# Patient Record
Sex: FEMALE | Race: WHITE | NOT HISPANIC OR LATINO | Employment: PART TIME | URBAN - METROPOLITAN AREA
[De-identification: names, ages, dates, MRNs, and addresses within clinical notes are randomized per-mention and may not be internally consistent; named-entity substitution may affect disease eponyms.]

---

## 2019-04-09 ENCOUNTER — DOCUMENTATION (OUTPATIENT)
Dept: FAMILY MEDICINE CLINIC | Facility: CLINIC | Age: 54
End: 2019-04-09

## 2019-04-09 ENCOUNTER — TELEPHONE (OUTPATIENT)
Dept: FAMILY MEDICINE CLINIC | Facility: CLINIC | Age: 54
End: 2019-04-09

## 2019-04-09 DIAGNOSIS — E78.00 HYPERCHOLESTEREMIA: Primary | ICD-10-CM

## 2019-04-09 DIAGNOSIS — E55.9 VITAMIN D DEFICIENCY: ICD-10-CM

## 2019-04-10 PROBLEM — R03.0 ELEVATED BLOOD PRESSURE READING: Status: ACTIVE | Noted: 2017-11-23

## 2019-04-10 PROBLEM — G43.009 MIGRAINE WITHOUT AURA AND WITHOUT STATUS MIGRAINOSUS, NOT INTRACTABLE: Status: ACTIVE | Noted: 2017-11-23

## 2019-04-10 PROBLEM — E55.9 VITAMIN D DEFICIENCY: Status: ACTIVE | Noted: 2017-11-23

## 2019-04-10 PROBLEM — M79.7 FIBROMYALGIA: Status: ACTIVE | Noted: 2017-11-23

## 2019-04-10 PROBLEM — E78.00 HYPERCHOLESTEREMIA: Status: ACTIVE | Noted: 2017-11-23

## 2019-04-10 PROBLEM — G47.00 PERSISTENT INSOMNIA: Status: ACTIVE | Noted: 2018-02-23

## 2019-04-17 RX ORDER — NAPROXEN 500 MG/1
500 TABLET ORAL 2 TIMES DAILY PRN
COMMUNITY
Start: 2019-01-17 | End: 2019-06-21 | Stop reason: ALTCHOICE

## 2019-04-17 RX ORDER — BIOTIN 1 MG
1000 TABLET ORAL DAILY
COMMUNITY
Start: 2018-05-25 | End: 2019-12-13 | Stop reason: ALTCHOICE

## 2019-04-17 RX ORDER — IRON POLYSACCHARIDE COMPLEX 150 MG
150 CAPSULE ORAL DAILY
COMMUNITY
Start: 2018-05-25 | End: 2019-07-11

## 2019-04-17 RX ORDER — SUMATRIPTAN 100 MG/1
100 TABLET, FILM COATED ORAL AS NEEDED
COMMUNITY
Start: 2019-01-17 | End: 2019-07-11 | Stop reason: SDUPTHER

## 2019-04-24 ENCOUNTER — TELEPHONE (OUTPATIENT)
Dept: FAMILY MEDICINE CLINIC | Facility: CLINIC | Age: 54
End: 2019-04-24

## 2019-04-24 DIAGNOSIS — E61.1 LOW IRON: ICD-10-CM

## 2019-04-24 DIAGNOSIS — N92.6 ABNORMAL MENSTRUAL PERIODS: Primary | ICD-10-CM

## 2019-04-26 ENCOUNTER — TELEPHONE (OUTPATIENT)
Dept: FAMILY MEDICINE CLINIC | Facility: CLINIC | Age: 54
End: 2019-04-26

## 2019-05-15 ENCOUNTER — LAB (OUTPATIENT)
Dept: LAB | Facility: CLINIC | Age: 54
End: 2019-05-15
Payer: COMMERCIAL

## 2019-05-15 DIAGNOSIS — E78.00 HYPERCHOLESTEREMIA: ICD-10-CM

## 2019-05-15 DIAGNOSIS — E61.1 LOW IRON: ICD-10-CM

## 2019-05-15 DIAGNOSIS — N92.6 ABNORMAL MENSTRUAL PERIODS: ICD-10-CM

## 2019-05-15 DIAGNOSIS — E55.9 VITAMIN D DEFICIENCY: ICD-10-CM

## 2019-05-15 LAB
25(OH)D3 SERPL-MCNC: 22.4 NG/ML (ref 30–100)
ALBUMIN SERPL BCP-MCNC: 3.7 G/DL (ref 3.5–5)
ALP SERPL-CCNC: 81 U/L (ref 46–116)
ALT SERPL W P-5'-P-CCNC: 24 U/L (ref 12–78)
ANION GAP SERPL CALCULATED.3IONS-SCNC: 3 MMOL/L (ref 4–13)
AST SERPL W P-5'-P-CCNC: 14 U/L (ref 5–45)
BASOPHILS # BLD AUTO: 0.03 THOUSANDS/ΜL (ref 0–0.1)
BASOPHILS NFR BLD AUTO: 0 % (ref 0–1)
BILIRUB SERPL-MCNC: 0.56 MG/DL (ref 0.2–1)
BUN SERPL-MCNC: 8 MG/DL (ref 5–25)
CALCIUM SERPL-MCNC: 8.4 MG/DL (ref 8.3–10.1)
CHLORIDE SERPL-SCNC: 105 MMOL/L (ref 100–108)
CHOLEST SERPL-MCNC: 221 MG/DL (ref 50–200)
CO2 SERPL-SCNC: 30 MMOL/L (ref 21–32)
CREAT SERPL-MCNC: 0.7 MG/DL (ref 0.6–1.3)
EOSINOPHIL # BLD AUTO: 0.15 THOUSAND/ΜL (ref 0–0.61)
EOSINOPHIL NFR BLD AUTO: 2 % (ref 0–6)
ERYTHROCYTE [DISTWIDTH] IN BLOOD BY AUTOMATED COUNT: 13 % (ref 11.6–15.1)
FERRITIN SERPL-MCNC: 14 NG/ML (ref 8–388)
FSH SERPL-ACNC: 26.4 MIU/ML
GFR SERPL CREATININE-BSD FRML MDRD: 101 ML/MIN/1.73SQ M
GLUCOSE P FAST SERPL-MCNC: 77 MG/DL (ref 65–99)
HCT VFR BLD AUTO: 46.9 % (ref 34.8–46.1)
HDLC SERPL-MCNC: 54 MG/DL (ref 40–60)
HGB BLD-MCNC: 14.9 G/DL (ref 11.5–15.4)
IMM GRANULOCYTES # BLD AUTO: 0.06 THOUSAND/UL (ref 0–0.2)
IMM GRANULOCYTES NFR BLD AUTO: 1 % (ref 0–2)
LDLC SERPL CALC-MCNC: 145 MG/DL (ref 0–100)
LH SERPL-ACNC: 20.3 MIU/ML
LYMPHOCYTES # BLD AUTO: 1.91 THOUSANDS/ΜL (ref 0.6–4.47)
LYMPHOCYTES NFR BLD AUTO: 26 % (ref 14–44)
MCH RBC QN AUTO: 28 PG (ref 26.8–34.3)
MCHC RBC AUTO-ENTMCNC: 31.8 G/DL (ref 31.4–37.4)
MCV RBC AUTO: 88 FL (ref 82–98)
MONOCYTES # BLD AUTO: 0.57 THOUSAND/ΜL (ref 0.17–1.22)
MONOCYTES NFR BLD AUTO: 8 % (ref 4–12)
NEUTROPHILS # BLD AUTO: 4.62 THOUSANDS/ΜL (ref 1.85–7.62)
NEUTS SEG NFR BLD AUTO: 63 % (ref 43–75)
NONHDLC SERPL-MCNC: 167 MG/DL
NRBC BLD AUTO-RTO: 0 /100 WBCS
PLATELET # BLD AUTO: 231 THOUSANDS/UL (ref 149–390)
PMV BLD AUTO: 10.9 FL (ref 8.9–12.7)
POTASSIUM SERPL-SCNC: 3.9 MMOL/L (ref 3.5–5.3)
PROT SERPL-MCNC: 7.1 G/DL (ref 6.4–8.2)
RBC # BLD AUTO: 5.33 MILLION/UL (ref 3.81–5.12)
SODIUM SERPL-SCNC: 138 MMOL/L (ref 136–145)
TRIGL SERPL-MCNC: 111 MG/DL
WBC # BLD AUTO: 7.34 THOUSAND/UL (ref 4.31–10.16)

## 2019-05-15 PROCEDURE — 83002 ASSAY OF GONADOTROPIN (LH): CPT

## 2019-05-15 PROCEDURE — 36415 COLL VENOUS BLD VENIPUNCTURE: CPT

## 2019-05-15 PROCEDURE — 82306 VITAMIN D 25 HYDROXY: CPT

## 2019-05-15 PROCEDURE — 83001 ASSAY OF GONADOTROPIN (FSH): CPT

## 2019-05-15 PROCEDURE — 80061 LIPID PANEL: CPT

## 2019-05-15 PROCEDURE — 85025 COMPLETE CBC W/AUTO DIFF WBC: CPT

## 2019-05-15 PROCEDURE — 82728 ASSAY OF FERRITIN: CPT

## 2019-05-15 PROCEDURE — 80053 COMPREHEN METABOLIC PANEL: CPT

## 2019-05-16 ENCOUNTER — TELEPHONE (OUTPATIENT)
Dept: NEUROLOGY | Facility: CLINIC | Age: 54
End: 2019-05-16

## 2019-05-17 ENCOUNTER — OFFICE VISIT (OUTPATIENT)
Dept: FAMILY MEDICINE CLINIC | Facility: CLINIC | Age: 54
End: 2019-05-17
Payer: COMMERCIAL

## 2019-05-17 VITALS
WEIGHT: 156.8 LBS | HEIGHT: 62 IN | OXYGEN SATURATION: 99 % | RESPIRATION RATE: 16 BRPM | SYSTOLIC BLOOD PRESSURE: 136 MMHG | BODY MASS INDEX: 28.85 KG/M2 | HEART RATE: 76 BPM | TEMPERATURE: 97.6 F | DIASTOLIC BLOOD PRESSURE: 90 MMHG

## 2019-05-17 DIAGNOSIS — N95.1 PERIMENOPAUSE: ICD-10-CM

## 2019-05-17 DIAGNOSIS — Z00.00 WELL ADULT EXAM: ICD-10-CM

## 2019-05-17 DIAGNOSIS — Z12.31 SCREENING MAMMOGRAM, ENCOUNTER FOR: ICD-10-CM

## 2019-05-17 DIAGNOSIS — J30.2 SEASONAL ALLERGIES: ICD-10-CM

## 2019-05-17 DIAGNOSIS — G43.009 MIGRAINE WITHOUT AURA AND WITHOUT STATUS MIGRAINOSUS, NOT INTRACTABLE: Primary | ICD-10-CM

## 2019-05-17 DIAGNOSIS — G47.00 PERSISTENT INSOMNIA: ICD-10-CM

## 2019-05-17 DIAGNOSIS — Z12.11 SCREENING FOR COLON CANCER: ICD-10-CM

## 2019-05-17 DIAGNOSIS — R79.0 LOW FERRITIN: ICD-10-CM

## 2019-05-17 DIAGNOSIS — M79.7 FIBROMYALGIA: ICD-10-CM

## 2019-05-17 DIAGNOSIS — E55.9 VITAMIN D DEFICIENCY: ICD-10-CM

## 2019-05-17 DIAGNOSIS — R03.0 ELEVATED BLOOD PRESSURE READING: ICD-10-CM

## 2019-05-17 DIAGNOSIS — E78.00 HYPERCHOLESTEREMIA: ICD-10-CM

## 2019-05-17 PROCEDURE — 99214 OFFICE O/P EST MOD 30 MIN: CPT | Performed by: FAMILY MEDICINE

## 2019-05-17 PROCEDURE — 99396 PREV VISIT EST AGE 40-64: CPT | Performed by: FAMILY MEDICINE

## 2019-05-17 RX ORDER — ONDANSETRON HYDROCHLORIDE 8 MG/1
8 TABLET, FILM COATED ORAL EVERY 8 HOURS PRN
Qty: 20 TABLET | Refills: 0 | Status: SHIPPED | OUTPATIENT
Start: 2019-05-17 | End: 2019-11-22 | Stop reason: ALTCHOICE

## 2019-05-17 RX ORDER — AMITRIPTYLINE HYDROCHLORIDE 25 MG/1
25 TABLET, FILM COATED ORAL
Qty: 30 TABLET | Refills: 1 | Status: SHIPPED | OUTPATIENT
Start: 2019-05-17 | End: 2019-07-26 | Stop reason: SDUPTHER

## 2019-05-17 RX ORDER — RIZATRIPTAN BENZOATE 10 MG/1
10 TABLET ORAL ONCE AS NEEDED
Qty: 9 TABLET | Refills: 0 | Status: SHIPPED | OUTPATIENT
Start: 2019-05-17 | End: 2019-06-21

## 2019-05-20 ENCOUNTER — TELEPHONE (OUTPATIENT)
Dept: FAMILY MEDICINE CLINIC | Facility: CLINIC | Age: 54
End: 2019-05-20

## 2019-05-20 DIAGNOSIS — H66.001 NON-RECURRENT ACUTE SUPPURATIVE OTITIS MEDIA OF RIGHT EAR WITHOUT SPONTANEOUS RUPTURE OF TYMPANIC MEMBRANE: Primary | ICD-10-CM

## 2019-05-20 RX ORDER — AMOXICILLIN 500 MG/1
500 CAPSULE ORAL 2 TIMES DAILY
Qty: 14 CAPSULE | Refills: 0 | Status: SHIPPED | OUTPATIENT
Start: 2019-05-20 | End: 2019-05-27

## 2019-05-29 ENCOUNTER — TELEPHONE (OUTPATIENT)
Dept: FAMILY MEDICINE CLINIC | Facility: CLINIC | Age: 54
End: 2019-05-29

## 2019-05-29 DIAGNOSIS — T36.95XA ANTIBIOTIC-INDUCED YEAST INFECTION: Primary | ICD-10-CM

## 2019-05-29 DIAGNOSIS — B37.9 ANTIBIOTIC-INDUCED YEAST INFECTION: Primary | ICD-10-CM

## 2019-05-30 RX ORDER — FLUCONAZOLE 150 MG/1
150 TABLET ORAL ONCE
Qty: 2 TABLET | Refills: 0 | Status: SHIPPED | OUTPATIENT
Start: 2019-05-30 | End: 2019-05-30

## 2019-06-21 ENCOUNTER — OFFICE VISIT (OUTPATIENT)
Dept: FAMILY MEDICINE CLINIC | Facility: CLINIC | Age: 54
End: 2019-06-21
Payer: COMMERCIAL

## 2019-06-21 VITALS
DIASTOLIC BLOOD PRESSURE: 90 MMHG | SYSTOLIC BLOOD PRESSURE: 140 MMHG | HEART RATE: 90 BPM | OXYGEN SATURATION: 96 % | TEMPERATURE: 98.1 F | WEIGHT: 158 LBS | HEIGHT: 62 IN | BODY MASS INDEX: 29.08 KG/M2

## 2019-06-21 DIAGNOSIS — M79.7 FIBROMYALGIA: ICD-10-CM

## 2019-06-21 DIAGNOSIS — G43.009 MIGRAINE WITHOUT AURA AND WITHOUT STATUS MIGRAINOSUS, NOT INTRACTABLE: Primary | ICD-10-CM

## 2019-06-21 DIAGNOSIS — R03.0 ELEVATED BLOOD PRESSURE READING: ICD-10-CM

## 2019-06-21 PROCEDURE — 3008F BODY MASS INDEX DOCD: CPT | Performed by: FAMILY MEDICINE

## 2019-06-21 PROCEDURE — 1036F TOBACCO NON-USER: CPT | Performed by: FAMILY MEDICINE

## 2019-06-21 PROCEDURE — 99214 OFFICE O/P EST MOD 30 MIN: CPT | Performed by: FAMILY MEDICINE

## 2019-06-21 RX ORDER — PROPRANOLOL HCL 60 MG
60 CAPSULE, EXTENDED RELEASE 24HR ORAL DAILY
Qty: 30 CAPSULE | Refills: 1 | Status: SHIPPED | OUTPATIENT
Start: 2019-06-21 | End: 2019-09-05 | Stop reason: ALTCHOICE

## 2019-07-11 ENCOUNTER — LAB (OUTPATIENT)
Dept: LAB | Facility: CLINIC | Age: 54
End: 2019-07-11
Payer: COMMERCIAL

## 2019-07-11 ENCOUNTER — OFFICE VISIT (OUTPATIENT)
Dept: FAMILY MEDICINE CLINIC | Facility: CLINIC | Age: 54
End: 2019-07-11
Payer: COMMERCIAL

## 2019-07-11 ENCOUNTER — TELEPHONE (OUTPATIENT)
Dept: FAMILY MEDICINE CLINIC | Facility: CLINIC | Age: 54
End: 2019-07-11

## 2019-07-11 VITALS
RESPIRATION RATE: 18 BRPM | TEMPERATURE: 97.7 F | WEIGHT: 158.2 LBS | HEIGHT: 62 IN | BODY MASS INDEX: 29.11 KG/M2 | SYSTOLIC BLOOD PRESSURE: 128 MMHG | HEART RATE: 90 BPM | DIASTOLIC BLOOD PRESSURE: 78 MMHG | OXYGEN SATURATION: 99 %

## 2019-07-11 DIAGNOSIS — R21 RASH: ICD-10-CM
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
## 2019-07-11 DIAGNOSIS — G43.009 MIGRAINE WITHOUT AURA AND WITHOUT STATUS MIGRAINOSUS, NOT INTRACTABLE: ICD-10-CM
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
## 2019-07-11 PROCEDURE — 1036F TOBACCO NON-USER: CPT | Performed by: FAMILY MEDICINE

## 2019-07-11 PROCEDURE — 3008F BODY MASS INDEX DOCD: CPT | Performed by: FAMILY MEDICINE

## 2019-07-11 PROCEDURE — 99214 OFFICE O/P EST MOD 30 MIN: CPT | Performed by: FAMILY MEDICINE

## 2019-07-11 PROCEDURE — 36415 COLL VENOUS BLD VENIPUNCTURE: CPT

## 2019-07-11 PROCEDURE — 86618 LYME DISEASE ANTIBODY: CPT

## 2019-07-11 RX ORDER — RIZATRIPTAN BENZOATE 10 MG/1
10 TABLET ORAL
COMMUNITY
End: 2019-07-11

## 2019-07-11 RX ORDER — METHYLPREDNISOLONE 4 MG/1
TABLET ORAL
Refills: 0 | COMMUNITY
Start: 2019-06-27 | End: 2019-07-11

## 2019-07-11 RX ORDER — DOXYCYCLINE HYCLATE 100 MG/1
200 CAPSULE ORAL EVERY 12 HOURS SCHEDULED
Qty: 2 CAPSULE | Refills: 0 | Status: SHIPPED | OUTPATIENT
Start: 2019-07-11 | End: 2019-07-12

## 2019-07-11 RX ORDER — FERROUS SULFATE 325(65) MG
325 TABLET ORAL
COMMUNITY

## 2019-07-11 RX ORDER — BETAMETHASONE DIPROPIONATE 0.5 MG/G
CREAM TOPICAL 2 TIMES DAILY
Qty: 15 G | Refills: 0 | Status: SHIPPED | OUTPATIENT
Start: 2019-07-11 | End: 2019-11-22 | Stop reason: ALTCHOICE

## 2019-07-11 RX ORDER — SUMATRIPTAN 100 MG/1
100 TABLET, FILM COATED ORAL AS NEEDED
Qty: 30 TABLET | Refills: 2 | Status: SHIPPED | OUTPATIENT
Start: 2019-07-11 | End: 2020-01-17 | Stop reason: SDUPTHER

## 2019-07-11 NOTE — PROGRESS NOTES
Assessment/Plan:   Willy Chawla was seen today for tick removal     Diagnoses and all orders for this visit:    Tick bite, initial encounter  See below  -     doxycycline hyclate (VIBRAMYCIN) 100 mg capsule; Take 2 capsules (200 mg total) by mouth every 12 (twelve) hours for 1 dose  -     betamethasone dipropionate (DIPROSONE) 0 05 % cream; Apply topically 2 (two) times a day 2 weeks maximum  Do not use on face or groin  -     Lyme Antibody Profile with reflex to WB; Future    Rash  Pt concerned this is Lyme disease  Rash more consistent with an allergic reaction  With high lyme prevelent area will treat with one time dose of Doxy 200 mg, prescription betamethasone to itchy area  Difficult to assess symptoms of lyme as these are her baseline symptoms  Recommend wait a few weeks to check Lyme titer unless her baseline symptoms increase    -     doxycycline hyclate (VIBRAMYCIN) 100 mg capsule; Take 2 capsules (200 mg total) by mouth every 12 (twelve) hours for 1 dose  -     betamethasone dipropionate (DIPROSONE) 0 05 % cream; Apply topically 2 (two) times a day 2 weeks maximum  Do not use on face or groin  -     Lyme Antibody Profile with reflex to WB; Future    Migraine without aura and without status migrainosus, not intractable  -     SUMAtriptan (IMITREX) 100 mg tablet; Take 1 tablet (100 mg total) by mouth as needed for migraine      There are no Patient Instructions on file for this visit  Return for Keep scheduled appointment  Subjective:    HPI  Willy Chawla is a 48 y o  female who presents with:  Chief Complaint     Tick Removal        HPI     Tick Removal      Additional comments: tick bite on right shoulder blade  bright red spot  was itching before, not currently  noticed it was itching on tuesday          Last edited by Barney Varner MA on 7/11/2019  1:12 PM  (History)        ---Above per clinical staff & reviewed  ---        2 days ago noted something on back  Itchy   Not painful   No fevers or chills   Normal joint aches and pains  Fatigue but feels it may be medicine  She did not see any tick or insect -   Just sees ticks in her area so assumed it was a tick   Area very itchy     Headaches are definitely better  Using imitrex less often  Got her period again  Headaches not as severe  Blood pressure much better on Inderal  Will cancel nurse visit for blood pressure check  No side effects of inderal      The following portions of the patient's history were reviewed and updated as appropriate: allergies, current medications, past family history, past medical history, past social history, past surgical history and problem list   Review of Systems  ROS:  all others negative - no chest pain, SOB, normal urine and bowels  no GERD  sleeping well  mood good     Objective:    /78   Pulse 90   Temp 97 7 °F (36 5 °C) (Tympanic)   Resp 18   Ht 5' 1 81" (1 57 m)   Wt 71 8 kg (158 lb 3 2 oz)   SpO2 99%   BMI 29 11 kg/m²   Wt Readings from Last 3 Encounters:   07/11/19 71 8 kg (158 lb 3 2 oz)   06/21/19 71 7 kg (158 lb)   05/17/19 71 1 kg (156 lb 12 8 oz)     BP Readings from Last 3 Encounters:   07/11/19 128/78   06/21/19 140/90   05/17/19 136/90     Current Outpatient Medications   Medication Sig Dispense Refill    amitriptyline (ELAVIL) 25 mg tablet Take 1 tablet (25 mg total) by mouth daily at bedtime 30 tablet 1    Cholecalciferol (VITAMIN D3) 1000 units CAPS Take 1,000 Units by mouth daily      ferrous sulfate 325 (65 Fe) mg tablet Take 325 mg by mouth      ondansetron (ZOFRAN) 8 mg tablet Take 1 tablet (8 mg total) by mouth every 8 (eight) hours as needed for nausea or vomiting (with migraine) 20 tablet 0    propranolol (INDERAL LA) 60 mg 24 hr capsule Take 1 capsule (60 mg total) by mouth daily 30 capsule 1    SUMAtriptan (IMITREX) 100 mg tablet Take 1 tablet (100 mg total) by mouth as needed for migraine 30 tablet 2    betamethasone dipropionate (DIPROSONE) 0 05 % cream Apply topically 2 (two) times a day 2 weeks maximum  Do not use on face or groin  15 g 0     No current facility-administered medications for this visit  Physical Exam   Skin:           Constitutional: she appears well-developed and well-nourished  HENT: Head: Normocephalic  Right Ear: External ear normal  Tympanic membrane normal    Left Ear: External ear normal  Tympanic membrane normal    Nose: Nose normal  No mucosal edema, No rhinorrhea  Right sinus exhibits no maxillary sinus tenderness  Left sinus exhibits no maxillary sinus tenderness  Mouth/Throat: Oropharynx is clear and moist    Eyes: Normal conjunctiva  No erythema  No discharge  Neck: No pain on exam  Neck supple  Cardiovascular: Normal rate, regular rhythm and normal heart sounds  Pulmonary/Chest: Effort normal and breath sounds normal    Abdominal: Soft  Bowel sounds are normal  There is no tenderness  Lymphadenopathy: she has no cervical adenopathy  Neurological: she is alert and oriented to person, place, and time  Skin: Skin is warm and dry  Psychiatric: she has a normal mood and affect   her behavior is normal

## 2019-07-12 NOTE — TELEPHONE ENCOUNTER
I assume this is in regards to the doxycycline - 2 tablets one time for a total of 200 mg once  Please call that in

## 2019-07-13 LAB
B BURGDOR IGG SER IA-ACNC: 0.06
B BURGDOR IGM SER IA-ACNC: 0.66

## 2019-07-14 NOTE — RESULT ENCOUNTER NOTE
Call patient with lab results - Lyme is negative  Please order another test to be done in 4 weeks to ensure it remains negative  (use dx tick bite and rash)

## 2019-07-14 NOTE — ASSESSMENT & PLAN NOTE
Failed Maxalt  Using Imitrex less since starting elavil, Inderal along with ferrous sulfate and vitamin D  Will refill Imitrex today  Did get bad migraine with her menses, her first one in 9 months

## 2019-07-15 ENCOUNTER — TELEPHONE (OUTPATIENT)
Dept: FAMILY MEDICINE CLINIC | Facility: CLINIC | Age: 54
End: 2019-07-15
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
## 2019-07-15 DIAGNOSIS — R21 RASH: ICD-10-CM

## 2019-07-26 DIAGNOSIS — G43.009 MIGRAINE WITHOUT AURA AND WITHOUT STATUS MIGRAINOSUS, NOT INTRACTABLE: ICD-10-CM

## 2019-07-29 NOTE — TELEPHONE ENCOUNTER
Medication: Amitriptyline 25mg  Last office visit: 7/11/19  Next office visit: 9/27/19  Labs: 5/15/19  Last refilled: 5/17/19  Pharmacy: on file

## 2019-07-30 RX ORDER — AMITRIPTYLINE HYDROCHLORIDE 25 MG/1
TABLET, FILM COATED ORAL
Qty: 30 TABLET | Refills: 2 | Status: SHIPPED | OUTPATIENT
Start: 2019-07-30 | End: 2019-11-03 | Stop reason: SDUPTHER

## 2019-08-21 ENCOUNTER — TELEPHONE (OUTPATIENT)
Dept: FAMILY MEDICINE CLINIC | Facility: CLINIC | Age: 54
End: 2019-08-21

## 2019-08-21 NOTE — TELEPHONE ENCOUNTER
Patient called, was seen on 7/11/19 for possible lymes r/t tick bite  Per patient, rash present and has grown 4 times in the size since her visit with us  Tender to the touch, hard, warm to the touch  Was prescribed doxy and completed the course  Patient is out of state in [facility]  Will be home Sunday  Spoke to Dr Jose Eduardo Rosales - patient should seek treatment at an Urgent Care  Advised patient and she agreed  Will call our office if a follow up is necessary for when she returns home  Will have Urgent Care send us results and OV note

## 2019-09-03 ENCOUNTER — TELEPHONE (OUTPATIENT)
Dept: FAMILY MEDICINE CLINIC | Facility: CLINIC | Age: 54
End: 2019-09-03

## 2019-09-03 NOTE — TELEPHONE ENCOUNTER
Please schedule a visit here first and we can refer her if needed  (there is a 4 month wait to get into derm)

## 2019-09-03 NOTE — TELEPHONE ENCOUNTER
Hi Dr Woody Marlow  Placed call to patient  Patient informed visit Urgent Care was told possible cellulitis  Patient is requesting referral to dermatology  Site remains red and irritated  Thank you

## 2019-09-03 NOTE — TELEPHONE ENCOUNTER
Concern: tick bite  Symptoms: not better, red and enlarged  Duration: since 7/11/19  Remedies tried at home for relief:  Dr Jacinto Smith gave her a one time antibiotics, was seen at urgent care 2 weeks ago because site was red, warm and itchy  Finished antibiotics from urgent care  She wondering if she should be seen by Derm?

## 2019-09-05 ENCOUNTER — LAB (OUTPATIENT)
Dept: LAB | Facility: CLINIC | Age: 54
End: 2019-09-05
Payer: COMMERCIAL

## 2019-09-05 ENCOUNTER — OFFICE VISIT (OUTPATIENT)
Dept: FAMILY MEDICINE CLINIC | Facility: CLINIC | Age: 54
End: 2019-09-05
Payer: COMMERCIAL

## 2019-09-05 VITALS
WEIGHT: 159 LBS | HEIGHT: 62 IN | RESPIRATION RATE: 16 BRPM | TEMPERATURE: 98.6 F | BODY MASS INDEX: 29.26 KG/M2 | OXYGEN SATURATION: 90 % | HEART RATE: 61 BPM | DIASTOLIC BLOOD PRESSURE: 86 MMHG | SYSTOLIC BLOOD PRESSURE: 120 MMHG

## 2019-09-05 DIAGNOSIS — R21 RASH: Primary | ICD-10-CM

## 2019-09-05 DIAGNOSIS — A69.20 ERYTHEMA MIGRANS (LYME DISEASE): ICD-10-CM

## 2019-09-05 PROCEDURE — 36415 COLL VENOUS BLD VENIPUNCTURE: CPT

## 2019-09-05 PROCEDURE — 99214 OFFICE O/P EST MOD 30 MIN: CPT | Performed by: FAMILY MEDICINE

## 2019-09-05 PROCEDURE — 86617 LYME DISEASE ANTIBODY: CPT

## 2019-09-05 RX ORDER — FLUCONAZOLE 150 MG/1
TABLET ORAL
Refills: 0 | COMMUNITY
Start: 2019-08-22 | End: 2019-11-22 | Stop reason: ALTCHOICE

## 2019-09-05 RX ORDER — DOXYCYCLINE 100 MG/1
100 TABLET ORAL 2 TIMES DAILY
Qty: 42 TABLET | Refills: 0 | Status: SHIPPED | OUTPATIENT
Start: 2019-09-05 | End: 2019-09-26

## 2019-09-05 NOTE — PROGRESS NOTES
Assessment/Plan:   Meri Murphy was seen today for follow-up  Diagnoses and all orders for this visit:    Rash    Erythema migrans (Lyme disease)  Rash first seen 7/10 as small area of erythema with itch and warmth after presumed bite  At that time treated with one dose of doxy in case of lyme and steroid cream given  Lyme test ordered but pt had blood work that day instead of waiting  Apparently it never got better and pt seen in [facility] 8/22/19 and given bactrim x 10 days  for presumed cellulitis  Area of erythema has actually worsened since this time  Area of initial erythema is still indurated and warm  Today diff dx reviewed with pt - again presumed to be Lyme rash with bulls eye appearance  Will start 21 day course of Doxy  Will check Lyme Western Blot  If Lyme test negative or rash not responding to antibiotics will refer to derm  -     doxycycline (ADOXA) 100 MG tablet; Take 1 tablet (100 mg total) by mouth 2 (two) times a day for 21 days  -     Lyme Antibody Profile with reflex to WB; Future        There are no Patient Instructions on file for this visit  No follow-ups on file  Subjective:    HPI  Meri Murphy is a 48 y o  female who presents with:  Chief Complaint     Follow-up        HPI     Follow-up      Additional comments: Derm referral           Last edited by Iris Kelly MA on 9/5/2019  2:52 PM  (History)        ---Above per clinical staff & reviewed  ---      Pt here because rash she came in for in July got much worse  Was seen at [facility] 8/22/19 and given bactrim x 10 days for cellulitis but this did not help   Area no longer itchy  Little sore at swollen area of bra  No new lyme symptoms - she has her usual aches, fatigue from fibro  Does not feel any new symptoms  Denies new headache or numbness         The following portions of the patient's history were reviewed and updated as appropriate: allergies, current medications, past family history, past medical history, past social history, past surgical history and problem list   Review of Systems  ROS:  all others negative - no chest pain, SOB, normal urine and bowels  no GERD  sleeping well  mood good  Objective:    /86 (BP Location: Left arm)   Pulse 61   Temp 98 6 °F (37 °C)   Resp 16   Ht 5' 1 81" (1 57 m)   Wt 72 1 kg (159 lb)   SpO2 90%   BMI 29 26 kg/m²   Wt Readings from Last 3 Encounters:   09/05/19 72 1 kg (159 lb)   07/11/19 71 8 kg (158 lb 3 2 oz)   06/21/19 71 7 kg (158 lb)     BP Readings from Last 3 Encounters:   09/05/19 120/86   07/11/19 128/78   06/21/19 140/90     Current Outpatient Medications   Medication Sig Dispense Refill    amitriptyline (ELAVIL) 25 mg tablet TAKE 1 TABLET(25 MG) BY MOUTH DAILY AT BEDTIME 30 tablet 2    betamethasone dipropionate (DIPROSONE) 0 05 % cream Apply topically 2 (two) times a day 2 weeks maximum  Do not use on face or groin  15 g 0    cholecalciferol (VITAMIN D3) 1,000 units tablet Take 1,000 Units by mouth      ferrous sulfate 325 (65 Fe) mg tablet Take 325 mg by mouth      fluconazole (DIFLUCAN) 150 mg tablet   0    ondansetron (ZOFRAN) 8 mg tablet Take 1 tablet (8 mg total) by mouth every 8 (eight) hours as needed for nausea or vomiting (with migraine) 20 tablet 0    SUMAtriptan (IMITREX) 100 mg tablet Take 1 tablet (100 mg total) by mouth as needed for migraine 30 tablet 2    Cholecalciferol (VITAMIN D3) 1000 units CAPS Take 1,000 Units by mouth daily      doxycycline (ADOXA) 100 MG tablet Take 1 tablet (100 mg total) by mouth 2 (two) times a day for 21 days 42 tablet 0     No current facility-administered medications for this visit  Physical Exam   Constitutional: she appears well-developed and well-nourished  HENT: Head: Normocephalic  Right Ear: External ear normal  Tympanic membrane normal    Left Ear: External ear normal  Tympanic membrane normal    Nose: Nose normal  No mucosal edema, No rhinorrhea  Right sinus exhibits no maxillary sinus tenderness     Left sinus exhibits no maxillary sinus tenderness  Mouth/Throat: Oropharynx is clear and moist    Eyes: Normal conjunctiva  No erythema  No discharge  Neck: No pain on exam  Neck supple  Cardiovascular: Normal rate, regular rhythm and normal heart sounds  Pulmonary/Chest: Effort normal and breath sounds normal    Abdominal: Soft  Bowel sounds are normal  There is no tenderness  Lymphadenopathy: she has no cervical pr axillary adenopathy  Neurological: she is alert and oriented to person, place, and time  Skin: large rash, entire back  See photos in Media and below   On right side, at bra line large area of warmth and induration  No areas of open skin  No oozing or purulence  No papules or pustules  Outer border irregular and erythematous, then clearing , then red again  Psychiatric: she has a normal mood and affect   her behavior is normal

## 2019-09-09 DIAGNOSIS — R21 RASH: Primary | ICD-10-CM

## 2019-09-09 LAB — MISCELLANEOUS LAB TEST RESULT: NORMAL

## 2019-09-09 NOTE — RESULT ENCOUNTER NOTE
Call patient with lab results - her lyme test is negative for Lyme disease  Ask if her rash is any better   If it is not better I would like her to stay on antibiotics and please call derm to try to get her in for an appointment  (Kisha [phone])

## 2019-09-13 ENCOUNTER — HOSPITAL ENCOUNTER (OUTPATIENT)
Dept: MAMMOGRAPHY | Facility: CLINIC | Age: 54
Discharge: HOME | End: 2019-09-13
Payer: COMMERCIAL

## 2019-09-13 VITALS — BODY MASS INDEX: 30.02 KG/M2 | HEIGHT: 61 IN | WEIGHT: 159 LBS

## 2019-09-13 DIAGNOSIS — Z12.31 SCREENING MAMMOGRAM, ENCOUNTER FOR: ICD-10-CM

## 2019-09-13 PROCEDURE — 77067 SCR MAMMO BI INCL CAD: CPT

## 2019-09-13 PROCEDURE — 77063 BREAST TOMOSYNTHESIS BI: CPT

## 2019-11-01 ENCOUNTER — OFFICE VISIT (OUTPATIENT)
Dept: FAMILY MEDICINE CLINIC | Facility: CLINIC | Age: 54
End: 2019-11-01
Payer: COMMERCIAL

## 2019-11-01 VITALS
DIASTOLIC BLOOD PRESSURE: 112 MMHG | HEART RATE: 85 BPM | WEIGHT: 160.8 LBS | OXYGEN SATURATION: 97 % | BODY MASS INDEX: 30.36 KG/M2 | SYSTOLIC BLOOD PRESSURE: 162 MMHG | TEMPERATURE: 99.1 F | HEIGHT: 61 IN

## 2019-11-01 DIAGNOSIS — H10.022 PINK EYE, LEFT: ICD-10-CM

## 2019-11-01 DIAGNOSIS — G43.009 MIGRAINE WITHOUT AURA AND WITHOUT STATUS MIGRAINOSUS, NOT INTRACTABLE: Primary | ICD-10-CM

## 2019-11-01 DIAGNOSIS — M79.7 FIBROMYALGIA: ICD-10-CM

## 2019-11-01 DIAGNOSIS — R03.0 ELEVATED BLOOD PRESSURE READING: ICD-10-CM

## 2019-11-01 DIAGNOSIS — M54.2 ONGOING CERVICAL PAIN: ICD-10-CM

## 2019-11-01 DIAGNOSIS — G89.29 ONGOING CERVICAL PAIN: ICD-10-CM

## 2019-11-01 PROCEDURE — 99214 OFFICE O/P EST MOD 30 MIN: CPT | Performed by: FAMILY MEDICINE

## 2019-11-01 PROCEDURE — 3008F BODY MASS INDEX DOCD: CPT | Performed by: FAMILY MEDICINE

## 2019-11-01 RX ORDER — POLYMYXIN B SULFATE AND TRIMETHOPRIM 1; 10000 MG/ML; [USP'U]/ML
1 SOLUTION OPHTHALMIC EVERY 4 HOURS
Qty: 10 ML | Refills: 0 | Status: SHIPPED | OUTPATIENT
Start: 2019-11-01 | End: 2019-11-22 | Stop reason: ALTCHOICE

## 2019-11-01 RX ORDER — CYCLOBENZAPRINE HCL 10 MG
10 TABLET ORAL
Qty: 30 TABLET | Refills: 1 | Status: SHIPPED | OUTPATIENT
Start: 2019-11-01 | End: 2020-04-03 | Stop reason: ALTCHOICE

## 2019-11-01 NOTE — PROGRESS NOTES
Assessment/Plan:  1  Migraine without aura and without status migrainosus, not intractable  Headaches not well controlled  These are clearly not all migraines, and pt understands that she is overusing Imitrex and having rebound headaches too  Her neck pain is also contributing to the headaches  She has failed multiple preventive medications  She will need to have these headaches managed by neuro  - Ambulatory referral to Neurology; Future    2  Elevated blood pressure reading  Blood pressure high today, she was not feeling good and in pain  Last visit she was off of inderal and blood pressure was perfect  Recommend we recheck when she is feeling better and if remains high she agrees to try a different blood pressure medication  3  Fibromyalgia  Admits to aches and pains, suspect this is contributing to her neck pain and headaches  She has massage appointment scheduled  4  Ongoing cervical pain  She is not open to going back to PT at this time, did not find that to be helpful  She has a massage scheduled  Offered appointment with ortho and she did not feel this would be helpful either  She did agree to see pain mgmt as her severe neck pain has caused her ongoing headaches  Will prescribe Flexeril to be used at bedtime as needed only  Will order x-rays to be done prior to pain mgmt appointment  - Ambulatory referral to Pain Management; Future  - XR spine cervical complete 4 or 5 vw non injury; Future  - cyclobenzaprine (FLEXERIL) 10 mg tablet; Take 1 tablet (10 mg total) by mouth daily at bedtime as needed for muscle spasms  Dispense: 30 tablet; Refill: 1    5  Pink eye, left  Warm compressed three times a day and prescription:  - polymyxin b-trimethoprim (POLYTRIM) ophthalmic solution; Administer 1 drop into the left eye every 4 (four) hours  Dispense: 10 mL; Refill: 0    Return for nurse visit week for BP check        Subjective:   Pool Stevens is a 48 y o  female here today for a follow-up on her current medical conditions:  Patient Active Problem List   Diagnosis    Elevated blood pressure reading    Fibromyalgia    Hypercholesteremia    Migraine without aura and without status migrainosus, not intractable    Persistent insomnia    Vitamin D deficiency    Low ferritin        Current Outpatient Medications   Medication Sig Dispense Refill    amitriptyline (ELAVIL) 25 mg tablet TAKE 1 TABLET(25 MG) BY MOUTH DAILY AT BEDTIME 30 tablet 2    betamethasone dipropionate (DIPROSONE) 0 05 % cream Apply topically 2 (two) times a day 2 weeks maximum  Do not use on face or groin  15 g 0    cholecalciferol (VITAMIN D3) 1,000 units tablet Take 1,000 Units by mouth      ferrous sulfate 325 (65 Fe) mg tablet Take 325 mg by mouth      fluconazole (DIFLUCAN) 150 mg tablet   0    ondansetron (ZOFRAN) 8 mg tablet Take 1 tablet (8 mg total) by mouth every 8 (eight) hours as needed for nausea or vomiting (with migraine) 20 tablet 0    SUMAtriptan (IMITREX) 100 mg tablet Take 1 tablet (100 mg total) by mouth as needed for migraine 30 tablet 2    Cholecalciferol (VITAMIN D3) 1000 units CAPS Take 1,000 Units by mouth daily      cyclobenzaprine (FLEXERIL) 10 mg tablet Take 1 tablet (10 mg total) by mouth daily at bedtime as needed for muscle spasms 30 tablet 1    polymyxin b-trimethoprim (POLYTRIM) ophthalmic solution Administer 1 drop into the left eye every 4 (four) hours 10 mL 0     No current facility-administered medications for this visit  HPI:  Chief Complaint   Patient presents with    Follow-up     Migraine, B/P     Conjunctivitis     Left eye x 1 day     Immunizations     Patient declines flu vaccine this season      -- Above per clinical staff and reviewed  --    PHQ-9 Depression Screening    PHQ-9:    Frequency of the following problems over the past two weeks:              f/u headaches  started inderal for prevention  tolerated well, helped bp too   did not find benefit with elavil  maxalt did not help  9/9 worsening rash referred to derm  LYme 3 positive IgG, 1 positive IgM  Today:  Feels like having same migraine for 5 weeks   Not finding releief on sumatriptan   Right hand and arm going numb coming from neck pain   Massage therapy appointment   Not steady   Stopped inderal after a month - feels it was hallucinations from this medication   Has not gotten period - headache started before her cycle  Taking imitrex often so tried to not take it but did not do well  Taking it almost daily  Across face, temples and into back of head   Not affected by light, little by noise, smells bother her  No nausea  Last imaging of brain 12 or 16 years ago  At that time had bad sinus infection  Red eye started yesterday  Discharge  Pain/irritation  Threw out her contacts  Some congestion few days  "slight fever" here 99 1  Never needed to see derm as rash got better  2 aleve and imitrex today for migraine  Rates pain 6-7/10  The following portions of the patient's history were reviewed and updated as appropriate: allergies, current medications, past family history, past medical history, past social history, past surgical history and problem list     Objective:  Vitals:  BP (!) 162/112   Pulse 85   Temp 99 1 °F (37 3 °C)   Ht 5' 1" (1 549 m)   Wt 72 9 kg (160 lb 12 8 oz)   SpO2 97%   BMI 30 38 kg/m²    Wt Readings from Last 3 Encounters:   11/01/19 72 9 kg (160 lb 12 8 oz)   09/13/19 72 1 kg (159 lb)   09/05/19 72 1 kg (159 lb)      BP Readings from Last 3 Encounters:   11/01/19 (!) 162/112   09/05/19 120/86   07/11/19 128/78        Review of Systems   She has no other concerns  No unexpected weight changes  No chest pain, SOB, or palpitations  No GERD  No changes in bowels or bladder  Sleeping well  No mood changes  Physical Exam   Constitutional:  she appears well-developed and well-nourished but in pain with headache  HENT: Head: Normocephalic  Left eye conjunctiva erythema, injected     Neck: Neck supple  Cardiovascular: Normal rate, regular rhythm and normal heart sounds  Pulmonary/Chest: Effort normal and breath sounds normal  No wheezes, rales, or rhonchi  Abdominal: Soft  Bowel sounds are normal  There is no tenderness  No hepatosplenomegaly  Musculoskeletal: she exhibits no edema  Lymphadenopathy: she has no cervical adenopathy  Neurological: she is alert and oriented to person, place, and time  The patient is alert and oriented to person, place, and time  CN II - XII intact  Muscle strength full and equal  Patellar reflexes normal  Negative romberg  Skin: Skin is warm and dry  Psychiatric: she has a normal mood and affect   her behavior is normal  Thought content normal

## 2019-11-03 DIAGNOSIS — G43.009 MIGRAINE WITHOUT AURA AND WITHOUT STATUS MIGRAINOSUS, NOT INTRACTABLE: ICD-10-CM

## 2019-11-04 RX ORDER — AMITRIPTYLINE HYDROCHLORIDE 25 MG/1
TABLET, FILM COATED ORAL
Qty: 30 TABLET | Refills: 5 | Status: SHIPPED | OUTPATIENT
Start: 2019-11-04 | End: 2020-04-03 | Stop reason: ALTCHOICE

## 2019-11-08 ENCOUNTER — CLINICAL SUPPORT (OUTPATIENT)
Dept: FAMILY MEDICINE CLINIC | Facility: CLINIC | Age: 54
End: 2019-11-08

## 2019-11-08 VITALS — DIASTOLIC BLOOD PRESSURE: 82 MMHG | HEART RATE: 72 BPM | SYSTOLIC BLOOD PRESSURE: 132 MMHG

## 2019-11-08 DIAGNOSIS — G43.009 MIGRAINE WITHOUT AURA AND WITHOUT STATUS MIGRAINOSUS, NOT INTRACTABLE: ICD-10-CM

## 2019-11-08 DIAGNOSIS — I10 BENIGN ESSENTIAL HYPERTENSION: Primary | ICD-10-CM

## 2019-11-08 NOTE — PROGRESS NOTES
Patient came in for a BP nurse visit  Patient is taking Elavil 25 mg and Flexeril 10 mg  Per patient she was on Inderal previously but caused hallucinations  Patient stated Dr Paras Luciano was going to prescribe additional medications based off of BP visit and when she sees neurologist   Patients neurologist appointment is in March 2020  No refills needed at this time

## 2019-11-11 RX ORDER — TIMOLOL MALEATE 10 MG/1
10 TABLET ORAL 2 TIMES DAILY WITH MEALS
Qty: 60 TABLET | Refills: 1 | Status: SHIPPED | OUTPATIENT
Start: 2019-11-11 | End: 2020-01-17 | Stop reason: SINTOL

## 2019-11-11 NOTE — PROGRESS NOTES
Placed call to patient, reviewed medication instructions  Should patient continue to take Elavil 25 mg and Flexeril 10 mg in additional to the Timolol? Patient inquired about neck XRAY - xray on file and will mail to patient

## 2019-11-11 NOTE — PROGRESS NOTES
Please let pt know I want her to try a different blood pressure med that is also good for migraines  We will start with a low dose - 10 mg twice a day  Please have her schedule a nurse visit in 4 weeks for nurse visit for blood pressure check

## 2019-11-11 NOTE — PROGRESS NOTES
Spoke to Dr Paras Luciano, the Timolol should be taken in addition to the Elavil and Flexeril  Placed call to patient, advised to take Timolol in addition to the current medications  Patient understood

## 2019-11-22 ENCOUNTER — APPOINTMENT (OUTPATIENT)
Dept: RADIOLOGY | Facility: MEDICAL CENTER | Age: 54
End: 2019-11-22
Payer: COMMERCIAL

## 2019-11-22 ENCOUNTER — CONSULT (OUTPATIENT)
Dept: PAIN MEDICINE | Facility: MEDICAL CENTER | Age: 54
End: 2019-11-22
Payer: COMMERCIAL

## 2019-11-22 VITALS
HEIGHT: 62 IN | SYSTOLIC BLOOD PRESSURE: 148 MMHG | HEART RATE: 74 BPM | BODY MASS INDEX: 29.63 KG/M2 | DIASTOLIC BLOOD PRESSURE: 91 MMHG | WEIGHT: 161 LBS

## 2019-11-22 DIAGNOSIS — M54.2 ONGOING CERVICAL PAIN: ICD-10-CM

## 2019-11-22 DIAGNOSIS — M47.812 CERVICAL SPONDYLOSIS: Primary | ICD-10-CM

## 2019-11-22 DIAGNOSIS — G89.29 ONGOING CERVICAL PAIN: ICD-10-CM

## 2019-11-22 DIAGNOSIS — M54.12 CERVICAL RADICULITIS: ICD-10-CM

## 2019-11-22 DIAGNOSIS — G44.86 CERVICOGENIC HEADACHE: ICD-10-CM

## 2019-11-22 PROCEDURE — 72050 X-RAY EXAM NECK SPINE 4/5VWS: CPT

## 2019-11-22 PROCEDURE — 99244 OFF/OP CNSLTJ NEW/EST MOD 40: CPT | Performed by: PHYSICAL MEDICINE & REHABILITATION

## 2019-11-22 NOTE — PROGRESS NOTES
Assessment  1  Cervical spondylosis    2  Ongoing cervical pain    3  Cervicogenic headache    4  Cervical radiculitis        Plan  1  Patient will obtain her cervical spine x-ray hopefully today and try to have this copy to me  2  Patient will obtain right upper extremity EMG  3  Consider cervical medial branch nerve blocks for neck and headache pain  4  May consider cervical spine MRI depending on the results of the EMG, if she does have evidence of cervical radiculopathy will pursue cervical epidural steroid injection   5  Recommend neurology consultation regarding chronic migraine    My impressions and treatment recommendations were discussed in detail with the patient who verbalized understanding and had no further questions  Discharge instructions were provided  I personally saw and examined the patient and I agree with the above discussed plan of care  Orders Placed This Encounter   Procedures    EMG 1 Limb     Standing Status:   Future     Standing Expiration Date:   11/22/2020     Order Specific Question:   Location:     Answer:   Right upper     Order Specific Question:   Reason for Exam:     Answer:   right arm pain and paresthesias     Order Specific Question:   Possible Diagnosis: Answer:   median neuropathy (carpal tunnel syndrome)     Order Specific Question:   Possible Diagnosis: Answer:   cervical neuropathy     New Medications Ordered This Visit   Medications    magnesium gluconate (MAGONATE) 500 mg tablet     Sig: Take 800 mg by mouth daily at bedtime       History of Present Illness    Amelia Timmons is a 48 y o  female seen in consultation at the request of Dr Kobe Malave regarding chronic neck pain and presumed cervicogenic headache  The patient has been experiencing symptoms for a number of years related to her neck pain but more recently has been developing paresthesias and numbness in the right upper extremity which is concerning to her      She is unable to determine any inciting event or trauma  She describes moderate to severe intensity symptoms rated as a 5 to 8/10 which are nearly constant without any typical pattern  She experiences numbness and pins and needle sensation in the arm and has sharp pain, pressure-like pain, and throbbing in her neck  Aggravating factors are unknown  She is unable to determine any significant alleviating factors  Past medical history is significant for fibromyalgia  She has tried physical therapy in the past without relief, moderate relief of local heat or ice application, acupuncture, and chiropractic manipulation  I have personally reviewed and/or updated the patient's past medical history, past surgical history, family history, social history, current medications, allergies, and vital signs today  Review of Systems   Constitutional: Negative for fever and unexpected weight change  HENT: Negative for trouble swallowing  Eyes: Positive for visual disturbance  Respiratory: Negative for shortness of breath and wheezing  Cardiovascular: Negative for chest pain and palpitations  Gastrointestinal: Negative for constipation, diarrhea, nausea and vomiting  Endocrine: Negative for cold intolerance, heat intolerance and polydipsia  Genitourinary: Negative for difficulty urinating and frequency  Musculoskeletal: Positive for neck pain (posterior)  Negative for arthralgias, gait problem, joint swelling and myalgias  Skin: Negative for rash  Neurological: Positive for numbness (right arm ) and headaches  Negative for dizziness, seizures, syncope and weakness  Hematological: Does not bruise/bleed easily  Psychiatric/Behavioral: Negative for dysphoric mood  All other systems reviewed and are negative        Patient Active Problem List   Diagnosis    Elevated blood pressure reading    Fibromyalgia    Hypercholesteremia    Migraine without aura and without status migrainosus, not intractable    Persistent insomnia    Vitamin D deficiency    Low ferritin       Past Medical History:   Diagnosis Date    Fibromyalgia     Migraine     Neck pain     Osteoarthritis     lumbar spine       Past Surgical History:   Procedure Laterality Date    CHOLECYSTECTOMY      DILATION AND CURETTAGE OF UTERUS      TUBAL LIGATION         Family History   Problem Relation Age of Onset    MORGAN disease Mother     Heart attack Father     Diabetes Father     Prostate cancer Father 79   [facility] Lup[facility] Sister     Raynaud syndrome Sister     Sjogren's syndrome Sister     Breast cancer Maternal Grandmother 79    No Known Problems Paternal Grandfather     No Known Problems Daughter     Prostate cancer Maternal Grandfather 79    Colon cancer Paternal Grandmother [de-identified]    No Known Problems Sister     No Known Problems Brother     No Known Problems Son     No Known Problems Son     No Known Problems Son     No Known Problems Son     No Known Problems Son     No Known Problems Maternal Uncle     No Known Problems Paternal Aunt     No Known Problems Paternal Uncle        Social History     Occupational History    Occupation: Administration   Tobacco Use    Smoking status: Never Smoker    Smokeless tobacco: Never Used   Substance and Sexual Activity    Alcohol use: Yes     Frequency: 2-4 times a month     Drinks per session: 1 or 2     Binge frequency: Never    Drug use: Never    Sexual activity: Yes     Partners: Male     Birth control/protection: Female Sterilization       Current Outpatient Medications on File Prior to Visit   Medication Sig    amitriptyline (ELAVIL) 25 mg tablet TAKE 1 TABLET(25 MG) BY MOUTH DAILY AT BEDTIME    cholecalciferol (VITAMIN D3) 1,000 units tablet Take 1,000 Units by mouth    cyclobenzaprine (FLEXERIL) 10 mg tablet Take 1 tablet (10 mg total) by mouth daily at bedtime as needed for muscle spasms    ferrous sulfate 325 (65 Fe) mg tablet Take 325 mg by mouth    magnesium gluconate (MAGONATE) 500 mg tablet Take 800 mg by mouth daily at bedtime    SUMAtriptan (IMITREX) 100 mg tablet Take 1 tablet (100 mg total) by mouth as needed for migraine    timolol (BLOCADREN) 10 mg tablet Take 1 tablet (10 mg total) by mouth 2 (two) times a day with meals    Cholecalciferol (VITAMIN D3) 1000 units CAPS Take 1,000 Units by mouth daily    [DISCONTINUED] betamethasone dipropionate (DIPROSONE) 0 05 % cream Apply topically 2 (two) times a day 2 weeks maximum  Do not use on face or groin   [DISCONTINUED] fluconazole (DIFLUCAN) 150 mg tablet     [DISCONTINUED] ondansetron (ZOFRAN) 8 mg tablet Take 1 tablet (8 mg total) by mouth every 8 (eight) hours as needed for nausea or vomiting (with migraine)    [DISCONTINUED] polymyxin b-trimethoprim (POLYTRIM) ophthalmic solution Administer 1 drop into the left eye every 4 (four) hours     No current facility-administered medications on file prior to visit  Allergies   Allergen Reactions    Propranolol Other (See Comments)     Halluncinations    Strawberry Extract      Other reaction(s): Other (See Comments)  Congestion  Physical Exam    /91   Pulse 74   Ht 5' 2" (1 575 m)   Wt 73 kg (161 lb)   BMI 29 45 kg/m²     CERVICAL  General: Well-developed, well-nourished individual in no acute distress  Mental: Appropriate mood and affect  Grossly oriented with coherent speech and thought processing   Neuro:  Cranial nerves: Cranial nerve function is grossly intact bilaterally   Strength: Bilateral upper extremity strength is normal and symmetric   No atrophy or tone abnormalities noted   Reflexes: Bilateral upper extremity muscle stretch reflexes are physiologic and symmetric   No Hoover sign   Sensation: No loss of sensation is noted   Foraminal Compression Maneuvers:  Spurling sign is equivocal on the right        Gait:  Gait/gross motor: Gait is normal  Station is normal      Musculoskeletal:  Palpation: Inspection and palpation of the spine and extremities are unremarkable except for significant tenderness to palpation along the cervical facet joints reproducing some of her pain complaint  Spine:  Significant limitation in range of motion of cervical extension and rotation to either direction which reproduces pain    No gross axial skeletal deformities   Skin: Skin inspection grossly negative for erythema, breakdown, or concerning lesions in affected area   Lymph: No lymphadenopathy is appreciated in the involved extremity   Vessels: No lower extremity edema   Lungs: Breathing is comfortable and regular  No dyspnea noted during examination   Eyes: Visual field grossly intact to confrontation  No redness appreciated  ENT: No craniofacial deformities or asymmetry  No neck masses appreciated            Imaging

## 2019-11-29 NOTE — RESULT ENCOUNTER NOTE
[facility] - Please let Virgen Hernandezjustyn know that the x-ray shows mild arthritis in her neck     DR Wesly Bauer -I will forward the reading of the x-ray to Dr Jf Page per his request --

## 2019-12-03 ENCOUNTER — TELEPHONE (OUTPATIENT)
Dept: PAIN MEDICINE | Facility: MEDICAL CENTER | Age: 54
End: 2019-12-03

## 2019-12-03 NOTE — TELEPHONE ENCOUNTER
----- Message from Luda Andres DO sent at 12/2/2019  3:48 PM EST -----  Minimal C5-6 degenerative change    Could offer C4-6 MBB if interested, whichever side she prefers to start with

## 2019-12-03 NOTE — TELEPHONE ENCOUNTER
--pt to C/B w/ her decision--    S/W pt  Advised pt of the same  Explained MBB and RFA procedures to pt  Pt stated she wants to review the pamphlets that her got at her appt and research it and think about what she wants to do  Pt stated she has a busy week this week and she will C/B on Monday next week with her decision  Gave [facility] direct line for her to C/B

## 2019-12-13 ENCOUNTER — OFFICE VISIT (OUTPATIENT)
Dept: FAMILY MEDICINE CLINIC | Facility: CLINIC | Age: 54
End: 2019-12-13
Payer: COMMERCIAL

## 2019-12-13 ENCOUNTER — TELEPHONE (OUTPATIENT)
Dept: NON INVASIVE DIAGNOSTICS | Facility: HOSPITAL | Age: 54
End: 2019-12-13

## 2019-12-13 VITALS
WEIGHT: 161.8 LBS | SYSTOLIC BLOOD PRESSURE: 114 MMHG | HEART RATE: 63 BPM | RESPIRATION RATE: 12 BRPM | HEIGHT: 62 IN | TEMPERATURE: 98.7 F | BODY MASS INDEX: 29.77 KG/M2 | OXYGEN SATURATION: 95 % | DIASTOLIC BLOOD PRESSURE: 92 MMHG

## 2019-12-13 DIAGNOSIS — R94.31 ABNORMAL EKG: ICD-10-CM

## 2019-12-13 DIAGNOSIS — G43.009 MIGRAINE WITHOUT AURA AND WITHOUT STATUS MIGRAINOSUS, NOT INTRACTABLE: ICD-10-CM

## 2019-12-13 DIAGNOSIS — I44.7 LBBB (LEFT BUNDLE BRANCH BLOCK): ICD-10-CM

## 2019-12-13 DIAGNOSIS — I10 BENIGN ESSENTIAL HYPERTENSION: Primary | ICD-10-CM

## 2019-12-13 DIAGNOSIS — Z11.4 SCREENING FOR HIV (HUMAN IMMUNODEFICIENCY VIRUS): ICD-10-CM

## 2019-12-13 DIAGNOSIS — R00.2 PALPITATIONS: ICD-10-CM

## 2019-12-13 DIAGNOSIS — R79.0 LOW FERRITIN: ICD-10-CM

## 2019-12-13 DIAGNOSIS — E55.9 VITAMIN D DEFICIENCY: ICD-10-CM

## 2019-12-13 DIAGNOSIS — R20.2 NUMBNESS AND TINGLING IN RIGHT HAND: ICD-10-CM

## 2019-12-13 DIAGNOSIS — R20.0 NUMBNESS AND TINGLING IN RIGHT HAND: ICD-10-CM

## 2019-12-13 DIAGNOSIS — R07.9 CHEST PAIN, UNSPECIFIED TYPE: ICD-10-CM

## 2019-12-13 DIAGNOSIS — M79.7 FIBROMYALGIA: ICD-10-CM

## 2019-12-13 PROBLEM — R03.0 ELEVATED BLOOD PRESSURE READING: Status: RESOLVED | Noted: 2017-11-23 | Resolved: 2019-12-13

## 2019-12-13 PROCEDURE — 3008F BODY MASS INDEX DOCD: CPT | Performed by: FAMILY MEDICINE

## 2019-12-13 PROCEDURE — 93000 ELECTROCARDIOGRAM COMPLETE: CPT | Performed by: FAMILY MEDICINE

## 2019-12-13 PROCEDURE — 99215 OFFICE O/P EST HI 40 MIN: CPT | Performed by: FAMILY MEDICINE

## 2019-12-13 RX ORDER — DULOXETIN HYDROCHLORIDE 30 MG/1
30 CAPSULE, DELAYED RELEASE ORAL DAILY
Qty: 30 CAPSULE | Refills: 0 | Status: SHIPPED | OUTPATIENT
Start: 2019-12-13 | End: 2020-01-17

## 2019-12-13 NOTE — PROGRESS NOTES
Assessment/Plan:  1  Benign essential hypertension  Not at goal   Timolol was added for blood pressure and hoping it would benefit migraines also  It has not helped migraines  dBP not at goal    Pt asymptomatic at this time but with abnormal EKG today will send for stress test  Will change meds depending on outcome of this  Close follow up needed  - Ferritin; Future  - Vitamin D 25 hydroxy; Future  - Comprehensive metabolic panel; Future  - HIV 1/2 AG-AB combo; Future    2  Chest pain, unspecified type  See above  Chest pain musculoskeletal in nature at this time from FM, but EKG changes are concerning and need workup  If any new symptoms over weekend to ER   - POCT ECG    3  Palpitations  See above  - POCT ECG    4  Abnormal EKG  See above  - Echo stress test w contrast if indicated; Future    5  LBBB (left bundle branch block)  See above   - Echo stress test w contrast if indicated; Future    6  Low ferritin  Update labs   - Ferritin; Future  - Vitamin D 25 hydroxy; Future  - Comprehensive metabolic panel; Future  - HIV 1/2 AG-AB combo; Future    7  Vitamin D deficiency  Update labs   - Ferritin; Future  - Vitamin D 25 hydroxy; Future  - Comprehensive metabolic panel; Future  - HIV 1/2 AG-AB combo; Future    8  Screening for HIV (human immunodeficiency virus)  Update labs   - Ferritin; Future  - Vitamin D 25 hydroxy; Future  - Comprehensive metabolic panel; Future  - HIV 1/2 AG-AB combo; Future    9  Numbness and tingling in right hand  Advised her that whole hand numbness will require an EMG and then further workup either with neuro (scheduled)  or MRI cervical spine depending on outcome  (she is now following with pain mgmt too)   - EMG 2 Limb Upper Extremity; Future    10  Fibromyalgia  Not well controlled  She has never taken a medication dedicated to this  After reviewed risks and benefits and side effects she agrees to try Cymbalta 30 mg daily  Follow up 4 weeks     - DULoxetine (CYMBALTA) 30 mg delayed release capsule; Take 1 capsule (30 mg total) by mouth daily  Dispense: 30 capsule; Refill: 0    11  Migraine without aura and without status migrainosus, not intractable  Not well controlled  Still awaiting neuro appointment  Total visit time 45 minutes and greater than 50% spent counseling/coordinating care of the patient regarding above  Reviewed diagnosis as above, treatment options including differential dx for abnormal EKG, workup options, meds, risks and benefits and side effects of these treatment options  Return in about 1 month (around 1/13/2020) for pain check FM   Subjective:   Susan Liu is a 48 y o  female here today for a follow-up on her current medical conditions:  Patient Active Problem List   Diagnosis    Fibromyalgia    Hypercholesteremia    Migraine without aura and without status migrainosus, not intractable    Persistent insomnia    Vitamin D deficiency    Low ferritin    Benign essential hypertension        Current Outpatient Medications   Medication Sig Dispense Refill    amitriptyline (ELAVIL) 25 mg tablet TAKE 1 TABLET(25 MG) BY MOUTH DAILY AT BEDTIME 30 tablet 5    cholecalciferol (VITAMIN D3) 1,000 units tablet Take 1,000 Units by mouth      cyclobenzaprine (FLEXERIL) 10 mg tablet Take 1 tablet (10 mg total) by mouth daily at bedtime as needed for muscle spasms 30 tablet 1    ferrous sulfate 325 (65 Fe) mg tablet Take 325 mg by mouth      magnesium gluconate (MAGONATE) 500 mg tablet Take 800 mg by mouth daily at bedtime      SUMAtriptan (IMITREX) 100 mg tablet Take 1 tablet (100 mg total) by mouth as needed for migraine 30 tablet 2    timolol (BLOCADREN) 10 mg tablet Take 1 tablet (10 mg total) by mouth 2 (two) times a day with meals 60 tablet 1    DULoxetine (CYMBALTA) 30 mg delayed release capsule Take 1 capsule (30 mg total) by mouth daily 30 capsule 0     No current facility-administered medications for this visit          HPI:  Chief Complaint   Patient presents with    Follow-up     B/P     -- Above per clinical staff and reviewed  --    PHQ-9 Depression Screening    PHQ-9:    Frequency of the following problems over the past two weeks:              f/u BP  headaches  started inderal for prevention  tolerated well, helped bp too  did not find benefit with elavil  maxalt did not help  9/9 worsening rash referred to derm  LYme 3 positive IgG, 1 positive IgM  saw pain mgmt for cervical pain   Timolol started for migraines and BP  (Failed Inderal)  referred to neuro   due for HIV   ferritin vit D lipids CMP   Today:  Last few nights heart racing when getting into bed heart fast   Not skipping beats, not punding - just fast and only when she lays down to go to bed  Chest hurts but she thinks that is her muscles - fibro she thinks  Along tender point in her chest    [employer] 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 and she attributes the pain to this   Denies chest pressure with activities  Denies nausea or diaphoresis    March appointment with neuro   Not getting black out headaches   Using medicine often (Imitrex) - almost everyday   Wakes up with migraines   To start injections in her neck   Hand going numb from wrist down on right side     Not always sleeping deeply but does sleep       The following portions of the patient's history were reviewed and updated as appropriate: allergies, current medications, past family history, past medical history, past social history, past surgical history and problem list     Objective:  Vitals:  /92   Pulse 63   Temp 98 7 °F (37 1 °C)   Resp 12   Ht 5' 2" (1 575 m)   Wt 73 4 kg (161 lb 12 8 oz)   SpO2 95%   BMI 29 59 kg/m²    Wt Readings from Last 3 Encounters:   12/13/19 73 4 kg (161 lb 12 8 oz)   11/22/19 73 kg (161 lb)   11/01/19 72 9 kg (160 lb 12 8 oz)      BP Readings from Last 3 Encounters:   12/13/19 114/92   11/22/19 148/91   11/08/19 132/82        Review of Systems   She has no other concerns  No unexpected weight changes  + chest pain, shortness of breath with more strenuous activities , +  Palpitations in bed only  No GERD  No changes in bowels or bladder  Not sleeping well  No mood changes  Admits to more stressors  Physical Exam   Pulmonary/Chest:          Constitutional:  she appears well-developed and well-nourished  HENT: Head: Normocephalic  Neck: Neck supple  Cardiovascular: Normal rate, regular rhythm and normal heart sounds  + pain upon palpatation of anterior chest wall along FM trigger points  Pulmonary/Chest: Effort normal and breath sounds normal  No wheezes, rales, or rhonchi  Abdominal: Soft  Bowel sounds are normal  There is no tenderness  No hepatosplenomegaly  Musculoskeletal: she exhibits no edema  Lymphadenopathy: she has no cervical adenopathy  Neurological: she is alert and oriented to person, place, and time  Skin: Skin is warm and dry  Psychiatric: she has a normal mood and affect   her behavior is normal  Thought content normal

## 2019-12-13 NOTE — PATIENT INSTRUCTIONS
[employer] - Available in [employer] store and [employer] for free, has in-carroll purchases  Teaches about the chronic pain cycle and how to reverse it, while retraining your brain and nervous system for long-term results  Smart  [employer] guides user through updates in pain science to identify, target, eliminate the factors that are keeping user "stuck" in the pain cycle

## 2019-12-16 ENCOUNTER — TELEPHONE (OUTPATIENT)
Dept: FAMILY MEDICINE CLINIC | Facility: CLINIC | Age: 54
End: 2019-12-16

## 2019-12-16 ENCOUNTER — VBI (OUTPATIENT)
Dept: FAMILY MEDICINE CLINIC | Facility: CLINIC | Age: 54
End: 2019-12-16

## 2019-12-16 DIAGNOSIS — R94.31 ABNORMAL EKG: ICD-10-CM

## 2019-12-16 DIAGNOSIS — I44.7 LBBB (LEFT BUNDLE BRANCH BLOCK): Primary | ICD-10-CM

## 2019-12-16 NOTE — TELEPHONE ENCOUNTER
Patient is scheduled for her NM stress echo tomorrow and has not taken her medication, she has a headache and would like to know if there is anything that she can take for this until she is able to take her medication again?   Please let her know, her test is scheduled for tomorrow morning at 7:30am

## 2019-12-17 ENCOUNTER — HOSPITAL ENCOUNTER (OUTPATIENT)
Dept: NUCLEAR MEDICINE | Facility: HOSPITAL | Age: 54
Discharge: HOME | End: 2019-12-17
Attending: FAMILY MEDICINE
Payer: COMMERCIAL

## 2019-12-17 ENCOUNTER — HOSPITAL ENCOUNTER (OUTPATIENT)
Dept: NON INVASIVE DIAGNOSTICS | Facility: HOSPITAL | Age: 54
Discharge: HOME | End: 2019-12-17
Attending: FAMILY MEDICINE
Payer: COMMERCIAL

## 2019-12-17 DIAGNOSIS — I44.7 LBBB (LEFT BUNDLE BRANCH BLOCK): ICD-10-CM

## 2019-12-17 DIAGNOSIS — R94.31 ABNORMAL EKG: ICD-10-CM

## 2019-12-17 LAB
CHEST PAIN STATEMENT: NORMAL
MAX DIASTOLIC BP: 116 MMHG
MAX HEART RATE: 144 BPM
MAX PREDICTED HEART RATE: 168 BPM
MAX. SYSTOLIC BP: 183 MMHG
PROTOCOL NAME: NORMAL
REASON FOR TERMINATION: NORMAL
TARGET HR FORMULA: NORMAL
TEST INDICATION: NORMAL
TIME IN EXERCISE PHASE: NORMAL

## 2019-12-17 PROCEDURE — 93306 TTE W/DOPPLER COMPLETE: CPT | Performed by: INTERNAL MEDICINE

## 2019-12-17 PROCEDURE — 78452 HT MUSCLE IMAGE SPECT MULT: CPT

## 2019-12-17 PROCEDURE — 93017 CV STRESS TEST TRACING ONLY: CPT

## 2019-12-17 PROCEDURE — A9502 TC99M TETROFOSMIN: HCPCS

## 2019-12-17 RX ADMIN — REGADENOSON 0.4 MG: 0.08 INJECTION, SOLUTION INTRAVENOUS at 09:17

## 2019-12-19 DIAGNOSIS — I10 BENIGN ESSENTIAL HYPERTENSION: Primary | ICD-10-CM

## 2019-12-19 DIAGNOSIS — I10 BENIGN ESSENTIAL HYPERTENSION: ICD-10-CM

## 2019-12-19 RX ORDER — LISINOPRIL 5 MG/1
TABLET ORAL
Qty: 90 TABLET | Refills: 0 | Status: SHIPPED | OUTPATIENT
Start: 2019-12-19 | End: 2020-01-17 | Stop reason: SINTOL

## 2019-12-19 RX ORDER — LISINOPRIL 5 MG/1
5 TABLET ORAL DAILY
Qty: 30 TABLET | Refills: 1 | Status: SHIPPED | OUTPATIENT
Start: 2019-12-19 | End: 2019-12-19 | Stop reason: SDUPTHER

## 2019-12-30 ENCOUNTER — TELEPHONE (OUTPATIENT)
Dept: FAMILY MEDICINE CLINIC | Facility: CLINIC | Age: 54
End: 2019-12-30

## 2020-01-10 DIAGNOSIS — M79.7 FIBROMYALGIA: ICD-10-CM

## 2020-01-10 RX ORDER — DULOXETIN HYDROCHLORIDE 30 MG/1
CAPSULE, DELAYED RELEASE ORAL
Qty: 30 CAPSULE | Refills: 0 | OUTPATIENT
Start: 2020-01-10

## 2020-01-17 ENCOUNTER — OFFICE VISIT (OUTPATIENT)
Dept: FAMILY MEDICINE CLINIC | Facility: CLINIC | Age: 55
End: 2020-01-17
Payer: COMMERCIAL

## 2020-01-17 VITALS
OXYGEN SATURATION: 93 % | WEIGHT: 160 LBS | DIASTOLIC BLOOD PRESSURE: 100 MMHG | RESPIRATION RATE: 12 BRPM | HEIGHT: 62 IN | BODY MASS INDEX: 29.44 KG/M2 | HEART RATE: 86 BPM | TEMPERATURE: 98.5 F | SYSTOLIC BLOOD PRESSURE: 142 MMHG

## 2020-01-17 DIAGNOSIS — M79.7 FIBROMYALGIA: ICD-10-CM

## 2020-01-17 DIAGNOSIS — I44.7 LBBB (LEFT BUNDLE BRANCH BLOCK): ICD-10-CM

## 2020-01-17 DIAGNOSIS — G43.009 MIGRAINE WITHOUT AURA AND WITHOUT STATUS MIGRAINOSUS, NOT INTRACTABLE: Primary | ICD-10-CM

## 2020-01-17 DIAGNOSIS — I10 BENIGN ESSENTIAL HYPERTENSION: ICD-10-CM

## 2020-01-17 PROCEDURE — 3008F BODY MASS INDEX DOCD: CPT | Performed by: FAMILY MEDICINE

## 2020-01-17 PROCEDURE — 99214 OFFICE O/P EST MOD 30 MIN: CPT | Performed by: FAMILY MEDICINE

## 2020-01-17 RX ORDER — SUMATRIPTAN 100 MG/1
100 TABLET, FILM COATED ORAL AS NEEDED
Qty: 10 TABLET | Refills: 2 | Status: SHIPPED | OUTPATIENT
Start: 2020-01-17 | End: 2020-04-27 | Stop reason: SDUPTHER

## 2020-01-17 RX ORDER — DULOXETIN HYDROCHLORIDE 30 MG/1
CAPSULE, DELAYED RELEASE ORAL
Qty: 90 CAPSULE | Refills: 1 | Status: SHIPPED | OUTPATIENT
Start: 2020-01-17 | End: 2020-04-03 | Stop reason: ALTCHOICE

## 2020-01-17 RX ORDER — LISINOPRIL 5 MG/1
5 TABLET ORAL DAILY
Qty: 90 TABLET | Refills: 0
Start: 2020-01-17 | End: 2020-03-18 | Stop reason: SDUPTHER

## 2020-01-17 NOTE — PROGRESS NOTES
Assessment/Plan:  1  Migraine without aura and without status migrainosus, not intractable  Pt feels migraines may have been related to meds and stopped them all  Reviewed this is not likely due to pattern and history  Rebound headaches are likely and tension headache   Refilled imitrex today   To see neuro in March due to failing multiple prevention medications    - SUMAtriptan (IMITREX) 100 mg tablet; Take 1 tablet (100 mg total) by mouth as needed for migraine  Dispense: 10 tablet; Refill: 2    2  Benign essential hypertension  Not well controlled  She has not tolerated inderal or timolol (both started for migraine prevention and blood pressure control)  Then switched to lisinopril for htn but she felt this may have caused her migraines  Encouraged her to give lisinopril a try again  Call if headache return  To see cardio soon  - lisinopril (ZESTRIL) 5 mg tablet; Take 1 tablet (5 mg total) by mouth daily  Dispense: 90 tablet; Refill: 0    3  LBBB (left bundle branch block)  Echo scheduled  Cardio appointment scheduled  4  Fibromyalgia  Not well controlled  She had stopped amitriptyline not realizing this was for migraines, sleep and FM  for now will keep her off of this but may restart  Reviewed complex cycle of poor sleep, FM flare, headaches  Recommend we increase cymbalta since she had no side effects to this and try to get ahead of FM pain which may help sleep and headaches  Increase cymbalta from 30 mg to 60 mg X 2 weeks then 90 mg daily   - DULoxetine (CYMBALTA) 30 mg delayed release capsule; Two per day for 2 weeks then 3 per day for total of 90 mg daily  Dispense: 90 capsule; Refill: 1    Return in about 2 months (around 3/17/2020) for fibromyalgia, HA       Subjective:   Chelsy Mistyr is a 48 y o  female here today for a follow-up on her current medical conditions:  Patient Active Problem List   Diagnosis    Fibromyalgia    Hypercholesteremia    Migraine without aura and without status migrainosus, not intractable    Persistent insomnia    Vitamin D deficiency    Low ferritin    Benign essential hypertension        Current Outpatient Medications   Medication Sig Dispense Refill    amitriptyline (ELAVIL) 25 mg tablet TAKE 1 TABLET(25 MG) BY MOUTH DAILY AT BEDTIME (Patient not taking: Reported on 1/17/2020) 30 tablet 5    cholecalciferol (VITAMIN D3) 1,000 units tablet Take 1,000 Units by mouth      cyclobenzaprine (FLEXERIL) 10 mg tablet Take 1 tablet (10 mg total) by mouth daily at bedtime as needed for muscle spasms (Patient not taking: Reported on 1/17/2020) 30 tablet 1    DULoxetine (CYMBALTA) 30 mg delayed release capsule Two per day for 2 weeks then 3 per day for total of 90 mg daily 90 capsule 1    ferrous sulfate 325 (65 Fe) mg tablet Take 325 mg by mouth      lisinopril (ZESTRIL) 5 mg tablet Take 1 tablet (5 mg total) by mouth daily 90 tablet 0    magnesium gluconate (MAGONATE) 500 mg tablet Take 800 mg by mouth daily at bedtime      SUMAtriptan (IMITREX) 100 mg tablet Take 1 tablet (100 mg total) by mouth as needed for migraine 10 tablet 2     No current facility-administered medications for this visit  HPI:  Chief Complaint   Patient presents with    Follow-up     medication, pain      -- Above per clinical staff and reviewed  --      f/u BP  headaches  started inderal for prevention  tolerated well, helped bp too  did not find benefit with elavil  maxalt did not help  9/9 worsening rash referred to derm  LYme 3 positive IgG, 1 positive IgM  saw pain mgmt for cervical pain   Timolol started for migraines and BP  (Failed Inderal)  To see cardio Dec 2019 - echo ordered   referred to neuro   due for HIV   ferritin vit D lipids CMP     referred for EMG then neuro or MRI for f/u   last visit started Cymbalta for FM   She has appointment with neuro 3 5 2020 & 3 27 2020     12/17/19 NM stress test IMPRESSIONS: Normal study after pharmacologic vasodilation   Myocardial perfusion imaging was normal at rest and with stress  Labs were ordered but not done   Today:  Since last visit she self stopped all of her medications -   Noticed when she held them for stress test she did not have a headache   When she stopped timolol she did not have headache   switched to lisinopril but was still having headache   Then skipped meds one day by accident and did not have a headache   Then when she took the lisinopril she had a headache   Fibro horrible for a few weeks and migraine  Only got 6 Imitrex so she was panicking a little  To go to [facility] and thinks swimming will be good for her   [facility]   Only migraine this week was when she took the lisinopril     Blood pressure at home running 138-156/81-86  Echo scheduled next week   Dr Yfn Yoder [facility] appointment next week     Eating more fruit   Will get colonoscopy this year     The following portions of the patient's history were reviewed and updated as appropriate: allergies, current medications, past family history, past medical history, past social history, past surgical history and problem list   2  Objective:  Vitals:  /100   Pulse 86   Temp 98 5 °F (36 9 °C)   Resp 12   Ht 5' 2" (1 575 m)   Wt 72 6 kg (160 lb)   SpO2 93%   BMI 29 26 kg/m²    Wt Readings from Last 3 Encounters:   01/17/20 72 6 kg (160 lb)   12/13/19 73 4 kg (161 lb 12 8 oz)   11/22/19 73 kg (161 lb)      BP Readings from Last 3 Encounters:   01/17/20 142/100   12/13/19 114/92   11/22/19 148/91        Review of Systems   She has no other concerns  No unexpected weight changes  No chest pain, SOB, or palpitations  No GERD  No changes in bowels or bladder  Sleeping well  No mood changes  + myalgias kylah neck and upper back  Physical Exam   Constitutional:  she appears well-developed and well-nourished  HENT: Head: Normocephalic  Neck: Neck supple  Cardiovascular: Normal rate, regular rhythm and normal heart sounds     Pulmonary/Chest: Effort normal and breath sounds normal  No wheezes, rales, or rhonchi  Abdominal: Soft  Bowel sounds are normal  There is no tenderness  No hepatosplenomegaly  Musculoskeletal: she exhibits no edema  + pain upper back and neck  Lymphadenopathy: she has no cervical adenopathy  Neurological: she is alert and oriented to person, place, and time  Skin: Skin is warm and dry  Psychiatric: she has a normal mood and affect  her behavior is normal  Thought content normal      BMI Counseling: There is no height or weight on file to calculate BMI  The BMI is above normal  Nutrition recommendations include decreasing portion sizes  Exercise recommendations include exercising 3-5 times per week

## 2020-01-17 NOTE — PATIENT INSTRUCTIONS
Starting today Cymbalta 30 mg take two capsules daily for 2 weeks, then start 3 capsules daily for a total of 90 mg (for fibromylagia)   Next week restart Lisinopril 5 mg daily   Black Cohash herbal supplement

## 2020-03-05 ENCOUNTER — HOSPITAL ENCOUNTER (OUTPATIENT)
Dept: NEUROLOGY | Facility: CLINIC | Age: 55
Discharge: HOME | End: 2020-03-05
Payer: COMMERCIAL

## 2020-03-05 DIAGNOSIS — R20.0 NUMBNESS AND TINGLING IN RIGHT HAND: ICD-10-CM

## 2020-03-05 DIAGNOSIS — R20.2 NUMBNESS AND TINGLING IN RIGHT HAND: ICD-10-CM

## 2020-03-05 PROCEDURE — 95912 NRV CNDJ TEST 11-12 STUDIES: CPT | Performed by: PHYSICAL MEDICINE & REHABILITATION

## 2020-03-05 PROCEDURE — 95886 MUSC TEST DONE W/N TEST COMP: CPT | Performed by: PHYSICAL MEDICINE & REHABILITATION

## 2020-03-18 DIAGNOSIS — I10 BENIGN ESSENTIAL HYPERTENSION: ICD-10-CM

## 2020-03-18 RX ORDER — LISINOPRIL 5 MG/1
TABLET ORAL
Qty: 90 TABLET | Refills: 0 | OUTPATIENT
Start: 2020-03-18

## 2020-03-18 NOTE — TELEPHONE ENCOUNTER
Left detailed message (Amos Sexton per communication form in chart ) informing patient  and to call the office

## 2020-03-18 NOTE — TELEPHONE ENCOUNTER
Medication refill received from [facility]  Please let the patient know that we do not accept refills directly from the pharmacy

## 2020-03-19 DIAGNOSIS — I10 BENIGN ESSENTIAL HYPERTENSION: ICD-10-CM

## 2020-03-19 RX ORDER — LISINOPRIL 5 MG/1
TABLET ORAL
Qty: 90 TABLET | OUTPATIENT
Start: 2020-03-19

## 2020-03-19 RX ORDER — LISINOPRIL 5 MG/1
5 TABLET ORAL DAILY
Qty: 30 TABLET | Refills: 0 | Status: SHIPPED | OUTPATIENT
Start: 2020-03-19 | End: 2020-04-03

## 2020-03-27 ENCOUNTER — CONSULT (OUTPATIENT)
Dept: NEUROLOGY | Facility: CLINIC | Age: 55
End: 2020-03-27
Payer: COMMERCIAL

## 2020-03-27 VITALS
RESPIRATION RATE: 18 BRPM | WEIGHT: 164.2 LBS | HEART RATE: 80 BPM | HEIGHT: 62 IN | DIASTOLIC BLOOD PRESSURE: 66 MMHG | SYSTOLIC BLOOD PRESSURE: 143 MMHG | BODY MASS INDEX: 30.22 KG/M2

## 2020-03-27 DIAGNOSIS — M79.7 FIBROMYALGIA: ICD-10-CM

## 2020-03-27 DIAGNOSIS — G47.9 SLEEP DISTURBANCE: ICD-10-CM

## 2020-03-27 DIAGNOSIS — G43.009 MIGRAINE WITHOUT AURA AND WITHOUT STATUS MIGRAINOSUS, NOT INTRACTABLE: ICD-10-CM

## 2020-03-27 DIAGNOSIS — R55 SYNCOPE, UNSPECIFIED SYNCOPE TYPE: Primary | ICD-10-CM

## 2020-03-27 PROCEDURE — 99244 OFF/OP CNSLTJ NEW/EST MOD 40: CPT | Performed by: PSYCHIATRY & NEUROLOGY

## 2020-03-27 RX ORDER — GABAPENTIN 100 MG/1
CAPSULE ORAL
Qty: 180 CAPSULE | Refills: 2 | Status: SHIPPED | OUTPATIENT
Start: 2020-03-27 | End: 2020-05-15

## 2020-03-27 NOTE — PATIENT INSTRUCTIONS
Begin gabapentin 100 mg capsules taking 1 capsule at bedtime daily for 3 days then 2 capsules at bedtime daily for 3 days then 1 capsule in the morning and 2 capsules at bedtime daily  Please keep a headache diary  Reinstitute your oral magnesium daily supplement  Speak with your PCP regarding a possible referral to Rheumatology  Call in 2 weeks with a status update and to discuss possible further change in medication  Call before then should she have any questions or concerns    Please review your handouts which detail migraine, dietary non dietary triggers, over-the-counter agents helpful, medication overuse headache, etc

## 2020-03-27 NOTE — ASSESSMENT & PLAN NOTE
Patient's history quite consistent with a diagnosis of migraine without aura dating back to her mid teen years  Unfortunately, she has had increasing frequency and intensity over the past several years and although slightly improved as of late, still experiencing 1 to 2 significant episodes on a weekly basis  Interestingly, she also reports several brief episodes of loss of consciousness in association with her very intense migraines, the last occurring some 6-7 months ago  No history of any past documented seizure issues  Likely vasovagal origin here  She does have total body pain and 12+ areas of distinct trigger point tenderness, supporting the diagnosis of fibromyalgia  Certainly, this untreated could well be aggravating her headache circumstance  In addition, she has an attendant sleep disturbance with early night insomnia and multiple awakenings, supporting inadequate restorative sleep, a definite contributed to her headache situation  --the frequency of her migraine would suggest the appropriateness of beginning a daily preventative measure  In review she was tried in the past unsuccessfully on amitriptyline, Flexeril and duloxetine and was intolerant of propranolol  As an alternative, especially given her issues with fibromyalgia, trial on gabapentin  She will begin slowly with 100 mg 1 at bedtime nightly for 3 days, then 2 at bedtime daily for 3 days followed by 1 in the morning and 2 at bedtime daily  --to reinstitute her oral magnesium supplement (has not been using as of late)  --to maintain a headache diary  --to call in 2 weeks with a status report and to discuss possible further advancement in her gabapentin schedule  --baseline blood work to include CBC, CMP along with TSH, sed rate and B12 level    --given the change in character and intensity of her headaches and her reported syncopal episodes, MRI brain along with sleep-deprived EEG   --with her significant sleep issues, ambulatory referral to sleep medicine  --I have also suggested that she discuss with her PCP referral to Rheumatology given her diagnosis of fibromyalgia

## 2020-03-27 NOTE — PROGRESS NOTES
Patient ID: Ahmet Garcia is a 48 y o  female  Assessment/Plan:    Migraine without aura and without status migrainosus, not intractable       Syncopal episodes       Fibromyalgia       Sleep disturbance      Patient's history quite consistent with a diagnosis of migraine without aura dating back to her mid teen years  Unfortunately, she has had increasing frequency and intensity over the past several years and although slightly improved as of late, still experiencing 1 to 2 significant episodes on a weekly basis  Interestingly, she also reports several brief episodes of loss of consciousness in association with her very intense migraines, the last occurring some 6-7 months ago  No history of any past documented seizure issues  Likely vasovagal origin here  She does have total body pain and 12+ areas of distinct trigger point tenderness, supporting the diagnosis of fibromyalgia  Certainly, this untreated could well be aggravating her headache circumstance  In addition, she has an attendant sleep disturbance with early night insomnia and multiple awakenings, supporting inadequate restorative sleep, a definite contributed to her headache situation  --the frequency of her migraine would suggest the appropriateness of beginning a daily preventative measure  In review she was tried in the past unsuccessfully on amitriptyline, Flexeril and duloxetine and was intolerant of propranolol  As an alternative, especially given her issues with fibromyalgia, trial on gabapentin  She will begin slowly with 100 mg 1 at bedtime nightly for 3 days, then 2 at bedtime daily for 3 days followed by 1 in the morning and 2 at bedtime daily  --to reinstitute her oral magnesium supplement (has not been using as of late)  --to maintain a headache diary  --to call in 2 weeks with a status report and to discuss possible further advancement in her gabapentin schedule    --baseline blood work to include CBC, CMP along with TSH, sed rate and B12 level  --given the change in character and intensity of her headaches and her reported syncopal episodes, MRI brain along with sleep-deprived EEG   --with her significant sleep issues, ambulatory referral to sleep medicine  --I have also suggested that she discuss with her PCP referral to Rheumatology given her diagnosis of fibromyalgia  She will follow up in 10-12 weeks  Subjective:    HPI  Patient, 48years of age and right-handed, presents for further evaluation regarding ongoing headache issues  She reports her headaches for started when she was in her mid teens  Her headaches can either be holocranial or hemicranial, left much more so than right  She describes the quality of her discomfort as an intense pressure along with pulsation  Headaches vary from moderately severe to at times severe  She is aware of no specific aura or though does report some nonspecific prodrome  With her headaches she is is smell and light sensitive  She has occasional nausea and vomiting during her intense headaches  Over the past 2-3 years her headaches have been slowly increasing in frequency and intensity  At 1 point she was experiencing 3-4 episodes on a weekly basis  More recently, her frequency has been reduced to 1-2 on a weekly basis  She describes several episodes of brief loss of consciousness that occurred in conjunction with her most severe migraine episodes, the last occurring some 6-7 months ago  There is no past history of seizure  She does apparently have a history of fibromyalgia with an accompanying issue with early night insomnia and difficulties in staying awake, resulting in poor sleep  X-ray of cervical spine results reviewed  Minimal degenerative changes noted at the C5-6 level  She also has had a recent EMG study done which suggest the presence of a right carpal tunnel syndrome  Has not had recent blood work performed      Past Medical History:   Diagnosis Date    Fibromyalgia     Migraine     Neck pain     Osteoarthritis     lumbar spine     Past Surgical History:   Procedure Laterality Date    CHOLECYSTECTOMY      DILATION AND CURETTAGE OF UTERUS      TUBAL LIGATION       Social History     Socioeconomic History    Marital status: /Civil Union     Spouse name: None    Number of children: None    Years of education: None    Highest education level: None   Occupational History    Occupation: Administration   Social Needs    Financial resource strain: None    Food insecurity:     Worry: None     Inability: None    Transportation needs:     Medical: None     Non-medical: None   Tobacco Use    Smoking status: Never Smoker    Smokeless tobacco: Never Used   Substance and Sexual Activity    Alcohol use: Yes     Frequency: 2-4 times a month     Drinks per session: 1 or 2     Binge frequency: Never    Drug use: Never    Sexual activity: Yes     Partners: Male     Birth control/protection: Female Sterilization   Lifestyle    Physical activity:     Days per week: None     Minutes per session: None    Stress: None   Relationships    Social connections:     Talks on phone: None     Gets together: None     Attends Mu-ism service: None     Active member of club or organization: None     Attends meetings of clubs or organizations: None     Relationship status: None    Intimate partner violence:     Fear of current or ex partner: None     Emotionally abused: None     Physically abused: None     Forced sexual activity: None   Other Topics Concern    None   Social History Narrative    6 kids 25, 25, 23, 16, 15, 8     Moved from [place] for husbands job - [employer] for  at [employer] with spouse     Family History   Problem Relation Age of Onset    MORGAN disease Mother     Heart attack Father 79    Diabetes Father     Prostate cancer Father 79    Lupus Sister     Raynaud syndrome Sister     Sjogren's syndrome Sister     Breast cancer Maternal Grandmother 79    No Known Problems Paternal Grandfather     No Known Problems Daughter     Prostate cancer Maternal Grandfather 79    Colon cancer Paternal Grandmother [de-identified]    No Known Problems Sister     No Known Problems Brother     No Known Problems Son     No Known Problems Son     No Known Problems Son     No Known Problems Son     No Known Problems Son     No Known Problems Maternal Uncle     No Known Problems Paternal Aunt     No Known Problems Paternal Uncle      Allergies   Allergen Reactions    Propranolol Other (See Comments)     Halluncinations    Strawberry Extract      Other reaction(s): Other (See Comments)  Congestion         Current Outpatient Medications:     Aspirin-Acetaminophen-Caffeine (EXCEDRIN MIGRAINE PO), Take by mouth as needed, Disp: , Rfl:     cholecalciferol (VITAMIN D3) 1,000 units tablet, Take 1,000 Units by mouth, Disp: , Rfl:     ferrous sulfate 325 (65 Fe) mg tablet, Take 325 mg by mouth, Disp: , Rfl:     lisinopril (ZESTRIL) 5 mg tablet, Take 1 tablet (5 mg total) by mouth daily, Disp: 30 tablet, Rfl: 0    Naproxen Sodium (ALEVE PO), Take 600 mg by mouth as needed, Disp: , Rfl:     SUMAtriptan (IMITREX) 100 mg tablet, Take 1 tablet (100 mg total) by mouth as needed for migraine, Disp: 10 tablet, Rfl: 2    amitriptyline (ELAVIL) 25 mg tablet, TAKE 1 TABLET(25 MG) BY MOUTH DAILY AT BEDTIME (Patient not taking: Reported on 1/17/2020), Disp: 30 tablet, Rfl: 5    cyclobenzaprine (FLEXERIL) 10 mg tablet, Take 1 tablet (10 mg total) by mouth daily at bedtime as needed for muscle spasms (Patient not taking: Reported on 1/17/2020), Disp: 30 tablet, Rfl: 1    DULoxetine (CYMBALTA) 30 mg delayed release capsule, Two per day for 2 weeks then 3 per day for total of 90 mg daily (Patient not taking: Reported on 3/27/2020), Disp: 90 capsule, Rfl: 1    gabapentin (NEURONTIN) 100 mg capsule, By mouth take 2 capsules 3 times daily or as directed, Disp: 180 capsule, Rfl: 2    magnesium gluconate (MAGONATE) 500 mg tablet, Take 800 mg by mouth daily at bedtime, Disp: , Rfl:     Objective:    Blood pressure 143/66, pulse 80, resp  rate 18, height 5' 2" (1 575 m), weight 74 5 kg (164 lb 3 2 oz)  Physical Exam  Head normocephalic  Eyes nonicteric  No audible head, ocular or anterior neck bruits  Lungs clear to auscultation  Rhythm regular  GI (abdomen) soft nontender  Bowel sounds present  No significant lower extremity edema  12+ areas of distinct trigger point tenderness noted  Neurological Exam  Alert  Pleasantly appropriately conversational   Fully oriented  No dysarthria  Unremarkable spontaneous gait  Able to heel and toe walk bilaterally  Able to tandem walk  Romberg maneuver performed unremarkably  Cranial Nerves:   I: Olfactory sense intact bilaterally  II: Visual fields full to confrontation  Pupils equal, round, reactive to light with normal accomodation  Fundus: with bilaterally marginated discs  III,IV,VI: Extraocular muscles EOMI, no nystagmus  V: Masseter and pterygoid strength  Sensation in the V1 through V3 distributions intact to pinprick and light touch bilaterally  VII: Face is symmetric with no weakness noted  VIII: Audition intact to finger rub bilaterally  IX/X: Uvula midline  Soft palate elevation symmetric  XI: Trapezius and SCM strength 5/5 bilaterally  XII: Tongue midline with no atrophy or fasciculations with appropriate movement  Accurate with finger-to-nose and heel-to-shin maneuvers bilaterally  Full symmetrical strength throughout the 4 extremities with no upper extremity drift  Sensory testing grossly intact to pin, position and vibration throughout  No extinction with double simultaneous stimulation  Muscle stretch reflexes bilaterally 1+ throughout the upper extremities and at the knees and bilaterally 2 at the ankles  Toe response downgoing bilaterally      ROS:    Review of Systems Constitutional: Positive for fatigue  Negative for appetite change and fever  HENT: Negative  Negative for hearing loss, tinnitus, trouble swallowing and voice change  Eyes: Negative  Negative for photophobia and pain  Respiratory: Negative  Negative for shortness of breath  Cardiovascular: Negative  Negative for palpitations  Gastrointestinal: Negative  Negative for nausea and vomiting  Endocrine: Negative  Negative for cold intolerance  Genitourinary: Negative  Negative for dysuria, frequency and urgency  Musculoskeletal: Negative  Negative for myalgias and neck pain  Skin: Negative  Negative for rash  Allergic/Immunologic: Negative  Neurological: Positive for numbness (bilateral hands) and headaches  Negative for dizziness, tremors, seizures, syncope, facial asymmetry, speech difficulty, weakness and light-headedness  Hematological: Bruises/bleeds easily  Psychiatric/Behavioral: Positive for sleep disturbance  Negative for confusion and hallucinations  The patient is nervous/anxious  I personally reviewed the ROS as entered by the medical assistant  *Please note this document was created using voice recognition software and may contain sound-alike word errors  *

## 2020-03-30 ENCOUNTER — TELEPHONE (OUTPATIENT)
Dept: NEUROLOGY | Facility: CLINIC | Age: 55
End: 2020-03-30

## 2020-04-03 ENCOUNTER — TELEMEDICINE (OUTPATIENT)
Dept: FAMILY MEDICINE CLINIC | Facility: CLINIC | Age: 55
End: 2020-04-03
Payer: COMMERCIAL

## 2020-04-03 VITALS
DIASTOLIC BLOOD PRESSURE: 96 MMHG | SYSTOLIC BLOOD PRESSURE: 158 MMHG | BODY MASS INDEX: 29.63 KG/M2 | HEIGHT: 62 IN | WEIGHT: 161 LBS | HEART RATE: 68 BPM

## 2020-04-03 DIAGNOSIS — E66.3 OVERWEIGHT: ICD-10-CM

## 2020-04-03 DIAGNOSIS — M79.7 FIBROMYALGIA: ICD-10-CM

## 2020-04-03 DIAGNOSIS — G47.00 PERSISTENT INSOMNIA: ICD-10-CM

## 2020-04-03 DIAGNOSIS — G43.009 MIGRAINE WITHOUT AURA AND WITHOUT STATUS MIGRAINOSUS, NOT INTRACTABLE: Primary | ICD-10-CM

## 2020-04-03 DIAGNOSIS — I10 BENIGN ESSENTIAL HYPERTENSION: ICD-10-CM

## 2020-04-03 PROCEDURE — 99214 OFFICE O/P EST MOD 30 MIN: CPT | Performed by: FAMILY MEDICINE

## 2020-04-03 RX ORDER — LISINOPRIL 10 MG/1
10 TABLET ORAL DAILY
Qty: 30 TABLET | Refills: 2 | Status: SHIPPED | OUTPATIENT
Start: 2020-04-03 | End: 2020-04-03

## 2020-04-03 RX ORDER — LISINOPRIL 10 MG/1
TABLET ORAL
Qty: 90 TABLET | Refills: 2 | Status: SHIPPED | OUTPATIENT
Start: 2020-04-03 | End: 2020-06-19 | Stop reason: SDUPTHER

## 2020-04-08 ENCOUNTER — APPOINTMENT (OUTPATIENT)
Dept: LAB | Facility: MEDICAL CENTER | Age: 55
End: 2020-04-08
Payer: COMMERCIAL

## 2020-04-08 DIAGNOSIS — G43.009 MIGRAINE WITHOUT AURA AND WITHOUT STATUS MIGRAINOSUS, NOT INTRACTABLE: ICD-10-CM

## 2020-04-08 DIAGNOSIS — M79.7 FIBROMYALGIA: ICD-10-CM

## 2020-04-08 LAB
ALBUMIN SERPL BCP-MCNC: 3.7 G/DL (ref 3.5–5)
ALP SERPL-CCNC: 81 U/L (ref 46–116)
ALT SERPL W P-5'-P-CCNC: 36 U/L (ref 12–78)
ANION GAP SERPL CALCULATED.3IONS-SCNC: 4 MMOL/L (ref 4–13)
AST SERPL W P-5'-P-CCNC: 20 U/L (ref 5–45)
BASOPHILS # BLD AUTO: 0.04 THOUSANDS/ΜL (ref 0–0.1)
BASOPHILS NFR BLD AUTO: 1 % (ref 0–1)
BILIRUB SERPL-MCNC: 0.32 MG/DL (ref 0.2–1)
BUN SERPL-MCNC: 15 MG/DL (ref 5–25)
CALCIUM SERPL-MCNC: 9.2 MG/DL (ref 8.3–10.1)
CHLORIDE SERPL-SCNC: 107 MMOL/L (ref 100–108)
CO2 SERPL-SCNC: 29 MMOL/L (ref 21–32)
CREAT SERPL-MCNC: 0.67 MG/DL (ref 0.6–1.3)
EOSINOPHIL # BLD AUTO: 0.25 THOUSAND/ΜL (ref 0–0.61)
EOSINOPHIL NFR BLD AUTO: 3 % (ref 0–6)
ERYTHROCYTE [DISTWIDTH] IN BLOOD BY AUTOMATED COUNT: 12.8 % (ref 11.6–15.1)
ERYTHROCYTE [SEDIMENTATION RATE] IN BLOOD: 7 MM/HOUR (ref 0–20)
GFR SERPL CREATININE-BSD FRML MDRD: 101 ML/MIN/1.73SQ M
GLUCOSE P FAST SERPL-MCNC: 90 MG/DL (ref 65–99)
HCT VFR BLD AUTO: 45.4 % (ref 34.8–46.1)
HGB BLD-MCNC: 14.4 G/DL (ref 11.5–15.4)
IMM GRANULOCYTES # BLD AUTO: 0.06 THOUSAND/UL (ref 0–0.2)
IMM GRANULOCYTES NFR BLD AUTO: 1 % (ref 0–2)
LYMPHOCYTES # BLD AUTO: 2.07 THOUSANDS/ΜL (ref 0.6–4.47)
LYMPHOCYTES NFR BLD AUTO: 28 % (ref 14–44)
MCH RBC QN AUTO: 28.4 PG (ref 26.8–34.3)
MCHC RBC AUTO-ENTMCNC: 31.7 G/DL (ref 31.4–37.4)
MCV RBC AUTO: 90 FL (ref 82–98)
MONOCYTES # BLD AUTO: 0.65 THOUSAND/ΜL (ref 0.17–1.22)
MONOCYTES NFR BLD AUTO: 9 % (ref 4–12)
NEUTROPHILS # BLD AUTO: 4.45 THOUSANDS/ΜL (ref 1.85–7.62)
NEUTS SEG NFR BLD AUTO: 58 % (ref 43–75)
NRBC BLD AUTO-RTO: 0 /100 WBCS
PLATELET # BLD AUTO: 241 THOUSANDS/UL (ref 149–390)
PMV BLD AUTO: 10.5 FL (ref 8.9–12.7)
POTASSIUM SERPL-SCNC: 4.2 MMOL/L (ref 3.5–5.3)
PROT SERPL-MCNC: 7 G/DL (ref 6.4–8.2)
RBC # BLD AUTO: 5.07 MILLION/UL (ref 3.81–5.12)
SODIUM SERPL-SCNC: 140 MMOL/L (ref 136–145)
TSH SERPL DL<=0.05 MIU/L-ACNC: 2.96 UIU/ML (ref 0.36–3.74)
VIT B12 SERPL-MCNC: 836 PG/ML (ref 100–900)
WBC # BLD AUTO: 7.52 THOUSAND/UL (ref 4.31–10.16)

## 2020-04-08 PROCEDURE — 85652 RBC SED RATE AUTOMATED: CPT

## 2020-04-08 PROCEDURE — 82607 VITAMIN B-12: CPT

## 2020-04-08 PROCEDURE — 36415 COLL VENOUS BLD VENIPUNCTURE: CPT

## 2020-04-08 PROCEDURE — 80053 COMPREHEN METABOLIC PANEL: CPT

## 2020-04-08 PROCEDURE — 84443 ASSAY THYROID STIM HORMONE: CPT

## 2020-04-08 PROCEDURE — 85025 COMPLETE CBC W/AUTO DIFF WBC: CPT

## 2020-04-20 ENCOUNTER — TELEPHONE (OUTPATIENT)
Dept: MRI IMAGING | Facility: HOSPITAL | Age: 55
End: 2020-04-20

## 2020-04-24 DIAGNOSIS — G43.009 MIGRAINE WITHOUT AURA AND WITHOUT STATUS MIGRAINOSUS, NOT INTRACTABLE: ICD-10-CM

## 2020-04-24 RX ORDER — SUMATRIPTAN 100 MG/1
100 TABLET, FILM COATED ORAL AS NEEDED
Qty: 10 TABLET | Refills: 0 | Status: CANCELLED | OUTPATIENT
Start: 2020-04-24 | End: 2021-04-24

## 2020-04-25 DIAGNOSIS — G43.009 MIGRAINE WITHOUT AURA AND WITHOUT STATUS MIGRAINOSUS, NOT INTRACTABLE: ICD-10-CM

## 2020-04-27 ENCOUNTER — PATIENT MESSAGE (OUTPATIENT)
Dept: FAMILY MEDICINE CLINIC | Facility: CLINIC | Age: 55
End: 2020-04-27

## 2020-04-27 DIAGNOSIS — G43.009 MIGRAINE WITHOUT AURA AND WITHOUT STATUS MIGRAINOSUS, NOT INTRACTABLE: ICD-10-CM

## 2020-04-27 RX ORDER — SUMATRIPTAN 100 MG/1
100 TABLET, FILM COATED ORAL AS NEEDED
Qty: 10 TABLET | Refills: 2 | Status: SHIPPED | OUTPATIENT
Start: 2020-04-27 | End: 2020-04-27 | Stop reason: SDUPTHER

## 2020-04-27 RX ORDER — SUMATRIPTAN 100 MG/1
TABLET, FILM COATED ORAL
Qty: 10 TABLET | Refills: 2 | Status: SHIPPED | OUTPATIENT
Start: 2020-04-27 | End: 2020-07-17 | Stop reason: SDUPTHER

## 2020-04-27 RX ORDER — SUMATRIPTAN 100 MG/1
TABLET, FILM COATED ORAL
Qty: 10 TABLET | Refills: 2 | OUTPATIENT
Start: 2020-04-27

## 2020-04-30 ENCOUNTER — TELEPHONE (OUTPATIENT)
Dept: FAMILY MEDICINE CLINIC | Facility: CLINIC | Age: 55
End: 2020-04-30

## 2020-05-01 ENCOUNTER — HOSPITAL ENCOUNTER (OUTPATIENT)
Dept: NEUROLOGY | Facility: CLINIC | Age: 55
Discharge: HOME | End: 2020-05-01
Payer: COMMERCIAL

## 2020-05-01 DIAGNOSIS — G43.009 MIGRAINE WITHOUT AURA AND WITHOUT STATUS MIGRAINOSUS, NOT INTRACTABLE: ICD-10-CM

## 2020-05-01 DIAGNOSIS — R55 SYNCOPE, UNSPECIFIED SYNCOPE TYPE: ICD-10-CM

## 2020-05-01 PROCEDURE — 95819 EEG AWAKE AND ASLEEP: CPT | Performed by: PSYCHIATRY & NEUROLOGY

## 2020-05-01 PROCEDURE — 95819 EEG AWAKE AND ASLEEP: CPT

## 2020-05-15 ENCOUNTER — OFFICE VISIT (OUTPATIENT)
Dept: FAMILY MEDICINE CLINIC | Facility: CLINIC | Age: 55
End: 2020-05-15
Payer: COMMERCIAL

## 2020-05-15 VITALS
DIASTOLIC BLOOD PRESSURE: 78 MMHG | OXYGEN SATURATION: 99 % | HEIGHT: 62 IN | BODY MASS INDEX: 30 KG/M2 | HEART RATE: 70 BPM | WEIGHT: 163 LBS | RESPIRATION RATE: 12 BRPM | TEMPERATURE: 98.5 F | SYSTOLIC BLOOD PRESSURE: 112 MMHG

## 2020-05-15 DIAGNOSIS — M79.7 FIBROMYALGIA: Primary | ICD-10-CM

## 2020-05-15 DIAGNOSIS — L23.7 POISON IVY: ICD-10-CM

## 2020-05-15 DIAGNOSIS — I10 BENIGN ESSENTIAL HYPERTENSION: ICD-10-CM

## 2020-05-15 DIAGNOSIS — G43.009 MIGRAINE WITHOUT AURA AND WITHOUT STATUS MIGRAINOSUS, NOT INTRACTABLE: ICD-10-CM

## 2020-05-15 PROCEDURE — 3074F SYST BP LT 130 MM HG: CPT | Performed by: FAMILY MEDICINE

## 2020-05-15 PROCEDURE — 99214 OFFICE O/P EST MOD 30 MIN: CPT | Performed by: FAMILY MEDICINE

## 2020-05-15 PROCEDURE — 3078F DIAST BP <80 MM HG: CPT | Performed by: FAMILY MEDICINE

## 2020-05-15 PROCEDURE — 1036F TOBACCO NON-USER: CPT | Performed by: FAMILY MEDICINE

## 2020-05-15 RX ORDER — GABAPENTIN 300 MG/1
300 CAPSULE ORAL 3 TIMES DAILY
Qty: 90 CAPSULE | Refills: 1 | Status: SHIPPED | OUTPATIENT
Start: 2020-05-15 | End: 2020-10-23 | Stop reason: ALTCHOICE

## 2020-05-15 RX ORDER — PROCHLORPERAZINE MALEATE 10 MG
10 TABLET ORAL EVERY 6 HOURS PRN
Qty: 30 TABLET | Refills: 0
Start: 2020-05-15 | End: 2020-10-23 | Stop reason: ALTCHOICE

## 2020-05-21 ENCOUNTER — PATIENT MESSAGE (OUTPATIENT)
Dept: FAMILY MEDICINE CLINIC | Facility: CLINIC | Age: 55
End: 2020-05-21

## 2020-05-21 ENCOUNTER — TELEPHONE (OUTPATIENT)
Dept: OBGYN CLINIC | Facility: CLINIC | Age: 55
End: 2020-05-21

## 2020-06-12 ENCOUNTER — TELEPHONE (OUTPATIENT)
Dept: FAMILY MEDICINE CLINIC | Facility: CLINIC | Age: 55
End: 2020-06-12

## 2020-06-12 ENCOUNTER — PATIENT MESSAGE (OUTPATIENT)
Dept: FAMILY MEDICINE CLINIC | Facility: CLINIC | Age: 55
End: 2020-06-12

## 2020-06-17 ENCOUNTER — LAB (OUTPATIENT)
Dept: LAB | Facility: MEDICAL CENTER | Age: 55
End: 2020-06-17
Payer: COMMERCIAL

## 2020-06-17 DIAGNOSIS — I10 BENIGN ESSENTIAL HYPERTENSION: ICD-10-CM

## 2020-06-17 DIAGNOSIS — Z11.4 SCREENING FOR HIV (HUMAN IMMUNODEFICIENCY VIRUS): ICD-10-CM

## 2020-06-17 DIAGNOSIS — E55.9 VITAMIN D DEFICIENCY: ICD-10-CM

## 2020-06-17 DIAGNOSIS — R79.0 LOW FERRITIN: ICD-10-CM

## 2020-06-17 LAB
25(OH)D3 SERPL-MCNC: 41.1 NG/ML (ref 30–100)
ALBUMIN SERPL BCP-MCNC: 3.8 G/DL (ref 3.5–5)
ALP SERPL-CCNC: 79 U/L (ref 46–116)
ALT SERPL W P-5'-P-CCNC: 27 U/L (ref 12–78)
ANION GAP SERPL CALCULATED.3IONS-SCNC: 5 MMOL/L (ref 4–13)
AST SERPL W P-5'-P-CCNC: 17 U/L (ref 5–45)
BILIRUB SERPL-MCNC: 0.4 MG/DL (ref 0.2–1)
BUN SERPL-MCNC: 12 MG/DL (ref 5–25)
CALCIUM SERPL-MCNC: 9.3 MG/DL (ref 8.3–10.1)
CHLORIDE SERPL-SCNC: 110 MMOL/L (ref 100–108)
CO2 SERPL-SCNC: 26 MMOL/L (ref 21–32)
CREAT SERPL-MCNC: 0.68 MG/DL (ref 0.6–1.3)
FERRITIN SERPL-MCNC: 24 NG/ML (ref 8–388)
GFR SERPL CREATININE-BSD FRML MDRD: 101 ML/MIN/1.73SQ M
GLUCOSE P FAST SERPL-MCNC: 86 MG/DL (ref 65–99)
POTASSIUM SERPL-SCNC: 3.7 MMOL/L (ref 3.5–5.3)
PROT SERPL-MCNC: 7.3 G/DL (ref 6.4–8.2)
SODIUM SERPL-SCNC: 141 MMOL/L (ref 136–145)

## 2020-06-17 PROCEDURE — 80053 COMPREHEN METABOLIC PANEL: CPT

## 2020-06-17 PROCEDURE — 87389 HIV-1 AG W/HIV-1&-2 AB AG IA: CPT

## 2020-06-17 PROCEDURE — 82728 ASSAY OF FERRITIN: CPT

## 2020-06-17 PROCEDURE — 36415 COLL VENOUS BLD VENIPUNCTURE: CPT

## 2020-06-17 PROCEDURE — 82306 VITAMIN D 25 HYDROXY: CPT

## 2020-06-19 ENCOUNTER — OFFICE VISIT (OUTPATIENT)
Dept: FAMILY MEDICINE CLINIC | Facility: CLINIC | Age: 55
End: 2020-06-19
Payer: COMMERCIAL

## 2020-06-19 VITALS
SYSTOLIC BLOOD PRESSURE: 144 MMHG | DIASTOLIC BLOOD PRESSURE: 96 MMHG | BODY MASS INDEX: 29.63 KG/M2 | HEART RATE: 67 BPM | TEMPERATURE: 98.5 F | WEIGHT: 161 LBS | OXYGEN SATURATION: 99 % | HEIGHT: 62 IN | RESPIRATION RATE: 12 BRPM

## 2020-06-19 DIAGNOSIS — Z00.00 WELL ADULT EXAM: Primary | ICD-10-CM

## 2020-06-19 DIAGNOSIS — G47.00 PERSISTENT INSOMNIA: ICD-10-CM

## 2020-06-19 DIAGNOSIS — Z80.0 FAMILY HISTORY OF COLON CANCER REQUIRING SCREENING COLONOSCOPY: ICD-10-CM

## 2020-06-19 DIAGNOSIS — Z23 NEED FOR VACCINATION: ICD-10-CM

## 2020-06-19 DIAGNOSIS — E61.1 IRON DEFICIENCY: ICD-10-CM

## 2020-06-19 DIAGNOSIS — M79.7 FIBROMYALGIA: ICD-10-CM

## 2020-06-19 DIAGNOSIS — I10 BENIGN ESSENTIAL HYPERTENSION: ICD-10-CM

## 2020-06-19 DIAGNOSIS — G43.009 MIGRAINE WITHOUT AURA AND WITHOUT STATUS MIGRAINOSUS, NOT INTRACTABLE: ICD-10-CM

## 2020-06-19 LAB — HIV 1+2 AB+HIV1 P24 AG SERPL QL IA: NORMAL

## 2020-06-19 PROCEDURE — 3008F BODY MASS INDEX DOCD: CPT | Performed by: FAMILY MEDICINE

## 2020-06-19 PROCEDURE — 3077F SYST BP >= 140 MM HG: CPT | Performed by: FAMILY MEDICINE

## 2020-06-19 PROCEDURE — 3080F DIAST BP >= 90 MM HG: CPT | Performed by: FAMILY MEDICINE

## 2020-06-19 PROCEDURE — 90471 IMMUNIZATION ADMIN: CPT | Performed by: FAMILY MEDICINE

## 2020-06-19 PROCEDURE — 90750 HZV VACC RECOMBINANT IM: CPT | Performed by: FAMILY MEDICINE

## 2020-06-19 PROCEDURE — 99396 PREV VISIT EST AGE 40-64: CPT | Performed by: FAMILY MEDICINE

## 2020-06-19 PROCEDURE — 99214 OFFICE O/P EST MOD 30 MIN: CPT | Performed by: FAMILY MEDICINE

## 2020-06-19 PROCEDURE — 1036F TOBACCO NON-USER: CPT | Performed by: FAMILY MEDICINE

## 2020-06-19 RX ORDER — LISINOPRIL 10 MG/1
20 TABLET ORAL DAILY
Qty: 90 TABLET | Refills: 2
Start: 2020-06-19 | End: 2020-07-07 | Stop reason: SDUPTHER

## 2020-07-07 DIAGNOSIS — I10 BENIGN ESSENTIAL HYPERTENSION: ICD-10-CM

## 2020-07-07 RX ORDER — LISINOPRIL 10 MG/1
20 TABLET ORAL DAILY
Qty: 180 TABLET | Refills: 1 | Status: SHIPPED | OUTPATIENT
Start: 2020-07-07 | End: 2020-12-21 | Stop reason: DRUGHIGH

## 2020-07-07 NOTE — TELEPHONE ENCOUNTER
Medication: Lisinopril 10 mg   Last refilled: 6/19/20  Last Office Visit: 6/19/20  Next Office Visit: 10/23/20  Pharmacy:

## 2020-07-16 ENCOUNTER — TELEPHONE (OUTPATIENT)
Dept: FAMILY MEDICINE CLINIC | Facility: CLINIC | Age: 55
End: 2020-07-16

## 2020-07-16 NOTE — TELEPHONE ENCOUNTER
Patient called, is scheduled for a 3 week BP f/u nurse visit on 7/17/20  Patient is requesting to cancel that appointment as she has a cardiologist appointment next week  [place] that patient can cancel the appointment and we will take the BP and pulse from cardiology's OV  Reminder sent to myself to pull BP and pulse from OV next week  FYI to provider

## 2020-07-17 DIAGNOSIS — G43.009 MIGRAINE WITHOUT AURA AND WITHOUT STATUS MIGRAINOSUS, NOT INTRACTABLE: ICD-10-CM

## 2020-07-17 RX ORDER — SUMATRIPTAN 100 MG/1
TABLET, FILM COATED ORAL
Qty: 10 TABLET | Refills: 0 | OUTPATIENT
Start: 2020-07-17

## 2020-07-17 NOTE — TELEPHONE ENCOUNTER
Medication refill received from [facility]  Please let the patient know that we do not accept refills directly from the pharmacy

## 2020-07-17 NOTE — TELEPHONE ENCOUNTER
Medication: Imitrex  Last office visit:6/19/20  Next office visit:10/23/20  Labs: 6/17/20  Last refilled:4/27/20#10x2  Pharmacy: on file

## 2020-07-18 RX ORDER — SUMATRIPTAN 100 MG/1
TABLET, FILM COATED ORAL
Qty: 10 TABLET | Refills: 2 | Status: SHIPPED | OUTPATIENT
Start: 2020-07-18 | End: 2020-08-18 | Stop reason: SDUPTHER

## 2020-08-11 ENCOUNTER — TELEPHONE (OUTPATIENT)
Dept: FAMILY MEDICINE CLINIC | Facility: CLINIC | Age: 55
End: 2020-08-11

## 2020-08-11 NOTE — TELEPHONE ENCOUNTER
Dr Rajan Mahan  Please advise  Per 6/19/20 office visit        2  Migraine without aura and without status migrainosus, not intractable   not well controlled  Patient does not like how she feels on gabapentin  She will wean herself off of this  She does not wish to try any prevention medicines at this time  She will continue with Imitrex as needed and she will start CBD oil

## 2020-08-11 NOTE — TELEPHONE ENCOUNTER
Radha Dallas is calling because she is taking Sumatriptan and Aleve at the same time and getting some relief  She is leaving for vacation on Monday and is out of medication ( went through them in 3 weeks)  She does not know what she should take?

## 2020-08-12 NOTE — TELEPHONE ENCOUNTER
Left detailed message (Joshua Oscar per communication form in chart ) informing patient  and to call the office with any further questions or concerns

## 2020-08-12 NOTE — TELEPHONE ENCOUNTER
So she should not be going through the imitrex this fast  She should again consider a prevention medication  I again encourage her to see a headache specialist  If she wishes to see a different neurologist in the same practice that may be an option  I feel that she needs a neurologist to help manage her headaches

## 2020-08-18 DIAGNOSIS — G43.009 MIGRAINE WITHOUT AURA AND WITHOUT STATUS MIGRAINOSUS, NOT INTRACTABLE: ICD-10-CM

## 2020-08-18 RX ORDER — SUMATRIPTAN 100 MG/1
TABLET, FILM COATED ORAL
Qty: 10 TABLET | Refills: 2 | Status: SHIPPED | OUTPATIENT
Start: 2020-08-18 | End: 2020-10-26 | Stop reason: SDUPTHER

## 2020-08-18 NOTE — TELEPHONE ENCOUNTER
[place] and is out of her medication and needs a refill sent over today, please call if there is a problem

## 2020-08-18 NOTE — TELEPHONE ENCOUNTER
Medication: Imitrex 100 mg   Last refilled: 7/18/20  Last Office Visit: 6/19/20  Next Office Visit: 10/23/20  Pharmacy:

## 2020-10-23 ENCOUNTER — OFFICE VISIT (OUTPATIENT)
Dept: FAMILY MEDICINE CLINIC | Facility: CLINIC | Age: 55
End: 2020-10-23
Payer: COMMERCIAL

## 2020-10-23 VITALS
DIASTOLIC BLOOD PRESSURE: 92 MMHG | RESPIRATION RATE: 16 BRPM | OXYGEN SATURATION: 98 % | HEIGHT: 62 IN | BODY MASS INDEX: 29.88 KG/M2 | HEART RATE: 71 BPM | SYSTOLIC BLOOD PRESSURE: 120 MMHG | TEMPERATURE: 98.8 F | WEIGHT: 162.4 LBS

## 2020-10-23 DIAGNOSIS — I10 BENIGN ESSENTIAL HYPERTENSION: Primary | ICD-10-CM

## 2020-10-23 DIAGNOSIS — R10.13 EPIGASTRIC PAIN: ICD-10-CM

## 2020-10-23 DIAGNOSIS — Z23 NEED FOR VACCINATION: ICD-10-CM

## 2020-10-23 DIAGNOSIS — G43.009 MIGRAINE WITHOUT AURA AND WITHOUT STATUS MIGRAINOSUS, NOT INTRACTABLE: ICD-10-CM

## 2020-10-23 DIAGNOSIS — R79.0 LOW FERRITIN: ICD-10-CM

## 2020-10-23 DIAGNOSIS — G47.00 PERSISTENT INSOMNIA: ICD-10-CM

## 2020-10-23 DIAGNOSIS — Z12.31 SCREENING MAMMOGRAM, ENCOUNTER FOR: ICD-10-CM

## 2020-10-23 DIAGNOSIS — E78.00 HYPERCHOLESTEREMIA: ICD-10-CM

## 2020-10-23 DIAGNOSIS — M79.7 FIBROMYALGIA: ICD-10-CM

## 2020-10-23 DIAGNOSIS — I10 BENIGN ESSENTIAL HYPERTENSION: ICD-10-CM

## 2020-10-23 PROCEDURE — 90471 IMMUNIZATION ADMIN: CPT | Performed by: FAMILY MEDICINE

## 2020-10-23 PROCEDURE — 99214 OFFICE O/P EST MOD 30 MIN: CPT | Performed by: FAMILY MEDICINE

## 2020-10-23 PROCEDURE — 90750 HZV VACC RECOMBINANT IM: CPT | Performed by: FAMILY MEDICINE

## 2020-10-23 RX ORDER — HYDROCHLOROTHIAZIDE 25 MG/1
TABLET ORAL
Qty: 90 TABLET | Refills: 1 | Status: SHIPPED | OUTPATIENT
Start: 2020-10-23 | End: 2020-11-20

## 2020-10-23 RX ORDER — HYDROCHLOROTHIAZIDE 25 MG/1
25 TABLET ORAL
Qty: 30 TABLET | Refills: 5 | Status: SHIPPED | OUTPATIENT
Start: 2020-10-23 | End: 2020-10-23

## 2020-10-23 RX ORDER — OMEPRAZOLE 20 MG/1
CAPSULE, DELAYED RELEASE ORAL
Qty: 90 CAPSULE | Refills: 1 | Status: SHIPPED | OUTPATIENT
Start: 2020-10-23 | End: 2021-05-15

## 2020-10-23 RX ORDER — OMEPRAZOLE 20 MG/1
20 CAPSULE, DELAYED RELEASE ORAL
Qty: 30 CAPSULE | Refills: 5 | Status: SHIPPED | OUTPATIENT
Start: 2020-10-23 | End: 2020-10-23

## 2020-10-29 ENCOUNTER — TELEMEDICINE (OUTPATIENT)
Dept: FAMILY MEDICINE CLINIC | Facility: CLINIC | Age: 55
End: 2020-10-29
Payer: COMMERCIAL

## 2020-10-29 VITALS
HEART RATE: 78 BPM | BODY MASS INDEX: 29.81 KG/M2 | DIASTOLIC BLOOD PRESSURE: 81 MMHG | HEIGHT: 62 IN | WEIGHT: 162 LBS | SYSTOLIC BLOOD PRESSURE: 117 MMHG

## 2020-10-29 DIAGNOSIS — J01.00 ACUTE NON-RECURRENT MAXILLARY SINUSITIS: Primary | ICD-10-CM

## 2020-10-29 DIAGNOSIS — B37.9 ANTIBIOTIC-INDUCED YEAST INFECTION: ICD-10-CM

## 2020-10-29 DIAGNOSIS — T36.95XA ANTIBIOTIC-INDUCED YEAST INFECTION: ICD-10-CM

## 2020-10-29 PROCEDURE — 99213 OFFICE O/P EST LOW 20 MIN: CPT | Performed by: FAMILY MEDICINE

## 2020-10-29 RX ORDER — AMOXICILLIN AND CLAVULANATE POTASSIUM 875; 125 MG/1; MG/1
1 TABLET, FILM COATED ORAL EVERY 12 HOURS SCHEDULED
Qty: 20 TABLET | Refills: 0 | Status: SHIPPED | OUTPATIENT
Start: 2020-10-29 | End: 2020-11-08

## 2020-10-29 RX ORDER — FLUCONAZOLE 150 MG/1
150 TABLET ORAL ONCE
Qty: 1 TABLET | Refills: 0 | Status: SHIPPED | OUTPATIENT
Start: 2020-10-29 | End: 2020-10-29

## 2020-11-04 ENCOUNTER — PATIENT MESSAGE (OUTPATIENT)
Dept: FAMILY MEDICINE CLINIC | Facility: CLINIC | Age: 55
End: 2020-11-04

## 2020-11-05 ENCOUNTER — TELEMEDICINE (OUTPATIENT)
Dept: FAMILY MEDICINE CLINIC | Facility: CLINIC | Age: 55
End: 2020-11-05
Payer: COMMERCIAL

## 2020-11-05 VITALS
HEIGHT: 62 IN | SYSTOLIC BLOOD PRESSURE: 129 MMHG | DIASTOLIC BLOOD PRESSURE: 80 MMHG | HEART RATE: 68 BPM | WEIGHT: 162 LBS | BODY MASS INDEX: 29.81 KG/M2

## 2020-11-05 DIAGNOSIS — J01.00 ACUTE NON-RECURRENT MAXILLARY SINUSITIS: Primary | ICD-10-CM

## 2020-11-05 PROCEDURE — 3079F DIAST BP 80-89 MM HG: CPT | Performed by: FAMILY MEDICINE

## 2020-11-05 PROCEDURE — 3074F SYST BP LT 130 MM HG: CPT | Performed by: FAMILY MEDICINE

## 2020-11-05 PROCEDURE — 99213 OFFICE O/P EST LOW 20 MIN: CPT | Performed by: FAMILY MEDICINE

## 2020-11-05 RX ORDER — FLUCONAZOLE 150 MG/1
150 TABLET ORAL ONCE
COMMUNITY
Start: 2020-10-29 | End: 2021-07-27

## 2020-11-05 RX ORDER — PREDNISONE 10 MG/1
TABLET ORAL
Qty: 30 TABLET | Refills: 0 | Status: SHIPPED | OUTPATIENT
Start: 2020-11-05 | End: 2020-11-18 | Stop reason: ALTCHOICE

## 2020-11-12 ENCOUNTER — TELEPHONE (OUTPATIENT)
Dept: FAMILY MEDICINE CLINIC | Facility: CLINIC | Age: 55
End: 2020-11-12

## 2020-11-12 DIAGNOSIS — J01.00 ACUTE NON-RECURRENT MAXILLARY SINUSITIS: Primary | ICD-10-CM

## 2020-11-12 RX ORDER — CEFDINIR 300 MG/1
300 CAPSULE ORAL 2 TIMES DAILY
Qty: 20 CAPSULE | Refills: 0 | Status: SHIPPED | OUTPATIENT
Start: 2020-11-12 | End: 2020-11-22

## 2020-11-13 ENCOUNTER — TELEPHONE (OUTPATIENT)
Dept: FAMILY MEDICINE CLINIC | Facility: CLINIC | Age: 55
End: 2020-11-13

## 2020-11-16 ENCOUNTER — TELEMEDICINE (OUTPATIENT)
Dept: FAMILY MEDICINE CLINIC | Facility: CLINIC | Age: 55
End: 2020-11-16
Payer: COMMERCIAL

## 2020-11-16 VITALS — WEIGHT: 162 LBS | BODY MASS INDEX: 29.81 KG/M2 | HEIGHT: 62 IN

## 2020-11-16 DIAGNOSIS — J01.00 ACUTE NON-RECURRENT MAXILLARY SINUSITIS: ICD-10-CM

## 2020-11-16 DIAGNOSIS — J34.89 SINUS PAIN: ICD-10-CM

## 2020-11-16 DIAGNOSIS — I10 BENIGN ESSENTIAL HYPERTENSION: ICD-10-CM

## 2020-11-16 DIAGNOSIS — R30.0 DYSURIA: ICD-10-CM

## 2020-11-16 DIAGNOSIS — R35.0 URINARY FREQUENCY: Primary | ICD-10-CM

## 2020-11-16 PROCEDURE — 1036F TOBACCO NON-USER: CPT | Performed by: FAMILY MEDICINE

## 2020-11-16 PROCEDURE — 3008F BODY MASS INDEX DOCD: CPT | Performed by: FAMILY MEDICINE

## 2020-11-16 PROCEDURE — 99214 OFFICE O/P EST MOD 30 MIN: CPT | Performed by: FAMILY MEDICINE

## 2020-11-17 ENCOUNTER — LAB (OUTPATIENT)
Dept: LAB | Facility: CLINIC | Age: 55
End: 2020-11-17
Payer: COMMERCIAL

## 2020-11-17 DIAGNOSIS — R30.0 DYSURIA: ICD-10-CM

## 2020-11-17 DIAGNOSIS — R35.0 URINARY FREQUENCY: ICD-10-CM

## 2020-11-17 PROCEDURE — 87086 URINE CULTURE/COLONY COUNT: CPT

## 2020-11-18 LAB — BACTERIA UR CULT: NORMAL

## 2020-11-19 ENCOUNTER — TELEPHONE (OUTPATIENT)
Dept: FAMILY MEDICINE CLINIC | Facility: CLINIC | Age: 55
End: 2020-11-19

## 2020-11-19 DIAGNOSIS — I10 BENIGN ESSENTIAL HYPERTENSION: ICD-10-CM

## 2020-11-20 RX ORDER — HYDROCHLOROTHIAZIDE 25 MG/1
12.5 TABLET ORAL DAILY
Qty: 90 TABLET | Refills: 1
Start: 2020-11-20 | End: 2020-12-21 | Stop reason: DRUGHIGH

## 2020-12-14 ENCOUNTER — HOSPITAL ENCOUNTER (OUTPATIENT)
Dept: CT IMAGING | Facility: HOSPITAL | Age: 55
Discharge: HOME | End: 2020-12-14
Payer: COMMERCIAL

## 2020-12-14 DIAGNOSIS — J32.9 CHRONIC SINUSITIS, UNSPECIFIED LOCATION: ICD-10-CM

## 2020-12-14 PROCEDURE — G1004 CDSM NDSC: HCPCS

## 2020-12-14 PROCEDURE — 70486 CT MAXILLOFACIAL W/O DYE: CPT

## 2020-12-21 ENCOUNTER — TELEMEDICINE (OUTPATIENT)
Dept: FAMILY MEDICINE CLINIC | Facility: CLINIC | Age: 55
End: 2020-12-21
Payer: COMMERCIAL

## 2020-12-21 VITALS
HEIGHT: 62 IN | BODY MASS INDEX: 29.81 KG/M2 | SYSTOLIC BLOOD PRESSURE: 112 MMHG | DIASTOLIC BLOOD PRESSURE: 77 MMHG | WEIGHT: 162 LBS

## 2020-12-21 DIAGNOSIS — G89.29 ONGOING CERVICAL PAIN: ICD-10-CM

## 2020-12-21 DIAGNOSIS — I10 BENIGN ESSENTIAL HYPERTENSION: ICD-10-CM

## 2020-12-21 DIAGNOSIS — M79.7 FIBROMYALGIA: ICD-10-CM

## 2020-12-21 DIAGNOSIS — M54.2 ONGOING CERVICAL PAIN: ICD-10-CM

## 2020-12-21 DIAGNOSIS — G43.009 MIGRAINE WITHOUT AURA AND WITHOUT STATUS MIGRAINOSUS, NOT INTRACTABLE: Primary | ICD-10-CM

## 2020-12-21 PROCEDURE — 3078F DIAST BP <80 MM HG: CPT | Performed by: FAMILY MEDICINE

## 2020-12-21 PROCEDURE — 3008F BODY MASS INDEX DOCD: CPT | Performed by: FAMILY MEDICINE

## 2020-12-21 PROCEDURE — 99214 OFFICE O/P EST MOD 30 MIN: CPT | Performed by: FAMILY MEDICINE

## 2020-12-21 PROCEDURE — 1036F TOBACCO NON-USER: CPT | Performed by: FAMILY MEDICINE

## 2020-12-21 PROCEDURE — 3074F SYST BP LT 130 MM HG: CPT | Performed by: FAMILY MEDICINE

## 2020-12-21 RX ORDER — LISINOPRIL 20 MG/1
20 TABLET ORAL DAILY
Start: 2020-12-21 | End: 2021-01-15 | Stop reason: SDUPTHER

## 2020-12-21 RX ORDER — SUMATRIPTAN 100 MG/1
TABLET, FILM COATED ORAL
Qty: 10 TABLET | Refills: 0 | Status: SHIPPED | OUTPATIENT
Start: 2020-12-21 | End: 2021-02-08 | Stop reason: SDUPTHER

## 2020-12-21 RX ORDER — HYDROCHLOROTHIAZIDE 12.5 MG/1
12.5 CAPSULE, GELATIN COATED ORAL DAILY
Start: 2020-12-21 | End: 2021-03-26 | Stop reason: SDUPTHER

## 2020-12-28 ENCOUNTER — TELEPHONE (OUTPATIENT)
Dept: FAMILY MEDICINE CLINIC | Facility: CLINIC | Age: 55
End: 2020-12-28

## 2021-01-15 DIAGNOSIS — I10 BENIGN ESSENTIAL HYPERTENSION: ICD-10-CM

## 2021-01-15 RX ORDER — LISINOPRIL 20 MG/1
20 TABLET ORAL DAILY
Qty: 90 TABLET | Refills: 1 | Status: SHIPPED | OUTPATIENT
Start: 2021-01-15 | End: 2021-04-08 | Stop reason: SDUPTHER

## 2021-01-15 NOTE — TELEPHONE ENCOUNTER
Medication refill requested: Lisinopril   Last office visit: 12/21/20  Next office visit: None  Last refilled: 12/21/20, Historical   Ph[facility]acy :   [facility] [address] [address]  Phone: [phone] Fax: [phone]         Pended: 90 x 1

## 2021-02-08 ENCOUNTER — TELEPHONE (OUTPATIENT)
Dept: FAMILY MEDICINE CLINIC | Facility: CLINIC | Age: 56
End: 2021-02-08

## 2021-02-08 DIAGNOSIS — G43.009 MIGRAINE WITHOUT AURA AND WITHOUT STATUS MIGRAINOSUS, NOT INTRACTABLE: ICD-10-CM

## 2021-02-08 NOTE — TELEPHONE ENCOUNTER
Medication: Imitrex 100 mg   Last refilled: 12/21/20  Last Office Visit: 12/21/20  Next Office Visit: No follow-up at this time    Pharmacy: [facility]

## 2021-02-08 NOTE — TELEPHONE ENCOUNTER
Patient called, left a message stating she is having knee pain and is unsure if she needs a referral to ortho or an appointment with our office  Patient will need to be triaged

## 2021-02-08 NOTE — TELEPHONE ENCOUNTER
Has she tried [place] and [place]? Has she scheduled with neuro? She needs to see neuro for her migraines

## 2021-02-09 ENCOUNTER — TELEPHONE (OUTPATIENT)
Dept: NEUROLOGY | Facility: CLINIC | Age: 56
End: 2021-02-09

## 2021-02-09 RX ORDER — SUMATRIPTAN 100 MG/1
TABLET, FILM COATED ORAL
Qty: 10 TABLET | Refills: 0 | Status: SHIPPED | OUTPATIENT
Start: 2021-02-09 | End: 2021-03-26 | Stop reason: SDUPTHER

## 2021-02-09 NOTE — TELEPHONE ENCOUNTER
Best contact number for [id number]:[phone]    Emergency Contact name and number:    Referring provider and telephone number:[facility] Provider Name and if affiliated with [facility]     Reason for Appointment/Dx:Migraine without aura and without status migrainosus    Neurology Location patient would like to be seen:    Order received? Yes                                                 Records Received? No    Have you ever seen another Neurologist?       No    Insurance Information    Insurance Name:[employer]    ID/Policy #:    Secondary Insurance:    ID/Policy#: Workman's Comp/ Accident/ School  Information      Workman's Comp/Accident/School related?        No    If yes name of Insurance company:    Date of Injury:    Type of Injury:    [address] Name and Telephone Number:    Notes:Galen Shea pt pack sent                   Appointment date: 04/07/21 12:00pm

## 2021-02-09 NOTE — TELEPHONE ENCOUNTER
Patient returned our call  The [facility] did not do anything for the patient, didn't even take the edge off  Tried this a few times  Patient reached out to Neurology, Dr Esdras Flores office to schedule  Sumatriptan and Aleve is working the best for patient for the most relief

## 2021-03-04 ENCOUNTER — APPOINTMENT (OUTPATIENT)
Dept: RADIOLOGY | Facility: MEDICAL CENTER | Age: 56
End: 2021-03-04
Payer: COMMERCIAL

## 2021-03-04 ENCOUNTER — OFFICE VISIT (OUTPATIENT)
Dept: OBGYN CLINIC | Facility: MEDICAL CENTER | Age: 56
End: 2021-03-04
Payer: COMMERCIAL

## 2021-03-04 VITALS
SYSTOLIC BLOOD PRESSURE: 128 MMHG | DIASTOLIC BLOOD PRESSURE: 82 MMHG | BODY MASS INDEX: 29.95 KG/M2 | TEMPERATURE: 98.3 F | HEIGHT: 63 IN | WEIGHT: 169 LBS | HEART RATE: 78 BPM

## 2021-03-04 DIAGNOSIS — M25.562 LEFT KNEE PAIN, UNSPECIFIED CHRONICITY: ICD-10-CM

## 2021-03-04 DIAGNOSIS — Z01.89 ENCOUNTER FOR LOWER EXTREMITY COMPARISON IMAGING STUDY: ICD-10-CM

## 2021-03-04 DIAGNOSIS — M25.562 LEFT KNEE PAIN, UNSPECIFIED CHRONICITY: Primary | ICD-10-CM

## 2021-03-04 DIAGNOSIS — M17.12 PRIMARY OSTEOARTHRITIS OF LEFT KNEE: ICD-10-CM

## 2021-03-04 PROCEDURE — 73564 X-RAY EXAM KNEE 4 OR MORE: CPT

## 2021-03-04 PROCEDURE — 3079F DIAST BP 80-89 MM HG: CPT | Performed by: SPECIALIST/TECHNOLOGIST

## 2021-03-04 PROCEDURE — 99204 OFFICE O/P NEW MOD 45 MIN: CPT | Performed by: SPECIALIST/TECHNOLOGIST

## 2021-03-04 PROCEDURE — 3008F BODY MASS INDEX DOCD: CPT | Performed by: SPECIALIST/TECHNOLOGIST

## 2021-03-04 PROCEDURE — 1036F TOBACCO NON-USER: CPT | Performed by: SPECIALIST/TECHNOLOGIST

## 2021-03-04 PROCEDURE — 20610 DRAIN/INJ JOINT/BURSA W/O US: CPT | Performed by: SPECIALIST/TECHNOLOGIST

## 2021-03-04 PROCEDURE — 73560 X-RAY EXAM OF KNEE 1 OR 2: CPT

## 2021-03-04 PROCEDURE — 3074F SYST BP LT 130 MM HG: CPT | Performed by: SPECIALIST/TECHNOLOGIST

## 2021-03-04 RX ORDER — METHYLPREDNISOLONE ACETATE 40 MG/ML
2 INJECTION, SUSPENSION INTRA-ARTICULAR; INTRALESIONAL; INTRAMUSCULAR; SOFT TISSUE
Status: COMPLETED | OUTPATIENT
Start: 2021-03-04 | End: 2021-03-04

## 2021-03-04 RX ORDER — LIDOCAINE HYDROCHLORIDE 10 MG/ML
3 INJECTION, SOLUTION INFILTRATION; PERINEURAL
Status: COMPLETED | OUTPATIENT
Start: 2021-03-04 | End: 2021-03-04

## 2021-03-04 RX ADMIN — LIDOCAINE HYDROCHLORIDE 3 ML: 10 INJECTION, SOLUTION INFILTRATION; PERINEURAL at 12:46

## 2021-03-04 RX ADMIN — METHYLPREDNISOLONE ACETATE 2 ML: 40 INJECTION, SUSPENSION INTRA-ARTICULAR; INTRALESIONAL; INTRAMUSCULAR; SOFT TISSUE at 12:46

## 2021-03-04 NOTE — PROGRESS NOTES
Assessment/Plan     1  Left knee pain, unspecified chronicity    2  Encounter for lower extremity comparison imaging study    3  Primary osteoarthritis of left knee      Orders Placed This Encounter   Procedures    Large joint arthrocentesis    XR knee 4+ vw left injury    XR knee 1 or 2 vw right    Ambulatory referral to Physical Therapy     -CSI was provided to the patient today  - an order for physical therapy was provided to the patient, she was instructed to attend at least one session In transition to a daily home exercise program  - we did discuss viscosupplementation may be an option for her further in the future  - she should continue to use ice and elevation for pain relief   -weight management was discussed at today's appointment   -  She may also use Tylenol and NSAIDs for swelling and pain control      Return in about 6 weeks (around 4/15/2021)  I answered all of the patient's questions during the visit and provided education of the patient's condition during the visit  The patient verbalized understanding of the information given and agrees with the plan  This note was dictated using [employer] software  It may contain errors including improperly dictated words  Please contact physician directly for any questions  History of Present Illness   Chief complaint:   Chief Complaint   Patient presents with    Left Knee - Pain       HPI: Gail Pena is a 50 y o  female that c/o left knee pain  She presents to the office today with her   She has been experiencing left knee pain for approximately 2 months with insidious onset  She denies any previous injuries to her left knee  She states she experiences a constant moderate pain  On her anterior medial left knee  She states her pain is waking her up at night  She does use ice to help relieve her pain which does help  She tried compression in the past however it increased her pain    She does not use any Tylenol or ibuprofen to help relieve her pain  She denies any clicking, catching or locking  She does experience increased pain when she pivots  She states her pain does not keep her from participating in activities however he does have to push through the pain  She denies any further injury or trauma to her left knee  She denies any distal paresthesias  ROS:    See HPI for musculoskeletal review     All other systems reviewed are negative     Historical Information   Past Medical History:   Diagnosis Date    Allergic 02/01/2020    Fibromyalgia     Hypertension     Migraine     Neck pain     Osteoarthritis     lumbar spine     Past Surgical History:   Procedure Laterality Date    CHOLECYSTECTOMY      DILATION AND CURETTAGE OF UTERUS      TUBAL LIGATION       Social History   Social History     Substance and Sexual Activity   Alcohol Use Not Currently    Frequency: Monthly or less    Drinks per session: 1 or 2    Binge frequency: Never     Social History     Substance and Sexual Activity   Drug Use Never     Social History     Tobacco Use   Smoking Status Never Smoker   Smokeless Tobacco Never Used     Family History:   Family History   Problem Relation Age of Onset    MORGAN disease Mother     Arthritis Mother     Depression Mother     Hypertension Mother     Heart attack Father 79    Diabetes Father     Prostate cancer Father 79    Hypertension Father     Arthritis Father     Lupus Sister     Raynaud syndrome Sister     Sjogren's syndrome Sister     Breast cancer Maternal Grandmother 79    Arthritis Maternal Grandmother     Cancer Maternal Grandmother     Alcohol abuse Paternal Grandfather     No Known Problems Daughter     Prostate cancer Maternal Grandfather 79    Arthritis Maternal Grandfather     Cancer Maternal Grandfather     Colon cancer Paternal Grandmother [de-identified]    Depression Sister     No Known Problems Brother     No Known Problems Son     No Known Problems Son     No Known Problems Son    Alonso Rios No Known Problems Son     No Known Problems Son     No Known Problems Maternal Uncle     No Known Problems Paternal Aunt     No Known Problems Paternal Uncle        Current Outpatient Medications on File Prior to Visit   Medication Sig Dispense Refill    Aspirin-Acetaminophen-Caffeine (EXCEDRIN MIGRAINE PO) Take 1 tablet by mouth as needed (migraines)       Black Cohosh (REMIFEMIN PO) Take by mouth      cholecalciferol (VITAMIN D3) 1,000 units tablet Take 2,000 Units by mouth daily       ferrous sulfate 325 (65 Fe) mg tablet Take 325 mg by mouth daily with breakfast       fluconazole (DIFLUCAN) 150 mg tablet Take 150 mg by mouth once       hydrochlorothiazide (MICROZIDE) 12 5 mg capsule Take 1 capsule (12 5 mg total) by mouth daily      lisinopril (ZESTRIL) 20 mg tablet Take 1 tablet (20 mg total) by mouth daily 90 tablet 1    magnesium gluconate (MAGONATE) 500 mg tablet Take 200 mg by mouth 2 (two) times a day       Multiple Vitamins-Minerals (MULTIVITAMIN ADULT PO) Take 1 tablet by mouth daily      omeprazole (PriLOSEC) 20 mg delayed release capsule TAKE 1 CAPSULE(20 MG) BY MOUTH DAILY BEFORE BREAKFAST (Patient taking differently: Take 20 mg by mouth daily ) 90 capsule 1    predniSONE 10 mg tablet Take 40 mg daily for 3 days  Then take 30 mg daily for 3 days  Then take 20 mg daily for 3 days  Then take 10 mg daily for 3 days  30 tablet 0    Riboflavin (Vitamin B-2) 100 MG TABS Take 400 mg by mouth daily      SUMAtriptan (IMITREX) 100 mg tablet One tab daily prn migraine, may repeat in 2 hours if needed  Max 2 tabs per 24 hr 10 tablet 0     No current facility-administered medications on file prior to visit        Allergies   Allergen Reactions    Propranolol Other (See Comments)     Halluncinations    Raspberry (Food Allergy) Allergic Rhinitis, Itching and Nasal Congestion       Current Outpatient Medications on File Prior to Visit   Medication Sig Dispense Refill    Aspirin-Acetaminophen-Caffeine (EXCEDRIN MIGRAINE PO) Take 1 tablet by mouth as needed (migraines)       Black Cohosh (REMIFEMIN PO) Take by mouth      cholecalciferol (VITAMIN D3) 1,000 units tablet Take 2,000 Units by mouth daily       ferrous sulfate 325 (65 Fe) mg tablet Take 325 mg by mouth daily with breakfast       fluconazole (DIFLUCAN) 150 mg tablet Take 150 mg by mouth once       hydrochlorothiazide (MICROZIDE) 12 5 mg capsule Take 1 capsule (12 5 mg total) by mouth daily      lisinopril (ZESTRIL) 20 mg tablet Take 1 tablet (20 mg total) by mouth daily 90 tablet 1    magnesium gluconate (MAGONATE) 500 mg tablet Take 200 mg by mouth 2 (two) times a day       Multiple Vitamins-Minerals (MULTIVITAMIN ADULT PO) Take 1 tablet by mouth daily      omeprazole (PriLOSEC) 20 mg delayed release capsule TAKE 1 CAPSULE(20 MG) BY MOUTH DAILY BEFORE BREAKFAST (Patient taking differently: Take 20 mg by mouth daily ) 90 capsule 1    predniSONE 10 mg tablet Take 40 mg daily for 3 days  Then take 30 mg daily for 3 days  Then take 20 mg daily for 3 days  Then take 10 mg daily for 3 days  30 tablet 0    Riboflavin (Vitamin B-2) 100 MG TABS Take 400 mg by mouth daily      SUMAtriptan (IMITREX) 100 mg tablet One tab daily prn migraine, may repeat in 2 hours if needed  Max 2 tabs per 24 hr 10 tablet 0     No current facility-administered medications on file prior to visit  Objective   Vitals: Blood pressure 128/82, pulse 78, temperature 98 3 °F (36 8 °C), height 5' 2 5" (1 588 m), weight 76 7 kg (169 lb)  ,Body mass index is 30 42 kg/m²      PE:  AAOx 3  WDWN  Hearing intact, no drainage from eyes  Regular rate  no audible wheezing  no abdominal distension  LE compartments soft, skin intact      leftknee:    Appearance:  mild swelling   No ecchymosis  no obvious joint deformity   small effusion  Palpation/Tenderness:  +TTP over medial joint line  No TTP over lateral joint line   No TTP over patella  No TTP over patellar tendon  No TTP over pes anserine bursa  Active Range of Motion:  AROM: 0-130 pain at extreme knee flexion  Special Tests:  Medial Ly's Test:  Positive  Lateral Ly's Test:    Positive  Apley's compression test:   positive  Lachman's Test:  negative  Anterior Drawer Test:  Negative  Patellar grind:  Negative  Valgus Stress Test:  Negative, with pain  Varus Stress Test:  negative     No ipsilateral hip pain with ROM    leftLE:    LLE:  EHL/AT/GS/quads/hamstrings/iliopsoas 5/5, sensation grossly intact L4, L5, S1, palpable pedal pulse      Imaging Studies: I have personally reviewed pertinent reports  leftknee:   X-ray of left knee obtained on March 4, 2021 which demonstrates mild left knee osteoarthritis  With medial  Joint line narrowing    Large joint arthrocentesis: L knee  Universal Protocol:  Consent: Verbal consent obtained  Consent given by: patient  Time out: Immediately prior to procedure a "time out" was called to verify the correct patient, procedure, equipment, support staff and site/side marked as required    Timeout called at: 3/4/2021 12:44 PM   Site marked: the operative site was marked  Supporting Documentation  Indications: pain and diagnostic evaluation   Procedure Details  Location: knee - L knee  Preparation: Patient was prepped and draped in the usual sterile fashion  Needle size: 22 G  Ultrasound guidance: no  Approach: lateral  Medications administered: 3 mL lidocaine 1 %; 2 mL methylPREDNISolone acetate 40 mg/mL    Patient tolerance: patient tolerated the procedure well with no immediate complications  Dressing:  Sterile dressing applied

## 2021-03-10 ENCOUNTER — TELEPHONE (OUTPATIENT)
Dept: PALLIATIVE MEDICINE | Facility: CLINIC | Age: 56
End: 2021-03-10

## 2021-03-10 NOTE — TELEPHONE ENCOUNTER
Patient had called the [place] office asking to speak with [facility] but I advised her that he is only here in [place] on tuesdays  I did tell the patient that she can call the [place] office to see if he is down there but not sure where he is at with the other locations? Patient has questions regarding on where her tumor is located? She is having shooting pain on left side between ear and jaw line  Patient is asking that you give her a call regarding her questions  She doesn't always get the greatest reception with her cell phone at her job  Her cell number is:  [phone]    Her work number is:   [phone] ext [phone]--her hours are Mon-Thursday 9-3ish

## 2021-03-10 NOTE — TELEPHONE ENCOUNTER
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

## 2021-03-19 ENCOUNTER — TELEPHONE (OUTPATIENT)
Dept: NEUROLOGY | Facility: CLINIC | Age: 56
End: 2021-03-19

## 2021-03-19 NOTE — TELEPHONE ENCOUNTER
Called and left a vm for pt that Dr Maggie Adhikari will not be in the office on 4/721 and we need to reschedule the apt  Please call me back to reschedule  Also informed pt that Dr Tamar Han has opening on 4/20 and 4/23

## 2021-03-22 NOTE — TELEPHONE ENCOUNTER
Called and left a 2nd vm for pt that Dr Curtis Eugene will not be in the office on 4/721 and we need to reschedule the apt  Please call me back to reschedule  Also informed pt that Dr Ryley Peña has opening on 4/20 and 4/23

## 2021-03-26 DIAGNOSIS — I10 BENIGN ESSENTIAL HYPERTENSION: ICD-10-CM

## 2021-03-26 DIAGNOSIS — G43.009 MIGRAINE WITHOUT AURA AND WITHOUT STATUS MIGRAINOSUS, NOT INTRACTABLE: ICD-10-CM

## 2021-03-26 NOTE — TELEPHONE ENCOUNTER
The neurology appointment was cancelled by their office and rescheduled for June, so she will need to have this refilled until she gets in for that appointment

## 2021-03-29 DIAGNOSIS — I10 BENIGN ESSENTIAL HYPERTENSION: ICD-10-CM

## 2021-03-29 RX ORDER — HYDROCHLOROTHIAZIDE 12.5 MG/1
CAPSULE, GELATIN COATED ORAL
Qty: 90 CAPSULE | Refills: 1 | Status: SHIPPED | OUTPATIENT
Start: 2021-03-29 | End: 2021-09-28 | Stop reason: SDUPTHER

## 2021-03-29 RX ORDER — HYDROCHLOROTHIAZIDE 12.5 MG/1
12.5 CAPSULE, GELATIN COATED ORAL DAILY
Qty: 30 CAPSULE | Refills: 5 | Status: SHIPPED | OUTPATIENT
Start: 2021-03-29 | End: 2021-03-29

## 2021-03-29 RX ORDER — SUMATRIPTAN 100 MG/1
TABLET, FILM COATED ORAL
Qty: 10 TABLET | Refills: 2 | Status: SHIPPED | OUTPATIENT
Start: 2021-03-29 | End: 2021-05-04 | Stop reason: SDUPTHER

## 2021-03-29 NOTE — TELEPHONE ENCOUNTER
Medication refill requested: Sumatriptan, Microzide  Last office visit: 12/21/20  Next office visit: None  Last refilled:   Sumatriptan 02/09/21, 10 x 0  Microzide 12/21/20  Pharmacy :   [facility] [address] [place] [id number]  Phone: [phone] Fax: [phone]    Pended: See pended

## 2021-04-05 ENCOUNTER — HOSPITAL ENCOUNTER (OUTPATIENT)
Dept: MRI IMAGING | Facility: HOSPITAL | Age: 56
Discharge: HOME | End: 2021-04-05
Payer: COMMERCIAL

## 2021-04-05 DIAGNOSIS — R22.1 PARAPHARYNGEAL SPACE MASS: ICD-10-CM

## 2021-04-05 PROCEDURE — A9585 GADOBUTROL INJECTION: HCPCS | Performed by: RADIOLOGY

## 2021-04-05 PROCEDURE — 70543 MRI ORBT/FAC/NCK W/O &W/DYE: CPT

## 2021-04-05 PROCEDURE — G1004 CDSM NDSC: HCPCS

## 2021-04-05 RX ADMIN — GADOBUTROL 7 ML: 604.72 INJECTION INTRAVENOUS at 08:36

## 2021-04-08 ENCOUNTER — PATIENT MESSAGE (OUTPATIENT)
Dept: FAMILY MEDICINE CLINIC | Facility: CLINIC | Age: 56
End: 2021-04-08

## 2021-04-08 DIAGNOSIS — I10 BENIGN ESSENTIAL HYPERTENSION: ICD-10-CM

## 2021-04-08 RX ORDER — LISINOPRIL 20 MG/1
20 TABLET ORAL DAILY
Qty: 90 TABLET | Refills: 1 | Status: SHIPPED | OUTPATIENT
Start: 2021-04-08 | End: 2021-10-04 | Stop reason: SDUPTHER

## 2021-04-16 ENCOUNTER — OFFICE VISIT (OUTPATIENT)
Dept: OBGYN CLINIC | Facility: MEDICAL CENTER | Age: 56
End: 2021-04-16
Payer: COMMERCIAL

## 2021-04-16 VITALS
TEMPERATURE: 97.8 F | WEIGHT: 168 LBS | DIASTOLIC BLOOD PRESSURE: 73 MMHG | SYSTOLIC BLOOD PRESSURE: 106 MMHG | BODY MASS INDEX: 29.77 KG/M2 | HEIGHT: 63 IN | HEART RATE: 71 BPM

## 2021-04-16 DIAGNOSIS — M17.12 PRIMARY OSTEOARTHRITIS OF LEFT KNEE: Primary | ICD-10-CM

## 2021-04-16 PROCEDURE — 99213 OFFICE O/P EST LOW 20 MIN: CPT | Performed by: ORTHOPAEDIC SURGERY

## 2021-04-16 PROCEDURE — 3078F DIAST BP <80 MM HG: CPT | Performed by: ORTHOPAEDIC SURGERY

## 2021-04-16 PROCEDURE — 3074F SYST BP LT 130 MM HG: CPT | Performed by: ORTHOPAEDIC SURGERY

## 2021-04-16 PROCEDURE — 1036F TOBACCO NON-USER: CPT | Performed by: ORTHOPAEDIC SURGERY

## 2021-04-16 PROCEDURE — 3008F BODY MASS INDEX DOCD: CPT | Performed by: ORTHOPAEDIC SURGERY

## 2021-04-16 NOTE — PROGRESS NOTES
Assessment/Plan:  No diagnosis found  No orders of the defined types were placed in this encounter  ·     No follow-ups on file  I answered all of the patient's questions during the visit and provided education of the patient's condition during the visit  The patient verbalized understanding of the information given and agrees with the plan  This note was dictated using [employer] software  It may contain errors including improperly dictated words  Please contact physician directly for any questions  Subjective   Chief Complaint: No chief complaint on file  HPI  Leigha Zamora is a 50 y o  female who presents for follow up for left knee pain and OA  Patient received left knee steroid injection on 3/4/2021 and reports     Review of Systems  ROS:    See HPI for musculoskeletal review     All other systems reviewed are negative     History:  Past Medical History:   Diagnosis Date    Allergic 02/01/2020    Fibromyalgia     Hypertension     Migraine     Neck pain     Osteoarthritis     lumbar spine     Past Surgical History:   Procedure Laterality Date    CHOLECYSTECTOMY      DILATION AND CURETTAGE OF UTERUS      TUBAL LIGATION       Social History   Social History     Substance and Sexual Activity   Alcohol Use Not Currently    Frequency: Monthly or less    Drinks per session: 1 or 2    Binge frequency: Never     Social History     Substance and Sexual Activity   Drug Use Never     Social History     Tobacco Use   Smoking Status Never Smoker   Smokeless Tobacco Never Used     Family History:   Family History   Problem Relation Age of Onset    MORGAN disease Mother     Arthritis Mother     Depression Mother     Hypertension Mother     Heart attack Father 79    Diabetes Father     Prostate cancer Father 79    Hypertension Father     Arthritis Father     Lupus Sister     Raynaud syndrome Sister     Sjogren's syndrome Sister     Breast cancer Maternal Grandmother 79    Arthritis Maternal Grandmother     Cancer Maternal Grandmother     Alcohol abuse Paternal Grandfather     No Known Problems Daughter     Prostate cancer Maternal Grandfather 79    Arthritis Maternal Grandfather     Cancer Maternal Grandfather     Colon cancer Paternal Grandmother [de-identified]    Depression Sister     No Known Problems Brother     No Known Problems Son     No Known Problems Son     No Known Problems Son     No Known Problems Son     No Known Problems Son     No Known Problems Maternal Uncle     No Known Problems Paternal Aunt     No Known Problems Paternal Uncle        Current Outpatient Medications on File Prior to Visit   Medication Sig Dispense Refill    Aspirin-Acetaminophen-Caffeine (EXCEDRIN MIGRAINE PO) Take 1 tablet by mouth as needed (migraines)       Black Cohosh (REMIFEMIN PO) Take by mouth      cholecalciferol (VITAMIN D3) 1,000 units tablet Take 2,000 Units by mouth daily       ferrous sulfate 325 (65 Fe) mg tablet Take 325 mg by mouth daily with breakfast       fluconazole (DIFLUCAN) 150 mg tablet Take 150 mg by mouth once       hydrochlorothiazide (MICROZIDE) 12 5 mg capsule TAKE 1 CAPSULE(12 5 MG) BY MOUTH DAILY 90 capsule 1    lisinopril (ZESTRIL) 20 mg tablet Take 1 tablet (20 mg total) by mouth daily 90 tablet 1    magnesium gluconate (MAGONATE) 500 mg tablet Take 200 mg by mouth 2 (two) times a day       Multiple Vitamins-Minerals (MULTIVITAMIN ADULT PO) Take 1 tablet by mouth daily      omeprazole (PriLOSEC) 20 mg delayed release capsule TAKE 1 CAPSULE(20 MG) BY MOUTH DAILY BEFORE BREAKFAST (Patient taking differently: Take 20 mg by mouth daily ) 90 capsule 1    predniSONE 10 mg tablet Take 40 mg daily for 3 days  Then take 30 mg daily for 3 days  Then take 20 mg daily for 3 days  Then take 10 mg daily for 3 days   30 tablet 0    Riboflavin (Vitamin B-2) 100 MG TABS Take 400 mg by mouth daily      SUMAtriptan (IMITREX) 100 mg tablet One tab daily prn migraine, may repeat in 2 hours if needed  Max 2 tabs per 24 hr 10 tablet 2     No current facility-administered medications on file prior to visit  Allergies   Allergen Reactions    Propranolol Other (See Comments)     Halluncinations    Raspberry - Food Allergy Allergic Rhinitis, Itching and Nasal Congestion        Objective     There were no vitals taken for this visit       PE:  AAOx 3  WDWN  Hearing intact, no drainage from eyes  no audible wheezing  no abdominal distension  LE compartments soft, skin intact    Ortho Exam:  left Knee:   No erythema  no swelling  no effusion  no warmth  TTP  AROM:   Stable to varus/valgus stress

## 2021-04-16 NOTE — PROGRESS NOTES
Assessment/Plan:  1  Primary osteoarthritis of left knee      No orders of the defined types were placed in this encounter  · Patient has moderate left knee osteoarthritis   · Advised patient to go to physical therapy for a couple of visits to learn the exercises and continue with them at home   · May repeat steroid injections every 3 months as needed   · Ice and elevated as needed for pain   · Declined prescription medications: May take OTC IBU/ Tylenol as needed for pain   Return if symptoms worsen or fail to improve  I answered all of the patient's questions during the visit and provided education of the patient's condition during the visit  The patient verbalized understanding of the information given and agrees with the plan  This note was dictated using [employer] software  It may contain errors including improperly dictated words  Please contact physician directly for any questions  Subjective   Chief Complaint: No chief complaint on file  HPI  Corina Irving is a 50 y o  female who presents for follow up for left knee pain due to moderate  osteoarthritis  Patient had a left knee steroid injection on 03/04/2021 and she states she is feeling significant improvement from the injection  Overall she feels 95% better  She has not started physical therapy yet  She states she is having tenderness over the anterior medial aspect of the left knee  She states mild buckling when walking  She has been elevating and occasional icing as needed for relief  She is currently not taking pain medications  Patient denies any numbness or tingling  Review of Systems  ROS:    See HPI for musculoskeletal review     All other systems reviewed are negative     History:  Past Medical History:   Diagnosis Date    Allergic 02/01/2020    Fibromyalgia     Hypertension     Migraine     Neck pain     Osteoarthritis     lumbar spine     Past Surgical History:   Procedure Laterality Date    CHOLECYSTECTOMY  DILATION AND CURETTAGE OF UTERUS      TUBAL LIGATION       Social History   Social History     Substance and Sexual Activity   Alcohol Use Not Currently    Frequency: Monthly or less    Drinks per session: 1 or 2    Binge frequency: Never     Social History     Substance and Sexual Activity   Drug Use Never     Social History     Tobacco Use   Smoking Status Never Smoker   Smokeless Tobacco Never Used     Family History:   Family History   Problem Relation Age of Onset    MORGAN disease Mother     Arthritis Mother     Depression Mother     Hypertension Mother     Heart attack Father 79    Diabetes Father     Prostate cancer Father 79    Hypertension Father     Arthritis Father     Lupus Sister     Raynaud syndrome Sister     Sjogren's syndrome Sister     Breast cancer Maternal Grandmother 79    Arthritis Maternal Grandmother     Cancer Maternal Grandmother     Alcohol abuse Paternal Grandfather     No Known Problems Daughter     Prostate cancer Maternal Grandfather 79    Arthritis Maternal Grandfather     Cancer Maternal Grandfather     Colon cancer Paternal Grandmother [de-identified]    Depression Sister     No Known Problems Brother     No Known Problems Son     No Known Problems Son     No Known Problems Son     No Known Problems Son     No Known Problems Son     No Known Problems Maternal Uncle     No Known Problems Paternal Aunt     No Known Problems Paternal Uncle        Current Outpatient Medications on File Prior to Visit   Medication Sig Dispense Refill    Aspirin-Acetaminophen-Caffeine (EXCEDRIN MIGRAINE PO) Take 1 tablet by mouth as needed (migraines)       Black Cohosh (REMIFEMIN PO) Take by mouth      cholecalciferol (VITAMIN D3) 1,000 units tablet Take 2,000 Units by mouth daily       ferrous sulfate 325 (65 Fe) mg tablet Take 325 mg by mouth daily with breakfast       fluconazole (DIFLUCAN) 150 mg tablet Take 150 mg by mouth once       hydrochlorothiazide (MICROZIDE) 12 5 mg capsule TAKE 1 CAPSULE(12 5 MG) BY MOUTH DAILY 90 capsule 1    lisinopril (ZESTRIL) 20 mg tablet Take 1 tablet (20 mg total) by mouth daily 90 tablet 1    magnesium gluconate (MAGONATE) 500 mg tablet Take 200 mg by mouth 2 (two) times a day       Multiple Vitamins-Minerals (MULTIVITAMIN ADULT PO) Take 1 tablet by mouth daily      omeprazole (PriLOSEC) 20 mg delayed release capsule TAKE 1 CAPSULE(20 MG) BY MOUTH DAILY BEFORE BREAKFAST (Patient taking differently: Take 20 mg by mouth daily ) 90 capsule 1    predniSONE 10 mg tablet Take 40 mg daily for 3 days  Then take 30 mg daily for 3 days  Then take 20 mg daily for 3 days  Then take 10 mg daily for 3 days  30 tablet 0    Riboflavin (Vitamin B-2) 100 MG TABS Take 400 mg by mouth daily      SUMAtriptan (IMITREX) 100 mg tablet One tab daily prn migraine, may repeat in 2 hours if needed  Max 2 tabs per 24 hr 10 tablet 2     No current facility-administered medications on file prior to visit        Allergies   Allergen Reactions    Propranolol Other (See Comments)     Halluncinations    Raspberry - Food Allergy Allergic Rhinitis, Itching and Nasal Congestion        Objective     /73   Pulse 71   Temp 97 8 °F (36 6 °C)   Ht 5' 2 5" (1 588 m)   Wt 76 2 kg (168 lb)   BMI 30 24 kg/m²      PE:  AAOx 3  WDWN  Hearing intact, no drainage from eyes  no audible wheezing  no abdominal distension  LE compartments soft, skin intact    Ortho Exam:  left Knee:   No erythema  no swelling  no effusion  no warmth  AROM: 0-120         Scribe Attestation    I,:  Gunjan Baker am acting as a scribe while in the presence of the attending physician :       I,:  Mari Has, DO personally performed the services described in this documentation    as scribed in my presence :

## 2021-04-19 NOTE — TELEPHONE ENCOUNTER
Called and left a vm for pt informing her that Dr Francine Poole has apts available the week of 5/17/21  If interested, please call back to schedule sooner apt

## 2021-05-04 DIAGNOSIS — G43.009 MIGRAINE WITHOUT AURA AND WITHOUT STATUS MIGRAINOSUS, NOT INTRACTABLE: ICD-10-CM

## 2021-05-04 NOTE — TELEPHONE ENCOUNTER
Called and left a vm for pt in regards to cancellation at 11am today, ok to do virtual if cannot make it into office

## 2021-05-04 NOTE — TELEPHONE ENCOUNTER
Medication refill requested: SUMATRIPTAN  Last office visit: 12/21/20  Next office visit: None  Last refilled: 03/29/21, 10 X 2  Pharmacy :   [facility] [address] [place] [id number]  Phone: [phone] Fax: [phone]         Pended: 10 X 2

## 2021-05-05 RX ORDER — SUMATRIPTAN 100 MG/1
TABLET, FILM COATED ORAL
Qty: 10 TABLET | Refills: 2 | Status: SHIPPED | OUTPATIENT
Start: 2021-05-05 | End: 2021-10-15 | Stop reason: SDUPTHER

## 2021-05-07 ENCOUNTER — TELEPHONE (OUTPATIENT)
Dept: NEUROLOGY | Facility: CLINIC | Age: 56
End: 2021-05-07

## 2021-05-15 DIAGNOSIS — R10.13 EPIGASTRIC PAIN: ICD-10-CM

## 2021-05-15 RX ORDER — OMEPRAZOLE 20 MG/1
20 CAPSULE, DELAYED RELEASE ORAL DAILY
Qty: 90 CAPSULE | Refills: 1 | Status: SHIPPED | OUTPATIENT
Start: 2021-05-15 | End: 2021-11-26

## 2021-05-17 ENCOUNTER — OFFICE VISIT (OUTPATIENT)
Dept: NEUROLOGY | Facility: CLINIC | Age: 56
End: 2021-05-17
Payer: COMMERCIAL

## 2021-05-17 VITALS
DIASTOLIC BLOOD PRESSURE: 73 MMHG | HEART RATE: 71 BPM | BODY MASS INDEX: 29.34 KG/M2 | TEMPERATURE: 97.9 F | WEIGHT: 165.6 LBS | SYSTOLIC BLOOD PRESSURE: 117 MMHG | HEIGHT: 63 IN

## 2021-05-17 DIAGNOSIS — G47.00 INSOMNIA, UNSPECIFIED TYPE: ICD-10-CM

## 2021-05-17 DIAGNOSIS — G43.709 CHRONIC MIGRAINE WITHOUT AURA WITHOUT STATUS MIGRAINOSUS, NOT INTRACTABLE: Primary | ICD-10-CM

## 2021-05-17 DIAGNOSIS — R51.9 CHRONIC DAILY HEADACHE: ICD-10-CM

## 2021-05-17 DIAGNOSIS — G43.109 MIGRAINE WITH AURA AND WITHOUT STATUS MIGRAINOSUS, NOT INTRACTABLE: ICD-10-CM

## 2021-05-17 PROCEDURE — 3008F BODY MASS INDEX DOCD: CPT | Performed by: PSYCHIATRY & NEUROLOGY

## 2021-05-17 PROCEDURE — 1036F TOBACCO NON-USER: CPT | Performed by: PSYCHIATRY & NEUROLOGY

## 2021-05-17 PROCEDURE — 99215 OFFICE O/P EST HI 40 MIN: CPT | Performed by: PSYCHIATRY & NEUROLOGY

## 2021-05-17 PROCEDURE — 99417 PROLNG OP E/M EACH 15 MIN: CPT | Performed by: PSYCHIATRY & NEUROLOGY

## 2021-05-17 PROCEDURE — 3074F SYST BP LT 130 MM HG: CPT | Performed by: PSYCHIATRY & NEUROLOGY

## 2021-05-17 PROCEDURE — 3078F DIAST BP <80 MM HG: CPT | Performed by: PSYCHIATRY & NEUROLOGY

## 2021-05-17 RX ORDER — COVID-19 ANTIGEN TEST
2 KIT MISCELLANEOUS AS NEEDED
COMMUNITY
End: 2022-06-14 | Stop reason: ALTCHOICE

## 2021-05-17 NOTE — PROGRESS NOTES
Review of Systems   Constitutional: Negative  HENT: Negative  Eyes: Negative  Respiratory: Negative  Cardiovascular: Negative  Gastrointestinal: Negative  Endocrine: Negative  Genitourinary: Negative  Musculoskeletal: Positive for back pain, myalgias and neck pain  Skin: Negative  Allergic/Immunologic: Negative  Neurological: Positive for numbness (Right Arm) and headaches  Tingling   Balance Problems    Hematological: Negative  Psychiatric/Behavioral: Negative

## 2021-05-17 NOTE — PROGRESS NOTES
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) followed in ENT  referred here for evaluation of headache  My initial evaluation 2021    Chronic migraine with and without aura without status migrainosus, not intractable  Chronic daily headache  She reports a long history of headaches and migraines dating back to age 15  She reports the pain is typically bilateral and various locations as discussed in HPI  She reports sometimes she has a possible aura and otherwise has typical associated migrainous features without significant autonomic features  - as of 2021: chronic daily headaches for years, severe migraines 1-2 times a month that don't even respond to sumatriptan, migraines 10 days a month  Trial of emgality for prevention  Continue sumatriptan for abortive  Workup:  - due to increased frequency and severity of headaches and migraines I recommend further evaluation with MRI brain with without contrast to rule out structural or treatable causes of symptoms     Preventative:  - we discussed headache hygiene and lifestyle factors that may improve headaches  - trial of emgality  Discussed proper use, possible side effects and risks    - Past/ failed/contraindicated:   Gabapentin, amitriptyline, duloxetine side effects therefore venlafaxine would be contraindicated due to previous side effects from duloxetine, propranolol  - future options: alternative CGRP, botox     Abortive:  - discussed not taking over-the-counter or prescription pain medications more than 3 days per week to prevent medication overuse/rebound headache  - continue sumatriptan 100 mg as needed  - Past/ failed/contraindicated: fioricet, Fiorinal, Meryle Ligas  - future options: Alternative Triptan,  prochlorperazine, Toradol IM or p o , could consider trial for 5 days of Depakote or dexamethasone 4 prolonged migraine,  reyvow, nurtec    Insomnia, unspecified type  -     Ambulatory referral to Sleep Medicine; Future      Patient instructions      Continue to follow with your primary care provider and other providers/pain medicine, rheumatology regarding chronic neck and back pain and leg pain     Sleep medicine referral     Additional Testing:   Neurodiagnostic workup: MRI Brain ordered    Headache/migraine treatment:   Abortive medications (for immediate treatment of a headache): It is ok to take ibuprofen, acetaminophen or naproxen (Advil, Tylenol,  Aleve, Excedrin) if they help your headaches you should limit these to No more than 3 times a week to avoid medication overuse/rebound headaches  For your more moderate to severe migraines take this medication early   Sumatriptan 100mg tabs - take one at the onset of headache  May repeat one time after 1-2 hours if pain has not resolved  (Max 2 a day and 9 a month)       Over the counter preventive supplements for headaches/migraines (if you try, try for 3 months straight)  Continue if you want or not   (to take every day to help prevent headaches - not to take at the time of headache):  [] Magnesium 400mg daily (If any diarrhea or upset stomach, decrease dose  as tolerated)  [] Riboflavin (Vitamin B2) 200 mg (kids) to 400mg daily (adults) - try online   (FYI B2 may make your urine bright/neon yellow)    Sleep and headache prevention:   [x] Melatonin - you may take 1-3 mg nightly for sleep   You should take this 1 hour prior to bedtime consistently every night for it to work  It works by gradually helping to adjust your sleep time over days to weeks, rather than immediately making you feel sleepy  Prescription preventive medications for headaches/migraines   (to take every day to help prevent headaches - not to take at the time of headache):  [x] Emgality/Galcanezumab - the 1st dose is 240 mg loading dose of 2 consecutive 120 mg injections  Thereafter, 120 mg injections every 30 days     Keep out of direct sunlight  Prior to administration, allow to come to room temperature for 30 minutes  Do not warm using a heat source (eg, microwave or hot water)  Do not shake  Administer in abdomen (avoiding 2 inches around the navel), thigh, upper arm, or buttocks avoiding areas of skin that are tender, bruised, red or hard  Deliver entire contents of single-use prefilled pen or syringe  *Typically these types of medications take time untill you see the benefit, although some may see improvement in days, often it may take weeks, especially if the medication is being titrated up to a beneficial level  Please contact us if there are any concerns or questions regarding the medication  Lifestyle Recommendations:  [x] SLEEP - Maintain a regular sleep schedule: Adults need at least 7-8 hours of uninterrupted a night  Maintain good sleep hygiene:  Going to bed and waking up at consistent times, avoiding excessive daytime naps, avoiding caffeinated beverages in the evening, avoid excessive stimulation in the evening and generally using bed primarily for sleeping  One hour before bedtime would recommend turning lights down lower, decreasing your activity (may read quietly, listen to music at a low volume)  When you get into bed, should eliminate all technology (no texting, emailing, playing with your phone, iPad or tablet in bed)  [x] HYDRATION - Maintain good hydration    Drink  2L of fluid a day (4 typical small water bottles)  [x] DIET - Maintain good nutrition  In particular don't skip meals and try and eat healthy balanced meals regularly  [x] TRIGGERS - Look for other triggers and avoid them: Limit caffeine to 1-2 cups a day or less  Avoid dietary triggers that you have noticed bring on your headaches (this could include aged cheese, peanuts, MSG, aspartame and nitrates)  [x] EXERCISE - physical exercise as we all know is good for you in many ways, and not only is good for your heart, but also is beneficial for your mental health, cognitive health and  chronic pain/headaches  I would encourage at the least 5 days of physical exercise weekly for at least 30 minutes  Education and Follow-up  [x] Please call with any questions or concerns  Of course if any new concerning symptoms go to the emergency department  [x] Follow up 3 months     - As we discussed if there are no abnormalities on the brain MRI that need urgent intervention (very often there are incidental findings that may not mean anything significant and are better discussed in person), you will not necessarily receive a call from us, but feel free to call in to check on the status of the report (since not knowing can be anxiety provoking) and the nurses will let you know the result or make sure I have nothing to pass on regarding the results first   Ideally, all of this will be discussed in detail in the follow-up visit  CC:   We had the pleasure of evaluating Georgie Douglas in neurological consultation today  Georgie Douglas is a   right handed female who presents today for evaluation of headaches  History obtained from patient as well as available medical record review    History of Present Illness:   Current medical illnesses  or past medical history include Migraine without aura, HTN, fibromyalgia, hypercholesteremia, insomnia, Vit D deficiency, carpal tunnel syndrome,  Chronic neck pain, chronic back pain (saw pain management), low ferritin,  mass (exophytic parotid mass, 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 16 yo   How often do the headaches occur?   - waxing and waned during life, better during pregnancy  -  As of 03/27/2020:  1-2 significant episodes weekly  - as of 5/17/2021: chronic daily headaches for years, severe migraines 1-2 times a month that dont respond to sumatriptan, migraines 10 days a month   What time of the day do the headaches start? Not there when she wakes up usually (they are there 10/30 morning) Start in am and worse by mid afternoon   How long do the headaches last? All day   Are you ever headache free? Yes    Aura? Sometimes with   - handwriting less than an hour prior        Last eye exam: around 5/2020 - nothing going on except for some macular degeneration     Where is your headache located and pain quality?  - bitemporal   - under sinus  - in front of ears  - bioccipital  - rarely apex  - usually bilateral  - rarely unilateral   -throbbing, tight band, pressure, achy, shooting, dull, stabbing   What is the intensity of pain? Average: 7-8/10, worst 10/10  Associated symptoms:   [x] Nausea       [x] Vomiting        [] Diarrhea  [x] Stiff or sore neck   [x] Problems with concentration  [x] Photophobia - not too much     [x]Phonophobia  - not too much     [x] Osmophobia  [x] Light-headed or dizzy       [] Ptosis      [] Facial droop  [] Lacrimation  [x] Nasal congestion    Number of days missed per month because of headaches:  Social or Family activities: 2-3     Things that make the headache worse?  No specific movements, any movement     Headache triggers:   weather changes, alcohol, stress, missing meals, fatigue    Have you seen someone else for headaches or pain? Yes, ENT, neurology Dr Renetta Salinas, pain management  Have you had trigger point injection performed and how often? No  Have you had Botox injection performed and how often? No   Have you had epidural injections or transforaminal injections performed? No  Are you current pregnant or planning on getting pregnant? Tubes tied, irregular   Have you ever had any Brain imaging? Unknown    What medications do you take or have you taken for your headaches? ABORTIVE:    OTC medications have been ineffective     exedrin migraine   sumatriptan 100 mg- helps usually, rarely     Past:  fioricet and fiorinal   ED - migraine cocktails - sometimes dont work   ubrelvy    PREVENTIVE:      riboflavin 400 mg, magnesium 400 mg   for BP:  Lisinopril, hydrochlorothiazide    Past/ failed/contraindicated:  Gabapentin up to twice a day about 3 months - can not remember how high, didn't work   Amitriptyline -   Duloxetine - side effects  Propranolol - side effects hallucinating       Alternative therapies used in the past for headaches? Massage     LIFESTYLE  Sleep   - averages: 4-6 hours   Problems falling asleep?:   Yes  Problems staying asleep?:  Yes, 2-3, body hurts   - never had a sleep study  - snores     Water: trying to drink 1-2 bottles, forgets   Caffeine: 12- 24 oz per day - not daily     Mood:   Denies history of anxiety or depression or other diagnosed mood disorder      Right arm>Left arm paresthesias for years - entire hand   - EMG 12/13/2019  Ordered for 6 months of bilateral  Hand numbness, right greater than left   demonstrated mild carpal tunnel syndrome on the left  - plans to see rheumatology to discuss other etiologies or fibro  - plans to follow up with pain medicine     The following portions of the patient's history were reviewed and updated as appropriate: allergies, current medications, past family history, past medical history, past social history, past surgical history and problem list     Pertinent family history:  Family history of headaches:  migraine headaches in mother, grandmother and probably sons  Any family history of aneurysms - No    Pertinent social history:  Work: admin - [employer]   Education: associates degree   Lives with 2-3 children   Total 5 boys, 1 girl  ( lives in [place] with some of the kids)    Illicit Drugs: denies  Alcohol/tobacco: Denies tobacco use, alcohol intake: social drinker    Past Medical History:     Past Medical History:   Diagnosis Date    Allergic 02/01/2020    Fibromyalgia     Hypertension     Migraine     Neck pain     Osteoarthritis     lumbar spine       Patient Active Problem List   Diagnosis    Fibromyalgia    Hypercholesteremia    Migraine without aura and without status migrainosus, not intractable    Persistent insomnia    Vitamin D deficiency    Low ferritin    Benign essential hypertension    Carpal tunnel syndrome, bilateral    Syncopal episodes    Sleep disturbance       Medications:      Current Outpatient Medications   Medication Sig Dispense Refill    Aspirin-Acetaminophen-Caffeine (EXCEDRIN MIGRAINE PO) Take 1 tablet by mouth as needed (migraines)       cholecalciferol (VITAMIN D3) 1,000 units tablet Take 2,000 Units by mouth daily       ferrous sulfate 325 (65 Fe) mg tablet Take 325 mg by mouth daily with breakfast       hydrochlorothiazide (MICROZIDE) 12 5 mg capsule TAKE 1 CAPSULE(12 5 MG) BY MOUTH DAILY (Patient taking differently: Take 12 5 mg by mouth daily ) 90 capsule 1    lisinopril (ZESTRIL) 20 mg tablet Take 1 tablet (20 mg total) by mouth daily 90 tablet 1    MAGNESIUM GLUCONATE PO Take 3 tablets by mouth daily       Multiple Vitamins-Minerals (MULTIVITAMIN ADULT PO) Take 1 tablet by mouth daily      Naproxen Sodium (Aleve) 220 MG CAPS Take 2 capsules by mouth as needed      omeprazole (PriLOSEC) 20 mg delayed release capsule Take 1 capsule (20 mg total) by mouth daily 90 capsule 1    Riboflavin (Vitamin B-2) 100 MG TABS Take 400 mg by mouth daily      SUMAtriptan (IMITREX) 100 mg tablet One tab daily prn migraine, may repeat in 2 hours if needed  Max 2 tabs per 24 hr 10 tablet 2    Black Cohosh (REMIFEMIN PO) Take by mouth      fluconazole (DIFLUCAN) 150 mg tablet Take 150 mg by mouth once       Galcanezumab-gnlm 120 MG/ML SOAJ Inject 240 mg under the skin once for 1 dose 2 mL 0    [START ON 6/17/2021] Galcanezumab-gnlm 120 MG/ML SOAJ Inject 120 mg under the skin every 30 (thirty) days 1 mL 11    predniSONE 10 mg tablet Take 40 mg daily for 3 days  Then take 30 mg daily for 3 days  Then take 20 mg daily for 3 days  Then take 10 mg daily for 3 days  (Patient not taking: Reported on 5/17/2021) 30 tablet 0     No current facility-administered medications for this visit  Allergies:       Allergies   Allergen Reactions    Propranolol Other (See Comments)     Halluncinations    Raspberry - Food Allergy Allergic Rhinitis, Itching and Nasal Congestion       Family History:     Family History   Problem Relation Age of Onset    MORGAN disease Mother     Arthritis Mother     Depression Mother     Hypertension Mother     Heart attack Father 79    Diabetes Father     Prostate cancer Father 79    Hypertension Father     Arthritis Father     Lupus Sister     Raynaud syndrome Sister     Sjogren's syndrome Sister     Breast cancer Maternal Grandmother 79    Arthritis Maternal Grandmother     Cancer Maternal Grandmother     Alcohol abuse Paternal Grandfather     No Known Problems Daughter     Prostate cancer Maternal Grandfather 79    Arthritis Maternal Grandfather     Cancer Maternal Grandfather     Colon cancer Paternal Grandmother [de-identified]    Depression Sister     No Known Problems Brother     No Known Problems Son     No Known Problems Son     No Known Problems Son     No Known Problems Son     No Known Problems Son     No Known Problems Maternal Uncle     No Known Problems Paternal Aunt     No Known Problems Paternal Uncle        Social History:       Social History     Socioeconomic History    Marital status: /Civil Union     Spouse name: Not on file    Number of children: 10    Years of education: Not on file    Highest education level: Not on file   Occupational History    Occupation: Administration   Social Needs    Financial resource strain: Not on file    Food insecurity     Worry: Not on file     Inability: Not on file   [employer] needs     Medical: Not on file     Non-medical: Not on file   Tobacco Use    Smoking status: Never Smoker    Smokeless tobacco: Never Used   Substance and Sexual Activity    Alcohol use: Not Currently     Frequency: Monthly or less     Drinks per session: 1 or 2     Binge frequency: Never    Drug use: Never    Sexual activity: Yes     Partners: Male     Birth control/protection: Female Sterilization   Lifestyle    Physical activity     Days per week: Not on file     Minutes per session: Not on file    Stress: Not on file   Relationships    Social connections     Talks on phone: Not on file     Gets together: Not on file     Attends Sikh service: Not on file     Active member of club or organization: Not on file     Attends meetings of clubs or organizations: Not on file     Relationship status: Not on file    Intimate partner violence     Fear of current or ex partner: Not on file     Emotionally abused: Not on file     Physically abused: Not on file     Forced sexual activity: Not on file   Other Topics Concern    Not on file   Social History Narrative    6 kids 24, 25, 23, 16, 15, 8     Moved from [place] for husbands job - [employer] [facility] for  at [employer] with spouse         Objective:       Physical Exam:                                                                 Vitals:            Constitutional:    /73 (BP Location: Left arm, Patient Position: Sitting, Cuff Size: Standard)   Pulse 71   Temp 97 9 °F (36 6 °C) (Temporal)   Ht 5' 2 5" (1 588 m)   Wt 75 1 kg (165 lb 9 6 oz)   BMI 29 81 kg/m²   BP Readings from Last 3 Encounters:   05/17/21 117/73   04/16/21 106/73   03/04/21 128/82     Pulse Readings from Last 3 Encounters:   05/17/21 71   04/16/21 71   03/04/21 78         Well developed, well nourished, well groomed  No dysmorphic features  Psychiatric:  Normal behavior and appropriate affect        Neurological Examination:     Mental status/cognitive function:   Recent and remote memory intact  Attention span and concentration as well as fund of knowledge are appropriate for age  Normal language and spontaneous speech  Cranial Nerves:  II-visual fields full  Fundi poorly visualized due to pupillary constriction  III, IV, VI-Pupils were equal, round, and reactive to light and accomodation  Extraocular movements were full and conjugate without nystagmus  V-facial sensation symmetric  VII-facial expression symmetric, intact forehead wrinkle, strong eye closure, symmetric smile    VIII-hearing grossly intact bilaterally   IX, X-palate elevation symmetric, no dysarthria  XI-shoulder shrug strength intact    XII-tongue protrusion midline  Motor Exam: symmetric bulk and tone throughout, no pronator drift  Power/strength 5/5 bilateral upper and lower extremities, no atrophy, fasciculations or abnormal movements noted  Sensory: grossly intact light touch in all extremities  Reflexes: brachioradialis 2+, biceps 2+, knee 2+, ankle 2+ bilaterally  No ankle clonus  Coordination: Finger nose finger intact bilaterally, no apparent dysmetria, ataxia or tremor noted  Gait: steady casual and tandem gait       Pertinent lab results:   6/17/20: CMP unremarkable  Vit D 41 1  4/8/20: CBC unremarkable, B12 836  TSH normal      Normal stress test - 12/17/19     Imaging:   None in our system   Sleep-deprived EEG 05/01/2020 normal     Review of Systems:   ROS obtained by medical assistant Personally reviewed and updated if indicated  Review of Systems   Constitutional: Negative  HENT: Negative  Eyes: Negative  Respiratory: Negative  Cardiovascular: Negative  Gastrointestinal: Negative  Endocrine: Negative  Genitourinary: Negative  Musculoskeletal: Positive for back pain, myalgias and neck pain  Skin: Negative  Allergic/Immunologic: Negative  Neurological: Positive for headaches  Hematological: Negative  Psychiatric/Behavioral: Negative  I have spent 50 minutes with Patient today in which greater than 50% of this time was spent in counseling/coordination of care regarding Prognosis, Risks and benefits of tx options, Intructions for management, Patient education, Importance of tx compliance, Risk factor reductions and Impressions  I also spent 20 minutes non face to face for this patient the same day           Author:  Purnima Martinez MD 5/17/2021 5:37 PM

## 2021-05-17 NOTE — PATIENT INSTRUCTIONS
Continue to follow with your primary care provider and other providers regarding chronic neck and back pain and leg pain     Sleep medicine referral     Additional Testing:   Neurodiagnostic workup: MRI Brain ordered    Headache/migraine treatment:   Abortive medications (for immediate treatment of a headache): It is ok to take ibuprofen, acetaminophen or naproxen (Advil, Tylenol,  Aleve, Excedrin) if they help your headaches you should limit these to No more than 3 times a week to avoid medication overuse/rebound headaches  For your more moderate to severe migraines take this medication early   Sumatriptan 100mg tabs - take one at the onset of headache  May repeat one time after 1-2 hours if pain has not resolved  (Max 2 a day and 9 a month)       Over the counter preventive supplements for headaches/migraines (if you try, try for 3 months straight)  Continue if you want or not   (to take every day to help prevent headaches - not to take at the time of headache):  [] Magnesium 400mg daily (If any diarrhea or upset stomach, decrease dose  as tolerated)  [] Riboflavin (Vitamin B2) 200 mg (kids) to 400mg daily (adults) - try online   (FYI B2 may make your urine bright/neon yellow)    Sleep and headache prevention:   [x] Melatonin - you may take 1-3 mg nightly for sleep  You should take this 1 hour prior to bedtime consistently every night for it to work  It works by gradually helping to adjust your sleep time over days to weeks, rather than immediately making you feel sleepy  Prescription preventive medications for headaches/migraines   (to take every day to help prevent headaches - not to take at the time of headache):  [x] Emgality/Galcanezumab - the 1st dose is 240 mg loading dose of 2 consecutive 120 mg injections  Thereafter, 120 mg injections every 30 days     Keep out of direct sunlight  Prior to administration, allow to come to room temperature for 30 minutes   Do not warm using a heat source (eg, microwave or hot water)  Do not shake  Administer in abdomen (avoiding 2 inches around the navel), thigh, upper arm, or buttocks avoiding areas of skin that are tender, bruised, red or hard  Deliver entire contents of single-use prefilled pen or syringe  *Typically these types of medications take time untill you see the benefit, although some may see improvement in days, often it may take weeks, especially if the medication is being titrated up to a beneficial level  Please contact us if there are any concerns or questions regarding the medication  Lifestyle Recommendations:  [x] SLEEP - Maintain a regular sleep schedule: Adults need at least 7-8 hours of uninterrupted a night  Maintain good sleep hygiene:  Going to bed and waking up at consistent times, avoiding excessive daytime naps, avoiding caffeinated beverages in the evening, avoid excessive stimulation in the evening and generally using bed primarily for sleeping  One hour before bedtime would recommend turning lights down lower, decreasing your activity (may read quietly, listen to music at a low volume)  When you get into bed, should eliminate all technology (no texting, emailing, playing with your phone, iPad or tablet in bed)  [x] HYDRATION - Maintain good hydration  Drink  2L of fluid a day (4 typical small water bottles)  [x] DIET - Maintain good nutrition  In particular don't skip meals and try and eat healthy balanced meals regularly  [x] TRIGGERS - Look for other triggers and avoid them: Limit caffeine to 1-2 cups a day or less  Avoid dietary triggers that you have noticed bring on your headaches (this could include aged cheese, peanuts, MSG, aspartame and nitrates)  [x] EXERCISE - physical exercise as we all know is good for you in many ways, and not only is good for your heart, but also is beneficial for your mental health, cognitive health and  chronic pain/headaches   I would encourage at the least 5 days of physical exercise weekly for at least 30 minutes  Education and Follow-up  [x] Please call with any questions or concerns  Of course if any new concerning symptoms go to the emergency department  [x] Follow up 3 months     - As we discussed if there are no abnormalities on the brain MRI that need urgent intervention (very often there are incidental findings that may not mean anything significant and are better discussed in person), you will not necessarily receive a call from us, but feel free to call in to check on the status of the report (since not knowing can be anxiety provoking) and the nurses will let you know the result or make sure I have nothing to pass on regarding the results first   Ideally, all of this will be discussed in detail in the follow-up visit

## 2021-05-20 ENCOUNTER — PATIENT MESSAGE (OUTPATIENT)
Dept: FAMILY MEDICINE CLINIC | Facility: CLINIC | Age: 56
End: 2021-05-20

## 2021-05-20 NOTE — TELEPHONE ENCOUNTER
Please call the ENT office and ask to speak to a nurse and ask someone to get back to her right away

## 2021-05-20 NOTE — TELEPHONE ENCOUNTER
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  Thank you

## 2021-05-20 NOTE — TELEPHONE ENCOUNTER
Left non detailed message for pt to call office     I SEE 1ST MRI BUT ANOTHER SCHEDULED   CE HAS NOTHING  WHERE WAS THIS COMPLETED AND WHEN?

## 2021-05-20 NOTE — TELEPHONE ENCOUNTER
Placed call to ENT - they will get in contact with patient  Informed patient to be aware of their phone call

## 2021-07-01 ENCOUNTER — HOSPITAL ENCOUNTER (OUTPATIENT)
Dept: MRI IMAGING | Facility: HOSPITAL | Age: 56
Discharge: HOME | End: 2021-07-01
Attending: PSYCHIATRY & NEUROLOGY

## 2021-07-01 DIAGNOSIS — G43.109 MIGRAINE WITH AURA AND WITHOUT STATUS MIGRAINOSUS, NOT INTRACTABLE: ICD-10-CM

## 2021-07-01 DIAGNOSIS — G43.709 CHRONIC MIGRAINE WITHOUT AURA WITHOUT STATUS MIGRAINOSUS, NOT INTRACTABLE: ICD-10-CM

## 2021-07-06 ENCOUNTER — TELEPHONE (OUTPATIENT)
Dept: NEUROLOGY | Facility: CLINIC | Age: 56
End: 2021-07-06

## 2021-07-06 DIAGNOSIS — F40.240 CLAUSTROPHOBIA: Primary | ICD-10-CM

## 2021-07-06 NOTE — TELEPHONE ENCOUNTER
Patient of Dr Raj Ray    scheduled for MRI Brain, and had panic attack and was unable to complete  She said she was unable to tolerate in the past but was unable to this time  Dr Joss Valadez:  Please provide recommendation, she will have transportation if necessary  PA Team:  Please resubmit to extend auth as      Clinical Team:  Emgality needs PA        MRI brain ordered , needs medication in order to tolerate due to claustrophobia (was able to tolerate in the past)  will arrange transportation  pharmacy [facility] [phone], okay to leave detailed message

## 2021-07-07 NOTE — TELEPHONE ENCOUNTER
Emgality PA completed on [address]  Key: [id number]  Sent to [employer]-to check [phone]

## 2021-07-07 NOTE — TELEPHONE ENCOUNTER
I can prescribe ativan to take prior to MRI, someone would have to drive her, let me know if she would like   Glad emgality was approved

## 2021-07-08 RX ORDER — LORAZEPAM 1 MG/1
TABLET ORAL
Qty: 2 TABLET | Refills: 0 | Status: SHIPPED | OUTPATIENT
Start: 2021-07-08 | End: 2021-11-26

## 2021-07-08 NOTE — TELEPHONE ENCOUNTER
I reviewed results of pulmonary function test with patient today  Though he is still consider to have moderate obstruction has had a significant decrease in his FEV1 from 73% predicted to 55% predicted  He is still clinical stage IIB and reports relatively good symptom control on   Tudorza and Breo  Will continue  Tudorza 400 mcg 1 puff twice daily and Breo 200/ 25 mcg 1 puff daily  He was reminded to rinse mouth out after each use of Breo  Recommend continued use of DuoNeb q 6 hours p r n  and Atrovent HFA q 6 hours p r n  Recommended he use Flonase 1 spray each nostril before bed to get the most benefit from this medication  I also offered to prescribe Singulair 10 mg p o  daily at bedtime but he refused stating that he wanted to try Flonase at night 1st  He has completed 5 of the 12 weeks of pulmonary rehab and will continue this  Patient is receptive to ativan prior to MRI  Please send script to pharmacy, thank you

## 2021-07-08 NOTE — TELEPHONE ENCOUNTER
Reached vm, left detailed message regarding ativan, please call back if receptive and Dr Ladonna Hope will prescribe

## 2021-07-09 NOTE — TELEPHONE ENCOUNTER
emgality approved  7/8/2021 - 1/8/2022    Patient aware, reviewed loading dose is 2 pens and 1 pen monthly thereafter  She will call her pharmacy to advise  She also said she picked up ativan for pre MRI and will call to schedule MRI

## 2021-07-13 ENCOUNTER — HOSPITAL ENCOUNTER (EMERGENCY)
Facility: HOSPITAL | Age: 56
Discharge: HOME | End: 2021-07-13
Attending: EMERGENCY MEDICINE | Admitting: EMERGENCY MEDICINE
Payer: COMMERCIAL

## 2021-07-13 ENCOUNTER — APPOINTMENT (EMERGENCY)
Dept: RADIOLOGY | Facility: HOSPITAL | Age: 56
End: 2021-07-13
Payer: COMMERCIAL

## 2021-07-13 VITALS
BODY MASS INDEX: 30.24 KG/M2 | TEMPERATURE: 98.7 F | HEART RATE: 67 BPM | WEIGHT: 165.34 LBS | OXYGEN SATURATION: 95 % | SYSTOLIC BLOOD PRESSURE: 153 MMHG | DIASTOLIC BLOOD PRESSURE: 82 MMHG | RESPIRATION RATE: 18 BRPM

## 2021-07-13 DIAGNOSIS — R07.89 ATYPICAL CHEST PAIN: Primary | ICD-10-CM

## 2021-07-13 LAB
ANION GAP SERPL CALCULATED.3IONS-SCNC: 3 MMOL/L (ref 4–13)
ATRIAL RATE: 0 BPM
ATRIAL RATE: 70 BPM
ATRIAL RATE: 82 BPM
BASOPHILS # BLD AUTO: 0.04 THOUSANDS/ΜL (ref 0–0.1)
BASOPHILS NFR BLD AUTO: 1 % (ref 0–1)
BUN SERPL-MCNC: 16 MG/DL (ref 5–25)
CALCIUM SERPL-MCNC: 9.1 MG/DL (ref 8.3–10.1)
CHLORIDE SERPL-SCNC: 105 MMOL/L (ref 100–108)
CO2 SERPL-SCNC: 34 MMOL/L (ref 21–32)
CREAT SERPL-MCNC: 0.75 MG/DL (ref 0.6–1.3)
EOSINOPHIL # BLD AUTO: 0.22 THOUSAND/ΜL (ref 0–0.61)
EOSINOPHIL NFR BLD AUTO: 3 % (ref 0–6)
ERYTHROCYTE [DISTWIDTH] IN BLOOD BY AUTOMATED COUNT: 12.5 % (ref 11.6–15.1)
GFR SERPL CREATININE-BSD FRML MDRD: 91 ML/MIN/1.73SQ M
GLUCOSE SERPL-MCNC: 104 MG/DL (ref 65–140)
HCT VFR BLD AUTO: 45 % (ref 34.8–46.1)
HGB BLD-MCNC: 14.6 G/DL (ref 11.5–15.4)
IMM GRANULOCYTES # BLD AUTO: 0.03 THOUSAND/UL (ref 0–0.2)
IMM GRANULOCYTES NFR BLD AUTO: 0 % (ref 0–2)
LYMPHOCYTES # BLD AUTO: 1.68 THOUSANDS/ΜL (ref 0.6–4.47)
LYMPHOCYTES NFR BLD AUTO: 20 % (ref 14–44)
MCH RBC QN AUTO: 28.3 PG (ref 26.8–34.3)
MCHC RBC AUTO-ENTMCNC: 32.4 G/DL (ref 31.4–37.4)
MCV RBC AUTO: 87 FL (ref 82–98)
MONOCYTES # BLD AUTO: 0.6 THOUSAND/ΜL (ref 0.17–1.22)
MONOCYTES NFR BLD AUTO: 7 % (ref 4–12)
NEUTROPHILS # BLD AUTO: 5.69 THOUSANDS/ΜL (ref 1.85–7.62)
NEUTS SEG NFR BLD AUTO: 69 % (ref 43–75)
NRBC BLD AUTO-RTO: 0 /100 WBCS
P AXIS: 52 DEGREES
P AXIS: 72 DEGREES
PLATELET # BLD AUTO: 236 THOUSANDS/UL (ref 149–390)
PMV BLD AUTO: 10.3 FL (ref 8.9–12.7)
POTASSIUM SERPL-SCNC: 3.6 MMOL/L (ref 3.5–5.3)
PR INTERVAL: 136 MS
PR INTERVAL: 140 MS
QRS AXIS: 0 DEGREES
QRS AXIS: 54 DEGREES
QRS AXIS: 87 DEGREES
QRSD INTERVAL: 0 MS
QRSD INTERVAL: 76 MS
QRSD INTERVAL: 78 MS
QT INTERVAL: 0 MS
QT INTERVAL: 390 MS
QT INTERVAL: 394 MS
QTC INTERVAL: 0 MS
QTC INTERVAL: 425 MS
QTC INTERVAL: 455 MS
RBC # BLD AUTO: 5.15 MILLION/UL (ref 3.81–5.12)
SODIUM SERPL-SCNC: 142 MMOL/L (ref 136–145)
T WAVE AXIS: 0 DEGREES
T WAVE AXIS: 55 DEGREES
T WAVE AXIS: 69 DEGREES
TROPONIN I SERPL-MCNC: <0.02 NG/ML
TROPONIN I SERPL-MCNC: <0.02 NG/ML
VENTRICULAR RATE: 0 BPM
VENTRICULAR RATE: 70 BPM
VENTRICULAR RATE: 82 BPM
WBC # BLD AUTO: 8.26 THOUSAND/UL (ref 4.31–10.16)

## 2021-07-13 PROCEDURE — 80048 BASIC METABOLIC PNL TOTAL CA: CPT | Performed by: EMERGENCY MEDICINE

## 2021-07-13 PROCEDURE — 96361 HYDRATE IV INFUSION ADD-ON: CPT

## 2021-07-13 PROCEDURE — 99285 EMERGENCY DEPT VISIT HI MDM: CPT

## 2021-07-13 PROCEDURE — 96374 THER/PROPH/DIAG INJ IV PUSH: CPT

## 2021-07-13 PROCEDURE — 93005 ELECTROCARDIOGRAM TRACING: CPT

## 2021-07-13 PROCEDURE — 84484 ASSAY OF TROPONIN QUANT: CPT | Performed by: EMERGENCY MEDICINE

## 2021-07-13 PROCEDURE — 99284 EMERGENCY DEPT VISIT MOD MDM: CPT | Performed by: EMERGENCY MEDICINE

## 2021-07-13 PROCEDURE — 71046 X-RAY EXAM CHEST 2 VIEWS: CPT

## 2021-07-13 PROCEDURE — 36415 COLL VENOUS BLD VENIPUNCTURE: CPT | Performed by: EMERGENCY MEDICINE

## 2021-07-13 PROCEDURE — 93010 ELECTROCARDIOGRAM REPORT: CPT | Performed by: INTERNAL MEDICINE

## 2021-07-13 PROCEDURE — 85025 COMPLETE CBC W/AUTO DIFF WBC: CPT | Performed by: EMERGENCY MEDICINE

## 2021-07-13 RX ORDER — KETOROLAC TROMETHAMINE 30 MG/ML
15 INJECTION, SOLUTION INTRAMUSCULAR; INTRAVENOUS ONCE
Status: COMPLETED | OUTPATIENT
Start: 2021-07-13 | End: 2021-07-13

## 2021-07-13 RX ADMIN — KETOROLAC TROMETHAMINE 15 MG: 30 INJECTION, SOLUTION INTRAMUSCULAR; INTRAVENOUS at 16:29

## 2021-07-13 RX ADMIN — SODIUM CHLORIDE 1000 ML: 0.9 INJECTION, SOLUTION INTRAVENOUS at 16:28

## 2021-07-13 NOTE — ED PROVIDER NOTES
History  Chief Complaint   Patient presents with    Chest Pain     Pt c/o midsternal chest pain that radiates down left arm ongoing for 30 minutes PTA     A 53-year-old female with past medical history of fibromyalgia, hypertension and migraines; presents with left-sided chest pain that radiates toward the left upper extremity and left neck  Pain began approximately 30 minutes prior to arrival   Patient states she was seated at her desk at work, when she developed lightheadedness followed by the onset of chest pain  Patient states the lightheadedness has since improved however the chest pain persists  Patient otherwise denies fever, chills, cough, shortness of breath, abdominal pain, nausea, vomiting, diarrhea, peripheral edema and rashes  A/P:  Chest pain, radiating toward the left upper extremity  Patient otherwise resting comfortably  EKG reviewed and within normal limits  Symptoms are atypical for ACS however patient does have risk factors, will check lab work for cardiac etiology, renal impairment, electrolyte abnormality and anemia  Will obtain chest x-ray to evaluate for structural abnormality  Will give Toradol for pain control  History provided by:  Patient and medical records  Chest Pain      Prior to Admission Medications   Prescriptions Last Dose Informant Patient Reported? Taking? Aspirin-Acetaminophen-Caffeine (EXCEDRIN MIGRAINE PO) Past Week at Unknown time  Yes Yes   Sig: Take 1 tablet by mouth as needed (migraines)    Black Cohosh (REMIFEMIN PO)   Yes No   Sig: Take by mouth   Galcanezumab-gnlm 120 MG/ML SOAJ   No No   Sig: Inject 120 mg under the skin every 30 (thirty) days   LORazepam (ATIVAN) 1 mg tablet Not Taking at Unknown time  No No   Sig: Take 1 mg 1 hour prior to MRI, may repeat x1 in 40 minutes p r n  Claustrophobia/anxiety  No driving     Patient not taking: Reported on 7/13/2021   MAGNESIUM GLUCONATE PO 7/12/2021 at Unknown time Self Yes Yes   Sig: Take 3 tablets by mouth daily    Multiple Vitamins-Minerals (MULTIVITAMIN ADULT PO)   Yes No   Sig: Take 1 tablet by mouth daily   Naproxen Sodium (Aleve) 220 MG CAPS Past Month at Unknown time  Yes Yes   Sig: Take 2 capsules by mouth as needed   Riboflavin (Vitamin B-2) 100 MG TABS 7/13/2021 at Unknown time  Yes Yes   Sig: Take 400 mg by mouth daily   SUMAtriptan (IMITREX) 100 mg tablet Past Month at Unknown time  No Yes   Sig: One tab daily prn migraine, may repeat in 2 hours if needed  Max 2 tabs per 24 hr   cholecalciferol (VITAMIN D3) 1,000 units tablet 7/13/2021 at Unknown time  Yes Yes   Sig: Take 2,000 Units by mouth daily    ferrous sulfate 325 (65 Fe) mg tablet 7/13/2021 at Unknown time  Yes Yes   Sig: Take 325 mg by mouth daily with breakfast    fluconazole (DIFLUCAN) 150 mg tablet   Yes No   Sig: Take 150 mg by mouth once    hydrochlorothiazide (MICROZIDE) 12 5 mg capsule 7/13/2021 at Unknown time  No Yes   Sig: TAKE 1 CAPSULE(12 5 MG) BY MOUTH DAILY   Patient taking differently: Take 12 5 mg by mouth daily    lisinopril (ZESTRIL) 20 mg tablet 7/12/2021 at Unknown time  No Yes   Sig: Take 1 tablet (20 mg total) by mouth daily   omeprazole (PriLOSEC) 20 mg delayed release capsule 7/13/2021 at Unknown time  No Yes   Sig: Take 1 capsule (20 mg total) by mouth daily   predniSONE 10 mg tablet   No No   Sig: Take 40 mg daily for 3 days  Then take 30 mg daily for 3 days  Then take 20 mg daily for 3 days  Then take 10 mg daily for 3 days     Patient not taking: Reported on 5/17/2021      Facility-Administered Medications: None       Past Medical History:   Diagnosis Date    Allergic 02/01/2020    Fibromyalgia     Hypertension     Migraine     Neck pain     Osteoarthritis     lumbar spine       Past Surgical History:   Procedure Laterality Date    CHOLECYSTECTOMY      DILATION AND CURETTAGE OF UTERUS      TUBAL LIGATION         Family History   Problem Relation Age of Onset    MORGAN disease Mother     Arthritis Mother  Depression Mother     Hypertension Mother     Heart attack Father 79    Diabetes Father     Prostate cancer Father 79    Hypertension Father     Arthritis Father     Lupus Sister     Raynaud syndrome Sister     Sjogren's syndrome Sister     Breast cancer Maternal Grandmother 79    Arthritis Maternal Grandmother     Cancer Maternal Grandmother     Alcohol abuse Paternal Grandfather     No Known Problems Daughter     Prostate cancer Maternal Grandfather 79    Arthritis Maternal Grandfather     Cancer Maternal Grandfather     Colon cancer Paternal Grandmother [de-identified]    Depression Sister     No Known Problems Brother     No Known Problems Son     No Known Problems Son     No Known Problems Son     No Known Problems Son     No Known Problems Son     No Known Problems Maternal Uncle     No Known Problems Paternal Aunt     No Known Problems Paternal Uncle      I have reviewed and agree with the history as documented  E-Cigarette/Vaping    E-Cigarette Use Never User      E-Cigarette/Vaping Substances    Nicotine No     THC No     CBD No     Flavoring No     Other No     Unknown No      Social History     Tobacco Use    Smoking status: Never Smoker    Smokeless tobacco: Never Used   Vaping Use    Vaping Use: Never used   Substance Use Topics    Alcohol use: Not Currently    Drug use: Never       Review of Systems   Cardiovascular: Positive for chest pain  Neurological: Positive for light-headedness  All other systems reviewed and are negative  Physical Exam  Physical Exam  General Appearance: alert and oriented, nad, non toxic appearing  Skin:  Warm, dry, intact  HEENT: atraumatic, normocephalic  Neck: Supple, trachea midline  Cardiac: RRR; no murmurs, rub, gallops  Pulmonary: lungs CTAB; no wheezes, rales, rhonchi    Chest wall nontender  Gastrointestinal: abdomen soft, nontender, nondistended; no guarding or rebound tenderness; good bowel sounds, no mass or bruits  Extremities:  no pedal edema, 2+ pulses; no calf tenderness, no clubbing, no cyanosis  Neuro:  no focal motor or sensory deficits, CN 2-12 grossly intact  Psych:  Normal mood and affect, normal judgement and insight      Vital Signs  ED Triage Vitals   Temperature Pulse Respirations Blood Pressure SpO2   07/13/21 1521 07/13/21 1521 07/13/21 1521 07/13/21 1521 07/13/21 1521   98 7 °F (37 1 °C) 78 16 154/95 100 %      Temp Source Heart Rate Source Patient Position - Orthostatic VS BP Location FiO2 (%)   07/13/21 1521 07/13/21 1521 07/13/21 1736 07/13/21 1736 --   Oral Monitor Lying Left arm       Pain Score       07/13/21 1521       5           Vitals:    07/13/21 1521 07/13/21 1736 07/13/21 1930 07/13/21 2021   BP: 154/95 132/74 155/84 153/82   Pulse: 78 59 68 67   Patient Position - Orthostatic VS:  Lying Sitting Lying         Visual Acuity      ED Medications  Medications   sodium chloride 0 9 % bolus 1,000 mL (0 mL Intravenous Stopped 7/13/21 1843)   ketorolac (TORADOL) injection 15 mg (15 mg Intravenous Given 7/13/21 1629)       Diagnostic Studies  Results Reviewed     Procedure Component Value Units Date/Time    Troponin I [[id number]]  (Normal) Collected: 07/13/21 1937    Lab Status: Final result Specimen: Blood from Arm, Right Updated: 07/13/21 2010     Troponin I <0 02 ng/mL     Troponin I [[id number]]  (Normal) Collected: 07/13/21 1628    Lab Status: Final result Specimen: Blood from Arm, Right Updated: 07/13/21 1705     Troponin I <0 02 ng/mL     Basic metabolic panel [[id number]]  (Abnormal) Collected: 07/13/21 1628    Lab Status: Final result Specimen: Blood from Arm, Right Updated: 07/13/21 1653     Sodium 142 mmol/L      Potassium 3 6 mmol/L      Chloride 105 mmol/L      CO2 34 mmol/L      ANION GAP 3 mmol/L      BUN 16 mg/dL      Creatinine 0 75 mg/dL      Glucose 104 mg/dL      Calcium 9 1 mg/dL      eGFR 91 ml/min/1 73sq m     Narrative:      [place] guidelines for Chronic Kidney Disease (CKD):     Stage 1 with normal or high GFR (GFR > 90 mL/min/1 73 square meters)    Stage 2 Mild CKD (GFR = 60-89 mL/min/1 73 square meters)    Stage 3A Moderate CKD (GFR = 45-59 mL/min/1 73 square meters)    Stage 3B Moderate CKD (GFR = 30-44 mL/min/1 73 square meters)    Stage 4 Severe CKD (GFR = 15-29 mL/min/1 73 square meters)    Stage 5 End Stage CKD (GFR <15 mL/min/1 73 square meters)  Note: GFR calculation is accurate only with a steady state creatinine    CBC and differential [[id number]]  (Abnormal) Collected: 07/13/21 1628    Lab Status: Final result Specimen: Blood from Arm, Right Updated: 07/13/21 1644     WBC 8 26 Thousand/uL      RBC 5 15 Million/uL      Hemoglobin 14 6 g/dL      Hematocrit 45 0 %      MCV 87 fL      MCH 28 3 pg      MCHC 32 4 g/dL      RDW 12 5 %      MPV 10 3 fL      Platelets 139 Thousands/uL      nRBC 0 /100 WBCs      Neutrophils Relative 69 %      Immat GRANS % 0 %      Lymphocytes Relative 20 %      Monocytes Relative 7 %      Eosinophils Relative 3 %      Basophils Relative 1 %      Neutrophils Absolute 5 69 Thousands/µL      Immature Grans Absolute 0 03 Thousand/uL      Lymphocytes Absolute 1 68 Thousands/µL      Monocytes Absolute 0 60 Thousand/µL      Eosinophils Absolute 0 22 Thousand/µL      Basophils Absolute 0 04 Thousands/µL                  XR chest 2 views   ED Interpretation by Desean Perdomo DO (07/13 1656)   No acute disease      Final Result by Vidal Cespedes DO (07/14 8383)      No acute cardiopulmonary disease                    Workstation performed: [id number]                    Procedures  Procedures   ECG 12 Lead Documentation  Date/Time: today/date: 7/15/2021  Performed by: Carmelina Gonzalez    ECG reviewed by me, the ED Provider: yes    Patient location:  ED   Previous ECG:  Compared to current, no change   Rate:  82  ECG rate assessment: normal    Rhythm: sinus rhythm    Ectopy:  none    QRS axis:  Normal  Intervals: normal   Q waves: None   ST segments:  Normal  T waves: normal      Impression: Normal EKG        ECG 12 Lead Documentation  Date/Time: today/date: 7/15/2021, obtained at 16:18  Performed by: Nikolai Lara    ECG reviewed by me, the ED Provider: yes    Patient location:  ED   Previous ECG:  Compared to current, no change   Rate:  70  ECG rate assessment: normal    Rhythm: sinus rhythm    Ectopy:  none    QRS axis:  Normal  Intervals: normal   Q waves: None   ST segments:  Normal  T waves: normal      Impression: Normal EKG      ECG 12 Lead Documentation  Date/Time: today/date: 7/15/2021, delta obtained at 19:36  Performed by: Nikolai Lara    ECG reviewed by me, the ED Provider: yes    Patient location:  ED   Previous ECG:  Compared to current, no change   Rate:  80  ECG rate assessment: normal    Rhythm: sinus rhythm    Ectopy:  none    QRS axis:  Normal  Intervals: normal   Q waves: None   ST segments:  Normal  T waves: normal      Impression: Normal EKG        ED Course  ED Course as of Jul 15 2339   Tue Jul 13, 2021   1707 Symptoms atypical for ACS, however given onset just PTA will proceed with 3 hour delta trop and EKG  Troponin I: <0 02   1718 Pt updated on results  Reports pain has started to improve  Agreeable to delta troponin and EKG  2010 Delta troponin negative, EKG unchanged from prior  Chest pain unlikely to be cardiac in nature, will proceed with discharge home  Recommend PCP follow up and strict return precautions     Troponin I: <0 02             HEART Risk Score      Most Recent Value   Heart Score Risk Calculator   History  0 Filed at: 07/13/2021 1707   ECG  0 Filed at: 07/13/2021 1707   Age  1 Filed at: 07/13/2021 1707   Risk Factors  1 Filed at: 07/13/2021 1707   Troponin  0 Filed at: 07/13/2021 1707   HEART Score  2 Filed at: 07/13/2021 1707                                    MDM    Disposition  Final diagnoses:   Atypical chest pain     Time reflects when diagnosis was documented in both MDM as applicable and the Disposition within this note     Time User Action Codes Description Comment    7/13/2021  8:11 PM Fionarobinson Vazquezas Add [R07 89] Atypical chest pain       ED Disposition     ED Disposition Condition Date/Time Comment    Discharge Stable Tue Jul 13, 2021  8:11 PM Alix Guallpa discharge to home/self care  Follow-up Information     Follow up With Specialties Details Why Contact Info Additional Information    Maira Mullins,  Family Medicine Schedule an appointment as soon as possible for a visit in 2 days For re-evaluation [address]    [address]  [facility] Emergency Department Emergency Medicine Go to  If symptoms worsen [facility] [id number]  [address] [facility], [address] [facility] [address] [facility], [id number]          Discharge Medication List as of 7/13/2021  8:11 PM      CONTINUE these medications which have NOT CHANGED    Details   Aspirin-Acetaminophen-Caffeine (EXCEDRIN MIGRAINE PO) Take 1 tablet by mouth as needed (migraines) , Historical Med      cholecalciferol (VITAMIN D3) 1,000 units tablet Take 2,000 Units by mouth daily , Starting Fri 5/25/2018, Historical Med      ferrous sulfate 325 (65 Fe) mg tablet Take 325 mg by mouth daily with breakfast , Historical Med      hydrochlorothiazide (MICROZIDE) 12 5 mg capsule TAKE 1 CAPSULE(12 5 MG) BY MOUTH DAILY, Normal      lisinopril (ZESTRIL) 20 mg tablet Take 1 tablet (20 mg total) by mouth daily, Starting Thu 4/8/2021, Normal      MAGNESIUM GLUCONATE PO Take 3 tablets by mouth daily , Historical Med      Naproxen Sodium (Aleve) 220 MG CAPS Take 2 capsules by mouth as needed, Historical Med      omeprazole (PriLOSEC) 20 mg delayed release capsule Take 1 capsule (20 mg total) by mouth daily, Starting Sat 5/15/2021, Normal      Riboflavin (Vitamin B-2) 100 MG TABS Take 400 mg by mouth daily, Historical Med      SUMAtriptan (IMITREX) 100 mg tablet One tab daily prn migraine, may repeat in 2 hours if needed  Max 2 tabs per 24 hr, Normal      Black Cohosh (REMIFEMIN PO) Take by mouth, Historical Med      fluconazole (DIFLUCAN) 150 mg tablet Take 150 mg by mouth once , Starting Thu 10/29/2020, Historical Med      Galcanezumab-gnlm 120 MG/ML SOAJ Inject 120 mg under the skin every 30 (thirty) days, Starting Thu 6/17/2021, Normal      LORazepam (ATIVAN) 1 mg tablet Take 1 mg 1 hour prior to MRI, may repeat x1 in 40 minutes p r n  Claustrophobia/anxiety  No driving , Normal      Multiple Vitamins-Minerals (MULTIVITAMIN ADULT PO) Take 1 tablet by mouth daily, Historical Med      predniSONE 10 mg tablet Take 40 mg daily for 3 days  Then take 30 mg daily for 3 days  Then take 20 mg daily for 3 days  Then take 10 mg daily for 3 days  , Normal           No discharge procedures on file      PDMP Review       Value Time User    PDMP Reviewed  Yes 7/8/2021  1:38 PM Ana Miller MD          ED Provider  Electronically Signed by           Radha Nguyen DO  07/15/21 2682

## 2021-07-14 LAB
ATRIAL RATE: 70 BPM
P AXIS: 66 DEGREES
PR INTERVAL: 144 MS
QRS AXIS: 90 DEGREES
QRSD INTERVAL: 76 MS
QT INTERVAL: 404 MS
QTC INTERVAL: 436 MS
T WAVE AXIS: 64 DEGREES
VENTRICULAR RATE: 70 BPM

## 2021-07-14 PROCEDURE — 93010 ELECTROCARDIOGRAM REPORT: CPT | Performed by: INTERNAL MEDICINE

## 2021-07-20 ENCOUNTER — TELEPHONE (OUTPATIENT)
Dept: FAMILY MEDICINE CLINIC | Facility: CLINIC | Age: 56
End: 2021-07-20

## 2021-07-20 DIAGNOSIS — M25.50 MULTIPLE JOINT PAIN: ICD-10-CM

## 2021-07-20 DIAGNOSIS — M79.7 FIBROMYALGIA: Primary | ICD-10-CM

## 2021-07-20 NOTE — TELEPHONE ENCOUNTER
[facility] is requesting a doctor to doctor referral for patient to see Dr Mare Hobson on 7/27/21

## 2021-07-27 ENCOUNTER — CONSULT (OUTPATIENT)
Dept: MULTI SPECIALTY CLINIC | Facility: CLINIC | Age: 56
End: 2021-07-27

## 2021-07-27 VITALS
BODY MASS INDEX: 30 KG/M2 | WEIGHT: 163 LBS | HEART RATE: 76 BPM | SYSTOLIC BLOOD PRESSURE: 105 MMHG | TEMPERATURE: 97.4 F | DIASTOLIC BLOOD PRESSURE: 72 MMHG | HEIGHT: 62 IN

## 2021-07-27 DIAGNOSIS — M79.7 FIBROMYALGIA: Primary | ICD-10-CM

## 2021-07-27 DIAGNOSIS — Z82.69 FAMILY HISTORY OF SYSTEMIC LUPUS ERYTHEMATOSUS: ICD-10-CM

## 2021-07-27 PROCEDURE — 99243 OFF/OP CNSLTJ NEW/EST LOW 30: CPT | Performed by: INTERNAL MEDICINE

## 2021-07-27 PROCEDURE — 3074F SYST BP LT 130 MM HG: CPT | Performed by: INTERNAL MEDICINE

## 2021-07-27 PROCEDURE — 1036F TOBACCO NON-USER: CPT | Performed by: INTERNAL MEDICINE

## 2021-07-27 PROCEDURE — 3008F BODY MASS INDEX DOCD: CPT | Performed by: INTERNAL MEDICINE

## 2021-07-27 PROCEDURE — 3078F DIAST BP <80 MM HG: CPT | Performed by: INTERNAL MEDICINE

## 2021-07-27 RX ORDER — DULOXETIN HYDROCHLORIDE 30 MG/1
30 CAPSULE, DELAYED RELEASE ORAL DAILY
Qty: 30 CAPSULE | Refills: 1 | Status: SHIPPED | OUTPATIENT
Start: 2021-07-27 | End: 2021-07-27

## 2021-07-27 RX ORDER — DULOXETIN HYDROCHLORIDE 30 MG/1
CAPSULE, DELAYED RELEASE ORAL
Qty: 30 CAPSULE | Refills: 1 | Status: SHIPPED | OUTPATIENT
Start: 2021-07-27 | End: 2021-09-28

## 2021-07-27 RX ORDER — DULOXETIN HYDROCHLORIDE 20 MG/1
20 CAPSULE, DELAYED RELEASE ORAL DAILY
Qty: 30 CAPSULE | Refills: 1 | Status: CANCELLED | OUTPATIENT
Start: 2021-07-27

## 2021-07-27 NOTE — PATIENT INSTRUCTIONS
Obtain lab work, start Cymbalta  Follow-up in 3 months  Antiphospholipid syndrom could be in your family  Fibromyalgia   WHAT YOU NEED TO KNOW:   Fibromyalgia is a long-term condition that causes pain and tender points throughout your body  Fibromyalgia can start at any age  DISCHARGE INSTRUCTIONS:   Call your doctor or pain specialist if:   · You are depressed and feel you cannot cope with your condition  · Your pain increases, even after you take your pain medicine  · You have difficulty sleeping  · You have questions or concerns about your condition or care  Medicines: You may need any of the following:  · Antidepressants  help decrease depression, pain, and fatigue  · Antiseizure medicine  is used to reduce fibromyalgia pain  · Muscle relaxers  help decrease pain and muscle spasms  · Acetaminophen  decreases pain and fever  It is available without a doctor's order  Ask how much to take and how often to take it  Follow directions  Read the labels of all other medicines you are using to see if they also contain acetaminophen, or ask your doctor or pharmacist  Acetaminophen can cause liver damage if not taken correctly  Do not use more than 4 grams (4,000 milligrams) total of acetaminophen in one day  · NSAIDs , such as ibuprofen, help decrease swelling, pain, and fever  This medicine is available with or without a doctor's order  NSAIDs can cause stomach bleeding or kidney problems in certain people  If you take blood thinner medicine, always ask if NSAIDs are safe for you  Always read the medicine label and follow directions  Do not give these medicines to children under 10months of age without direction from your child's healthcare provider  · Prescription pain medicine  may be given  Ask your healthcare provider how to take this medicine safely  Some prescription pain medicines contain acetaminophen   Do not take other medicines that contain acetaminophen without talking to your healthcare provider  Too much acetaminophen may cause liver damage  Prescription pain medicine may cause constipation  Ask your healthcare provider how to prevent or treat constipation  · Take your medicine as directed  Contact your healthcare provider if you think your medicine is not helping or if you have side effects  Tell him or her if you are allergic to any medicine  Keep a list of the medicines, vitamins, and herbs you take  Include the amounts, and when and why you take them  Bring the list or the pill bottles to follow-up visits  Carry your medicine list with you in case of an emergency  Manage your symptoms:  Fibromyalgia can be managed but not cured  The following can help you manage your symptoms:  · Keep a pain diary  Include your symptoms and what activity caused them  This may also help you track pain cycles and show a pattern to your symptoms  · Exercise as directed  Ask your healthcare provider about the best exercise plan for you  Aerobic exercise, such as walking, and strength-training activities may decrease pain and sleep problems  Exercise such as yoga or sukumar chi can also help with sleep problems  · Set a sleep schedule  Do not nap during the day  Go to bed at the same time each night  Make sure your bedroom is dark, quiet, and comfortable  Do not drink caffeine or alcohol right before you go to bed  These can make it difficult for you to sleep  Limit other liquids to help decrease your need to urinate in the night  · Reach or maintain a healthy weight  Obesity can make fibromyalgia symptoms worse  Your healthcare provider can help you create a weight loss plan if you are overweight  · Ask about massage or acupuncture  Myofascial release massage may help relax and stretch tight muscles, and improve blood flow  Acupuncture may also help relieve pain      Follow up with your doctor or pain specialist as directed:  Write down your questions so you remember to ask them during your visits  For support and more information:   · National Chronic Fatigue Syndrome and Fibromyalgia Association  [address] [facility] , [address] [place] [address] Street  Phone: [phone]  Web Address: [employer] [address]  2021 Information is for End User's use only and may not be sold, redistributed or otherwise used for commercial purposes  All illustrations and images included in CareNotes® are the copyrighted property of A D A M , Inc  or [address] [place], [place] [address]vibha Baez   The above information is an  only  It is not intended as medical advice for individual conditions or treatments  Talk to your doctor, nurse or pharmacist before following any medical regimen to see if it is safe and effective for you

## 2021-07-27 NOTE — PROGRESS NOTES
Outpatient Consult Service: Rheumatology      PATIENT INFORMATION      Amber Lewis 50 y o  female MRN: [id number]  Encounter: [id number]  PCP: Geremias Mc DO  Date of Consultation: 07/27/21  Requesting Physician: No att  providers found       ASSESSMENTS & PLAN         1  Fibromyalgia   suspected based on history of generalized body pain, headaches, poor sleep cycle  Will need to rule out other causes however prior to diagnosis  · Will order MARY, CRP, ESR, rheumatoid factor, anti double-stranded DNA, anti CCP, CK, aldolase, lupus anticoagulant, beta 2 glycoprotein  · Can consider starting Cymbalta to 20 mg daily and assessing response  Can increase dose depending on response  · Physical therapy  · Follow-up in 3 months to assess response and go over lab work  Please await attending attestation for further recommendations  HISTORY OF PRESENT ILLNESS       81-year-old female with past medical history of osteoarthritis, carpal tunnel syndrome, hypertension, migraines who is presenting to office for evaluation for suspected fibromyalgia  She is referred to us by her PCP  She endorses multiple year (19 y) history of generalized body pain which is constant  She feels like she is "hit by truck"  Pain is generalized in her arms and legs as well as her torso and back  She endorses tender points in her elbows and knees  She also endorse history of migraines and headaches for which she takes NSAIDs  And sumatriptan to relief  No changes in bowel movements  Has difficulty sleeping at night, gets up to 4 hours per night  Works in [employer] office and has to be on her feet all day which debilitated her  No recent trauma or accidents  She denies any chest pain, shortness of breath, rashes, fever, chills, nausea, vomiting  Family history of lupus in her sister  She states she was tested in the past and was negative   Has personal history of miscarries, one in the second trimester  REVIEW OF SYSTEMS     A thorough 12-point review of systems has been conducted  Pertinent positives and negatives are mentioned in the history of present illness  PAST MEDICAL & SURGICAL HISTORY      Past Medical History:   Diagnosis Date    Allergic 02/01/2020    Fibromyalgia     Hypertension     Migraine     Neck pain     Osteoarthritis     lumbar spine       Past Surgical History:   Procedure Laterality Date    CHOLECYSTECTOMY      DILATION AND CURETTAGE OF UTERUS      TUBAL LIGATION           MEDICATIONS & ALLERGIES       Medications:   Prior to Admission medications    Medication Sig Start Date End Date Taking?  Authorizing Provider   Aspirin-Acetaminophen-Caffeine (EXCEDRIN MIGRAINE PO) Take 1 tablet by mouth as needed (migraines)    Yes Historical Provider, MD   cholecalciferol (VITAMIN D3) 1,000 units tablet Take 2,000 Units by mouth daily  5/25/18  Yes Historical Provider, MD   ferrous sulfate 325 (65 Fe) mg tablet Take 325 mg by mouth daily with breakfast    Yes Historical Provider, MD   hydrochlorothiazide (MICROZIDE) 12 5 mg capsule TAKE 1 CAPSULE(12 5 MG) BY MOUTH DAILY  Patient taking differently: Take 12 5 mg by mouth daily  3/29/21  Yes Tono Huynh DO   lisinopril (ZESTRIL) 20 mg tablet Take 1 tablet (20 mg total) by mouth daily 4/8/21  Yes Tono Huynh DO   MAGNESIUM GLUCONATE PO Take 3 tablets by mouth daily    Yes Historical Provider, MD   Multiple Vitamins-Minerals (MULTIVITAMIN ADULT PO) Take 1 tablet by mouth daily   Yes Historical Provider, MD   Naproxen Sodium (Aleve) 220 MG CAPS Take 2 capsules by mouth as needed   Yes Historical Provider, MD   omeprazole (PriLOSEC) 20 mg delayed release capsule Take 1 capsule (20 mg total) by mouth daily 5/15/21  Yes Tono Huynh DO   Riboflavin (Vitamin B-2) 100 MG TABS Take 400 mg by mouth daily   Yes Historical Provider, MD   SUMAtriptan (IMITREX) 100 mg tablet One tab daily prn migraine, may repeat in 2 hours if needed  Max 2 tabs per 24 hr 5/5/21  Yes Maira Mullins,    Galcanezumab-gnlm 120 MG/ML SOAJ Inject 120 mg under the skin every 30 (thirty) days 6/17/21   Crow Zarate MD   LORazepam (ATIVAN) 1 mg tablet Take 1 mg 1 hour prior to MRI, may repeat x1 in 40 minutes p r n  Claustrophobia/anxiety  No driving  Patient not taking: Reported on 7/13/2021 7/8/21   Crow Zarate MD   Black Cohosh (REMIFEMIN PO) Take by mouth  7/27/21  Historical Provider, MD   fluconazole (DIFLUCAN) 150 mg tablet Take 150 mg by mouth once  10/29/20 7/27/21  Historical Provider, MD   predniSONE 10 mg tablet Take 40 mg daily for 3 days  Then take 30 mg daily for 3 days  Then take 20 mg daily for 3 days  Then take 10 mg daily for 3 days  Patient not taking: Reported on 5/17/2021 11/18/20 7/27/21  Jaskaran Sandoval PA-C       Allergies:    Allergies   Allergen Reactions    Propranolol Other (See Comments)     Halluncinations    Raspberry - Food Allergy Allergic Rhinitis, Itching and Nasal Congestion         SOCIAL HISTORY      Marital Status: [unfilled]    Substance Use History:   Social History     Substance and Sexual Activity   Alcohol Use Yes    Comment: rarely     Social History     Tobacco Use   Smoking Status Never Smoker   Smokeless Tobacco Never Used     Social History     Substance and Sexual Activity   Drug Use Never         FAMILY HISTORY      Family History   Problem Relation Age of Onset    MORGAN disease Mother     Arthritis Mother     Depression Mother     Hypertension Mother     Heart attack Father 79    Diabetes Father     Prostate cancer Father 79    Hypertension Father     Arthritis Father     Lupus Sister     Raynaud syndrome Sister     Sjogren's syndrome Sister     Breast cancer Maternal Grandmother 79    Arthritis Maternal Grandmother     Cancer Maternal Grandmother     Alcohol abuse Paternal Grandfather     No Known Problems Daughter     Prostate cancer Maternal Grandfather 79    Arthritis Maternal Grandfather     Cancer Maternal Grandfather     Colon cancer Paternal Grandmother [de-identified]    Depression Sister     No Known Problems Brother     No Known Problems Son     No Known Problems Son     No Known Problems Son     No Known Problems Son     No Known Problems Son     No Known Problems Maternal Uncle     No Known Problems Paternal Aunt     No Known Problems Paternal Uncle           PHYSICAL EXAM     Vitals:   Blood Pressure: 105/72 (07/27/21 1255)  Pulse: 76 (07/27/21 1255)  Temperature: (!) 97 4 °F (36 3 °C) (07/27/21 1255)  Temp Source: Temporal (07/27/21 1255)  Height: 5' 2" (157 5 cm) (07/27/21 1255)  Weight - Scale: 73 9 kg (163 lb) (07/27/21 1255)        Physical Exam:   GENERAL: NAD  HEENT:  NC/AT, PERRL, EOMI, MMM, no scleral icterus  CARDIAC:  RRR, +S1/S2, no S3/S4 heard, no m/g/r  PULMONARY:  CTA B/L, no wheezing/rales/rhonci, non-labored breathing  ABDOMEN:  Soft, NT/ND, +BS, no rebound/guarding/rigidity  Extremities:   Some tender points noted around the elbow and he area  No significant swelling noted on any joint  2+ Pulses in DP/PT  No edema, cyanosis, or clubbing  NEUROLOGIC:  Alert/oriented x3  No motor or sensory deficits  SKIN:  No rashes or erythema          ADDITIONAL DATA     Lab Results:               Invalid input(s): LABALBU        Imaging:    XR chest 2 views    Result Date: 7/14/2021  Narrative: CHEST INDICATION:   chest pain  COMPARISON:  None EXAM PERFORMED/VIEWS:  XR CHEST PA & LATERAL Images: 2 FINDINGS: Cardiomediastinal silhouette appears unremarkable  The lungs are clear  No pneumothorax or pleural effusion  Osseous structures appear within normal limits for patient age  Impression: No acute cardiopulmonary disease  Workstation performed: [id number]       EKG, Pathology, and Other Studies Reviewed on Admission:   · EKG: Reviewed      Counseling / Coordination of Care Time: 30 total mins spent n consult   Greater than 50% of total time spent on patient counseling and coordination of care  [address] [facility]  Internal Medicine PGY-3  7/27/2021 1:15 PM         ** Please Note: This note is constructed using a voice recognition dictation system   **

## 2021-07-30 ENCOUNTER — APPOINTMENT (OUTPATIENT)
Dept: LAB | Facility: MEDICAL CENTER | Age: 56
End: 2021-07-30
Payer: COMMERCIAL

## 2021-07-30 DIAGNOSIS — M79.7 FIBROMYALGIA: ICD-10-CM

## 2021-07-30 DIAGNOSIS — R22.1 PARAPHARYNGEAL SPACE MASS: ICD-10-CM

## 2021-07-30 LAB
BUN SERPL-MCNC: 15 MG/DL (ref 5–25)
CK SERPL-CCNC: 67 U/L (ref 26–192)
CREAT SERPL-MCNC: 0.61 MG/DL (ref 0.6–1.3)
CRP SERPL QL: 3.3 MG/L
ERYTHROCYTE [SEDIMENTATION RATE] IN BLOOD: 15 MM/HOUR (ref 0–29)
GFR SERPL CREATININE-BSD FRML MDRD: 104 ML/MIN/1.73SQ M

## 2021-07-30 PROCEDURE — 82565 ASSAY OF CREATININE: CPT

## 2021-07-30 PROCEDURE — 86225 DNA ANTIBODY NATIVE: CPT

## 2021-07-30 PROCEDURE — 85652 RBC SED RATE AUTOMATED: CPT

## 2021-07-30 PROCEDURE — 86430 RHEUMATOID FACTOR TEST QUAL: CPT

## 2021-07-30 PROCEDURE — 84165 PROTEIN E-PHORESIS SERUM: CPT

## 2021-07-30 PROCEDURE — 86147 CARDIOLIPIN ANTIBODY EA IG: CPT

## 2021-07-30 PROCEDURE — 86038 ANTINUCLEAR ANTIBODIES: CPT

## 2021-07-30 PROCEDURE — 82085 ASSAY OF ALDOLASE: CPT

## 2021-07-30 PROCEDURE — 84165 PROTEIN E-PHORESIS SERUM: CPT | Performed by: PATHOLOGY

## 2021-07-30 PROCEDURE — 86140 C-REACTIVE PROTEIN: CPT

## 2021-07-30 PROCEDURE — 86039 ANTINUCLEAR ANTIBODIES (ANA): CPT

## 2021-07-30 PROCEDURE — 84520 ASSAY OF UREA NITROGEN: CPT

## 2021-07-30 PROCEDURE — 86146 BETA-2 GLYCOPROTEIN ANTIBODY: CPT

## 2021-07-30 PROCEDURE — 86200 CCP ANTIBODY: CPT

## 2021-07-30 PROCEDURE — 36415 COLL VENOUS BLD VENIPUNCTURE: CPT

## 2021-07-30 PROCEDURE — 82550 ASSAY OF CK (CPK): CPT

## 2021-07-31 LAB — DSDNA AB SER-ACNC: <1 IU/ML (ref 0–9)

## 2021-08-02 LAB
ALBUMIN SERPL ELPH-MCNC: 4.52 G/DL (ref 3.5–5)
ALBUMIN SERPL ELPH-MCNC: 63.6 % (ref 52–65)
ALDOLASE SERPL-CCNC: 5.2 U/L (ref 3.3–10.3)
ALPHA1 GLOB SERPL ELPH-MCNC: 0.27 G/DL (ref 0.1–0.4)
ALPHA1 GLOB SERPL ELPH-MCNC: 3.8 % (ref 2.5–5)
ALPHA2 GLOB SERPL ELPH-MCNC: 0.62 G/DL (ref 0.4–1.2)
ALPHA2 GLOB SERPL ELPH-MCNC: 8.8 % (ref 7–13)
ANA HOMOGEN SER QL IF: NORMAL
ANA HOMOGEN TITR SER: NORMAL {TITER}
B2 GLYCOPROT1 IGA SERPL IA-ACNC: 1.1
B2 GLYCOPROT1 IGG SERPL IA-ACNC: 0.9
B2 GLYCOPROT1 IGM SERPL IA-ACNC: <2.9
BETA GLOB ABNORMAL SERPL ELPH-MCNC: 0.39 G/DL (ref 0.4–0.8)
BETA1 GLOB SERPL ELPH-MCNC: 5.5 % (ref 5–13)
BETA2 GLOB SERPL ELPH-MCNC: 5.1 % (ref 2–8)
BETA2+GAMMA GLOB SERPL ELPH-MCNC: 0.36 G/DL (ref 0.2–0.5)
CARDIOLIPIN IGA SER IA-ACNC: 1
CARDIOLIPIN IGG SER IA-ACNC: 1.9
CARDIOLIPIN IGM SER IA-ACNC: <0.8
CCP AB SER IA-ACNC: 0.8
GAMMA GLOB ABNORMAL SERPL ELPH-MCNC: 0.94 G/DL (ref 0.5–1.6)
GAMMA GLOB SERPL ELPH-MCNC: 13.2 % (ref 12–22)
IGG/ALB SER: 1.75 {RATIO} (ref 1.1–1.8)
PROT PATTERN SERPL ELPH-IMP: ABNORMAL
PROT SERPL-MCNC: 7.1 G/DL (ref 6.4–8.2)
RHEUMATOID FACT SER QL LA: NEGATIVE
RYE IGE QN: POSITIVE

## 2021-08-24 ENCOUNTER — TELEPHONE (OUTPATIENT)
Dept: NEUROLOGY | Facility: CLINIC | Age: 56
End: 2021-08-24

## 2021-08-24 NOTE — TELEPHONE ENCOUNTER
Spoke with patient , she expressed that she would like to reschedule appointment due to not having MRI and just starting emgality  recently, I was explaining she will probably not have any openings til January 2022, as I explained we got disconnected  I gave her a call back and go  and provided our phone number to reschedule this appointment if she still needs to cancel

## 2021-09-01 NOTE — TELEPHONE ENCOUNTER
Called and left a voicemail for patient - Please call back to confirm upcoming appointment with Doris Lowe  Provided patient with apt date, time and location  Informed patient that check in is at least 15 minutes prior to apt time  This was the second attempt to reach the patient , also advised of MRI pending

## 2021-09-03 ENCOUNTER — TELEPHONE (OUTPATIENT)
Dept: OTHER | Facility: OTHER | Age: 56
End: 2021-09-03

## 2021-09-03 NOTE — TELEPHONE ENCOUNTER
Patient canceled her appointment on 9/7/21  Patient states that she received her medications late and needs to be on them for 3 months before her next appointment  Patient will call back during office hours to reschedule

## 2021-09-08 ENCOUNTER — TELEPHONE (OUTPATIENT)
Dept: INTERNAL MEDICINE CLINIC | Facility: CLINIC | Age: 56
End: 2021-09-08

## 2021-09-08 NOTE — TELEPHONE ENCOUNTER
Patient would like a call with her lab results ordered by Dr Zeinab Restrepo  She says she saw on my chart she tested positive for lupus  She will be following up in October at Dr Bere Gilliam private office  But she is asking that someone contact her before then  Is there any way we can reach out to him at his office to review labs? She is aware he won't be here in the office until 9/28/21

## 2021-09-10 NOTE — TELEPHONE ENCOUNTER
Called Dr Char Ellison office and spoke to Galo Saini  Faxed over labs for him to review  Will inform me on what to tell patient

## 2021-09-15 NOTE — TELEPHONE ENCOUNTER
Received email back from [facility]  office, per Dr Yanci Montelongo I was suppose to advise patient her MARY test is a very low positive which may indicate some sort of autoimmune disorder or connective tissue disease  Lupus markers are negative at this time  Patient has appointment in his private office on 10/29/21 and they will discuss ordering more autoimmune tests at that time  Called patient and advised her as per note, patient understood and had no further questions at this time

## 2021-09-23 ENCOUNTER — TELEPHONE (OUTPATIENT)
Dept: FAMILY MEDICINE CLINIC | Facility: CLINIC | Age: 56
End: 2021-09-23

## 2021-09-23 ENCOUNTER — HOSPITAL ENCOUNTER (OUTPATIENT)
Dept: MAMMOGRAPHY | Facility: MEDICAL CENTER | Age: 56
Discharge: HOME | End: 2021-09-23
Payer: COMMERCIAL

## 2021-09-23 VITALS — BODY MASS INDEX: 30 KG/M2 | WEIGHT: 163 LBS | HEIGHT: 62 IN

## 2021-09-23 DIAGNOSIS — M79.7 FIBROMYALGIA: Primary | ICD-10-CM

## 2021-09-23 DIAGNOSIS — Z12.31 SCREENING MAMMOGRAM, ENCOUNTER FOR: ICD-10-CM

## 2021-09-23 PROCEDURE — 77067 SCR MAMMO BI INCL CAD: CPT

## 2021-09-23 PROCEDURE — 77063 BREAST TOMOSYNTHESIS BI: CPT

## 2021-09-24 NOTE — RESULT ENCOUNTER NOTE
Shana Serum,  Your mammogram is normal  We recommend you schedule your next mammogram in one year     Have a good day,   Dr Aleshia Cuevas

## 2021-09-28 ENCOUNTER — PATIENT MESSAGE (OUTPATIENT)
Dept: FAMILY MEDICINE CLINIC | Facility: CLINIC | Age: 56
End: 2021-09-28

## 2021-09-28 DIAGNOSIS — M79.7 FIBROMYALGIA: ICD-10-CM

## 2021-09-28 DIAGNOSIS — I10 BENIGN ESSENTIAL HYPERTENSION: ICD-10-CM

## 2021-09-28 RX ORDER — DULOXETIN HYDROCHLORIDE 30 MG/1
CAPSULE, DELAYED RELEASE ORAL
Qty: 30 CAPSULE | Refills: 1 | Status: SHIPPED | OUTPATIENT
Start: 2021-09-28 | End: 2021-09-29 | Stop reason: SDUPTHER

## 2021-09-28 RX ORDER — HYDROCHLOROTHIAZIDE 12.5 MG/1
12.5 CAPSULE, GELATIN COATED ORAL DAILY
Qty: 90 CAPSULE | Refills: 1 | Status: SHIPPED | OUTPATIENT
Start: 2021-09-28 | End: 2021-09-29 | Stop reason: SDUPTHER

## 2021-09-29 DIAGNOSIS — M79.7 FIBROMYALGIA: ICD-10-CM

## 2021-09-29 DIAGNOSIS — I10 BENIGN ESSENTIAL HYPERTENSION: ICD-10-CM

## 2021-09-29 RX ORDER — DULOXETIN HYDROCHLORIDE 30 MG/1
30 CAPSULE, DELAYED RELEASE ORAL DAILY
Qty: 90 CAPSULE | Refills: 1 | Status: SHIPPED | OUTPATIENT
Start: 2021-09-29 | End: 2021-11-01 | Stop reason: ALTCHOICE

## 2021-09-29 RX ORDER — HYDROCHLOROTHIAZIDE 12.5 MG/1
12.5 CAPSULE, GELATIN COATED ORAL DAILY
Qty: 90 CAPSULE | Refills: 1 | Status: SHIPPED | OUTPATIENT
Start: 2021-09-29 | End: 2022-02-24

## 2021-10-04 ENCOUNTER — PATIENT MESSAGE (OUTPATIENT)
Dept: FAMILY MEDICINE CLINIC | Facility: CLINIC | Age: 56
End: 2021-10-04

## 2021-10-04 DIAGNOSIS — I10 BENIGN ESSENTIAL HYPERTENSION: ICD-10-CM

## 2021-10-04 RX ORDER — LISINOPRIL 20 MG/1
20 TABLET ORAL DAILY
Qty: 90 TABLET | Refills: 1 | Status: SHIPPED | OUTPATIENT
Start: 2021-10-04 | End: 2022-04-12 | Stop reason: SDUPTHER

## 2021-10-07 ENCOUNTER — HOSPITAL ENCOUNTER (OUTPATIENT)
Dept: MRI IMAGING | Facility: HOSPITAL | Age: 56
Discharge: HOME | End: 2021-10-07
Attending: PSYCHIATRY & NEUROLOGY
Payer: COMMERCIAL

## 2021-10-07 DIAGNOSIS — G43.709 CHRONIC MIGRAINE WITHOUT AURA WITHOUT STATUS MIGRAINOSUS, NOT INTRACTABLE: ICD-10-CM

## 2021-10-07 DIAGNOSIS — G43.109 MIGRAINE WITH AURA AND WITHOUT STATUS MIGRAINOSUS, NOT INTRACTABLE: ICD-10-CM

## 2021-10-07 DIAGNOSIS — R22.1 PARAPHARYNGEAL SPACE MASS: ICD-10-CM

## 2021-10-07 PROCEDURE — A9585 GADOBUTROL INJECTION: HCPCS | Performed by: OTOLARYNGOLOGY

## 2021-10-07 PROCEDURE — 70543 MRI ORBT/FAC/NCK W/O &W/DYE: CPT

## 2021-10-07 PROCEDURE — G1004 CDSM NDSC: HCPCS

## 2021-10-07 PROCEDURE — 70553 MRI BRAIN STEM W/O & W/DYE: CPT

## 2021-10-07 RX ADMIN — GADOBUTROL 7 ML: 604.72 INJECTION INTRAVENOUS at 12:59

## 2021-10-15 ENCOUNTER — PATIENT MESSAGE (OUTPATIENT)
Dept: FAMILY MEDICINE CLINIC | Facility: CLINIC | Age: 56
End: 2021-10-15

## 2021-10-15 DIAGNOSIS — G43.009 MIGRAINE WITHOUT AURA AND WITHOUT STATUS MIGRAINOSUS, NOT INTRACTABLE: ICD-10-CM

## 2021-10-15 RX ORDER — SUMATRIPTAN 100 MG/1
TABLET, FILM COATED ORAL
Qty: 10 TABLET | Refills: 2 | Status: SHIPPED | OUTPATIENT
Start: 2021-10-15 | End: 2021-12-29 | Stop reason: SDUPTHER

## 2021-10-22 ENCOUNTER — CLINICAL SUPPORT (OUTPATIENT)
Dept: NUTRITION | Facility: HOSPITAL | Age: 56
End: 2021-10-22
Payer: COMMERCIAL

## 2021-10-22 VITALS — BODY MASS INDEX: 30 KG/M2 | WEIGHT: 164 LBS

## 2021-10-22 DIAGNOSIS — M79.7 SCAPULOHUMERAL FIBROSITIS: Primary | ICD-10-CM

## 2021-10-22 PROCEDURE — 97802 MEDICAL NUTRITION INDIV IN: CPT

## 2021-11-01 ENCOUNTER — OFFICE VISIT (OUTPATIENT)
Dept: NEUROLOGY | Facility: CLINIC | Age: 56
End: 2021-11-01
Payer: COMMERCIAL

## 2021-11-01 VITALS
BODY MASS INDEX: 29.63 KG/M2 | DIASTOLIC BLOOD PRESSURE: 56 MMHG | SYSTOLIC BLOOD PRESSURE: 116 MMHG | HEART RATE: 78 BPM | WEIGHT: 162 LBS

## 2021-11-01 DIAGNOSIS — G43.709 CHRONIC MIGRAINE WITHOUT AURA WITHOUT STATUS MIGRAINOSUS, NOT INTRACTABLE: Primary | ICD-10-CM

## 2021-11-01 PROCEDURE — 99214 OFFICE O/P EST MOD 30 MIN: CPT | Performed by: PSYCHIATRY & NEUROLOGY

## 2021-11-01 RX ORDER — DULOXETIN HYDROCHLORIDE 60 MG/1
60 CAPSULE, DELAYED RELEASE ORAL EVERY MORNING
COMMUNITY
Start: 2021-10-29 | End: 2022-02-24 | Stop reason: ALTCHOICE

## 2021-11-01 RX ORDER — TIZANIDINE 2 MG/1
2 TABLET ORAL
COMMUNITY
Start: 2021-10-29 | End: 2022-05-22

## 2021-11-01 RX ORDER — RIMEGEPANT SULFATE 75 MG/75MG
TABLET, ORALLY DISINTEGRATING ORAL
Qty: 10 TABLET | Refills: 3 | Status: SHIPPED | OUTPATIENT
Start: 2021-11-01 | End: 2021-11-26

## 2021-11-03 ENCOUNTER — TELEPHONE (OUTPATIENT)
Dept: NEUROLOGY | Facility: CLINIC | Age: 56
End: 2021-11-03

## 2021-11-05 ENCOUNTER — APPOINTMENT (OUTPATIENT)
Dept: LAB | Facility: MEDICAL CENTER | Age: 56
End: 2021-11-05
Payer: COMMERCIAL

## 2021-11-05 DIAGNOSIS — M79.7 FIBROMYALGIA MUSCLE PAIN: ICD-10-CM

## 2021-11-05 LAB
BASOPHILS # BLD AUTO: 0.05 THOUSANDS/ΜL (ref 0–0.1)
BASOPHILS NFR BLD AUTO: 1 % (ref 0–1)
C3 SERPL-MCNC: 131 MG/DL (ref 90–180)
C4 SERPL-MCNC: 32 MG/DL (ref 10–40)
CREAT SERPL-MCNC: 0.81 MG/DL (ref 0.6–1.3)
EOSINOPHIL # BLD AUTO: 0.22 THOUSAND/ΜL (ref 0–0.61)
EOSINOPHIL NFR BLD AUTO: 3 % (ref 0–6)
ERYTHROCYTE [DISTWIDTH] IN BLOOD BY AUTOMATED COUNT: 12.4 % (ref 11.6–15.1)
ERYTHROCYTE [SEDIMENTATION RATE] IN BLOOD: 8 MM/HOUR (ref 0–29)
GFR SERPL CREATININE-BSD FRML MDRD: 83 ML/MIN/1.73SQ M
HCT VFR BLD AUTO: 45.6 % (ref 34.8–46.1)
HGB BLD-MCNC: 14.6 G/DL (ref 11.5–15.4)
IGA SERPL-MCNC: 120 MG/DL (ref 70–400)
IGG SERPL-MCNC: 945 MG/DL (ref 700–1600)
IGM SERPL-MCNC: 83 MG/DL (ref 40–230)
IMM GRANULOCYTES # BLD AUTO: 0.02 THOUSAND/UL (ref 0–0.2)
IMM GRANULOCYTES NFR BLD AUTO: 0 % (ref 0–2)
LYMPHOCYTES # BLD AUTO: 2.33 THOUSANDS/ΜL (ref 0.6–4.47)
LYMPHOCYTES NFR BLD AUTO: 31 % (ref 14–44)
MCH RBC QN AUTO: 28.1 PG (ref 26.8–34.3)
MCHC RBC AUTO-ENTMCNC: 32 G/DL (ref 31.4–37.4)
MCV RBC AUTO: 88 FL (ref 82–98)
MONOCYTES # BLD AUTO: 0.69 THOUSAND/ΜL (ref 0.17–1.22)
MONOCYTES NFR BLD AUTO: 9 % (ref 4–12)
NEUTROPHILS # BLD AUTO: 4.33 THOUSANDS/ΜL (ref 1.85–7.62)
NEUTS SEG NFR BLD AUTO: 56 % (ref 43–75)
NRBC BLD AUTO-RTO: 0 /100 WBCS
PLATELET # BLD AUTO: 275 THOUSANDS/UL (ref 149–390)
PMV BLD AUTO: 10.5 FL (ref 8.9–12.7)
RBC # BLD AUTO: 5.2 MILLION/UL (ref 3.81–5.12)
WBC # BLD AUTO: 7.64 THOUSAND/UL (ref 4.31–10.16)

## 2021-11-05 PROCEDURE — 81374 HLA I TYPING 1 ANTIGEN LR: CPT

## 2021-11-05 PROCEDURE — 86160 COMPLEMENT ANTIGEN: CPT

## 2021-11-05 PROCEDURE — 84166 PROTEIN E-PHORESIS/URINE/CSF: CPT | Performed by: PATHOLOGY

## 2021-11-05 PROCEDURE — 82784 ASSAY IGA/IGD/IGG/IGM EACH: CPT

## 2021-11-05 PROCEDURE — 84166 PROTEIN E-PHORESIS/URINE/CSF: CPT | Performed by: INTERNAL MEDICINE

## 2021-11-05 PROCEDURE — 84165 PROTEIN E-PHORESIS SERUM: CPT

## 2021-11-05 PROCEDURE — 84165 PROTEIN E-PHORESIS SERUM: CPT | Performed by: PATHOLOGY

## 2021-11-05 PROCEDURE — 85652 RBC SED RATE AUTOMATED: CPT

## 2021-11-05 PROCEDURE — 85025 COMPLETE CBC W/AUTO DIFF WBC: CPT

## 2021-11-05 PROCEDURE — 82565 ASSAY OF CREATININE: CPT

## 2021-11-05 PROCEDURE — 86235 NUCLEAR ANTIGEN ANTIBODY: CPT

## 2021-11-05 PROCEDURE — 36415 COLL VENOUS BLD VENIPUNCTURE: CPT

## 2021-11-06 LAB — CENTROMERE B AB SER-ACNC: <0.2 AI (ref 0–0.9)

## 2021-11-08 LAB
ENA JO1 AB SER-ACNC: <0.2 AI (ref 0–0.9)
ENA RNP AB SER-ACNC: 0.3 AI (ref 0–0.9)
ENA SCL70 AB SER-ACNC: <0.2 AI (ref 0–0.9)
ENA SM AB SER-ACNC: <0.2 AI (ref 0–0.9)
ENA SS-A AB SER-ACNC: <0.2 AI (ref 0–0.9)
ENA SS-B AB SER-ACNC: <0.2 AI (ref 0–0.9)

## 2021-11-09 LAB
ALBUMIN SERPL ELPH-MCNC: 4.6 G/DL (ref 3.5–5)
ALBUMIN SERPL ELPH-MCNC: 64.8 % (ref 52–65)
ALBUMIN UR ELPH-MCNC: 100 %
ALPHA1 GLOB MFR UR ELPH: 0 %
ALPHA1 GLOB SERPL ELPH-MCNC: 0.28 G/DL (ref 0.1–0.4)
ALPHA1 GLOB SERPL ELPH-MCNC: 4 % (ref 2.5–5)
ALPHA2 GLOB MFR UR ELPH: 0 %
ALPHA2 GLOB SERPL ELPH-MCNC: 0.61 G/DL (ref 0.4–1.2)
ALPHA2 GLOB SERPL ELPH-MCNC: 8.6 % (ref 7–13)
B-GLOBULIN MFR UR ELPH: 0 %
BETA GLOB ABNORMAL SERPL ELPH-MCNC: 0.36 G/DL (ref 0.4–0.8)
BETA1 GLOB SERPL ELPH-MCNC: 5.1 % (ref 5–13)
BETA2 GLOB SERPL ELPH-MCNC: 4.8 % (ref 2–8)
BETA2+GAMMA GLOB SERPL ELPH-MCNC: 0.34 G/DL (ref 0.2–0.5)
GAMMA GLOB ABNORMAL SERPL ELPH-MCNC: 0.9 G/DL (ref 0.5–1.6)
GAMMA GLOB MFR UR ELPH: 0 %
GAMMA GLOB SERPL ELPH-MCNC: 12.7 % (ref 12–22)
IGG/ALB SER: 1.84 {RATIO} (ref 1.1–1.8)
PROT PATTERN SERPL ELPH-IMP: ABNORMAL
PROT PATTERN UR ELPH-IMP: NORMAL
PROT SERPL-MCNC: 7.1 G/DL (ref 6.4–8.2)
PROT UR-MCNC: 6 MG/DL

## 2021-11-14 LAB — HLA-B27 QL NAA+PROBE: NEGATIVE

## 2021-11-16 ENCOUNTER — CLINICAL SUPPORT (OUTPATIENT)
Dept: NUTRITION | Facility: HOSPITAL | Age: 56
End: 2021-11-16
Payer: COMMERCIAL

## 2021-11-16 VITALS — BODY MASS INDEX: 29.37 KG/M2 | WEIGHT: 160.6 LBS

## 2021-11-16 DIAGNOSIS — M79.7 FIBROMYALGIA: Primary | ICD-10-CM

## 2021-11-16 PROCEDURE — 97803 MED NUTRITION INDIV SUBSEQ: CPT

## 2021-11-23 ENCOUNTER — PATIENT MESSAGE (OUTPATIENT)
Dept: FAMILY MEDICINE CLINIC | Facility: CLINIC | Age: 56
End: 2021-11-23

## 2021-11-23 DIAGNOSIS — G43.009 MIGRAINE WITHOUT AURA AND WITHOUT STATUS MIGRAINOSUS, NOT INTRACTABLE: ICD-10-CM

## 2021-11-23 RX ORDER — SUMATRIPTAN 100 MG/1
TABLET, FILM COATED ORAL
Qty: 10 TABLET | Refills: 2 | Status: CANCELLED | OUTPATIENT
Start: 2021-11-23

## 2021-11-26 ENCOUNTER — OFFICE VISIT (OUTPATIENT)
Dept: FAMILY MEDICINE CLINIC | Facility: CLINIC | Age: 56
End: 2021-11-26
Payer: COMMERCIAL

## 2021-11-26 VITALS
DIASTOLIC BLOOD PRESSURE: 74 MMHG | HEART RATE: 74 BPM | SYSTOLIC BLOOD PRESSURE: 120 MMHG | RESPIRATION RATE: 12 BRPM | OXYGEN SATURATION: 98 % | TEMPERATURE: 98.8 F | WEIGHT: 161.4 LBS | HEIGHT: 61 IN | BODY MASS INDEX: 30.47 KG/M2

## 2021-11-26 DIAGNOSIS — I12.0 HYPERTENSIVE CHRONIC KIDNEY DISEASE WITH STAGE 5 CHRONIC KIDNEY DISEASE OR END STAGE RENAL DISEASE (HCC): ICD-10-CM

## 2021-11-26 DIAGNOSIS — E78.00 HYPERCHOLESTEREMIA: ICD-10-CM

## 2021-11-26 DIAGNOSIS — R10.13 EPIGASTRIC PAIN: ICD-10-CM

## 2021-11-26 DIAGNOSIS — Z80.0 FAMILY HISTORY OF COLON CANCER REQUIRING SCREENING COLONOSCOPY: ICD-10-CM

## 2021-11-26 DIAGNOSIS — E55.9 VITAMIN D DEFICIENCY: ICD-10-CM

## 2021-11-26 DIAGNOSIS — Z11.59 NEED FOR HEPATITIS C SCREENING TEST: ICD-10-CM

## 2021-11-26 DIAGNOSIS — G43.009 MIGRAINE WITHOUT AURA AND WITHOUT STATUS MIGRAINOSUS, NOT INTRACTABLE: ICD-10-CM

## 2021-11-26 DIAGNOSIS — I10 BENIGN ESSENTIAL HYPERTENSION: ICD-10-CM

## 2021-11-26 DIAGNOSIS — R79.0 LOW FERRITIN: ICD-10-CM

## 2021-11-26 DIAGNOSIS — M79.7 FIBROMYALGIA: ICD-10-CM

## 2021-11-26 DIAGNOSIS — Z00.00 WELL ADULT EXAM: Primary | ICD-10-CM

## 2021-11-26 DIAGNOSIS — K64.9 HEMORRHOIDS, UNSPECIFIED HEMORRHOID TYPE: ICD-10-CM

## 2021-11-26 DIAGNOSIS — Z12.11 SCREEN FOR COLON CANCER: ICD-10-CM

## 2021-11-26 PROCEDURE — 1036F TOBACCO NON-USER: CPT | Performed by: FAMILY MEDICINE

## 2021-11-26 PROCEDURE — 99396 PREV VISIT EST AGE 40-64: CPT | Performed by: FAMILY MEDICINE

## 2021-11-26 PROCEDURE — 99214 OFFICE O/P EST MOD 30 MIN: CPT | Performed by: FAMILY MEDICINE

## 2021-11-26 PROCEDURE — 3725F SCREEN DEPRESSION PERFORMED: CPT | Performed by: FAMILY MEDICINE

## 2021-11-26 PROCEDURE — 3008F BODY MASS INDEX DOCD: CPT | Performed by: FAMILY MEDICINE

## 2021-11-26 RX ORDER — OMEPRAZOLE 20 MG/1
CAPSULE, DELAYED RELEASE ORAL
Qty: 90 CAPSULE | Refills: 1 | Status: SHIPPED | OUTPATIENT
Start: 2021-11-26 | End: 2022-02-24 | Stop reason: SDUPTHER

## 2021-11-30 ENCOUNTER — PATIENT MESSAGE (OUTPATIENT)
Dept: FAMILY MEDICINE CLINIC | Facility: CLINIC | Age: 56
End: 2021-11-30

## 2021-12-01 ENCOUNTER — OFFICE VISIT (OUTPATIENT)
Dept: FAMILY MEDICINE CLINIC | Facility: CLINIC | Age: 56
End: 2021-12-01
Payer: COMMERCIAL

## 2021-12-01 VITALS
SYSTOLIC BLOOD PRESSURE: 100 MMHG | WEIGHT: 159.8 LBS | TEMPERATURE: 97.5 F | DIASTOLIC BLOOD PRESSURE: 60 MMHG | OXYGEN SATURATION: 100 % | HEIGHT: 61 IN | HEART RATE: 88 BPM | BODY MASS INDEX: 30.17 KG/M2 | RESPIRATION RATE: 16 BRPM

## 2021-12-01 DIAGNOSIS — R55 SYNCOPE, UNSPECIFIED SYNCOPE TYPE: Primary | ICD-10-CM

## 2021-12-01 DIAGNOSIS — B34.9 VIRAL INFECTION, UNSPECIFIED: ICD-10-CM

## 2021-12-01 DIAGNOSIS — I12.0 HYPERTENSIVE CHRONIC KIDNEY DISEASE WITH STAGE 5 CHRONIC KIDNEY DISEASE OR END STAGE RENAL DISEASE (HCC): ICD-10-CM

## 2021-12-01 DIAGNOSIS — I10 BENIGN ESSENTIAL HYPERTENSION: ICD-10-CM

## 2021-12-01 DIAGNOSIS — G43.009 MIGRAINE WITHOUT AURA AND WITHOUT STATUS MIGRAINOSUS, NOT INTRACTABLE: ICD-10-CM

## 2021-12-01 DIAGNOSIS — E66.9 CLASS 1 OBESITY WITH SERIOUS COMORBIDITY AND BODY MASS INDEX (BMI) OF 30.0 TO 30.9 IN ADULT, UNSPECIFIED OBESITY TYPE: ICD-10-CM

## 2021-12-01 PROCEDURE — 93000 ELECTROCARDIOGRAM COMPLETE: CPT | Performed by: FAMILY MEDICINE

## 2021-12-01 PROCEDURE — U0005 INFEC AGEN DETEC AMPLI PROBE: HCPCS | Performed by: FAMILY MEDICINE

## 2021-12-01 PROCEDURE — 99214 OFFICE O/P EST MOD 30 MIN: CPT | Performed by: FAMILY MEDICINE

## 2021-12-01 PROCEDURE — U0003 INFECTIOUS AGENT DETECTION BY NUCLEIC ACID (DNA OR RNA); SEVERE ACUTE RESPIRATORY SYNDROME CORONAVIRUS 2 (SARS-COV-2) (CORONAVIRUS DISEASE [COVID-19]), AMPLIFIED PROBE TECHNIQUE, MAKING USE OF HIGH THROUGHPUT TECHNOLOGIES AS DESCRIBED BY CMS-2020-01-R: HCPCS | Performed by: FAMILY MEDICINE

## 2021-12-03 ENCOUNTER — TELEMEDICINE (OUTPATIENT)
Dept: FAMILY MEDICINE CLINIC | Facility: CLINIC | Age: 56
End: 2021-12-03
Payer: COMMERCIAL

## 2021-12-03 ENCOUNTER — PATIENT MESSAGE (OUTPATIENT)
Dept: FAMILY MEDICINE CLINIC | Facility: CLINIC | Age: 56
End: 2021-12-03

## 2021-12-03 VITALS
BODY MASS INDEX: 30.02 KG/M2 | HEIGHT: 61 IN | SYSTOLIC BLOOD PRESSURE: 108 MMHG | WEIGHT: 159 LBS | DIASTOLIC BLOOD PRESSURE: 70 MMHG

## 2021-12-03 DIAGNOSIS — E66.9 OBESITY (BMI 30-39.9): ICD-10-CM

## 2021-12-03 DIAGNOSIS — U07.1 COVID-19: Primary | ICD-10-CM

## 2021-12-03 PROBLEM — I10 HTN (HYPERTENSION), BENIGN: Status: ACTIVE | Noted: 2021-11-26

## 2021-12-03 LAB — SARS-COV-2 RNA RESP QL NAA+PROBE: POSITIVE

## 2021-12-03 PROCEDURE — 99214 OFFICE O/P EST MOD 30 MIN: CPT | Performed by: FAMILY MEDICINE

## 2021-12-03 RX ORDER — SODIUM CHLORIDE 9 MG/ML
20 INJECTION, SOLUTION INTRAVENOUS ONCE
Status: CANCELLED | OUTPATIENT
Start: 2021-12-07

## 2021-12-03 RX ORDER — ONDANSETRON 2 MG/ML
4 INJECTION INTRAMUSCULAR; INTRAVENOUS ONCE AS NEEDED
Status: CANCELLED | OUTPATIENT
Start: 2021-12-07

## 2021-12-03 RX ORDER — ALBUTEROL SULFATE 90 UG/1
3 AEROSOL, METERED RESPIRATORY (INHALATION) ONCE AS NEEDED
Status: CANCELLED | OUTPATIENT
Start: 2021-12-07

## 2021-12-03 RX ORDER — ACETAMINOPHEN 325 MG/1
650 TABLET ORAL ONCE AS NEEDED
Status: CANCELLED | OUTPATIENT
Start: 2021-12-07

## 2021-12-03 RX ORDER — FLUTICASONE PROPIONATE 110 UG/1
4 AEROSOL, METERED RESPIRATORY (INHALATION) 2 TIMES DAILY
Qty: 12 G | Refills: 0 | Status: SHIPPED | OUTPATIENT
Start: 2021-12-03 | End: 2021-12-08

## 2021-12-07 ENCOUNTER — HOSPITAL ENCOUNTER (OUTPATIENT)
Dept: INFUSION CENTER | Facility: HOSPITAL | Age: 56
Discharge: HOME | End: 2021-12-07
Payer: COMMERCIAL

## 2021-12-07 VITALS
SYSTOLIC BLOOD PRESSURE: 121 MMHG | DIASTOLIC BLOOD PRESSURE: 73 MMHG | HEART RATE: 72 BPM | TEMPERATURE: 97.4 F | RESPIRATION RATE: 18 BRPM | OXYGEN SATURATION: 96 %

## 2021-12-07 DIAGNOSIS — U07.1 COVID-19: ICD-10-CM

## 2021-12-07 DIAGNOSIS — E66.9 OBESITY (BMI 30-39.9): Primary | ICD-10-CM

## 2021-12-07 PROCEDURE — M0245 HB BAMLAN AND ETESEV INF ADMIN: HCPCS | Performed by: FAMILY MEDICINE

## 2021-12-07 RX ORDER — ALBUTEROL SULFATE 90 UG/1
3 AEROSOL, METERED RESPIRATORY (INHALATION) ONCE AS NEEDED
Status: DISCONTINUED | OUTPATIENT
Start: 2021-12-07 | End: 2021-12-10 | Stop reason: HOSPADM

## 2021-12-07 RX ORDER — SODIUM CHLORIDE 9 MG/ML
20 INJECTION, SOLUTION INTRAVENOUS ONCE
Status: COMPLETED | OUTPATIENT
Start: 2021-12-07 | End: 2021-12-07

## 2021-12-07 RX ORDER — ONDANSETRON 2 MG/ML
4 INJECTION INTRAMUSCULAR; INTRAVENOUS ONCE AS NEEDED
Status: CANCELLED | OUTPATIENT
Start: 2021-12-07

## 2021-12-07 RX ORDER — ALBUTEROL SULFATE 90 UG/1
3 AEROSOL, METERED RESPIRATORY (INHALATION) ONCE AS NEEDED
Status: CANCELLED | OUTPATIENT
Start: 2021-12-07

## 2021-12-07 RX ORDER — ONDANSETRON 2 MG/ML
4 INJECTION INTRAMUSCULAR; INTRAVENOUS ONCE AS NEEDED
Status: DISCONTINUED | OUTPATIENT
Start: 2021-12-07 | End: 2021-12-10 | Stop reason: HOSPADM

## 2021-12-07 RX ORDER — ACETAMINOPHEN 325 MG/1
650 TABLET ORAL ONCE AS NEEDED
Status: DISCONTINUED | OUTPATIENT
Start: 2021-12-07 | End: 2021-12-10 | Stop reason: HOSPADM

## 2021-12-07 RX ORDER — SODIUM CHLORIDE 9 MG/ML
20 INJECTION, SOLUTION INTRAVENOUS ONCE
Status: CANCELLED | OUTPATIENT
Start: 2021-12-07

## 2021-12-07 RX ORDER — ACETAMINOPHEN 325 MG/1
650 TABLET ORAL ONCE AS NEEDED
Status: CANCELLED | OUTPATIENT
Start: 2021-12-07

## 2021-12-07 RX ADMIN — SODIUM CHLORIDE 2100 MG COMBINED: 9 INJECTION, SOLUTION INTRAVENOUS at 17:15

## 2021-12-07 RX ADMIN — SODIUM CHLORIDE 20 ML/HR: 9 INJECTION, SOLUTION INTRAVENOUS at 17:03

## 2021-12-08 ENCOUNTER — TELEMEDICINE (OUTPATIENT)
Dept: FAMILY MEDICINE CLINIC | Facility: CLINIC | Age: 56
End: 2021-12-08
Payer: COMMERCIAL

## 2021-12-08 VITALS
HEART RATE: 89 BPM | SYSTOLIC BLOOD PRESSURE: 124 MMHG | DIASTOLIC BLOOD PRESSURE: 88 MMHG | HEIGHT: 61 IN | WEIGHT: 159 LBS | OXYGEN SATURATION: 98 % | BODY MASS INDEX: 30.02 KG/M2

## 2021-12-08 DIAGNOSIS — U07.1 COVID-19: Primary | ICD-10-CM

## 2021-12-08 DIAGNOSIS — I10 BENIGN ESSENTIAL HYPERTENSION: ICD-10-CM

## 2021-12-08 DIAGNOSIS — E66.9 OBESITY (BMI 30-39.9): ICD-10-CM

## 2021-12-08 PROCEDURE — 3008F BODY MASS INDEX DOCD: CPT | Performed by: FAMILY MEDICINE

## 2021-12-08 PROCEDURE — 99214 OFFICE O/P EST MOD 30 MIN: CPT | Performed by: FAMILY MEDICINE

## 2021-12-08 PROCEDURE — 1036F TOBACCO NON-USER: CPT | Performed by: FAMILY MEDICINE

## 2021-12-14 ENCOUNTER — TELEPHONE (OUTPATIENT)
Dept: NEUROLOGY | Facility: CLINIC | Age: 56
End: 2021-12-14

## 2021-12-27 ENCOUNTER — TELEPHONE (OUTPATIENT)
Dept: NEUROLOGY | Facility: CLINIC | Age: 56
End: 2021-12-27

## 2021-12-29 ENCOUNTER — PATIENT MESSAGE (OUTPATIENT)
Dept: FAMILY MEDICINE CLINIC | Facility: CLINIC | Age: 56
End: 2021-12-29

## 2021-12-29 DIAGNOSIS — G43.009 MIGRAINE WITHOUT AURA AND WITHOUT STATUS MIGRAINOSUS, NOT INTRACTABLE: ICD-10-CM

## 2021-12-29 RX ORDER — SUMATRIPTAN 100 MG/1
TABLET, FILM COATED ORAL
Qty: 10 TABLET | Refills: 2 | Status: SHIPPED | OUTPATIENT
Start: 2021-12-29 | End: 2022-03-28 | Stop reason: SDUPTHER

## 2022-02-01 ENCOUNTER — CLINICAL SUPPORT (OUTPATIENT)
Dept: NUTRITION | Facility: HOSPITAL | Age: 57
End: 2022-02-01
Payer: COMMERCIAL

## 2022-02-01 VITALS — BODY MASS INDEX: 30.69 KG/M2 | WEIGHT: 162.4 LBS

## 2022-02-01 DIAGNOSIS — M79.7 SCAPULOHUMERAL FIBROSITIS: Primary | ICD-10-CM

## 2022-02-01 PROCEDURE — 97803 MED NUTRITION INDIV SUBSEQ: CPT

## 2022-02-01 NOTE — PROGRESS NOTES
Follow-Up Nutrition Assessment Form    Patient Name: Selene David    YOB: 1965    Sex: Female      Follow Up Date: 2/1/2022  Start Time: 18 Stop Time: 4 Total Minutes: 30     Data:  Present at session: self   Parent/Patient Concerns: "I was doing well, but a number of life issues came up since I've seen you last"   Medical Dx/Reason for Referral: fibromyalgia   Past Medical History:   Diagnosis Date    Allergic 02/01/2020    COVID 12/01/2021    Fibromyalgia     Hypertension     Migraine     Neck pain     Osteoarthritis     lumbar spine       Current Outpatient Medications   Medication Sig Dispense Refill    Aspirin-Acetaminophen-Caffeine (EXCEDRIN MIGRAINE PO) Take 1 tablet by mouth as needed (migraines)       cholecalciferol (VITAMIN D3) 1,000 units tablet Take 2,000 Units by mouth daily       DULoxetine (CYMBALTA) 60 mg delayed release capsule Take 60 mg by mouth every morning      ferrous sulfate 325 (65 Fe) mg tablet Take 325 mg by mouth daily with breakfast       Galcanezumab-gnlm 120 MG/ML SOAJ Inject 120 mg under the skin every 30 (thirty) days 1 mL 11    hydrochlorothiazide (MICROZIDE) 12 5 mg capsule Take 1 capsule (12 5 mg total) by mouth daily 90 capsule 1    lisinopril (ZESTRIL) 20 mg tablet Take 1 tablet (20 mg total) by mouth daily 90 tablet 1    MAGNESIUM GLUCONATE PO Take 3 tablets by mouth daily       Multiple Vitamins-Minerals (MULTIVITAMIN ADULT PO) Take 1 tablet by mouth daily      Naproxen Sodium (Aleve) 220 MG CAPS Take 2 capsules by mouth as needed      omeprazole (PriLOSEC) 20 mg delayed release capsule TAKE 1 CAPSULE(20 MG) BY MOUTH DAILY 90 capsule 1    Riboflavin (Vitamin B-2) 100 MG TABS Take 400 mg by mouth daily        SUMAtriptan (IMITREX) 100 mg tablet One tab daily prn migraine, may repeat in 2 hours if needed    Max 2 tabs per 24 hr 10 tablet 2    tiZANidine (ZANAFLEX) 2 mg tablet Take 2 mg by mouth daily at bedtime TAKE 1/2 TABLET NIGHTLY No current facility-administered medications for this visit  Additional Meds/Supplements: n/a   Barriers to Learning: None   Labs: n/a   Height: Ht Readings from Last 3 Encounters:   12/08/21 5' 1" (1 549 m)   12/03/21 5' 1" (1 549 m)   12/01/21 5' 1" (1 549 m)      Weight: Wt Readings from Last 10 Encounters:   12/08/21 72 1 kg (159 lb)   12/03/21 72 1 kg (159 lb)   12/01/21 72 5 kg (159 lb 12 8 oz)   11/26/21 73 2 kg (161 lb 6 4 oz)   11/16/21 72 8 kg (160 lb 9 6 oz)   11/01/21 73 5 kg (162 lb)   10/22/21 74 4 kg (164 lb)   09/23/21 73 9 kg (163 lb)   07/27/21 73 9 kg (163 lb)   07/13/21 75 kg (165 lb 5 5 oz)     Estimated body mass index is 30 04 kg/m² as calculated from the following:    Height as of 12/8/21: 5' 1" (1 549 m)  Weight as of 12/8/21: 72 1 kg (159 lb)  Wt  Change Since Last Visit: [x]Yes     []No  Amount: 2# gain      Energy Needs: No calculations performed for this visit   Pain Screen: Are you having pain now? Previous Goals or Goals Achieved:  1  Caffeine no later than 3pm  Accomplished (with the exception of this weekend)   2 3 meals a day no skipping (accomplished)   3 16 oz fluid with meals and additional 16-32 oz during the day   Has not accomplished continue to work on      New Goals:   1 none new right now, will continue to work on previous goals since fell off last weekend   2 begin activity to 30mins 5 x/wk   3  Initial PES:      Overweight/obesity  related to Excess energy intake as evidenced by  BMI more than normative standard for age and sex (obesity-grade I 26-30  9)          New PES: No Change      New Problem List:  1  None new   2    3        Assessment:  Was doing great until this last few months and is aware of wt gain  Is drinking mainly flavored water and coffee during the day  Minimal processed food, and managing my caffeine (cut it down)  Was away in [facility] for the weekend and drove back by herself    Has not started exercising yet, has not put it together yet  Also seeing rheumatologist, now referred for land and exercise therapy, so plans to start with PT  Eating 3 meals a day no skipping B fruit and cheese, HB egg x1-2(3 eggs-overeasy)  For lunch egg salad, tuna fish, leftovers, chicken/rice, soup  Drinking plenty of fluids during the day with the exception of this weekend  Inflammation also better  Does walk a lot of walking daily, renovating daily she is having issues with muscular from fibromyalgia  Medical Nutrition Therapy Intervention:  [x]Individualized Meal Plan-discussed apporpriate meals, snacks, drinks, quantites and portions, importance of regular meals discussed timing of meals  []Understanding Lab Values   []Basic Pathophysiology of Disease []Food/Medication Interactions   []Food Diary [x]Exercise-treadmill 30 mins 5-7x/wk   [x]Lifestyle/Behavior Modification Techniques-adequate sleep 7-9 hours as able []Medication, Mechanism of Action   []Label Reading []Self Blood Glucose Monitoring   [x]Weight/BMI Goals-long term goal 30-40#, short term goal 2-4# x 1 month (oldest son getting  in Sept 2022) [x]Other - [place]- yardwork 3-4 days a week- several hours;  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   Other Notes: Going to bed at 56 ( calls at 36 - in [place])  Sleep at [phone] (130-2) Awake at 7-730avg 4-6 hours sleep; no naps  [facility]       Comprehension: []Excellent  []Very Good  [x]Good  []Fair   []Poor    Receptivity: []Excellent  []Very Good  [x]Good  []Fair   []Poor    Expected Compliance: []Excellent  []Very Good  [x]Good  []Fair   []Poor      Labs:  CMP  Lab Results   Component Value Date    K 3 6 07/13/2021     07/13/2021    CO2 34 (H) 07/13/2021    BUN 15 07/30/2021    CREATININE 0 81 11/05/2021    GLUF 86 06/17/2020    CALCIUM 9 1 07/13/2021    AST 17 06/17/2020    ALT 27 06/17/2020    ALKPHOS 79 06/17/2020    EGFR 83 11/05/2021       BMP  Lab Results   Component Value Date    CALCIUM 9 1 07/13/2021    K 3 6 07/13/2021    CO2 34 (H) 07/13/2021     07/13/2021    BUN 15 07/30/2021    CREATININE 0 81 11/05/2021       Lipids  No results found for: CHOL  Lab Results   Component Value Date    HDL 54 05/15/2019     Lab Results   Component Value Date    LDLCALC 145 (H) 05/15/2019     Lab Results   Component Value Date    TRIG 111 05/15/2019     No results found for: CHOLHDL    Hemoglobin A1C  No results found for: HGBA1C    Fasting Glucose  Lab Results   Component Value Date    GLUF 86 06/17/2020       Insulin     Thyroid  No results found for: TSH, N5ZKYZQ, N1GVKBX, THYROIDAB    Hepatic Function Panel  Lab Results   Component Value Date    ALT 27 06/17/2020    AST 17 06/17/2020    ALKPHOS 79 06/17/2020       Celiac Disease Antibody Panel  IGA   Date Value Ref Range Status   11/05/2021 120 0 70 0 - 400 0 mg/dL Final      Iron  Lab Results   Component Value Date    FERRITIN 24 06/17/2020       Vitamins  No results found for: VITAMIN B2   No results found for: NICOTINAMIDE, NICOTINIC ACID   No results found for: VITAMINB6  Lab Results   Component Value Date    Edyth Lee 836 04/08/2020     No results found for: VITB5  No results found for: G5EXXNEB  No results found for: THYROGLB  No results found for: VITAMIN K   No results found for: 25-HYDROXY VIT D   No components found for: VITAMINE     No follow-ups on file      [address] [facility] [address]  [address]  [facility] [address]

## 2022-02-17 ENCOUNTER — TELEPHONE (OUTPATIENT)
Dept: FAMILY MEDICINE CLINIC | Facility: CLINIC | Age: 57
End: 2022-02-17

## 2022-02-17 NOTE — TELEPHONE ENCOUNTER
Pt would like to know if she can be seen or have a phone call with PCP regarding pain management - right arm/hand keep going numb  Need CRC, she needs to have polyp removed, she was previously referred to this but did not have a good experience  She would like to touch base regarding all this with PCP   Scheduled visit on 2/24 at 220pm

## 2022-02-24 ENCOUNTER — OFFICE VISIT (OUTPATIENT)
Dept: FAMILY MEDICINE CLINIC | Facility: CLINIC | Age: 57
End: 2022-02-24
Payer: COMMERCIAL

## 2022-02-24 VITALS
HEART RATE: 76 BPM | DIASTOLIC BLOOD PRESSURE: 70 MMHG | BODY MASS INDEX: 30.73 KG/M2 | HEIGHT: 61 IN | SYSTOLIC BLOOD PRESSURE: 90 MMHG | RESPIRATION RATE: 12 BRPM | OXYGEN SATURATION: 93 % | TEMPERATURE: 97.6 F | WEIGHT: 162.8 LBS

## 2022-02-24 DIAGNOSIS — N39.498 OTHER URINARY INCONTINENCE: ICD-10-CM

## 2022-02-24 DIAGNOSIS — M54.2 NECK PAIN: ICD-10-CM

## 2022-02-24 DIAGNOSIS — R10.13 EPIGASTRIC PAIN: ICD-10-CM

## 2022-02-24 DIAGNOSIS — Z78.0 MENOPAUSE: ICD-10-CM

## 2022-02-24 DIAGNOSIS — G89.29 CHRONIC RIGHT SHOULDER PAIN: ICD-10-CM

## 2022-02-24 DIAGNOSIS — D18.01 HEMANGIOMA OF SKIN: ICD-10-CM

## 2022-02-24 DIAGNOSIS — M25.511 CHRONIC RIGHT SHOULDER PAIN: ICD-10-CM

## 2022-02-24 DIAGNOSIS — D22.9 MULTIPLE BENIGN NEVI: Primary | ICD-10-CM

## 2022-02-24 PROCEDURE — 99214 OFFICE O/P EST MOD 30 MIN: CPT | Performed by: FAMILY MEDICINE

## 2022-02-24 RX ORDER — OMEPRAZOLE 20 MG/1
20 CAPSULE, DELAYED RELEASE ORAL DAILY
Qty: 90 CAPSULE | Refills: 1 | Status: SHIPPED | OUTPATIENT
Start: 2022-02-24

## 2022-03-04 ENCOUNTER — OFFICE VISIT (OUTPATIENT)
Dept: OBGYN CLINIC | Facility: MEDICAL CENTER | Age: 57
End: 2022-03-04
Payer: COMMERCIAL

## 2022-03-04 VITALS
HEART RATE: 98 BPM | DIASTOLIC BLOOD PRESSURE: 75 MMHG | HEIGHT: 61 IN | BODY MASS INDEX: 31.38 KG/M2 | WEIGHT: 166.2 LBS | TEMPERATURE: 97.7 F | SYSTOLIC BLOOD PRESSURE: 112 MMHG

## 2022-03-04 DIAGNOSIS — M17.12 PRIMARY OSTEOARTHRITIS OF LEFT KNEE: Primary | ICD-10-CM

## 2022-03-04 PROCEDURE — 99213 OFFICE O/P EST LOW 20 MIN: CPT | Performed by: PHYSICIAN ASSISTANT

## 2022-03-04 PROCEDURE — 20610 DRAIN/INJ JOINT/BURSA W/O US: CPT | Performed by: PHYSICIAN ASSISTANT

## 2022-03-04 RX ORDER — TRIAMCINOLONE ACETONIDE 40 MG/ML
40 INJECTION, SUSPENSION INTRA-ARTICULAR; INTRAMUSCULAR
Status: COMPLETED | OUTPATIENT
Start: 2022-03-04 | End: 2022-03-04

## 2022-03-04 RX ORDER — LIDOCAINE HYDROCHLORIDE 10 MG/ML
3 INJECTION, SOLUTION INFILTRATION; PERINEURAL
Status: COMPLETED | OUTPATIENT
Start: 2022-03-04 | End: 2022-03-04

## 2022-03-04 RX ADMIN — LIDOCAINE HYDROCHLORIDE 3 ML: 10 INJECTION, SOLUTION INFILTRATION; PERINEURAL at 09:00

## 2022-03-04 RX ADMIN — TRIAMCINOLONE ACETONIDE 40 MG: 40 INJECTION, SUSPENSION INTRA-ARTICULAR; INTRAMUSCULAR at 09:00

## 2022-03-04 NOTE — PROGRESS NOTES
Assessment/Plan:  1  Primary osteoarthritis of left knee      Orders Placed This Encounter   Procedures    Large joint arthrocentesis: L knee       · Patient received left knee steroid injection  · Continue OTC NSAID prn pain  · Continue activity as tolerated  · Aware she can repeat CSI in 3 months if needed  She has wedding in Sept so she may get CSI prior to that  · She will discuss ankle/ foot pain with Dr Seema Rivero  Return in about 4 months (around 7/4/2022) for left knee CSI  I answered all of the patient's questions during the visit and provided education of the patient's condition during the visit  The patient verbalized understanding of the information given and agrees with the plan  This note was dictated using Insight[employer] software  It may contain errors including improperly dictated words  Please contact physician directly for any questions  Subjective   Chief Complaint:   Chief Complaint   Patient presents with    Left Knee - Pain       HPI  Cam Chi is a 49 y o  female who presents for follow up for left knee pain and moderate OA  At her last visit on 4/16/21 patient received left knee steroid injection  She reports several months of good pain relief  Her left knee pain returned located over the medial knee  She is taking OTC meds as needed  She did go on a trip to [place] and noted pain increased with walking  She has pain that radiates down the calf to the foot  She would like repeat steroid injection today  Review of Systems  ROS:    See HPI for musculoskeletal review     All other systems reviewed are negative     History:  Past Medical History:   Diagnosis Date    Allergic 02/01/2020    COVID 12/01/2021    Fibromyalgia     Hypertension     Migraine     Neck pain     Osteoarthritis     lumbar spine     Past Surgical History:   Procedure Laterality Date    CHOLECYSTECTOMY      DILATION AND CURETTAGE OF UTERUS      TUBAL LIGATION       Social History   Social History Substance and Sexual Activity   Alcohol Use Yes    Comment: rarely     Social History     Substance and Sexual Activity   Drug Use Never     Social History     Tobacco Use   Smoking Status Never Smoker   Smokeless Tobacco Never Used     Family History:   Family History   Problem Relation Age of Onset    MORGAN disease Mother     Arthritis Mother     Depression Mother     Hypertension Mother     Heart attack Father 79    Diabetes Father     Prostate cancer Father 79    Hypertension Father     Arthritis Father     Stroke Father     Lupus Sister     Raynaud syndrome Sister     Sjogren's syndrome Sister     Breast cancer Maternal Grandmother 79    Arthritis Maternal Grandmother     Cancer Maternal Grandmother     Alcohol abuse Paternal Grandfather     No Known Problems Daughter     Prostate cancer Maternal Grandfather 79    Arthritis Maternal Grandfather     Cancer Maternal Grandfather     Colon cancer Paternal Grandmother [de-identified]    Depression Sister     No Known Problems Brother     No Known Problems Son     No Known Problems Son     No Known Problems Son     No Known Problems Son     No Known Problems Son     No Known Problems Maternal Uncle     No Known Problems Paternal Aunt     No Known Problems Paternal Uncle        Current Outpatient Medications on File Prior to Visit   Medication Sig Dispense Refill    Aspirin-Acetaminophen-Caffeine (EXCEDRIN MIGRAINE PO) Take 1 tablet by mouth as needed (migraines)       cholecalciferol (VITAMIN D3) 1,000 units tablet Take 2,000 Units by mouth daily       ferrous sulfate 325 (65 Fe) mg tablet Take 325 mg by mouth daily with breakfast       Galcanezumab-gnlm 120 MG/ML SOAJ Inject 120 mg under the skin every 30 (thirty) days 1 mL 11    lisinopril (ZESTRIL) 20 mg tablet Take 1 tablet (20 mg total) by mouth daily 90 tablet 1    MAGNESIUM GLUCONATE PO Take 3 tablets by mouth daily       Multiple Vitamins-Minerals (MULTIVITAMIN ADULT PO) Take 1 tablet by mouth daily      Naproxen Sodium (Aleve) 220 MG CAPS Take 2 capsules by mouth as needed      omeprazole (PriLOSEC) 20 mg delayed release capsule Take 1 capsule (20 mg total) by mouth daily 90 capsule 1    Riboflavin (Vitamin B-2) 100 MG TABS Take 400 mg by mouth daily        SUMAtriptan (IMITREX) 100 mg tablet One tab daily prn migraine, may repeat in 2 hours if needed  Max 2 tabs per 24 hr 10 tablet 2    tiZANidine (ZANAFLEX) 2 mg tablet Take 2 mg by mouth daily at bedtime TAKE 1/2 TABLET NIGHTLY        No current facility-administered medications on file prior to visit  Allergies   Allergen Reactions    Propranolol Other (See Comments)     Halluncinations    Raspberry - Food Allergy Allergic Rhinitis, Itching and Nasal Congestion        Objective     /75   Pulse 98   Temp 97 7 °F (36 5 °C)   Ht 5' 1" (1 549 m)   Wt 75 4 kg (166 lb 3 2 oz)   BMI 31 40 kg/m²      PE:  AAOx 3  WDWN  Hearing intact, no drainage from eyes  no audible wheezing  no abdominal distension  LE compartments soft, skin intact    Ortho Exam:  left Knee:   No erythema  no swelling  no effusion  no warmth  +TTP over medial joint line  AROM: 0- 130  Stable to varus/valgus stress      Large joint arthrocentesis: L knee  Universal Protocol:  Consent: Verbal consent obtained    Risks and benefits: risks, benefits and alternatives were discussed  Consent given by: patient  Site marked: the operative site was marked  Supporting Documentation  Indications: pain   Procedure Details  Location: knee - L knee  Preparation: Patient was prepped and draped in the usual sterile fashion  Needle size: 22 G  Ultrasound guidance: no  Approach: anterolateral  Medications administered: 3 mL lidocaine 1 %; 40 mg triamcinolone acetonide 40 mg/mL    Patient tolerance: patient tolerated the procedure well with no immediate complications  Dressing:  Sterile dressing applied

## 2022-03-05 ENCOUNTER — APPOINTMENT (OUTPATIENT)
Dept: LAB | Facility: MEDICAL CENTER | Age: 57
End: 2022-03-05
Payer: COMMERCIAL

## 2022-03-05 DIAGNOSIS — E78.00 HYPERCHOLESTEREMIA: ICD-10-CM

## 2022-03-05 DIAGNOSIS — R79.0 LOW FERRITIN: ICD-10-CM

## 2022-03-05 LAB
25(OH)D3 SERPL-MCNC: 69 NG/ML (ref 30–100)
ALBUMIN SERPL BCP-MCNC: 4 G/DL (ref 3.5–5)
ALP SERPL-CCNC: 69 U/L (ref 46–116)
ALT SERPL W P-5'-P-CCNC: 43 U/L (ref 12–78)
ANION GAP SERPL CALCULATED.3IONS-SCNC: 5 MMOL/L (ref 4–13)
AST SERPL W P-5'-P-CCNC: 16 U/L (ref 5–45)
BILIRUB SERPL-MCNC: 0.33 MG/DL (ref 0.2–1)
BUN SERPL-MCNC: 21 MG/DL (ref 5–25)
CALCIUM SERPL-MCNC: 10.4 MG/DL (ref 8.3–10.1)
CHLORIDE SERPL-SCNC: 108 MMOL/L (ref 100–108)
CHOLEST SERPL-MCNC: 224 MG/DL
CO2 SERPL-SCNC: 28 MMOL/L (ref 21–32)
CREAT SERPL-MCNC: 0.74 MG/DL (ref 0.6–1.3)
FERRITIN SERPL-MCNC: 51 NG/ML (ref 8–388)
GFR SERPL CREATININE-BSD FRML MDRD: 92 ML/MIN/1.73SQ M
GLUCOSE P FAST SERPL-MCNC: 95 MG/DL (ref 65–99)
HDLC SERPL-MCNC: 58 MG/DL
LDLC SERPL CALC-MCNC: 148 MG/DL (ref 0–100)
NONHDLC SERPL-MCNC: 166 MG/DL
POTASSIUM SERPL-SCNC: 4.6 MMOL/L (ref 3.5–5.3)
PROT SERPL-MCNC: 7.6 G/DL (ref 6.4–8.2)
SODIUM SERPL-SCNC: 141 MMOL/L (ref 136–145)
TRIGL SERPL-MCNC: 88 MG/DL

## 2022-03-05 PROCEDURE — 80061 LIPID PANEL: CPT

## 2022-03-05 PROCEDURE — 82306 VITAMIN D 25 HYDROXY: CPT

## 2022-03-05 PROCEDURE — 36415 COLL VENOUS BLD VENIPUNCTURE: CPT

## 2022-03-05 PROCEDURE — 82728 ASSAY OF FERRITIN: CPT

## 2022-03-05 PROCEDURE — 80053 COMPREHEN METABOLIC PANEL: CPT

## 2022-03-10 ENCOUNTER — PATIENT MESSAGE (OUTPATIENT)
Dept: FAMILY MEDICINE CLINIC | Facility: CLINIC | Age: 57
End: 2022-03-10

## 2022-03-15 ENCOUNTER — APPOINTMENT (OUTPATIENT)
Dept: RADIOLOGY | Facility: MEDICAL CENTER | Age: 57
End: 2022-03-15
Payer: COMMERCIAL

## 2022-03-15 ENCOUNTER — OFFICE VISIT (OUTPATIENT)
Dept: OBGYN CLINIC | Facility: MEDICAL CENTER | Age: 57
End: 2022-03-15
Payer: COMMERCIAL

## 2022-03-15 VITALS
BODY MASS INDEX: 31.34 KG/M2 | HEIGHT: 61 IN | DIASTOLIC BLOOD PRESSURE: 77 MMHG | WEIGHT: 166 LBS | SYSTOLIC BLOOD PRESSURE: 131 MMHG | HEART RATE: 60 BPM

## 2022-03-15 DIAGNOSIS — M54.2 CHRONIC NECK PAIN: ICD-10-CM

## 2022-03-15 DIAGNOSIS — G89.29 CHRONIC RIGHT SHOULDER PAIN: ICD-10-CM

## 2022-03-15 DIAGNOSIS — G56.01 CARPAL TUNNEL SYNDROME ON RIGHT: ICD-10-CM

## 2022-03-15 DIAGNOSIS — M25.511 CHRONIC RIGHT SHOULDER PAIN: ICD-10-CM

## 2022-03-15 DIAGNOSIS — G89.29 CHRONIC NECK PAIN: Primary | ICD-10-CM

## 2022-03-15 DIAGNOSIS — M54.2 CHRONIC NECK PAIN: Primary | ICD-10-CM

## 2022-03-15 DIAGNOSIS — R20.2 PARESTHESIA OF RIGHT ARM: ICD-10-CM

## 2022-03-15 DIAGNOSIS — G89.29 CHRONIC NECK PAIN: ICD-10-CM

## 2022-03-15 PROCEDURE — 1036F TOBACCO NON-USER: CPT | Performed by: STUDENT IN AN ORGANIZED HEALTH CARE EDUCATION/TRAINING PROGRAM

## 2022-03-15 PROCEDURE — 3008F BODY MASS INDEX DOCD: CPT | Performed by: STUDENT IN AN ORGANIZED HEALTH CARE EDUCATION/TRAINING PROGRAM

## 2022-03-15 PROCEDURE — 99214 OFFICE O/P EST MOD 30 MIN: CPT | Performed by: STUDENT IN AN ORGANIZED HEALTH CARE EDUCATION/TRAINING PROGRAM

## 2022-03-15 PROCEDURE — 72040 X-RAY EXAM NECK SPINE 2-3 VW: CPT

## 2022-03-15 RX ORDER — METHYLPREDNISOLONE 4 MG/1
TABLET ORAL
Qty: 1 EACH | Refills: 0 | Status: SHIPPED | OUTPATIENT
Start: 2022-03-15 | End: 2022-04-29 | Stop reason: ALTCHOICE

## 2022-03-15 NOTE — PROGRESS NOTES
1  Chronic neck pain  XR spine cervical 2 or 3 vw injury    methylPREDNISolone 4 MG tablet therapy pack    Ambulatory Referral to Physical Therapy   2  Chronic right shoulder pain  Ambulatory Referral to Orthopedic Surgery    XR spine cervical 2 or 3 vw injury    methylPREDNISolone 4 MG tablet therapy pack    Ambulatory Referral to Physical Therapy   3  Paresthesia of right arm  XR spine cervical 2 or 3 vw injury    methylPREDNISolone 4 MG tablet therapy pack    Ambulatory Referral to Physical Therapy   4  Carpal tunnel syndrome on right  XR spine cervical 2 or 3 vw injury    methylPREDNISolone 4 MG tablet therapy pack    Ambulatory Referral to Physical Therapy     Orders Placed This Encounter   Procedures    XR spine cervical 2 or 3 vw injury    Ambulatory Referral to Physical Therapy        Imaging Studies (I personally reviewed images in PACS and report):     X-ray cervical spine 03/15/2022:  Redemonstrates C5-6 degenerative changes without significant further degeneration compared to prior imaging  Otherwise, no acute osseous abnormalities   X-ray cervical spine 11/22/2019: There is minimal C5-6 degenerative changes  Otherwise no acute osseous abnormalities  IMPRESSION:   Acute right arm pain   Cervical spondylosis of C5-6   Reported radicular pain/paresthesias of right upper extremity-no precipitating injury - dermatomes intact on exam however   Ongoing issue for 3+ years   DDx: cervical radicular pain, TOS, carpal tunnel syndrome, complex migraine syndrome    Other factors:   Had been seeing Pain Management previously in 2019, but was delayed due to other medical conditions and COVID restrictions   Fibromyalgia   Migraine d/o    PLAN:     Clinical exam and radiographic imaging reviewed with patient today, with impression as per above  I have discussed with the patient the pathophysiology of this diagnosis and reviewed how the examination correlates with this diagnosis      Imaging of her cervical spine was obtained today as noted above  I will follow-up official radiology interpretation   I counseled starting with formal physical therapy/aqua therapy for now in order to facilitate recovery of her paresthesias/pain, in regards to pain control, I have prescribed her Medrol Dosepack  I did consider prescribing gabapentin, but patient states she had previously been on this medication did not find provided any relief  Thus, I counseled that she can continue p r n  Use of NSAIDs/acetaminophen, heat/ice therapy 20 minutes on/off   And she obtain/use TENs machine unit t i d  For 15 minutes sessions at a time to help facilitate recovery as well  I discussed that her physical therapist can show her how to appropriately use as well   I counseled that she may have concurrent carpal tunnel syndrome along with radicular pain from her cervical spine as well and I did prescribe her a cock-up wrist splint to be used at night and as needed throughout the day  I have added carpal tunnel syndrome along with her physical therapy and provided home exercises for this issue as well    Return in about 5 weeks (around 4/19/2022)  I to discuss potential cervical neck injections  f no improvement or worsening pain, I may consider ordering an MRI of her cervical spine without contrast and referring her back to pain management    Portions of the record may have been created with voice recognition software  Occasional wrong word or "sound a like" substitutions may have occurred due to the inherent limitations of voice recognition software  Read the chart carefully and recognize, using context, where substitutions have occurred  CHIEF COMPLAINT:  Chief Complaint   Patient presents with    Right Arm - Pain         HPI:  Skinny Durant is a 49 y o  female  who presents for       Visit 3/15/2022 :  Initial evaluation of right arm pain:  Patient states this has been ongoing issue since December of 2019   She actually had seen pain management with Dr Suzanne Kearney for this issue and there was a potential plan to get an MRI of her cervical spine  She did obtained an EMG at this time as well as noted above  She states a lot of the time has been delayed in regards to treatment due to COVID restrictions  She states constant numbness of her right arm and especially her entire hand that has progressed from intermittent to constant  She states numbness has included all of the digits of her hand  She states there is a shooting pain/burning pain from her neck down her arm as well  She states she inadvertently dropped objects with her right hand and has difficulty/weakness with gripping compared to her contralateral side  She states she has to wake up in the middle night and shake her hand out in order to relieve the numbness  She states having to hold her arms above her shoulder height in order to alleviate the numbness  She denies any swelling of her upper extremity or discoloration  She feels a sense of weakness of her right arm compared to her left arm  She states she did see Physical therapy in the past but has been over 2 years  She had been considering aqua therapy but had not set up an appointment with them yet  Of note, patient sees neurology in regards to migraine disorder  She also has a noted h/o fibromyalgia    In regards to pain medications, she states intermittently be on Medrol Dosepaks which does provide relief briefly but patient does not want continuously require having to take these medications  She also has use naproxen, Tylenol, heat/ice all with limited relief      Medical, Surgical, Family, and Social History    Past Medical History:   Diagnosis Date    Allergic 02/01/2020    COVID 12/01/2021    Fibromyalgia     Hypertension     Migraine     Neck pain     Osteoarthritis     lumbar spine     Past Surgical History:   Procedure Laterality Date    CHOLECYSTECTOMY      DILATION AND CURETTAGE OF UTERUS      TUBAL LIGATION       Social History   Social History     Substance and Sexual Activity   Alcohol Use Yes    Comment: rarely     Social History     Substance and Sexual Activity   Drug Use Never     Social History     Tobacco Use   Smoking Status Never Smoker   Smokeless Tobacco Never Used     Family History   Problem Relation Age of Onset    MORGAN disease Mother     Arthritis Mother     Depression Mother     Hypertension Mother     Heart attack Father 79    Diabetes Father     Prostate cancer Father 79    Hypertension Father     Arthritis Father     Stroke Father     Lupus Sister     Raynaud syndrome Sister     Sjogren's syndrome Sister     Breast cancer Maternal Grandmother 79    Arthritis Maternal Grandmother     Cancer Maternal Grandmother     Alcohol abuse Paternal Grandfather     No Known Problems Daughter     Prostate cancer Maternal Grandfather 79    Arthritis Maternal Grandfather     Cancer Maternal Grandfather     Colon cancer Paternal Grandmother [de-identified]    Depression Sister     No Known Problems Brother     No Known Problems Son     No Known Problems Son     No Known Problems Son     No Known Problems Son     No Known Problems Son     No Known Problems Maternal Uncle     No Known Problems Paternal Aunt     No Known Problems Paternal Uncle      Allergies   Allergen Reactions    Propranolol Other (See Comments)     Halluncinations    Raspberry - Food Allergy Allergic Rhinitis, Itching and Nasal Congestion          Physical Exam  /77   Pulse 60   Ht 5' 1" (1 549 m)   Wt 75 3 kg (166 lb)   BMI 31 37 kg/m²     Constitutional:  see vital signs  Gen: well-developed, normocephalic/atraumatic, well-groomed  Pulmonary/Chest: Effort normal  No respiratory distress     NEURO: cranial nerves grossly intact  PSYCH:  Alert and oriented to person, place, and time; recent and remote memory intact; mood normal, no depression, anxiety, or agitation, judgment and insight good and intact     Right Hand Exam     Muscle Strength   Wrist extension: 5/5   Wrist flexion: 5/5   : 4/5     Tests   Tinel's sign (median nerve): negative  Finkelstein's test: negative    Other   Erythema: absent    Comments:  Carpal compression test: + reports increased numbness/tingling of hand    Full digit MCP/PIP/DIP motion without malrotation or digital scissoring  Thumb MCP/DIP intact with opposition  No swelling, bruising, erythema  Sensation intact in radial/median/ulnar distribution            Shoulder exam:       RIGHT    Inspection Erythema None     Swelling None     Increased Warmth None    Rotator cuff ER 5/5     IR 5/5     Abduction 5/5    ROM      Abduction 170     ER0 60     ER90 90     IR90 40     IRb T7    TTP:  +right paracervical    Special Tests: O'Briens  (FF 90, add 10, resist thumbs up-, resist thumbs down+) Negative slap    Cross body Adduction Negative     Speeds  Negative     Yergason's Negative     Drop arm Negative     Neer Negative     Marley Negative    Other: Ce's sign Negative (patient actually reports resolution of right upper extremity pain/numbness with this motion)        UE NV Exam: +2 Radial pulses bilaterally      Cervical  ROM: intact, but extension/flexion and lateral flexion all aggravates paracervical pain  Midline spinous process tenderness:  +c5/C6  Muscular Tenderness: +right paracervical  Sensation UE Bilateral:  C5: normal  C6: normal  C7: normal  C8: normal  T1: normal  Strength UE: 5/5 elbow, wrist, fingers bilateral  Reflexes: 2+ bicipital/tricipital/brachioradialis  Spurlings: negative - aggravates paracervical pain  Ok sign (median nerve): intact  Froment sign (ulnar nerve): intact  Thumb extension (radial nerve): 5/5 and intact         Procedures

## 2022-03-24 ENCOUNTER — TELEPHONE (OUTPATIENT)
Dept: DERMATOLOGY | Facility: CLINIC | Age: 57
End: 2022-03-24

## 2022-03-24 NOTE — TELEPHONE ENCOUNTER
Pt left v/m to schedule a new pt appt with Dr Sridhar Luz, pt has a referral in her chart, c/b but n/a, left v/m with c/b info

## 2022-03-27 PROBLEM — M47.812 CERVICAL SPONDYLOSIS WITHOUT MYELOPATHY: Status: ACTIVE | Noted: 2022-03-27

## 2022-03-27 PROBLEM — Z87.59 HISTORY OF FETAL LOSS: Status: ACTIVE | Noted: 2022-03-27

## 2022-03-27 PROBLEM — M15.0 PRIMARY GENERALIZED (OSTEO)ARTHRITIS: Status: ACTIVE | Noted: 2022-03-27

## 2022-03-27 PROBLEM — M35.9 UNDIFFERENTIATED CONNECTIVE TISSUE DISEASE (HCC): Status: ACTIVE | Noted: 2022-03-27

## 2022-03-27 PROBLEM — K21.9 GASTROESOPHAGEAL REFLUX DISEASE WITHOUT ESOPHAGITIS: Status: ACTIVE | Noted: 2022-03-27

## 2022-03-28 ENCOUNTER — PATIENT MESSAGE (OUTPATIENT)
Dept: FAMILY MEDICINE CLINIC | Facility: CLINIC | Age: 57
End: 2022-03-28

## 2022-03-28 DIAGNOSIS — G43.009 MIGRAINE WITHOUT AURA AND WITHOUT STATUS MIGRAINOSUS, NOT INTRACTABLE: ICD-10-CM

## 2022-03-28 RX ORDER — SUMATRIPTAN 100 MG/1
TABLET, FILM COATED ORAL
Qty: 10 TABLET | Refills: 2 | Status: SHIPPED | OUTPATIENT
Start: 2022-03-28 | End: 2022-06-30 | Stop reason: SDUPTHER

## 2022-03-31 NOTE — PROGRESS NOTES
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) followed in ENT referred here for evaluation of headache  My initial evaluation 2021    Chronic migraine with and without aura without status migrainosus, not intractable  She reports a long history of headaches and migraines dating back to age 15  She reports the pain is typically bilateral and various locations as discussed in HPI  She reports sometimes she has a possible aura and otherwise has typical associated migrainous features without significant autonomic features  - as of 2021: chronic daily headaches for years, severe migraines 1-2 times a month that don't even respond to sumatriptan, migraines 10 days a month  Trial of emgality for prevention  Continue sumatriptan for abortive  - as of 2021: She reports headaches have improved to 3-4 milder migraines per week on emgality, Also severity of migraines has significantly improved as well, no longer preventing her from working  Trial of nurtec for abortive to see if this works better than sumatriptan  - as of 2022: She self discontinued emgality about  or Feb, but still feels like headaches and migraines are better, daily milder headaches she feels is related her fibro, worse migraines once every 3 weeks  Sleep study pending   Would consider botox next if she would like a alternative prescription preventative  Sumatriptan for abortive  Workup:  - MRI brain with without contrast 10/07/2021:  1  No acute infarction, intracranial hemorrhage or enhancing mass lesion  2   Small, 1 1 cm arachnoid cyst in the right cerebellopontine angle cistern  Preventative:  - we discussed headache hygiene and lifestyle factors that may improve headaches  - she is currently on through other providers: lisinopril  -  She is not interested in prescription preventative at this time  Discussed supplements she could try   - Past/ failed/contraindicated:   Gabapentin, amitriptyline, propranolol, lisinopril, emgality seemed to help initially and then she felt less so and self discontinued 1/2022, Hydrochlorothiazide, duloxetine   - future options: alternative CGRP, botox     Abortive:  - discussed not taking over-the-counter or prescription pain medications more than 3 days per week to prevent medication overuse/rebound headache  - sumatriptan 100 mg  Discussed proper use, possible side effects and risks  - she is currently on through other providers:  Tizanidine, naproxen, sumatriptan 100 mg   - Past/ failed/contraindicated: fioricet, Fiorinal, ubrelvy, nurtec, decadron for 5 days did not seem to help  - future options:   Alternative Triptan,  prochlorperazine, Toradol IM or p o , could consider trial for 5 days of Depakote 4 prolonged migraine,  reyvow    Insomnia, unspecified type  -     Ambulatory referral to Sleep Medicine placed 05/17/2021      Patient instructions      Follow-up with sleep medicine as scheduled 04/29/2022      [employer] - Download this free [employer] on your phone (they will offer subscription, but you do not have to do this)  - I recommend listening to the first approximately 5 or so lectures (more if you want) that are about 10-20 minutes long on the neuroscience of pain  - They discuss how pain works in the brain and steps to try and cure chronic pain    I recommend these free lectures from [employer]  "The basic neuroscience of pain"  "Pain is more than just tissue damage"  "How pain becomes chronic"  "What does the pain mean to you"  "What to do next"      Consider watching the documentary: "this might hurt" at [email or web address]  com  And the documentary: "All the rage: saved by bro" - on [facility]     Consider Read the book: Unlearn your pain By Dr Albania Nelson out this website for resources: [employer]  com - scroll down and watch "NEW ANIMATED VIDEOS ON PAIN"      Headache/migraine treatment:   Abortive medications (for immediate treatment of a headache): It is ok to take ibuprofen, acetaminophen or naproxen (Advil, Tylenol,  Aleve, Excedrin) if they help your headaches you should limit these to No more than 3 times a week to avoid medication overuse/rebound headaches  For your more moderate to severe migraines take this medication early   Sumatriptan/imitrex 100mg tabs - take one at the onset of headache  May repeat one time after 1-2 hours if pain has not resolved  (Max 2 a day and 9 a month)         Over the counter preventive supplements for headaches/migraines (if you try, try for 3 months straight)  (to take every day to help prevent headaches - not to take at the time of headache): There are combo pills online of these - none of which regulated by FDA and double check dosing - take appropriate dose only once a day- preventa migraine, migravent, mind ease, migrelief   [] Magnesium 400mg daily (If any diarrhea or upset stomach, decrease dose  as tolerated)  [] Riboflavin (Vitamin B2) 400mg daily - try online   (FYI B2 may make your urine bright/neon yellow)  AND/OR  [] Herbal medication: Petasites/Butterbur 150 mg daily - try online  (When choosing your Butterbur online or in the store, beware that there are some poor preps containing pyrrolizidine alkaloids (PAs) that can be harmful to the liver   Therefore, do not use butterbur products that are not labeled as PA-free )      Prescription preventive medications for headaches/migraines   (to take every day to help prevent headaches - not to take at the time of headache):  [x] we have options if needed     *Typically these types of medications take time untill you see the benefit, although some may see improvement in days, often it may take weeks, especially if the medication is being titrated up to a beneficial level  Please contact us if there are any concerns or questions regarding the medication  Lifestyle Recommendations:  [x] SLEEP - Maintain a regular sleep schedule: Adults need at least 7-8 hours of uninterrupted a night  Maintain good sleep hygiene:  Going to bed and waking up at consistent times, avoiding excessive daytime naps, avoiding caffeinated beverages in the evening, avoid excessive stimulation in the evening and generally using bed primarily for sleeping  One hour before bedtime would recommend turning lights down lower, decreasing your activity (may read quietly, listen to music at a low volume)  When you get into bed, should eliminate all technology (no texting, emailing, playing with your phone, iPad or tablet in bed)  [x] HYDRATION - Maintain good hydration  Drink  2L of fluid a day (4 typical small water bottles)  [x] DIET - Maintain good nutrition  In particular don't skip meals and try and eat healthy balanced meals regularly  [x] TRIGGERS - Look for other triggers and avoid them: Limit caffeine to 1-2 cups a day or less  Avoid dietary triggers that you have noticed bring on your headaches (this could include aged cheese, peanuts, MSG, aspartame and nitrates)  [x] EXERCISE - physical exercise as we all know is good for you in many ways, and not only is good for your heart, but also is beneficial for your mental health, cognitive health and  chronic pain/headaches  I would encourage at the least 5 days of physical exercise weekly for at least 30 minutes       Education and Follow-up  [x] Please call with any questions or concerns  Of course if any new concerning symptoms go to the emergency department  [x] Follow up 4 months, sooner if needed       CC: We had the pleasure of evaluating Gail Pena in neurological consultation today  Gail Pena is a   right handed female who presents today for evaluation of headaches  History obtained from patient as well as available medical record review    History of Present Illness:   Interval history as of 4/1/2022   - denies any new or concerning neurologic symptoms since last visit   - 5 hours max broken, occasional word finding difficulty, feels like she talks tangentially about things and wonders if she has ADHD to begin with, stepped down from primary job,  Follow-up with sleep medicine scheduled 04/29/2022    Headaches and migraines   - she had been doing well on emgality, although in January wrote a message stating headaches were more frequent and significant 2-3 days per week after COVID early December 2021, Decadron trial sent  - severity of migraines have decreased, still mild daily headaches that she feels is fibromyalgia is causing and plans on making some dietary changes to improve this  - today daily still, but worse once every 3 weeks  - weather changes can def trigger, still dealing with menopause   - drinking more water, cut back on caffeine  - starting aquatic PT soon   - entire scalp  hurts at times  - typically pain with headaches is diffuse and every location     Preventative:   - emgality - stopped in Jan or Feb because she did not feel like it was making a difference anymore and deductible at new year was going to be 450   - since last visit discontinued duloxetine and HCTZ     Abortive:   - exedrin with allergy med first   Trial of nurtec - did not help  Sumatriptan  - Decadron 2 mg daily for 1-5 days sent 01/12/2022 for migraine - did not feel like it helped    Interval history as of 11/1/2021  - denies any new or concerning neurologic symptoms since last visit    - Geraldo Mckeon 2022 apmt with sleep   - MRI brain with without contrast 10/07/2021:  1  No acute infarction, intracranial hemorrhage or enhancing mass lesion  2   Small, 1 1 cm arachnoid cyst in the right cerebellopontine angle cistern  Headaches and migraines   Headaches have improved to 3-4 per week   Also severity of migraines has significantly improved  Also dealing with fibromyalgia     Preventative: Trial of emgality started July or August, just had 4th shot -   Denies bothersome side effects      Abortive: sumatriptan 100 mg - often works     History as of initial visit 5/17/21  She has seen my neurology colleague Dr Maycol Gomez on 03/27/2020 for migraines, vasovagal syncope  -  He ordered MRI brain,  Referral to sleep medicine for insomnia, started gabapentin for prevention, sumatriptan for abortive,  Sleep-deprived EEG 05/01/2020 normal      Headaches started at what age? 16 yo   How often do the headaches occur?   - waxing and waned during life, better during pregnancy  -  As of 03/27/2020:  1-2 significant episodes weekly  - as of 5/17/2021: chronic daily headaches for years, severe migraines 1-2 times a month that dont respond to sumatriptan, migraines 10 days a month   What time of the day do the headaches start? Not there when she wakes up usually (they are there 10/30 morning) Start in am and worse by mid afternoon   How long do the headaches last? All day   Are you ever headache free? Yes    Aura? Sometimes with   - handwriting less than an hour prior        Last eye exam: around 5/2020 - nothing going on except for some macular degeneration     Where is your headache located and pain quality?  - bitemporal   - under sinus  - in front of ears  - bioccipital  - rarely apex  - usually bilateral  - rarely unilateral   -throbbing, tight band, pressure, achy, shooting, dull, stabbing   What is the intensity of pain?  Average: 7-8/10, worst 10/10  Associated symptoms:   [x] Nausea       [x] Vomiting        [] Diarrhea  [x] Stiff or sore neck   [x] Problems with concentration  [x] Photophobia - not too much     [x]Phonophobia  - not too much     [x] Osmophobia  [x] Light-headed or dizzy       [] Ptosis      [] Facial droop  [] Lacrimation  [x] Nasal congestion    Number of days missed per month because of headaches:  Social or Family activities: 2-3     Things that make the headache worse? No specific movements, any movement     Headache triggers:   weather changes, alcohol, stress, missing meals, fatigue    Have you seen someone else for headaches or pain? Yes, ENT, neurology Dr Curtis Olivas, pain management  Have you had trigger point injection performed and how often? No  Have you had Botox injection performed and how often? No   Have you had epidural injections or transforaminal injections performed? No  Are you current pregnant or planning on getting pregnant? Tubes tied, irregular   Have you ever had any Brain imaging? Unknown    What medications do you take or have you taken for your headaches? ABORTIVE:    OTC medications have been ineffective     exedrin migraine   sumatriptan 100 mg- helps usually, rarely     Past:  fioricet and fiorinal   ED - migraine cocktails - sometimes dont work   ubrelvy  Rizatriptan     PREVENTIVE:      riboflavin 400 mg, magnesium 400 mg   for BP:  Lisinopril, hydrochlorothiazide    Past/ failed/contraindicated:  Gabapentin up to twice a day about 3 months - can not remember how high, didn't work   Amitriptyline -   Duloxetine - side effects  Propranolol - side effects hallucinating       Alternative therapies used in the past for headaches?  Massage     LIFESTYLE  Sleep   - averages: 4-6 hours   Problems falling asleep?:   Yes  Problems staying asleep?:  Yes, 2-3, body hurts   - never had a sleep study  - snores     Water: trying to drink 1-2 bottles, forgets   Caffeine: 12- 24 oz per day - not daily     Mood:   Denies history of anxiety or depression or other diagnosed mood disorder      Right arm>Left arm paresthesias for years - entire hand   - EMG 12/13/2019  Ordered for 6 months of bilateral  Hand numbness, right greater than left  demonstrated mild carpal tunnel syndrome on the left  - plans to see rheumatology to discuss other etiologies or fibro  - plans to follow up with pain medicine     The following portions of the patient's history were reviewed and updated as appropriate: allergies, current medications, past family history, past medical history, past social history, past surgical history and problem list     Pertinent family history:  Family history of headaches:  migraine headaches in mother, grandmother and probably sons  Any family history of aneurysms - No    Pertinent social history:  Work: admin - [employer]   Education: associates degree   Lives with 2-3 children   Total 5 boys, 1 girl  ( lives in [place] with some of the kids)    Illicit Drugs: denies  Alcohol/tobacco: Denies tobacco use, alcohol intake: social drinker    Past Medical History:     Past Medical History:   Diagnosis Date    Allergic 02/01/2020    BMI 30 0-30 9,adult     Carpal tunnel syndrome on right     Cervical spondylosis without myelopathy     COVID 12/01/2021    Fibromyalgia     Fibromyalgia, primary     History of fetal loss     Recurrent    Hyperlipidemia     Hypertension     Migraine     Neck pain     Osteoarthritis     lumbar spine    Syncopal episodes     with severe migraines       Patient Active Problem List   Diagnosis    Fibromyalgia    Hypercholesteremia    Migraine without aura and without status migrainosus, not intractable    Persistent insomnia    Vitamin D deficiency    Low ferritin    Benign essential hypertension    Carpal tunnel syndrome, bilateral    Syncopal episodes    Sleep disturbance    HTN (hypertension), benign    COVID-19    Obesity (BMI 30-39  9)    Primary generalized (osteo)arthritis    Cervical spondylosis without myelopathy    Gastroesophageal reflux disease without esophagitis    History of fetal loss    Undifferentiated connective tissue disease (HCC)       Medications:      Current Outpatient Medications   Medication Sig Dispense Refill    Aspirin-Acetaminophen-Caffeine (EXCEDRIN MIGRAINE PO) Take 1 tablet by mouth as needed (migraines)        cholecalciferol (VITAMIN D3) 1,000 units tablet Take 1,000 Units by mouth daily       ferrous sulfate 325 (65 Fe) mg tablet Take 325 mg by mouth daily with breakfast       lisinopril (ZESTRIL) 20 mg tablet Take 1 tablet (20 mg total) by mouth daily 90 tablet 1    Multiple Vitamins-Minerals (MULTIVITAMIN ADULT PO) Take 1 tablet by mouth daily      Naproxen Sodium (Aleve) 220 MG CAPS Take 2 capsules by mouth as needed        omeprazole (PriLOSEC) 20 mg delayed release capsule Take 1 capsule (20 mg total) by mouth daily 90 capsule 1    SUMAtriptan (IMITREX) 100 mg tablet One tab daily prn migraine, may repeat in 2 hours if needed  Max 2 tabs per 24 hr 10 tablet 2    tiZANidine (ZANAFLEX) 2 mg tablet Take 2 mg by mouth daily at bedtime TAKE 1/2 TABLET NIGHTLY       Galcanezumab-gnlm 120 MG/ML SOAJ Inject 120 mg under the skin every 30 (thirty) days 1 mL 11    MAGNESIUM GLUCONATE PO Take 3 tablets by mouth daily       methylPREDNISolone 4 MG tablet therapy pack Use as directed on package (Patient not taking: Reported on 4/1/2022 ) 1 each 0    Riboflavin (Vitamin B-2) 100 MG TABS Take 400 mg by mouth daily         No current facility-administered medications for this visit  Allergies:       Allergies   Allergen Reactions    Propranolol Other (See Comments)     Halluncinations    Raspberry - Food Allergy Allergic Rhinitis, Itching and Nasal Congestion       Family History:     Family History   Problem Relation Age of Onset    MORGAN disease Mother     Arthritis Mother     Depression Mother     Hypertension Mother     Heart attack Father 79    Diabetes Father     Prostate cancer Father 79    Hypertension Father     Arthritis Father     Stroke Father     Lupus Sister     Raynaud syndrome Sister     Sjogren's syndrome Sister     Breast cancer Maternal Grandmother 79    Arthritis Maternal Grandmother     Cancer Maternal Grandmother     Alcohol abuse Paternal Grandfather     No Known Problems Daughter     Prostate cancer Maternal Grandfather 79    Arthritis Maternal Grandfather     Cancer Maternal Grandfather     Colon cancer Paternal Grandmother [de-identified]    Depression Sister     No Known Problems Brother     No Known Problems Son     No Known Problems Son     No Known Problems Son     No Known Problems Son     No Known Problems Son     No Known Problems Maternal Uncle     No Known Problems Paternal Aunt     No Known Problems Paternal Uncle        Social History:     Social History     Socioeconomic History    Marital status: /Civil Union     Spouse name: Not on file    Number of children: 10    Years of education: Not on file    Highest education level: Not on file   Occupational History    Occupation: Administration   Tobacco Use    Smoking status: Never Smoker    Smokeless tobacco: Never Used   Vaping Use    Vaping Use: Never used   Substance and Sexual Activity    Alcohol use: Yes     Comment: rarely    Drug use: Never    Sexual activity: Yes     Partners: Male     Birth control/protection: Female Sterilization   Other Topics Concern    Not on file   Social History Narrative    6 kids 25, 25, 23, 16, 15, 8     Moved from [de-identified] for [place] - Jan     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     Social Determinants of Health     Financial Resource Strain: Not on file   Food Insecurity: Not on file   Transportation Needs: Not on file   Physical Activity: Not on file   Stress: Not on file   Social Connections: Not on file   Intimate Partner Violence: Not on file   Housing Stability: Not on file Objective:       Physical Exam:                                                                 Vitals:            Constitutional:    /73 (BP Location: Left arm, Patient Position: Sitting, Cuff Size: Large)   Pulse 67   Temp 97 7 °F (36 5 °C)   Ht 5' 1 5" (1 562 m)   Wt 73 8 kg (162 lb 9 6 oz)   BMI 30 23 kg/m²   BP Readings from Last 3 Encounters:   04/01/22 123/73   03/23/22 138/82   03/15/22 131/77     Pulse Readings from Last 3 Encounters:   04/01/22 67   03/15/22 60   03/04/22 98         Well developed, well nourished, well groomed  No dysmorphic features  HEENT:  Normocephalic atraumatic  See neuro exam   Chest:  Respirations appear regular and unlabored  Cardiovascular:  no observed significant swelling  Musculoskeletal:  (see below under neurologic exam for evaluation of motor function and gait)   Skin:  warm and dry, not diaphoretic  Psychiatric:  Normal behavior and appropriate affect        Neurological Examination:     Mental status/cognitive function:   Recent and remote memory intact  Attention span and concentration as well as fund of knowledge are appropriate for age  Normal language and spontaneous speech  Cranial Nerves:  III, IV, VI-Pupils were equal, round  Extraocular movements were full and conjugate   VII-facial expression symmetric  VIII-hearing grossly intact bilaterally   Motor Exam: symmetric bulk throughout  no atrophy, fasciculations or abnormal movements noted  Coordination:  no apparent dysmetria, ataxia or tremor noted  Gait: steady casual gait       Pertinent lab results:   See EMR for recent labs  6/17/20: CMP unremarkable  Vit D 41 1  4/8/20: CBC unremarkable, B12 836  TSH normal      Normal stress test - 12/17/19     Imaging: I have personally reviewed imaging and radiology read   - MRI brain with without contrast 10/07/2021:  1   No acute infarction, intracranial hemorrhage or enhancing mass lesion    2   Small, 1 1 cm arachnoid cyst in the right cerebellopontine angle cistern      Sleep-deprived EEG 05/01/2020 normal  Review of Systems:   ROS obtained by medical assistant Personally reviewed and updated if indicated  I recommended PCP follow up for non neurologic problems  Review of Systems   Constitutional: Negative  Negative for appetite change and fever  HENT: Negative  Negative for hearing loss, tinnitus, trouble swallowing and voice change  Eyes: Negative  Negative for photophobia and pain  Respiratory: Negative  Negative for shortness of breath  Cardiovascular: Negative  Negative for palpitations  Gastrointestinal: Negative  Negative for nausea and vomiting  Endocrine: Negative  Negative for cold intolerance  Genitourinary: Negative  Negative for dysuria, frequency and urgency  Musculoskeletal: Negative  Negative for myalgias and neck pain  Skin: Negative  Negative for rash  Neurological: Positive for headaches  Negative for dizziness, tremors, seizures, syncope, facial asymmetry, speech difficulty, weakness, light-headedness and numbness  Hematological: Negative  Does not bruise/bleed easily  Psychiatric/Behavioral: Negative  Negative for confusion, hallucinations and sleep disturbance  I have spent 32 minutes with Patient  today in which greater than 50% of this time was spent in counseling/coordination of care regarding  Prognosis, Risks and benefits of tx options, Intructions for management, Patient education, Importance of tx compliance, Risk factor reductions and Impressions  I also spent 12 minutes non face to face for this patient the same day         Author:  Rosalie Schwarz MD 4/1/2022 10:21 AM   adin

## 2022-04-01 ENCOUNTER — TELEPHONE (OUTPATIENT)
Dept: OBGYN CLINIC | Facility: HOSPITAL | Age: 57
End: 2022-04-01

## 2022-04-01 ENCOUNTER — OFFICE VISIT (OUTPATIENT)
Dept: NEUROLOGY | Facility: CLINIC | Age: 57
End: 2022-04-01
Payer: COMMERCIAL

## 2022-04-01 ENCOUNTER — TELEPHONE (OUTPATIENT)
Dept: OBGYN CLINIC | Facility: MEDICAL CENTER | Age: 57
End: 2022-04-01

## 2022-04-01 VITALS
HEART RATE: 67 BPM | WEIGHT: 162.6 LBS | BODY MASS INDEX: 29.92 KG/M2 | SYSTOLIC BLOOD PRESSURE: 123 MMHG | HEIGHT: 62 IN | TEMPERATURE: 97.7 F | DIASTOLIC BLOOD PRESSURE: 73 MMHG

## 2022-04-01 DIAGNOSIS — G43.709 CHRONIC MIGRAINE WITHOUT AURA WITHOUT STATUS MIGRAINOSUS, NOT INTRACTABLE: Primary | ICD-10-CM

## 2022-04-01 PROCEDURE — 99215 OFFICE O/P EST HI 40 MIN: CPT | Performed by: PSYCHIATRY & NEUROLOGY

## 2022-04-01 NOTE — PROGRESS NOTES
Review of Systems   Constitutional: Negative  Negative for appetite change and fever  HENT: Negative  Negative for hearing loss, tinnitus, trouble swallowing and voice change  Eyes: Negative  Negative for photophobia and pain  Respiratory: Negative  Negative for shortness of breath  Cardiovascular: Negative  Negative for palpitations  Gastrointestinal: Negative  Negative for nausea and vomiting  Endocrine: Negative  Negative for cold intolerance  Genitourinary: Negative  Negative for dysuria, frequency and urgency  Musculoskeletal: Negative  Negative for myalgias and neck pain  Skin: Negative  Negative for rash  Neurological: Positive for headaches (Has a migraine today 4-5)  Negative for dizziness, tremors, seizures, syncope, facial asymmetry, speech difficulty, weakness, light-headedness and numbness  Hematological: Negative  Does not bruise/bleed easily  Psychiatric/Behavioral: Negative  Negative for confusion, hallucinations and sleep disturbance

## 2022-04-01 NOTE — TELEPHONE ENCOUNTER
Called pt to discuss the PT script  She stated that the script that [facility] has did not state what she needs to work on  I will be faxing over the original PT script with specific needs listed for PT  Faxing to [address] [facility] at fax # [phone]

## 2022-04-01 NOTE — TELEPHONE ENCOUNTER
Pt sees Dr Mary Cee    Pt states that she was see by Dr Mary Cee and he referred her to get aquatic therapy   Pt states however there are no orders put in for that    CB#[phone]

## 2022-04-01 NOTE — PATIENT INSTRUCTIONS
Follow-up with sleep medicine as scheduled 04/29/2022      [employer] - Download this free [employer] on your phone (they will offer subscription, but you do not have to do this)  - I recommend listening to the first approximately 5 or so lectures (more if you want) that are about 10-20 minutes long on the neuroscience of pain  - They discuss how pain works in the brain and steps to try and cure chronic pain    I recommend these free lectures from [employer]  "The basic neuroscience of pain"  "Pain is more than just tissue damage"  "How pain becomes chronic"  "What does the pain mean to you"  "What to do next"      Consider watching the documentary: "this might hurt" at thismighthurtfilm  com  And the documentary: "All the rage: saved by bro" - on [facility]     Consider Read the book: Unlearn your pain By Dr Mando Justice out this website for resources: [employer] Linger  com - scroll down and watch "NEW ANIMATED VIDEOS ON PAIN"      Headache/migraine treatment:   Abortive medications (for immediate treatment of a headache): It is ok to take ibuprofen, acetaminophen or naproxen (Advil, Tylenol,  Aleve, Excedrin) if they help your headaches you should limit these to No more than 3 times a week to avoid medication overuse/rebound headaches  For your more moderate to severe migraines take this medication early   Sumatriptan/imitrex 100mg tabs - take one at the onset of headache  May repeat one time after 1-2 hours if pain has not resolved  (Max 2 a day and 9 a month)         Over the counter preventive supplements for headaches/migraines (if you try, try for 3 months straight)  (to take every day to help prevent headaches - not to take at the time of headache):   There are combo pills online of these - none of which regulated by FDA and double check dosing - take appropriate dose only once a day- preventa migraine, migravent, mind ease, migrelief   [] Magnesium 400mg daily (If any diarrhea or upset stomach, decrease dose  as tolerated)  [] Riboflavin (Vitamin B2) 400mg daily - try online   (FYI B2 may make your urine bright/neon yellow)  AND/OR  [] Herbal medication: Petasites/Butterbur 150 mg daily - try online  (When choosing your Butterbur online or in the store, beware that there are some poor preps containing pyrrolizidine alkaloids (PAs) that can be harmful to the liver  Therefore, do not use butterbur products that are not labeled as PA-free )      Prescription preventive medications for headaches/migraines   (to take every day to help prevent headaches - not to take at the time of headache):  [x] we have options if needed     *Typically these types of medications take time untill you see the benefit, although some may see improvement in days, often it may take weeks, especially if the medication is being titrated up to a beneficial level  Please contact us if there are any concerns or questions regarding the medication  Lifestyle Recommendations:  [x] SLEEP - Maintain a regular sleep schedule: Adults need at least 7-8 hours of uninterrupted a night  Maintain good sleep hygiene:  Going to bed and waking up at consistent times, avoiding excessive daytime naps, avoiding caffeinated beverages in the evening, avoid excessive stimulation in the evening and generally using bed primarily for sleeping  One hour before bedtime would recommend turning lights down lower, decreasing your activity (may read quietly, listen to music at a low volume)  When you get into bed, should eliminate all technology (no texting, emailing, playing with your phone, iPad or tablet in bed)  [x] HYDRATION - Maintain good hydration  Drink  2L of fluid a day (4 typical small water bottles)  [x] DIET - Maintain good nutrition  In particular don't skip meals and try and eat healthy balanced meals regularly  [x] TRIGGERS - Look for other triggers and avoid them: Limit caffeine to 1-2 cups a day or less   Avoid dietary triggers that you have noticed bring on your headaches (this could include aged cheese, peanuts, MSG, aspartame and nitrates)  [x] EXERCISE - physical exercise as we all know is good for you in many ways, and not only is good for your heart, but also is beneficial for your mental health, cognitive health and  chronic pain/headaches  I would encourage at the least 5 days of physical exercise weekly for at least 30 minutes  Education and Follow-up  [x] Please call with any questions or concerns  Of course if any new concerning symptoms go to the emergency department    [x] Follow up 4 months, sooner if needed

## 2022-04-04 ENCOUNTER — APPOINTMENT (OUTPATIENT)
Dept: LAB | Facility: CLINIC | Age: 57
End: 2022-04-04
Payer: COMMERCIAL

## 2022-04-04 DIAGNOSIS — Z11.59 NEED FOR HEPATITIS C SCREENING TEST: ICD-10-CM

## 2022-04-04 DIAGNOSIS — M35.9 UNDIFFERENTIATED CONNECTIVE TISSUE DISEASE (HCC): ICD-10-CM

## 2022-04-04 DIAGNOSIS — M79.7 FIBROMYALGIA MUSCLE PAIN: ICD-10-CM

## 2022-04-04 DIAGNOSIS — Z87.59 HISTORY OF FETAL LOSS: ICD-10-CM

## 2022-04-04 DIAGNOSIS — E55.9 VITAMIN D DEFICIENCY: ICD-10-CM

## 2022-04-04 LAB
25(OH)D3 SERPL-MCNC: 69.1 NG/ML (ref 30–100)
ALBUMIN SERPL BCP-MCNC: 3.5 G/DL (ref 3.5–5)
ALP SERPL-CCNC: 66 U/L (ref 46–116)
ALT SERPL W P-5'-P-CCNC: 35 U/L (ref 12–78)
ANION GAP SERPL CALCULATED.3IONS-SCNC: 3 MMOL/L (ref 4–13)
AST SERPL W P-5'-P-CCNC: 20 U/L (ref 5–45)
BASOPHILS # BLD AUTO: 0.03 THOUSANDS/ΜL (ref 0–0.1)
BASOPHILS NFR BLD AUTO: 0 % (ref 0–1)
BILIRUB SERPL-MCNC: 0.43 MG/DL (ref 0.2–1)
BUN SERPL-MCNC: 14 MG/DL (ref 5–25)
CALCIUM SERPL-MCNC: 9.6 MG/DL (ref 8.3–10.1)
CHLORIDE SERPL-SCNC: 108 MMOL/L (ref 100–108)
CK SERPL-CCNC: 69 U/L (ref 26–192)
CO2 SERPL-SCNC: 29 MMOL/L (ref 21–32)
CREAT SERPL-MCNC: 0.78 MG/DL (ref 0.6–1.3)
CRP SERPL QL: 3.2 MG/L
EOSINOPHIL # BLD AUTO: 0.12 THOUSAND/ΜL (ref 0–0.61)
EOSINOPHIL NFR BLD AUTO: 2 % (ref 0–6)
ERYTHROCYTE [DISTWIDTH] IN BLOOD BY AUTOMATED COUNT: 12.4 % (ref 11.6–15.1)
GFR SERPL CREATININE-BSD FRML MDRD: 86 ML/MIN/1.73SQ M
GLUCOSE SERPL-MCNC: 96 MG/DL (ref 65–140)
HCT VFR BLD AUTO: 46.6 % (ref 34.8–46.1)
HCV AB SER QL: NORMAL
HGB BLD-MCNC: 15.1 G/DL (ref 11.5–15.4)
IMM GRANULOCYTES # BLD AUTO: 0.02 THOUSAND/UL (ref 0–0.2)
IMM GRANULOCYTES NFR BLD AUTO: 0 % (ref 0–2)
LYMPHOCYTES # BLD AUTO: 2.13 THOUSANDS/ΜL (ref 0.6–4.47)
LYMPHOCYTES NFR BLD AUTO: 28 % (ref 14–44)
MCH RBC QN AUTO: 28.9 PG (ref 26.8–34.3)
MCHC RBC AUTO-ENTMCNC: 32.4 G/DL (ref 31.4–37.4)
MCV RBC AUTO: 89 FL (ref 82–98)
MONOCYTES # BLD AUTO: 0.51 THOUSAND/ΜL (ref 0.17–1.22)
MONOCYTES NFR BLD AUTO: 7 % (ref 4–12)
NEUTROPHILS # BLD AUTO: 4.78 THOUSANDS/ΜL (ref 1.85–7.62)
NEUTS SEG NFR BLD AUTO: 63 % (ref 43–75)
NRBC BLD AUTO-RTO: 0 /100 WBCS
PLATELET # BLD AUTO: 244 THOUSANDS/UL (ref 149–390)
PMV BLD AUTO: 10.3 FL (ref 8.9–12.7)
POTASSIUM SERPL-SCNC: 4 MMOL/L (ref 3.5–5.3)
PROT SERPL-MCNC: 6.9 G/DL (ref 6.4–8.2)
RBC # BLD AUTO: 5.23 MILLION/UL (ref 3.81–5.12)
SODIUM SERPL-SCNC: 140 MMOL/L (ref 136–145)
TSH SERPL DL<=0.05 MIU/L-ACNC: 1.16 UIU/ML (ref 0.45–4.5)
WBC # BLD AUTO: 7.59 THOUSAND/UL (ref 4.31–10.16)

## 2022-04-04 PROCEDURE — 85306 CLOT INHIBIT PROT S FREE: CPT

## 2022-04-04 PROCEDURE — 86803 HEPATITIS C AB TEST: CPT

## 2022-04-04 PROCEDURE — 82306 VITAMIN D 25 HYDROXY: CPT

## 2022-04-04 PROCEDURE — 86618 LYME DISEASE ANTIBODY: CPT

## 2022-04-04 PROCEDURE — 84443 ASSAY THYROID STIM HORMONE: CPT

## 2022-04-04 PROCEDURE — 85025 COMPLETE CBC W/AUTO DIFF WBC: CPT

## 2022-04-04 PROCEDURE — 80053 COMPREHEN METABOLIC PANEL: CPT

## 2022-04-04 PROCEDURE — 86140 C-REACTIVE PROTEIN: CPT

## 2022-04-04 PROCEDURE — 85732 THROMBOPLASTIN TIME PARTIAL: CPT

## 2022-04-04 PROCEDURE — 36415 COLL VENOUS BLD VENIPUNCTURE: CPT

## 2022-04-04 PROCEDURE — 86147 CARDIOLIPIN ANTIBODY EA IG: CPT

## 2022-04-04 PROCEDURE — 86430 RHEUMATOID FACTOR TEST QUAL: CPT

## 2022-04-04 PROCEDURE — 85613 RUSSELL VIPER VENOM DILUTED: CPT

## 2022-04-04 PROCEDURE — 85303 CLOT INHIBIT PROT C ACTIVITY: CPT

## 2022-04-04 PROCEDURE — 85670 THROMBIN TIME PLASMA: CPT

## 2022-04-04 PROCEDURE — 86162 COMPLEMENT TOTAL (CH50): CPT

## 2022-04-04 PROCEDURE — 86800 THYROGLOBULIN ANTIBODY: CPT

## 2022-04-04 PROCEDURE — 86039 ANTINUCLEAR ANTIBODIES (ANA): CPT

## 2022-04-04 PROCEDURE — 86038 ANTINUCLEAR ANTIBODIES: CPT

## 2022-04-04 PROCEDURE — 86146 BETA-2 GLYCOPROTEIN ANTIBODY: CPT

## 2022-04-04 PROCEDURE — 82550 ASSAY OF CK (CPK): CPT

## 2022-04-04 PROCEDURE — 85705 THROMBOPLASTIN INHIBITION: CPT

## 2022-04-04 PROCEDURE — 86376 MICROSOMAL ANTIBODY EACH: CPT

## 2022-04-04 PROCEDURE — 86200 CCP ANTIBODY: CPT

## 2022-04-05 LAB
B BURGDOR IGG+IGM SER-ACNC: 22
B2 GLYCOPROT1 IGA SERPL IA-ACNC: 0.7
B2 GLYCOPROT1 IGG SERPL IA-ACNC: 0.7
B2 GLYCOPROT1 IGM SERPL IA-ACNC: <2.9
CARDIOLIPIN IGA SER IA-ACNC: 1.1
CARDIOLIPIN IGG SER IA-ACNC: 0.8
CARDIOLIPIN IGM SER IA-ACNC: 1.6
CCP AB SER IA-ACNC: 1.5
CH50 SERPL-ACNC: >60 U/ML
PROT C AG ACT/NOR PPP IA: >150 % OF NORMAL (ref 60–150)
PROT S ACT/NOR PPP: 90 % (ref 68–108)
RHEUMATOID FACT SER QL LA: NEGATIVE
THYROGLOB AB SERPL-ACNC: <1 IU/ML (ref 0–0.9)
THYROPEROXIDASE AB SERPL-ACNC: <8 IU/ML (ref 0–34)

## 2022-04-05 NOTE — TELEPHONE ENCOUNTER
Called pt to discuss the PT script  She stated that the script that [facility] has did not state what she needs to work on  I will be faxing over the original PT script with specific needs listed for PT  Faxing to [address] [facility] at fax # [phone]

## 2022-04-06 LAB
ANA HOMOGEN SER QL IF: NORMAL
ANA HOMOGEN TITR SER: NORMAL {TITER}
APTT SCREEN TO CONFIRM RATIO: 1.12 RATIO (ref 0–1.34)
CONFIRM APTT/NORMAL: 38.7 SEC (ref 0–47.6)
LA PPP-IMP: NORMAL
RYE IGE QN: POSITIVE
SCREEN APTT: 32.3 SEC (ref 0–51.9)
SCREEN DRVVT: 40.3 SEC (ref 0–47)
THROMBIN TIME: 17.7 SEC (ref 0–23)

## 2022-04-11 ENCOUNTER — PATIENT MESSAGE (OUTPATIENT)
Dept: FAMILY MEDICINE CLINIC | Facility: CLINIC | Age: 57
End: 2022-04-11

## 2022-04-11 DIAGNOSIS — I10 BENIGN ESSENTIAL HYPERTENSION: ICD-10-CM

## 2022-04-12 RX ORDER — LISINOPRIL 20 MG/1
20 TABLET ORAL DAILY
Qty: 90 TABLET | Refills: 1 | Status: SHIPPED | OUTPATIENT
Start: 2022-04-12

## 2022-04-12 NOTE — TELEPHONE ENCOUNTER
Medication refill requested: lisinopril  Last office visit: 2/24/22  Next office visit: 5/27/22  Last refilled: 10/4/21 #90x1  Labs: Yes, describe: completed 4/2022  [facility]):   Wendy Lennon [address] [address]  Phone: [phone] Fax: [phone]

## 2022-04-22 ENCOUNTER — APPOINTMENT (OUTPATIENT)
Dept: RADIOLOGY | Facility: MEDICAL CENTER | Age: 57
End: 2022-04-22
Payer: COMMERCIAL

## 2022-04-22 ENCOUNTER — OFFICE VISIT (OUTPATIENT)
Dept: OBGYN CLINIC | Facility: MEDICAL CENTER | Age: 57
End: 2022-04-22
Payer: COMMERCIAL

## 2022-04-22 VITALS
DIASTOLIC BLOOD PRESSURE: 80 MMHG | SYSTOLIC BLOOD PRESSURE: 116 MMHG | BODY MASS INDEX: 29.66 KG/M2 | WEIGHT: 161.2 LBS | HEIGHT: 62 IN | HEART RATE: 69 BPM

## 2022-04-22 DIAGNOSIS — M25.511 CHRONIC RIGHT SHOULDER PAIN: ICD-10-CM

## 2022-04-22 DIAGNOSIS — G89.29 CHRONIC ELBOW PAIN, RIGHT: ICD-10-CM

## 2022-04-22 DIAGNOSIS — F41.8 TEST ANXIETY: ICD-10-CM

## 2022-04-22 DIAGNOSIS — M25.521 CHRONIC ELBOW PAIN, RIGHT: Primary | ICD-10-CM

## 2022-04-22 DIAGNOSIS — M47.812 CERVICAL SPONDYLOSIS: ICD-10-CM

## 2022-04-22 DIAGNOSIS — G89.29 CHRONIC RIGHT SHOULDER PAIN: ICD-10-CM

## 2022-04-22 DIAGNOSIS — M25.521 CHRONIC ELBOW PAIN, RIGHT: ICD-10-CM

## 2022-04-22 DIAGNOSIS — R20.2 PARESTHESIA OF RIGHT ARM: ICD-10-CM

## 2022-04-22 DIAGNOSIS — G89.29 CHRONIC NECK PAIN: ICD-10-CM

## 2022-04-22 DIAGNOSIS — M54.2 CHRONIC NECK PAIN: ICD-10-CM

## 2022-04-22 DIAGNOSIS — M75.41 IMPINGEMENT SYNDROME OF RIGHT SHOULDER: ICD-10-CM

## 2022-04-22 DIAGNOSIS — M77.11 LATERAL EPICONDYLITIS OF RIGHT ELBOW: ICD-10-CM

## 2022-04-22 DIAGNOSIS — G89.29 CHRONIC ELBOW PAIN, RIGHT: Primary | ICD-10-CM

## 2022-04-22 PROCEDURE — 20610 DRAIN/INJ JOINT/BURSA W/O US: CPT | Performed by: STUDENT IN AN ORGANIZED HEALTH CARE EDUCATION/TRAINING PROGRAM

## 2022-04-22 PROCEDURE — 1036F TOBACCO NON-USER: CPT | Performed by: STUDENT IN AN ORGANIZED HEALTH CARE EDUCATION/TRAINING PROGRAM

## 2022-04-22 PROCEDURE — 73030 X-RAY EXAM OF SHOULDER: CPT

## 2022-04-22 PROCEDURE — 20551 NJX 1 TENDON ORIGIN/INSJ: CPT | Performed by: STUDENT IN AN ORGANIZED HEALTH CARE EDUCATION/TRAINING PROGRAM

## 2022-04-22 PROCEDURE — 99214 OFFICE O/P EST MOD 30 MIN: CPT | Performed by: STUDENT IN AN ORGANIZED HEALTH CARE EDUCATION/TRAINING PROGRAM

## 2022-04-22 PROCEDURE — 73080 X-RAY EXAM OF ELBOW: CPT

## 2022-04-22 RX ORDER — ALPRAZOLAM 0.5 MG/1
0.5 TABLET ORAL
Qty: 2 TABLET | Refills: 0 | Status: SHIPPED | OUTPATIENT
Start: 2022-04-22

## 2022-04-22 RX ORDER — LIDOCAINE HYDROCHLORIDE 10 MG/ML
0.5 INJECTION, SOLUTION INFILTRATION; PERINEURAL
Status: COMPLETED | OUTPATIENT
Start: 2022-04-22 | End: 2022-04-22

## 2022-04-22 RX ORDER — TRIAMCINOLONE ACETONIDE 40 MG/ML
40 INJECTION, SUSPENSION INTRA-ARTICULAR; INTRAMUSCULAR
Status: COMPLETED | OUTPATIENT
Start: 2022-04-22 | End: 2022-04-22

## 2022-04-22 RX ORDER — LIDOCAINE HYDROCHLORIDE 10 MG/ML
4 INJECTION, SOLUTION INFILTRATION; PERINEURAL
Status: COMPLETED | OUTPATIENT
Start: 2022-04-22 | End: 2022-04-22

## 2022-04-22 RX ORDER — TRIAMCINOLONE ACETONIDE 40 MG/ML
20 INJECTION, SUSPENSION INTRA-ARTICULAR; INTRAMUSCULAR
Status: COMPLETED | OUTPATIENT
Start: 2022-04-22 | End: 2022-04-22

## 2022-04-22 RX ADMIN — TRIAMCINOLONE ACETONIDE 40 MG: 40 INJECTION, SUSPENSION INTRA-ARTICULAR; INTRAMUSCULAR at 12:07

## 2022-04-22 RX ADMIN — LIDOCAINE HYDROCHLORIDE 0.5 ML: 10 INJECTION, SOLUTION INFILTRATION; PERINEURAL at 12:07

## 2022-04-22 RX ADMIN — LIDOCAINE HYDROCHLORIDE 4 ML: 10 INJECTION, SOLUTION INFILTRATION; PERINEURAL at 12:07

## 2022-04-22 RX ADMIN — TRIAMCINOLONE ACETONIDE 20 MG: 40 INJECTION, SUSPENSION INTRA-ARTICULAR; INTRAMUSCULAR at 12:07

## 2022-04-22 NOTE — PROGRESS NOTES
1  Chronic elbow pain, right  XR elbow 3+ vw right    Hand/upper extremity injection: R elbow    CANCELED: Tennis elbow strap   2  Chronic right shoulder pain  XR shoulder 2+ vw right    Large joint arthrocentesis: R subacromial bursa   3  Chronic neck pain  MRI cervical spine wo contrast    Ambulatory Referral to Pain Management    ALPRAZolam (XANAX) 0 5 mg tablet   4  Paresthesia of right arm  MRI cervical spine wo contrast    Ambulatory Referral to Pain Management    ALPRAZolam (XANAX) 0 5 mg tablet   5  Cervical spondylosis  MRI cervical spine wo contrast    Ambulatory Referral to Pain Management    ALPRAZolam (XANAX) 0 5 mg tablet   6  Lateral epicondylitis of right elbow  Hand/upper extremity injection: R elbow    CANCELED: Tennis elbow strap   7  Impingement syndrome of right shoulder  Large joint arthrocentesis: R subacromial bursa   8  Test anxiety  ALPRAZolam (XANAX) 0 5 mg tablet     Orders Placed This Encounter   Procedures    Large joint arthrocentesis: R subacromial bursa    Hand/upper extremity injection: R elbow    XR shoulder 2+ vw right    XR elbow 3+ vw right    MRI cervical spine wo contrast    Ambulatory Referral to Pain Management        Imaging Studies (I personally reviewed images in PACS and report):     X-ray right shoulder 04/22/2022:  Mild glenohumeral osteoarthritis  Otherwise no acute osseous abnormalities   X-ray right elbow 04/22/2022:  No acute osseous abnormalities or significant degenerative changes   X-ray cervical spine 03/15/2022:  Redemonstrates C5-6 degenerative changes without significant further degeneration compared to prior imaging  Otherwise, no acute osseous abnormalities   X-ray cervical spine 11/22/2019: There is minimal C5-6 degenerative changes  Otherwise no acute osseous abnormalities      IMPRESSION:   Chronic neck, right shoulder, right elbow and overall right arm pain   Reported RUE paresthesias  - however, dermatomal exam intact bilaterally   Cervical spondylosis of C5-6   Multi-factorial pain based on clinical exam - in regards to her shoulder it seems consistent with impingement/bursitis  In regards to her right elbow could be consistent with lateral epicondylitis  Other DDx: cervical radicular pain, carpal tunnel syndrome   Ongoing issue for 3+ years    Other factors:   Had been seeing Pain Management previously in 2019, but was delayed due to other medical conditions and COVID restrictions   Fibromyalgia   Migraine d/o    PLAN:     Clinical exam and radiographic imaging reviewed with patient today, with impression as per above  I have discussed with the patient the pathophysiology of this diagnosis and reviewed how the examination correlates with this diagnosis   Given her clinical exam today, I have ordered imaging of her right shoulder and right elbow as noted above her a follow-up official radiology interpretation   I discussed with patient that her pain is likely multifactorial from several different conditions  I discussed treatment for impingement syndrome of the right shoulder as well as lateral epicondylitis  I did offer a cortisone injection of her right elbow and right shoulder today to help with reduction of pain in these aspects of her arm  She was agreeable to this procedure after discussing risks/benefits as noted below   I counseled patient that there may be an element of cervical radiculopathy as well contributing to her pain and I will order MRI imaging of her cervical spine at this time  Patient had seen pain management in the past with this plan as well, thus I will also referred to pain management after she obtains an MRI of her cervical spine  Patient states she has severe anxiety when getting MRIs, thus I have prescribed her alprazolam 0 5 mg to be taken 30 minutes prior to MRI as well as another tablet 5 minutes prior to MRI     In regards to her lateral epicondylitis, I have prescribed her a tennis elbow brace today and I recommended only using during activities  I had planned on prescribing her a right elbow strap today, but patient prefers to buy OTC which is reasonable  Patient already owns a wrist brace from a prior visit and I recommended she uses while sleeping  Return for Follow up with pain management after MRI  I will follow-up after patient sees pain management to determine her progress with the injections provided today as well as to re-evaluate planned from pain management  Portions of the record may have been created with voice recognition software  Occasional wrong word or "sound a like" substitutions may have occurred due to the inherent limitations of voice recognition software  Read the chart carefully and recognize, using context, where substitutions have occurred  CHIEF COMPLAINT:  Chief Complaint   Patient presents with    Right Shoulder - Follow-up         HPI:  Pennie Ambriz is a 49 y o  female  who presents for     Visit 4/22/2022: Follow-up right arm pain:  Of note patient was last seen on 03/15/2022  He states that had been ongoing issue since December of 2019 in which she saw pain management and there was a plan to get an MRI of her cervical spine  There were some delays in treatment due to COVID restrictions per patient  During her visit[facility]  I would also prescribed her a Medrol Dosepak  I also obtained imaging of her cervical spine as noted above  She states today that the numbness of her right upper extremity has improved but she feels that her right arm pain is progressively worsened  She states no relief from the Medrol Dosepak prescribed from last visit  She states she has been attending aqua therapy with [facility] and has improved her generalized body pain given her history of fibromyalgia, but that her RUE is still constantly painful      She states her pain is diffusely across her midline neck and radiates to her right shoulder as well as her lateral elbow and can intermittently go down to her hand  She states there is tenderness to palpation in these aspects of her shoulder and elbow as well  She states any range of motion of her arm aggravates the pain  The use of her right arm including lifting/pushing/pulling all aggravate pain from her right elbow to right shoulder  She is unable to specifically describe it other than that it is throbbing and sharp, aching  She states there is numbness of her arms at time but this has mildly diminished since last visit  She denies any new injuries since last visit  Denies any arm swelling or discoloration          Medical, Surgical, Family, and Social History    Past Medical History:   Diagnosis Date    Allergic 02/01/2020    BMI 30 0-30 9,adult     Carpal tunnel syndrome on right     Cervical spondylosis without myelopathy     COVID 12/01/2021    Fibromyalgia     Fibromyalgia, primary     History of fetal loss     Recurrent    Hyperlipidemia     Hypertension     Migraine     Neck pain     Osteoarthritis     lumbar spine    Syncopal episodes     with severe migraines     Past Surgical History:   Procedure Laterality Date    CHOLECYSTECTOMY      DILATION AND CURETTAGE OF UTERUS      TUBAL LIGATION       Social History   Social History     Substance and Sexual Activity   Alcohol Use Yes    Comment: rarely     Social History     Substance and Sexual Activity   Drug Use Never     Social History     Tobacco Use   Smoking Status Never Smoker   Smokeless Tobacco Never Used     Family History   Problem Relation Age of Onset    MORGAN disease Mother     Arthritis Mother     Depression Mother     Hypertension Mother     Heart attack Father 79    Diabetes Father     Prostate cancer Father 79    Hypertension Father     Arthritis Father     Stroke Father     Lupus Sister     Raynaud syndrome Sister     Sjogren's syndrome Sister     Breast cancer Maternal Grandmother 79    Arthritis Maternal Grandmother     Cancer Maternal Grandmother     Alcohol abuse Paternal Grandfather     No Known Problems Daughter     Prostate cancer Maternal Grandfather 79    Arthritis Maternal Grandfather     Cancer Maternal Grandfather     Colon cancer Paternal Grandmother [de-identified]    Depression Sister     No Known Problems Brother     No Known Problems Son     No Known Problems Son     No Known Problems Son     No Known Problems Son     No Known Problems Son     No Known Problems Maternal Uncle     No Known Problems Paternal Aunt     No Known Problems Paternal Uncle      Allergies   Allergen Reactions    Propranolol Other (See Comments)     Halluncinations    Raspberry - Food Allergy Allergic Rhinitis, Itching and Nasal Congestion          Physical Exam  /80   Pulse 69   Ht 5' 1 5" (1 562 m)   Wt 73 1 kg (161 lb 3 2 oz)   BMI 29 97 kg/m²     Constitutional:  see vital signs  Gen: well-developed, normocephalic/atraumatic, well-groomed  Pulmonary/Chest: Effort normal  No respiratory distress  NEURO: cranial nerves grossly intact  PSYCH:  Alert and oriented to person, place, and time; recent and remote memory intact; mood normal, no depression, anxiety, or agitation, judgment and insight good and intact     Right Hand Exam     Muscle Strength   Right wrist normal muscle strength: limited d/t reports wrist/elbow pain  Wrist extension: 4/5   Wrist flexion: 4/5   : 4/5     Tests   Tinel's sign (median nerve): negative  Finkelstein's test: negative    Other   Erythema: absent    Comments:  Carpal compression test: + reports increased numbness/tingling of hand    Full digit MCP/PIP/DIP motion without malrotation or digital scissoring  Thumb MCP/DIP intact with opposition  No swelling, bruising, erythema  Sensation intact in radial/median/ulnar distribution        Right Elbow Exam     Tenderness   The patient is experiencing tenderness in the lateral epicondyle  Range of Motion   Extension: 0   Flexion: 130     Tests   Varus: negative  Valgus: negative  Tinel's sign (cubital tunnel): negative    Other   Erythema: absent    Comments:  Cozen's test: +          Shoulder exam:       RIGHT    Inspection Erythema None     Swelling None     Increased Warmth None    Rotator cuff ER 5/5 aggravates    IR 5/5 aggravates    Abduction 5/5 aggravates   ROM  aggravates    Abduction 160 aggravates    ER0 60     IRb Lower lumbar    TTP:  +lateral, anterior aspects of shoulder    Special Tests: O'Briens  (FF 90, add 10, resist thumbs up-, resist thumbs down+) Negative slap    Cross body Adduction Negative     Speeds  Negative     Yergason's Negative     Drop arm Negative     Neer Positive     Marley Positive    Other: Ce's sign Negative (patient actually reports resolution of right upper extremity pain/numbness with this motion)        UE NV Exam: +2 Radial pulses bilaterally    Cervical  ROM: intact, but extension/flexion and lateral flexion all aggravates paracervical pain  Midline spinous process tenderness:  +C5/C6  Muscular Tenderness: +right paracervical  Sensation UE Bilateral:  C5: normal  C6: normal  C7: normal  C8: normal  T1: normal  Strength UE: 5/5 elbow, wrist, fingers bilateral  Reflexes: 2+ bicipital/tricipital/brachioradialis  Spurlings: negative - aggravates paracervical pain  Ok sign (median nerve): intact  Froment sign (ulnar nerve): intact  Thumb extension (radial nerve): 5/5 and intact     I have spent 30 minutes with Patient  today in which greater than 50% of this time was spent in counseling/coordination of care regarding Diagnostic results, Risks and benefits of tx options, Intructions for management, Patient and family education, Risk factor reductions and Impressions  Large joint arthrocentesis: R subacromial bursa  Universal Protocol:  Consent: Verbal consent obtained    Risks and benefits: risks, benefits and alternatives were discussed  Consent given by: patient  Time out: Immediately prior to procedure a "time out" was called to verify the correct patient, procedure, equipment, support staff and site/side marked as required  Timeout called at: 4/22/2022 8:46 AM   Patient understanding: patient states understanding of the procedure being performed  Site marked: the operative site was marked  Radiology Images displayed and confirmed  If images not available, report reviewed: imaging studies available  Patient identity confirmed: verbally with patient    Supporting Documentation  Indications: pain   Procedure Details  Location: shoulder - R subacromial bursa  Preparation: Patient was prepped and draped in the usual sterile fashion  Needle size: 22 G  Ultrasound guidance: no  Approach: posterior  Medications administered: 4 mL lidocaine 1 %; 40 mg triamcinolone acetonide 40 mg/mL    Patient tolerance: patient tolerated the procedure well with no immediate complications  Dressing:  Sterile dressing applied    Hand/upper extremity injection: R elbow  Universal Protocol:  Consent: Verbal consent obtained  Risks and benefits: risks, benefits and alternatives were discussed  Consent given by: patient  Time out: Immediately prior to procedure a "time out" was called to verify the correct patient, procedure, equipment, support staff and site/side marked as required  Timeout called at: 4/22/2022 8:53 AM   Patient understanding: patient states understanding of the procedure being performed  Radiology Images displayed and confirmed   If images not available, report reviewed: imaging studies available  Patient identity confirmed: verbally with patient    Supporting Documentation  Indications: pain, diagnostic and therapeutic   Procedure Details  Condition:lateral epicondylitis Site: R elbow   Preparation: Patient was prepped and draped in the usual sterile fashion  Needle size: 25 G  Ultrasound guidance: no  Approach: dorsal  Medications administered: 0 5 mL lidocaine 1 %; 20 mg triamcinolone acetonide 40 mg/mL    Patient tolerance: patient tolerated the procedure well with no immediate complications  Dressing:  Sterile dressing applied

## 2022-04-29 ENCOUNTER — OFFICE VISIT (OUTPATIENT)
Dept: SLEEP CENTER | Facility: CLINIC | Age: 57
End: 2022-04-29
Payer: COMMERCIAL

## 2022-04-29 VITALS
HEART RATE: 73 BPM | BODY MASS INDEX: 30.4 KG/M2 | DIASTOLIC BLOOD PRESSURE: 66 MMHG | WEIGHT: 161 LBS | HEIGHT: 61 IN | SYSTOLIC BLOOD PRESSURE: 117 MMHG

## 2022-04-29 DIAGNOSIS — E66.9 OBESITY (BMI 30-39.9): ICD-10-CM

## 2022-04-29 DIAGNOSIS — G47.33 OBSTRUCTIVE SLEEP APNEA-HYPOPNEA SYNDROME: Primary | ICD-10-CM

## 2022-04-29 DIAGNOSIS — G47.19 EXCESSIVE DAYTIME SLEEPINESS: ICD-10-CM

## 2022-04-29 DIAGNOSIS — G47.00 INSOMNIA, UNSPECIFIED TYPE: ICD-10-CM

## 2022-04-29 PROCEDURE — 3008F BODY MASS INDEX DOCD: CPT | Performed by: STUDENT IN AN ORGANIZED HEALTH CARE EDUCATION/TRAINING PROGRAM

## 2022-04-29 PROCEDURE — 99204 OFFICE O/P NEW MOD 45 MIN: CPT | Performed by: NURSE PRACTITIONER

## 2022-04-29 NOTE — PROGRESS NOTES
Consultation - Jessica Oleary 89, 1965, MRN: [id number]    4/29/2022        Reason for Consult / Principal Problem:    Evaluation of possible Obstructive Sleep Apnea       Thank you for the opportunity of participating in the evaluation and care of this patient in the Sleep Clinic at [facility]  Subjective:     HPI: Julio Molina is a 49y o  year old female  She presents for a consultation regarding concern of poor sleep with snoring, frequent night time awakenings and daytime sleepiness  She has a history of migraines and is followed by neurology  She has a difficult time with focus and concentration  She has constant pain related to fibromyalgia, which can keep her from sleeping at times  She does not take medication to treat the pain  She plans to work on dietary changes to treat inflammation associated with fibromyalgia      Comorbid conditions:  Obesity  HTN  GERD  Migraines  Fibromyalgia    Review of Systems      Genitourinary post menopausal (no peroids)   Cardiology none   Gastrointestinal frequent heartburn/acid reflux   Neurology frequent headaches, awaken with headache, muscle weakness, numbness/tingling of an extremity, forgetfulness, poor concentration or confusion, , difficulty with memory and balance problems   Constitutional claustrophobia, fatigue, excessive sweating at night and weight change   Integumentary rash or dry skin and itching   Psychiatry anxiety, aggressiveness or irritability and mood change   Musculoskeletal joint pain, muscle aches, back pain and sciatica   Pulmonary snoring   ENT none   Endocrine frequent urination   Hematological none     Sleep Study Results:  No prior sleep study    Employment:  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[employer], working 25-30 hours per week, daytime and evening hours    Sleep Schedule:       Bedtime:  Between 10:30pm and 12:30am on week nights, however, some nights she may not go to bed until 4:00am      Latency:  30 minutes      Wakeup time:  Between 7:00-8:00am, Sundays by 6:00am    Awakenings:       Frequency: At least 1-2 times for urination and repositions at least 3-4 times      Causes: For urination, pain, repositioning      Duration:  She returns to sleep without difficulty    Daytime Sleepiness / Inappropriate Sleep:       Most severe: Most days, between 3:30-4:00pm, "hits a wall"       Naps :  2 times per week      Time:  3:30-4:00pm      Duration:  1-2 hours, occasionally longer       Inappropriate drowsiness / sleep:  She becomes drowsy around 3:00pm, especially with sedentary activities, such as meetings or reading    Snoring:  She snores    Apnea: No witnessed apnea    Change in Weight:  She has gained 10-15 lbs  over the past year    Restless Leg Syndrome:  no clinical symptoms consistent with this diagnosis     Other Complaints:  No reports of sleep walking, sleep talking  She experienced episodes of sleep paralysis and hallucinations surrounding sleep while she was taking propranolol  Symptoms have resolved since stopping propranolol  She awakens with migraines at times during the night and in the morning  They may resolve shortly after awakening, especially once she drinks coffee  Social History:      Caffeine:  24-32 ounces of coffee daily in the am, usually no caffeine after noon- sometimes as late as 3:00pm on weekends     Tobacco:   reports that she has never smoked  She has never used smokeless tobacco      E-cig/Vaping:    E-Cigarette/Vaping    E-Cigarette Use Never User       E-Cigarette/Vaping Substances    Nicotine No     THC No     CBD No     Flavoring No     Other No     Unknown No          Alcohol:   reports current alcohol use  2-3 times per year      Drugs:   reports no history of drug use         The review of systems and following portions of the patient's history were reviewed and updated as appropriate: allergies, current medications, past family history, past medical history, past social history, past surgical history and problem list         Objective:       Vitals:    04/29/22 0834   BP: 117/66   Pulse: 73   Weight: 73 kg (161 lb)   Height: 5' 1" (1 549 m)     Body mass index is 30 42 kg/m²  Neck Circumference: 14  Hestand Sleepiness Scale: Total score: 12      Current Outpatient Medications:     ALPRAZolam (XANAX) 0 5 mg tablet, Take 1 tablet (0 5 mg total) by mouth 30 min pre-procedure Take 1 tablet p o  30 minutes prior to MR I  Continue another 1 tablet p o  5 minutes prior to MRI as well in regards to anxiety, Disp: 2 tablet, Rfl: 0    Aspirin-Acetaminophen-Caffeine (EXCEDRIN MIGRAINE PO), Take 1 tablet by mouth as needed (migraines)  , Disp: , Rfl:     cholecalciferol (VITAMIN D3) 1,000 units tablet, Take 1,000 Units by mouth daily , Disp: , Rfl:     ferrous sulfate 325 (65 Fe) mg tablet, Take 325 mg by mouth daily with breakfast , Disp: , Rfl:     lisinopril (ZESTRIL) 20 mg tablet, Take 1 tablet (20 mg total) by mouth daily, Disp: 90 tablet, Rfl: 1    Multiple Vitamins-Minerals (MULTIVITAMIN ADULT PO), Take 1 tablet by mouth daily, Disp: , Rfl:     omeprazole (PriLOSEC) 20 mg delayed release capsule, Take 1 capsule (20 mg total) by mouth daily, Disp: 90 capsule, Rfl: 1    SUMAtriptan (IMITREX) 100 mg tablet, One tab daily prn migraine, may repeat in 2 hours if needed    Max 2 tabs per 24 hr, Disp: 10 tablet, Rfl: 2    tiZANidine (ZANAFLEX) 2 mg tablet, Take 2 mg by mouth daily at bedtime TAKE 1/2 TABLET NIGHTLY , Disp: , Rfl:     MAGNESIUM GLUCONATE PO, Take 3 tablets by mouth daily , Disp: , Rfl:     Naproxen Sodium (Aleve) 220 MG CAPS, Take 2 capsules by mouth as needed  , Disp: , Rfl:     Riboflavin (Vitamin B-2) 100 MG TABS, Take 400 mg by mouth daily  , Disp: , Rfl:     Physical Exam  General Appearance:   Alert, cooperative, no distress, appears stated age, overweight     Head:   Normocephalic, without obvious abnormality, atraumatic     Eyes:   PERRL, conjunctiva/corneas clear          Nose:  Nares normal, septum midline, mucosa normal, no drainage or sinus tenderness           Throat:  Lips, teeth and gums normal; tongue normal in size and midline in position; mucosa moist and redundant bilaterally, uvula normal, tonsils not visualized, Mallampati class 3       Neck:  Supple, symmetrical, trachea midline, no adenopathy; no thyromegaly noted, no carotid bruit or JVD     Lungs:      Clear to auscultation bilaterally, respirations unlabored     Heart:   Regular rate and rhythm, S1 and S2 normal, no murmur, rub or gallop       Extremities:  Extremities normal, atraumatic, no cyanosis or edema       Skin:  Skin color, texture, turgor normal, no rashes or lesions       Neurologic:  No focal deficits noted  ASSESSMENT / PLAN     1  Obstructive sleep apnea-hypopnea syndrome  Home Study   2  Insomnia, unspecified type  Ambulatory referral to Sleep Medicine    Home Study   3  Obesity (BMI 30-39  9)  Home Study   4  Excessive daytime sleepiness  Home Study         Counseling / Coordination of Care  Total clinic time spent today 55 minutes  Greater than 50% of total time was spent with the patient and / or family counseling and / or coordination of care  A description of the counseling / coordination of care:     diagnostic results, instructions for management, risk factor reductions, prognosis, patient and family education, impressions, risks and benefits of treatment options and importance of compliance with treatment    Today we discussed the anatomy and physiology of the upper airway  I pointed out how changes in this region can result in both snoring and abnormal breathing events including apneas and hypopneas  I explained the most common co-morbidities of untreated sleep apnea    After this we talked about some forms of treatment including application of positive airway pressure, mandibular advancement devices and surgery  In order to evaluate the possibility of Obstructive Sleep Apnea as a cause of the patient's symptoms, a home sleep study will be completed to identify the presence or absence of abnormal nocturnal breathing  If significant abnormal nocturnal breathing is detected, nasal CPAP will be titrated to find the optimum pressure needed to maintain upper airway patency during sleep  This may be accomplished using APAP or may require an in lab titration study  Following testing, the patient will return to the [address] for set up of CPAP equipment with follow up visit to review compliance and effectiveness of treatment 31-91 days after set up  The following instructions have been given to the patient today:    Patient Instructions   1  Schedule home sleep study  2  Schedule set up of CPAP equipment  3  Schedule compliance visit 31-91 days after beginning use of equipment        Nursing Support:  When: Monday through Friday 7A-5PM except holidays  Where: Our direct line is [phone]  If you are having a true emergency please call 911  In the event that the line is busy or it is after hours please leave a voice message and we will return your call  Please speak clearly, leaving your full name, birth date, best number to reach you and the reason for your call  Medication refills: We will need the name of the medication, the dosage, the ordering provider, whether you get a 30 or 90 day refill, and the pharmacy name and address  Medications will be ordered by the provider only  Nurses cannot call in prescriptions  Please allow 7 days for medication refills  Physician requested updates:  If your provider requested that you call with an update after starting medication, please be ready to provide us the medication and dosage, what time you take your medication, the time you attempt to fall asleep, time you fall asleep, when you wake up, and what time you get out of bed  Sleep Study Results: We will contact you with sleep study results and/or next steps after the physician has reviewed your testing        Ana Ayala, [address] [facility]

## 2022-04-29 NOTE — PATIENT INSTRUCTIONS
1   Schedule home sleep study  2  Schedule set up of CPAP equipment  3  Schedule compliance visit 31-91 days after beginning use of equipment        Nursing Support:  When: Monday through Friday 7A-5PM except holidays  Where: Our direct line is [phone]  If you are having a true emergency please call 911  In the event that the line is busy or it is after hours please leave a voice message and we will return your call  Please speak clearly, leaving your full name, birth date, best number to reach you and the reason for your call  Medication refills: We will need the name of the medication, the dosage, the ordering provider, whether you get a 30 or 90 day refill, and the pharmacy name and address  Medications will be ordered by the provider only  Nurses cannot call in prescriptions  Please allow 7 days for medication refills  Physician requested updates: If your provider requested that you call with an update after starting medication, please be ready to provide us the medication and dosage, what time you take your medication, the time you attempt to fall asleep, time you fall asleep, when you wake up, and what time you get out of bed  Sleep Study Results: We will contact you with sleep study results and/or next steps after the physician has reviewed your testing

## 2022-05-10 ENCOUNTER — TELEPHONE (OUTPATIENT)
Dept: PAIN MEDICINE | Facility: MEDICAL CENTER | Age: 57
End: 2022-05-10

## 2022-05-10 NOTE — TELEPHONE ENCOUNTER
Left message to call back to rescheduled consult 6/10/22 with Dr Ramya Kay, please reschedule pt       Thank you

## 2022-05-22 PROBLEM — E66.09 CLASS 1 OBESITY DUE TO EXCESS CALORIES WITH SERIOUS COMORBIDITY AND BODY MASS INDEX (BMI) OF 30.0 TO 30.9 IN ADULT: Status: ACTIVE | Noted: 2021-12-03

## 2022-05-22 PROBLEM — E66.811 CLASS 1 OBESITY DUE TO EXCESS CALORIES WITH SERIOUS COMORBIDITY AND BODY MASS INDEX (BMI) OF 30.0 TO 30.9 IN ADULT: Status: ACTIVE | Noted: 2021-12-03

## 2022-05-22 PROBLEM — I10 HTN (HYPERTENSION), BENIGN: Status: RESOLVED | Noted: 2021-11-26 | Resolved: 2022-05-22

## 2022-05-27 ENCOUNTER — OFFICE VISIT (OUTPATIENT)
Dept: FAMILY MEDICINE CLINIC | Facility: CLINIC | Age: 57
End: 2022-05-27
Payer: COMMERCIAL

## 2022-05-27 VITALS
SYSTOLIC BLOOD PRESSURE: 118 MMHG | TEMPERATURE: 97.5 F | WEIGHT: 160.6 LBS | BODY MASS INDEX: 30.32 KG/M2 | DIASTOLIC BLOOD PRESSURE: 74 MMHG | RESPIRATION RATE: 16 BRPM | HEIGHT: 61 IN | HEART RATE: 68 BPM

## 2022-05-27 DIAGNOSIS — Z12.2 ENCOUNTER FOR SCREENING FOR MALIGNANT NEOPLASM OF LUNG IN FORMER SMOKER WHO QUIT IN PAST 15 YEARS WITH 30 PACK YEAR HISTORY OR GREATER: ICD-10-CM

## 2022-05-27 DIAGNOSIS — E66.09 CLASS 1 OBESITY DUE TO EXCESS CALORIES WITH SERIOUS COMORBIDITY AND BODY MASS INDEX (BMI) OF 30.0 TO 30.9 IN ADULT: ICD-10-CM

## 2022-05-27 DIAGNOSIS — I10 BENIGN ESSENTIAL HYPERTENSION: ICD-10-CM

## 2022-05-27 DIAGNOSIS — Z29.9 PREVENTIVE MEASURE: ICD-10-CM

## 2022-05-27 DIAGNOSIS — E78.00 HYPERCHOLESTEREMIA: Primary | ICD-10-CM

## 2022-05-27 DIAGNOSIS — K21.9 GASTROESOPHAGEAL REFLUX DISEASE WITHOUT ESOPHAGITIS: ICD-10-CM

## 2022-05-27 DIAGNOSIS — Z87.891 ENCOUNTER FOR SCREENING FOR MALIGNANT NEOPLASM OF LUNG IN FORMER SMOKER WHO QUIT IN PAST 15 YEARS WITH 30 PACK YEAR HISTORY OR GREATER: ICD-10-CM

## 2022-05-27 DIAGNOSIS — Z12.4 SCREENING FOR CERVICAL CANCER: ICD-10-CM

## 2022-05-27 DIAGNOSIS — Z12.11 SCREENING FOR COLON CANCER: ICD-10-CM

## 2022-05-27 DIAGNOSIS — M79.7 FIBROMYALGIA: ICD-10-CM

## 2022-05-27 DIAGNOSIS — M35.9 UNDIFFERENTIATED CONNECTIVE TISSUE DISEASE (HCC): ICD-10-CM

## 2022-05-27 PROCEDURE — 1036F TOBACCO NON-USER: CPT | Performed by: FAMILY MEDICINE

## 2022-05-27 PROCEDURE — 99214 OFFICE O/P EST MOD 30 MIN: CPT | Performed by: FAMILY MEDICINE

## 2022-05-27 PROCEDURE — 3725F SCREEN DEPRESSION PERFORMED: CPT | Performed by: FAMILY MEDICINE

## 2022-05-27 PROCEDURE — 3008F BODY MASS INDEX DOCD: CPT | Performed by: FAMILY MEDICINE

## 2022-05-27 NOTE — PROGRESS NOTES
Assessment/Plan:     1  Hypercholesteremia  Well controlled     2  Class 1 obesity due to excess calories with serious comorbidity and body mass index (BMI) of 30 0 to 30 9 in adult  Encouraged diet and exercise to help for a healthier BMI  Weight stable  Very healthy diet  3  Benign essential hypertension  Well controlled     4  Fibromyalgia  Following with rheum Dr Morena Krueger    5  Screening for cervical cancer  Refer for pap - or may go to gyn where she used to live     6  Preventive measure  See above   - Ambulatory Referral to Obstetrics / Gynecology; Future    7  Screening for colon cancer  Update colonoscopy   - Ambulatory referral for Colonoscopy; Future  - Ambulatory Referral to Gastroenterology; Future    8  Gastroesophageal reflux disease without esophagitis  Pt c/o GERD despite meds   May need EGD with colonoscopy - refer to GI   - Ambulatory Referral to Gastroenterology; Future    9  Undifferentiated connective tissue disease ([facility] [address] )  Stable labs     10  Encounter for screening for malignant neoplasm of lung in former smoker who quit in past 15 years with 30 pack year history or greater  Pros and cons of screening were reviewed including false positive, radiation exposure, incidental findings, further testing needed  The patient was counseling on smoking cessation or remaining smoke free if they have already quit  We reviewed the importance of annual follow up for this exam in order to be an effective screening exam '      F/u physical in Nov/Dec  No follow-ups on file      Subjective:   Link Tai is a 49 y o  female here today for a follow-up on her current medical conditions:  Patient Active Problem List   Diagnosis    Fibromyalgia    Hypercholesteremia    Migraine without aura and without status migrainosus, not intractable    Insomnia    Vitamin D deficiency    Low ferritin    Benign essential hypertension    Carpal tunnel syndrome, bilateral    Syncopal episodes    Sleep disturbance    COVID-19    Class 1 obesity due to excess calories with serious comorbidity and body mass index (BMI) of 30 0 to 30 9 in adult    Primary generalized (osteo)arthritis    Cervical spondylosis without myelopathy    Gastroesophageal reflux disease without esophagitis    History of fetal loss    Undifferentiated connective tissue disease (HCC)    Obstructive sleep apnea-hypopnea syndrome    Excessive daytime sleepiness        Current Medications:  Current Outpatient Medications   Medication Sig Dispense Refill    ALPRAZolam (XANAX) 0 5 mg tablet Take 1 tablet (0 5 mg total) by mouth 30 min pre-procedure Take 1 tablet p o  30 minutes prior to MR I  Continue another 1 tablet p o  5 minutes prior to MRI as well in regards to anxiety 2 tablet 0    Aspirin-Acetaminophen-Caffeine (EXCEDRIN MIGRAINE PO) Take 1 tablet by mouth as needed (migraines)        cholecalciferol (VITAMIN D3) 1,000 units tablet Take 1,000 Units by mouth daily       ferrous sulfate 325 (65 Fe) mg tablet Take 325 mg by mouth daily with breakfast       lisinopril (ZESTRIL) 20 mg tablet Take 1 tablet (20 mg total) by mouth daily 90 tablet 1    MAGNESIUM GLUCONATE PO Take 3 tablets by mouth daily       Multiple Vitamins-Minerals (MULTIVITAMIN ADULT PO) Take 1 tablet by mouth daily      Naproxen Sodium 220 MG CAPS Take 2 capsules by mouth as needed        omeprazole (PriLOSEC) 20 mg delayed release capsule Take 1 capsule (20 mg total) by mouth daily 90 capsule 1    Riboflavin (Vitamin B-2) 100 MG TABS Take 400 mg by mouth daily        SUMAtriptan (IMITREX) 100 mg tablet One tab daily prn migraine, may repeat in 2 hours if needed  Max 2 tabs per 24 hr 10 tablet 2     No current facility-administered medications for this visit         HPI:  Chief Complaint   Patient presents with    Follow-up     6 month follow up     Labs     None     Medication Refill     None at this time     OTHER     - [place] G[place], CRC pending Declined COVID      -- Above per clinical staff and reviewed  --    PHQ-2/9 Depression Screening    Little interest or pleasure in doing things: 0 - not at all  Feeling down, depressed, or hopeless: 0 - not at all  PHQ-2 Score: 0  PHQ-2 Interpretation: Negative depression screen       MIGDALIA-7 Flowsheet Screening    Flowsheet Row Most Recent Value   Over the last 2 weeks, how often have you been bothered by any of the following problems? Feeling nervous, anxious, or on edge 0   Not being able to stop or control worrying 0   Worrying too much about different things 1   Trouble relaxing 0   Being so restless that it is hard to sit still 0   Becoming easily annoyed or irritable 1   Feeling afraid as if something awful might happen 0   MIGDALIA-7 Total Score 2           review april 2022 labs - hihg calcium resolved, vit D 69   chol 224, TG 88, HDL 58,  (stable) -ASCVD risk low 2 2%   parotid tumor  CT scan 10/2021 stable compared to 6 months prior   home BP not accurate  Today:  Saw several specialists and has been dx with carpel tunnel, tennis elbow   Rotator cuff, bicep issue   Saw Dr Ger Leggett - trying to eat better  Feeling good   Keeping moving but not too much     The following portions of the patient's history were reviewed and updated as appropriate: allergies, current medications, past family history, past medical history, past social history, past surgical history and problem list     Objective:  Vitals:  /74   Pulse 68   Temp 97 5 °F (36 4 °C)   Resp 16   Ht 5' 1" (1 549 m)   Wt 72 8 kg (160 lb 9 6 oz)   BMI 30 35 kg/m²    Wt Readings from Last 3 Encounters:   05/27/22 72 8 kg (160 lb 9 6 oz)   04/29/22 73 kg (161 lb)   04/22/22 73 1 kg (161 lb 3 2 oz)      BP Readings from Last 3 Encounters:   05/27/22 118/74   04/29/22 117/66   04/22/22 116/80        Review of Systems   She has no other concerns  No unexpected weight changes  No chest pain, SOB, or palpitations  No GERD   No changes in bowels or bladder  Sleeping well  No mood changes  Physical Exam   Constitutional:  she appears well-developed and well-nourished  HENT: Head: Normocephalic  Neck: Neck supple  Cardiovascular: Normal rate, regular rhythm and normal heart sounds  Pulmonary/Chest: Effort normal and breath sounds normal  No wheezes, rales, or rhonchi  Abdominal: Soft  Bowel sounds are normal  There is no tenderness  No hepatosplenomegaly  Musculoskeletal: she exhibits no edema  Lymphadenopathy: she has no cervical adenopathy  Neurological: she is alert and oriented to person, place, and time  Skin: Skin is warm and dry  Psychiatric: she has a normal mood and affect  her behavior is normal  Thought content normal        Depression Screening and Follow-up Plan: Patient was screened for depression during today's encounter  They screened negative with a PHQ-2 score of 0

## 2022-05-29 ENCOUNTER — HOSPITAL ENCOUNTER (OUTPATIENT)
Dept: SLEEP CENTER | Facility: CLINIC | Age: 57
Discharge: HOME | End: 2022-05-29
Payer: COMMERCIAL

## 2022-05-29 DIAGNOSIS — E66.9 OBESITY (BMI 30-39.9): ICD-10-CM

## 2022-05-29 DIAGNOSIS — G47.00 INSOMNIA, UNSPECIFIED TYPE: ICD-10-CM

## 2022-05-29 DIAGNOSIS — G47.19 EXCESSIVE DAYTIME SLEEPINESS: ICD-10-CM

## 2022-05-29 DIAGNOSIS — G47.33 OBSTRUCTIVE SLEEP APNEA-HYPOPNEA SYNDROME: ICD-10-CM

## 2022-05-29 PROCEDURE — G0399 HOME SLEEP TEST/TYPE 3 PORTA: HCPCS

## 2022-05-30 NOTE — PROGRESS NOTES
Home Sleep Study Documentation    Pre-Sleep Home Study:    Set-up and instructions performed by: JLUIS Scott, RST, CRT    Technician performed demonstration for Patient: yes    Return demonstration performed by Patient: yes    Written instructions provided to Patient: yes    Patient signed consent form: yes        Post-Sleep Home Study:    Additional comments by Patient: none    Home Sleep Study Failed:no:    Failure reason: N/A    Reported or Detected: N/A    Scored by: SUZIE Yee RPSGT

## 2022-06-03 DIAGNOSIS — M17.12 ARTHRITIS OF LEFT KNEE: Primary | ICD-10-CM

## 2022-06-03 DIAGNOSIS — G47.33 OBSTRUCTIVE SLEEP APNEA-HYPOPNEA SYNDROME: Primary | ICD-10-CM

## 2022-06-03 PROCEDURE — 95806 SLEEP STUDY UNATT&RESP EFFT: CPT | Performed by: PSYCHIATRY & NEUROLOGY

## 2022-06-07 ENCOUNTER — TELEPHONE (OUTPATIENT)
Dept: SLEEP CENTER | Facility: CLINIC | Age: 57
End: 2022-06-07

## 2022-06-13 ENCOUNTER — TELEPHONE (OUTPATIENT)
Dept: SLEEP CENTER | Facility: CLINIC | Age: 57
End: 2022-06-13

## 2022-06-13 LAB

## 2022-06-13 NOTE — TELEPHONE ENCOUNTER
Pt  was set up today in [facility] on Auto CPAP 5-15cm and given N30i (S) mask

## 2022-06-14 DIAGNOSIS — M17.12 PRIMARY OSTEOARTHRITIS OF LEFT KNEE: Primary | ICD-10-CM

## 2022-06-14 RX ORDER — DICLOFENAC SODIUM 75 MG/1
75 TABLET, DELAYED RELEASE ORAL 2 TIMES DAILY PRN
Qty: 60 TABLET | Refills: 1 | Status: SHIPPED | OUTPATIENT
Start: 2022-06-14 | End: 2022-08-08 | Stop reason: ALTCHOICE

## 2022-06-23 PROBLEM — M17.12 PRIMARY OSTEOARTHRITIS OF LEFT KNEE: Status: ACTIVE | Noted: 2022-06-23

## 2022-06-30 ENCOUNTER — PATIENT MESSAGE (OUTPATIENT)
Dept: FAMILY MEDICINE CLINIC | Facility: CLINIC | Age: 57
End: 2022-06-30

## 2022-06-30 DIAGNOSIS — L23.7 POISON IVY: Primary | ICD-10-CM

## 2022-06-30 DIAGNOSIS — G43.009 MIGRAINE WITHOUT AURA AND WITHOUT STATUS MIGRAINOSUS, NOT INTRACTABLE: ICD-10-CM

## 2022-07-01 ENCOUNTER — TELEPHONE (OUTPATIENT)
Dept: FAMILY MEDICINE CLINIC | Facility: CLINIC | Age: 57
End: 2022-07-01

## 2022-07-01 DIAGNOSIS — M17.12 PRIMARY OSTEOARTHRITIS OF LEFT KNEE: Primary | ICD-10-CM

## 2022-07-01 RX ORDER — PREDNISONE 20 MG/1
TABLET ORAL
Qty: 14 TABLET | Refills: 0 | Status: SHIPPED | OUTPATIENT
Start: 2022-07-01 | End: 2022-08-15

## 2022-07-01 RX ORDER — SUMATRIPTAN 100 MG/1
TABLET, FILM COATED ORAL
Qty: 10 TABLET | Refills: 2 | Status: SHIPPED | OUTPATIENT
Start: 2022-07-01

## 2022-07-03 PROBLEM — M75.81 TENDINITIS OF RIGHT ROTATOR CUFF: Status: ACTIVE | Noted: 2022-07-03

## 2022-07-05 ENCOUNTER — APPOINTMENT (OUTPATIENT)
Dept: LAB | Facility: CLINIC | Age: 57
End: 2022-07-05
Payer: COMMERCIAL

## 2022-07-05 DIAGNOSIS — M35.9 UNDIFFERENTIATED CONNECTIVE TISSUE DISEASE (HCC): ICD-10-CM

## 2022-07-05 DIAGNOSIS — Z79.899 ENCOUNTER FOR LONG-TERM (CURRENT) USE OF OTHER MEDICATIONS: ICD-10-CM

## 2022-07-05 LAB
ALBUMIN SERPL BCP-MCNC: 3.7 G/DL (ref 3.5–5)
ALP SERPL-CCNC: 67 U/L (ref 46–116)
ALT SERPL W P-5'-P-CCNC: 28 U/L (ref 12–78)
ANION GAP SERPL CALCULATED.3IONS-SCNC: 5 MMOL/L (ref 4–13)
AST SERPL W P-5'-P-CCNC: 16 U/L (ref 5–45)
BASOPHILS # BLD AUTO: 0.03 THOUSANDS/ΜL (ref 0–0.1)
BASOPHILS NFR BLD AUTO: 0 % (ref 0–1)
BILIRUB SERPL-MCNC: 0.44 MG/DL (ref 0.2–1)
BUN SERPL-MCNC: 14 MG/DL (ref 5–25)
CALCIUM SERPL-MCNC: 9.6 MG/DL (ref 8.3–10.1)
CHLORIDE SERPL-SCNC: 105 MMOL/L (ref 100–108)
CO2 SERPL-SCNC: 28 MMOL/L (ref 21–32)
CREAT SERPL-MCNC: 0.79 MG/DL (ref 0.6–1.3)
CRP SERPL QL: <3 MG/L
EOSINOPHIL # BLD AUTO: 0.01 THOUSAND/ΜL (ref 0–0.61)
EOSINOPHIL NFR BLD AUTO: 0 % (ref 0–6)
ERYTHROCYTE [DISTWIDTH] IN BLOOD BY AUTOMATED COUNT: 12.6 % (ref 11.6–15.1)
ERYTHROCYTE [SEDIMENTATION RATE] IN BLOOD: 15 MM/HOUR (ref 0–29)
GFR SERPL CREATININE-BSD FRML MDRD: 85 ML/MIN/1.73SQ M
GLUCOSE P FAST SERPL-MCNC: 114 MG/DL (ref 65–99)
HCT VFR BLD AUTO: 44.3 % (ref 34.8–46.1)
HGB BLD-MCNC: 14.8 G/DL (ref 11.5–15.4)
IMM GRANULOCYTES # BLD AUTO: 0.1 THOUSAND/UL (ref 0–0.2)
IMM GRANULOCYTES NFR BLD AUTO: 1 % (ref 0–2)
LYMPHOCYTES # BLD AUTO: 1.71 THOUSANDS/ΜL (ref 0.6–4.47)
LYMPHOCYTES NFR BLD AUTO: 16 % (ref 14–44)
MCH RBC QN AUTO: 28.5 PG (ref 26.8–34.3)
MCHC RBC AUTO-ENTMCNC: 33.4 G/DL (ref 31.4–37.4)
MCV RBC AUTO: 85 FL (ref 82–98)
MONOCYTES # BLD AUTO: 0.51 THOUSAND/ΜL (ref 0.17–1.22)
MONOCYTES NFR BLD AUTO: 5 % (ref 4–12)
NEUTROPHILS # BLD AUTO: 8.55 THOUSANDS/ΜL (ref 1.85–7.62)
NEUTS SEG NFR BLD AUTO: 78 % (ref 43–75)
NRBC BLD AUTO-RTO: 0 /100 WBCS
PLATELET # BLD AUTO: 290 THOUSANDS/UL (ref 149–390)
PMV BLD AUTO: 10.6 FL (ref 8.9–12.7)
POTASSIUM SERPL-SCNC: 4.5 MMOL/L (ref 3.5–5.3)
PROT SERPL-MCNC: 7.2 G/DL (ref 6.4–8.2)
RBC # BLD AUTO: 5.19 MILLION/UL (ref 3.81–5.12)
SODIUM SERPL-SCNC: 138 MMOL/L (ref 136–145)
WBC # BLD AUTO: 10.91 THOUSAND/UL (ref 4.31–10.16)

## 2022-07-05 PROCEDURE — 85652 RBC SED RATE AUTOMATED: CPT

## 2022-07-05 PROCEDURE — 85025 COMPLETE CBC W/AUTO DIFF WBC: CPT

## 2022-07-05 PROCEDURE — 80053 COMPREHEN METABOLIC PANEL: CPT

## 2022-07-05 PROCEDURE — 86140 C-REACTIVE PROTEIN: CPT

## 2022-07-05 PROCEDURE — 36415 COLL VENOUS BLD VENIPUNCTURE: CPT

## 2022-08-04 ENCOUNTER — TELEPHONE (OUTPATIENT)
Dept: PAIN MEDICINE | Facility: MEDICAL CENTER | Age: 57
End: 2022-08-04

## 2022-08-08 ENCOUNTER — OFFICE VISIT (OUTPATIENT)
Dept: OBGYN CLINIC | Facility: MEDICAL CENTER | Age: 57
End: 2022-08-08
Payer: COMMERCIAL

## 2022-08-08 VITALS
DIASTOLIC BLOOD PRESSURE: 79 MMHG | HEIGHT: 61 IN | WEIGHT: 159.2 LBS | HEART RATE: 80 BPM | BODY MASS INDEX: 30.06 KG/M2 | SYSTOLIC BLOOD PRESSURE: 131 MMHG

## 2022-08-08 DIAGNOSIS — M17.12 PRIMARY OSTEOARTHRITIS OF LEFT KNEE: Primary | ICD-10-CM

## 2022-08-08 PROCEDURE — 20610 DRAIN/INJ JOINT/BURSA W/O US: CPT | Performed by: PHYSICIAN ASSISTANT

## 2022-08-08 RX ORDER — HYALURONATE SODIUM 10 MG/ML
20 SYRINGE (ML) INTRAARTICULAR
Status: COMPLETED | OUTPATIENT
Start: 2022-08-08 | End: 2022-08-08

## 2022-08-08 RX ORDER — MELOXICAM 7.5 MG/1
7.5 TABLET ORAL 2 TIMES DAILY PRN
Qty: 60 TABLET | Refills: 1 | Status: SHIPPED | OUTPATIENT
Start: 2022-08-08

## 2022-08-08 RX ADMIN — Medication 20 MG: at 09:02

## 2022-08-08 NOTE — PROGRESS NOTES
Patient is here today for left knee euflexxa injection 1/3  Post injection instructions reviewed  Script provided for meloxicam  Patient aware to dc diclofenac  Follow up 1 week  Large joint arthrocentesis: L knee  Universal Protocol:  Consent: Verbal consent obtained    Risks and benefits: risks, benefits and alternatives were discussed  Consent given by: patient  Site marked: the operative site was marked  Supporting Documentation  Indications: pain   Procedure Details  Location: knee - L knee  Preparation: Patient was prepped and draped in the usual sterile fashion  Needle size: 22 G  Ultrasound guidance: no  Approach: anterolateral  Medications administered: 20 mg Sodium Hyaluronate 20 MG/2ML  Specialty Pharmacy Supplied: received medications from pharmacy  Patient tolerance: patient tolerated the procedure well with no immediate complications  Dressing:  Sterile dressing applied

## 2022-08-09 ENCOUNTER — TELEPHONE (OUTPATIENT)
Dept: NEUROLOGY | Facility: CLINIC | Age: 57
End: 2022-08-09

## 2022-08-09 NOTE — TELEPHONE ENCOUNTER
Called and spoke to patient to confirm their upcoming appointment with Dr Wisam Bustos  Informed patient about arriving in the [place] location 15 minutes prior to their appointment to get checked in and going over chart

## 2022-08-15 ENCOUNTER — PROCEDURE VISIT (OUTPATIENT)
Dept: OBGYN CLINIC | Facility: MEDICAL CENTER | Age: 57
End: 2022-08-15
Payer: COMMERCIAL

## 2022-08-15 ENCOUNTER — OFFICE VISIT (OUTPATIENT)
Dept: NEUROLOGY | Facility: CLINIC | Age: 57
End: 2022-08-15
Payer: COMMERCIAL

## 2022-08-15 VITALS
HEIGHT: 61 IN | WEIGHT: 158 LBS | SYSTOLIC BLOOD PRESSURE: 159 MMHG | HEART RATE: 73 BPM | TEMPERATURE: 97.8 F | BODY MASS INDEX: 29.83 KG/M2 | DIASTOLIC BLOOD PRESSURE: 93 MMHG

## 2022-08-15 VITALS
HEIGHT: 61 IN | DIASTOLIC BLOOD PRESSURE: 89 MMHG | WEIGHT: 159.6 LBS | BODY MASS INDEX: 30.13 KG/M2 | SYSTOLIC BLOOD PRESSURE: 136 MMHG | HEART RATE: 71 BPM

## 2022-08-15 DIAGNOSIS — G43.709 CHRONIC MIGRAINE WITHOUT AURA WITHOUT STATUS MIGRAINOSUS, NOT INTRACTABLE: Primary | ICD-10-CM

## 2022-08-15 DIAGNOSIS — M17.12 PRIMARY OSTEOARTHRITIS OF LEFT KNEE: ICD-10-CM

## 2022-08-15 DIAGNOSIS — M17.12 PRIMARY OSTEOARTHRITIS OF LEFT KNEE: Primary | ICD-10-CM

## 2022-08-15 PROCEDURE — 20610 DRAIN/INJ JOINT/BURSA W/O US: CPT | Performed by: ORTHOPAEDIC SURGERY

## 2022-08-15 PROCEDURE — 99215 OFFICE O/P EST HI 40 MIN: CPT | Performed by: PSYCHIATRY & NEUROLOGY

## 2022-08-15 RX ORDER — CETIRIZINE HYDROCHLORIDE 10 MG/1
10 TABLET ORAL DAILY
COMMUNITY

## 2022-08-15 RX ORDER — DICLOFENAC SODIUM 75 MG/1
TABLET, DELAYED RELEASE ORAL
Qty: 60 TABLET | Refills: 1 | Status: SHIPPED | OUTPATIENT
Start: 2022-08-15 | End: 2022-08-15

## 2022-08-15 RX ORDER — HYALURONATE SODIUM 10 MG/ML
20 SYRINGE (ML) INTRAARTICULAR
Status: COMPLETED | OUTPATIENT
Start: 2022-08-15 | End: 2022-08-15

## 2022-08-15 RX ORDER — ACETAMINOPHEN 500 MG
500 TABLET ORAL EVERY 6 HOURS PRN
COMMUNITY

## 2022-08-15 RX ORDER — ESTRADIOL AND NORETHINDRONE ACETATE 1; .5 MG/1; MG/1
1 TABLET ORAL DAILY
COMMUNITY
Start: 2022-07-19

## 2022-08-15 RX ADMIN — Medication 20 MG: at 16:32

## 2022-08-15 NOTE — PROGRESS NOTES
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) followed in ENT referred here for evaluation of headache  My initial evaluation 2021    Chronic migraine with and without aura without status migrainosus, not intractable  She reports a long history of headaches and migraines dating back to age 15  She reports the pain is typically bilateral and various locations as discussed in HPI  She reports sometimes she has a possible aura and otherwise has typical associated migrainous features without significant autonomic features  - as of 2021: chronic daily headaches for years, severe migraines 1-2 times a month that don't even respond to sumatriptan, migraines 10 days a month  Trial of emgality for prevention  Continue sumatriptan for abortive  - as of 2021: She reports headaches have improved to 3-4 milder migraines per week on emgality, Also severity of migraines has significantly improved as well, no longer preventing her from working  Trial of nurtec for abortive to see if this works better than sumatriptan  - as of 2022: She self discontinued emgality about  or Feb, but still feels like headaches and migraines are better, daily milder headaches she feels is related her fibro, worse migraines once every 3 weeks  Sleep study pending   Would consider botox next if she would like a alternative prescription preventative  Sumatriptan for abortive  - as of 8/15/2022: She was found to have moderate VIKAS and is struggling to tolerate CPAP  Headaches have improved from daily to a few times a week and worse migraines about 5-6 a month that respond to sumatriptan  Severity decreased as well, once every couple months is worse only now  Workup:  - MRI brain with without contrast 10/07/2021:  1  No acute infarction, intracranial hemorrhage or enhancing mass lesion  2   Small, 1 1 cm arachnoid cyst in the right cerebellopontine angle cistern  Preventative:  - we discussed headache hygiene and lifestyle factors that may improve headaches  - she is currently on through other providers: lisinopril  -  She is not interested in prescription preventative at this time  Discussed supplements she could try   - Past/ failed/contraindicated:   Gabapentin, amitriptyline, propranolol, lisinopril, emgality seemed to help initially and then she felt less so and self discontinued 1/2022, Hydrochlorothiazide, duloxetine   - future options: alternative CGRP, botox     Abortive:  - discussed not taking over-the-counter or prescription pain medications more than 3 days per week to prevent medication overuse/rebound headache  - sumatriptan 100 mg  Discussed proper use, possible side effects and risks  - she is currently on through other providers:  Tizanidine, naproxen, sumatriptan 100 mg   - Past/ failed/contraindicated: fioricet, Fiorinal, ubrelvy, nurtec, decadron for 5 days did not seem to help  - future options: Alternative Triptan,  prochlorperazine, Toradol IM or p o , could consider trial for 5 days of Depakote 4 prolonged migraine,  reyvow      Patient instructions      Generally we do not recommend estrogen in the setting of possible migraine aura, due to stroke risk     Headache/migraine treatment:   Abortive medications (for immediate treatment of a headache):    It is ok to take ibuprofen, acetaminophen or naproxen (Advil, Tylenol,  Aleve, Excedrin) if they help your headaches you should limit these to No more than 3 times a week to avoid medication overuse/rebound headaches  For your more moderate to severe migraines take this medication early   Sumatriptan/imitrex 100mg tabs - take one at the onset of headache  May repeat one time after 1-2 hours if pain has not resolved  (Max 2 a day and 9 a month)       Over the counter preventive supplements for headaches/migraines (if you try, try for 3 months straight)  (to take every day to help prevent headaches - not to take at the time of headache): There are combo pills online of these - none of which regulated by FDA and double check dosing - take appropriate dose only once a day- preventa migraine, migravent, mind ease, migrelief   [] Magnesium 400mg daily (If any diarrhea or upset stomach, decrease dose  as tolerated)  [] Riboflavin (Vitamin B2) 400mg daily - try online   (FYI B2 may make your urine bright/neon yellow)  AND/OR  [] Herbal medication: Petasites/Butterbur 150 mg daily - try online  (When choosing your Butterbur online or in the store, beware that there are some poor preps containing pyrrolizidine alkaloids (PAs) that can be harmful to the liver  Therefore, do not use butterbur products that are not labeled as PA-free )      Prescription preventive medications for headaches/migraines   (to take every day to help prevent headaches - not to take at the time of headache):  [x] we have options if needed     *Typically these types of medications take time untill you see the benefit, although some may see improvement in days, often it may take weeks, especially if the medication is being titrated up to a beneficial level  Please contact us if there are any concerns or questions regarding the medication  Lifestyle Recommendations:  [x] SLEEP - Maintain a regular sleep schedule: Adults need at least 7-8 hours of uninterrupted a night   Maintain good sleep hygiene:  Going to bed and waking up at consistent times, avoiding excessive daytime naps, avoiding caffeinated beverages in the evening, avoid excessive stimulation in the evening and generally using bed primarily for sleeping  One hour before bedtime would recommend turning lights down lower, decreasing your activity (may read quietly, listen to music at a low volume)  When you get into bed, should eliminate all technology (no texting, emailing, playing with your phone, iPad or tablet in bed)  [x] HYDRATION - Maintain good hydration  Drink  2L of fluid a day (4 typical small water bottles)  [x] DIET - Maintain good nutrition  In particular don't skip meals and try and eat healthy balanced meals regularly  [x] TRIGGERS - Look for other triggers and avoid them: Limit caffeine to 1-2 cups a day or less  Avoid dietary triggers that you have noticed bring on your headaches (this could include aged cheese, peanuts, MSG, aspartame and nitrates)  [x] EXERCISE - physical exercise as we all know is good for you in many ways, and not only is good for your heart, but also is beneficial for your mental health, cognitive health and  chronic pain/headaches  I would encourage at the least 5 days of physical exercise weekly for at least 30 minutes  Education and Follow-up  [x] Please call with any questions or concerns  Of course if any new concerning symptoms go to the emergency department  [x] Follow up 6 months, sooner if needed       CC: We had the pleasure of evaluating Anel Damon in neurological consultation today  Anel Damon is a   right handed female who presents today for evaluation of headaches  History obtained from patient as well as available medical record review    History of Present Illness:   Interval history as of 8/15/2022  - denies any new or concerning neurologic symptoms since last visit  - saw sleep medicine 04/29/2022 - dx with moderate VIKAS - 18 - still trying to get used to it - planning wedding - following with cardiology and had a few episodes of vasovagal syncope on the toilet while also having N/V, discussed safety precautions, no safety issues driving  - on estrogen for menopause now through OB and we discussed not recommended in setting of migraine aura     Headaches and migraines   She reports improvement in headaches from daily, to a few times a week and worse migraines about 5-6 a month that respond to sumatriptan, that may be related to weather changes   Severity decreased as well, once every couple months is worse only now   Can go months without a bad one at times     Preventative:   - B2, ran out and going to refill   - on through other providers: lisinopril, alprazolam prn anxiety, meloxicam     Abortive:   Sumatriptan 100 mg    Interval history as of 4/1/2022   - denies any new or concerning neurologic symptoms since last visit   - 5 hours max broken, occasional word finding difficulty, feels like she talks tangentially about things and wonders if she has ADHD to begin with, stepped down from primary job,  Follow-up with sleep medicine scheduled 04/29/2022    Headaches and migraines   - she had been doing well on emgality, although in January wrote a message stating headaches were more frequent and significant 2-3 days per week after COVID early December 2021, Decadron trial sent  - severity of migraines have decreased, still mild daily headaches that she feels is fibromyalgia is causing and plans on making some dietary changes to improve this  - today daily still, but worse once every 3 weeks  - weather changes can def trigger, still dealing with menopause   - drinking more water, cut back on caffeine  - starting aquatic PT soon   - entire scalp  hurts at times  - typically pain with headaches is diffuse and every location     Preventative:   - emgality - stopped in Jan or Feb because she did not feel like it was making a difference anymore and deductible at new year was going to be 450 - since last visit discontinued duloxetine and HCTZ     Abortive:   - exedrin with allergy med first   Trial of nurtec - did not help  Sumatriptan  - Decadron 2 mg daily for 1-5 days sent 01/12/2022 for migraine - did not feel like it helped    Interval history as of 11/1/2021  - denies any new or concerning neurologic symptoms since last visit    - [facility] Friday 2022 apmt with sleep   - MRI brain with without contrast 10/07/2021:  1  No acute infarction, intracranial hemorrhage or enhancing mass lesion  2   Small, 1 1 cm arachnoid cyst in the right cerebellopontine angle cistern  Headaches and migraines   Headaches have improved to 3-4 per week   Also severity of migraines has significantly improved  Also dealing with fibromyalgia     Preventative: Trial of emgality started July or August, just had 4th shot -   Denies bothersome side effects      Abortive: sumatriptan 100 mg - often works     History as of initial visit 5/17/21  She has seen my neurology colleague Dr Jazmin Booker on 03/27/2020 for migraines, vasovagal syncope  -  He ordered MRI brain,  Referral to sleep medicine for insomnia, started gabapentin for prevention, sumatriptan for abortive,  Sleep-deprived EEG 05/01/2020 normal      Headaches started at what age? 16 yo   How often do the headaches occur?   - waxing and waned during life, better during pregnancy  -  As of 03/27/2020:  1-2 significant episodes weekly  - as of 5/17/2021: chronic daily headaches for years, severe migraines 1-2 times a month that dont respond to sumatriptan, migraines 10 days a month   What time of the day do the headaches start? Not there when she wakes up usually (they are there 10/30 morning) Start in am and worse by mid afternoon   How long do the headaches last? All day   Are you ever headache free? Yes    Aura?  Sometimes with   - handwriting less than an hour prior        Last eye exam: around 5/2020 - nothing going on except for some macular degeneration     Where is your headache located and pain quality?  - bitemporal   - under sinus  - in front of ears  - bioccipital  - rarely apex  - usually bilateral  - rarely unilateral   -throbbing, tight band, pressure, achy, shooting, dull, stabbing   What is the intensity of pain? Average: 7-8/10, worst 10/10  Associated symptoms:   [x] Nausea       [x] Vomiting        [] Diarrhea  [x] Stiff or sore neck   [x] Problems with concentration  [x] Photophobia - not too much     [x]Phonophobia  - not too much     [x] Osmophobia  [x] Light-headed or dizzy       [] Ptosis      [] Facial droop  [] Lacrimation  [x] Nasal congestion    Number of days missed per month because of headaches:  Social or Family activities: 2-3     Things that make the headache worse? No specific movements, any movement     Headache triggers:   weather changes, alcohol, stress, missing meals, fatigue    Have you seen someone else for headaches or pain? Yes, ENT, neurology Dr Sonny Gifford, pain management  Have you had trigger point injection performed and how often? No  Have you had Botox injection performed and how often? No   Have you had epidural injections or transforaminal injections performed? No  Are you current pregnant or planning on getting pregnant? Tubes tied, irregular   Have you ever had any Brain imaging? Unknown    What medications do you take or have you taken for your headaches? ABORTIVE:    OTC medications have been ineffective     exedrin migraine   sumatriptan 100 mg- helps usually, rarely     Past:  fioricet and fiorinal   ED - migraine cocktails - sometimes dont work   ubrelvy  Rizatriptan     PREVENTIVE:      riboflavin 400 mg, magnesium 400 mg   for BP:  Lisinopril, hydrochlorothiazide    Past/ failed/contraindicated:  Gabapentin up to twice a day about 3 months - can not remember how high, didn't work   Amitriptyline -   Duloxetine - side effects  Propranolol - side effects hallucinating       Alternative therapies used in the past for headaches? Massage     LIFESTYLE  Sleep   - averages: 4-6 hours   Problems falling asleep?:   Yes  Problems staying asleep?:  Yes, 2-3, body hurts   - never had a sleep study  - snores     Water: trying to drink 1-2 bottles, forgets   Caffeine: 12- 24 oz per day - not daily     Mood:   Denies history of anxiety or depression or other diagnosed mood disorder      Right arm>Left arm paresthesias for years - entire hand   - EMG 12/13/2019  Ordered for 6 months of bilateral  Hand numbness, right greater than left   demonstrated mild carpal tunnel syndrome on the left  - plans to see rheumatology to discuss other etiologies or fibro  - plans to follow up with pain medicine     The following portions of the patient's history were reviewed and updated as appropriate: allergies, current medications, past family history, past medical history, past social history, past surgical history and problem list     Pertinent family history:  Family history of headaches:  migraine headaches in mother, grandmother and probably sons  Any family history of aneurysms - No    Pertinent social history:  Work: admin - [employer]   Education: associates degree   Lives with 2-3 children   Total 5 boys, 1 girl  ( lives in [place] with some of the kids)    Illicit Drugs: denies  Alcohol/tobacco: Denies tobacco use, alcohol intake: social drinker    Past Medical History:     Past Medical History:   Diagnosis Date    Allergic 02/01/2020    BMI 30 0-30 9,adult     Carpal tunnel syndrome on right     Cervical spondylosis without myelopathy     COVID 12/01/2021    Fibromyalgia     Fibromyalgia, primary     History of fetal loss     Recurrent    Hyperlipidemia     Hypertension     Migraine     Neck pain     Osteoarthritis     lumbar spine    [facility]ca syndrome [facility] [address] [facility]     Syncopal episodes     with severe migraines    Undifferentiated connective tissue disease ([facility] [address] )        Patient Active Problem List   Diagnosis    Fibromyalgia    Hypercholesteremia    Migraine without aura and without status migrainosus, not intractable    Insomnia    Vitamin D deficiency    Low ferritin    Benign essential hypertension    Carpal tunnel syndrome, bilateral    Syncopal episodes    Sleep disturbance    COVID-19    Class 1 obesity due to excess calories with serious comorbidity and body mass index (BMI) of 30 0 to 30 9 in adult    Primary generalized (osteo)arthritis    Cervical spondylosis without myelopathy    Gastroesophageal reflux disease without esophagitis    History of fetal loss    Undifferentiated connective tissue disease (HCC)    VIKAS (obstructive sleep apnea)    Excessive daytime sleepiness    Primary osteoarthritis of left knee    Tendinitis of right rotator cuff       Medications:      Current Outpatient Medications   Medication Sig Dispense Refill    acetaminophen (TYLENOL) 500 mg tablet Take 500 mg by mouth every 6 (six) hours as needed for mild pain      ALPRAZolam (XANAX) 0 5 mg tablet Take 1 tablet (0 5 mg total) by mouth 30 min pre-procedure Take 1 tablet p o  30 minutes prior to MR I  Continue another 1 tablet p o  5 minutes prior to MRI as well in regards to anxiety 2 tablet 0    cetirizine (ZyrTEC) 10 mg tablet Take 10 mg by mouth daily      cholecalciferol (VITAMIN D3) 1,000 units tablet Take 1,000 Units by mouth daily       estradiol-norethindrone (ACTIVELLA) 1-0 5 MG per tablet Take 1 tablet by mouth daily      ferrous sulfate 325 (65 Fe) mg tablet Take 325 mg by mouth daily with breakfast       lisinopril (ZESTRIL) 20 mg tablet Take 1 tablet (20 mg total) by mouth daily 90 tablet 1    meloxicam (Mobic) 7 5 mg tablet Take 1 tablet (7 5 mg total) by mouth 2 (two) times a day as needed for mild pain Take with food   60 tablet 1    Multiple Vitamins-Minerals (MULTIVITAMIN ADULT PO) Take 1 tablet by mouth daily      omeprazole (PriLOSEC) 20 mg delayed release capsule Take 1 capsule (20 mg total) by mouth daily 90 capsule 1    SUMAtriptan (IMITREX) 100 mg tablet One tab daily prn migraine, may repeat in 2 hours if needed  Max 2 tabs per 24 hr 10 tablet 2    Aspirin-Acetaminophen-Caffeine (EXCEDRIN MIGRAINE PO) Take 1 tablet by mouth as needed (migraines)   (Patient not taking: Reported on 8/15/2022)      Riboflavin (Vitamin B-2) 100 MG TABS Take 400 mg by mouth daily   (Patient not taking: Reported on 8/15/2022)       No current facility-administered medications for this visit  Allergies:       Allergies   Allergen Reactions    Propranolol Other (See Comments)     Halluncinations    Raspberry - Food Allergy Allergic Rhinitis, Itching and Nasal Congestion       Family History:     Family History   Problem Relation Age of Onset    MORGAN disease Mother     Arthritis Mother     Depression Mother     Hypertension Mother     Heart attack Father 79    Diabetes Father     Prostate cancer Father 79    Hypertension Father     Arthritis Father     Stroke Father     Lupus Sister     Raynaud syndrome Sister     Sjogren's syndrome Sister     Breast cancer Maternal Grandmother 79    Arthritis Maternal Grandmother     Cancer Maternal Grandmother     Alcohol abuse Paternal Grandfather     No Known Problems Daughter     Prostate cancer Maternal Grandfather 79    Arthritis Maternal Grandfather     Cancer Maternal Grandfather     Colon cancer Paternal Grandmother [de-identified]    Depression Sister     No Known Problems Brother     No Known Problems Son     No Known Problems Son     No Known Problems Son     No Known Problems Son     No Known Problems Son     No Known Problems Maternal Uncle     No Known Problems Paternal Aunt     No Known Problems Paternal Uncle        Social History:     Social History     Socioeconomic History    Marital status: /Civil Union     Spouse name: Not on file    Number of children: 10    Years of education: Not on file    Highest education level: Not on file   Occupational History    Occupation: Administration   Tobacco Use    Smoking status: Never Smoker    Smokeless tobacco: Never Used   Vaping Use    Vaping Use: Never used   Substance and Sexual Activity    Alcohol use: Not Currently     Comment: rarely    Drug use: Never    Sexual activity: Yes     Partners: Male     Birth control/protection: Female Sterilization   Other Topics Concern    Not on file   Social History Narrative    6 kids 25, 25, 23, 16, 15, 8     Moved from [de-identified] for husbands job - [employer] for  at [employer] with spouse     Social Determinants of Health     Financial Resource Strain: Not on file   Food Insecurity: Not on file   Transportation Needs: Not on file   Physical Activity: Not on file   Stress: Not on file   Social Connections: Not on file   Intimate Partner Violence: Not on file   Housing Stability: Not on file         Objective:       Physical Exam:                                                                 Vitals:            Constitutional:    /93 (BP Location: Left arm, Patient Position: Sitting, Cuff Size: Standard)   Pulse 73   Temp 97 8 °F (36 6 °C) (Tympanic)   Ht 5' 1" (1 549 m)   Wt 71 7 kg (158 lb)   BMI 29 85 kg/m²   BP Readings from Last 3 Encounters:   08/15/22 159/93   08/08/22 131/79   06/23/22 138/92     Pulse Readings from Last 3 Encounters:   08/15/22 73   08/08/22 80   05/27/22 68         Well developed, well nourished, well groomed  No dysmorphic features  HEENT:  Normocephalic atraumatic  See neuro exam   Chest:  Respirations appear regular and unlabored  Cardiovascular:  no observed significant swelling  Musculoskeletal:  (see below under neurologic exam for evaluation of motor function and gait)   Skin:  warm and dry, not diaphoretic  Psychiatric:  Normal behavior and appropriate affect        Neurological Examination:     Mental status/cognitive function:   Recent and remote memory intact   Attention span and concentration as well as fund of knowledge are appropriate for age  Normal language and spontaneous speech  Cranial Nerves:  VII-facial expression symmetric  Motor Exam: symmetric bulk throughout  no atrophy, fasciculations or abnormal movements noted  Coordination:  no apparent dysmetria, ataxia or tremor noted  Gait: steady casual gait          Pertinent lab results:   See EMR for recent labs  6/17/20: CMP unremarkable  Vit D 41 1  4/8/20: CBC unremarkable, B12 836  TSH normal      Normal stress test - 12/17/19     Imaging: I have personally reviewed imaging and radiology read   - MRI brain with without contrast 10/07/2021:  1   No acute infarction, intracranial hemorrhage or enhancing mass lesion  2   Small, 1 1 cm arachnoid cyst in the right cerebellopontine angle cistern      Sleep-deprived EEG 05/01/2020 normal  Review of Systems:   ROS obtained by medical assistant Personally reviewed and updated if indicated  I recommended PCP follow up for non neurologic problems  Review of Systems   Constitutional: Negative for appetite change and fever  HENT: Negative  Negative for hearing loss, tinnitus, trouble swallowing and voice change  Eyes: Negative  Negative for photophobia and pain  Respiratory: Negative  Negative for shortness of breath  Cardiovascular: Negative  Negative for palpitations  Gastrointestinal: Negative  Negative for nausea and vomiting  Endocrine: Negative  Negative for cold intolerance  Genitourinary: Negative  Negative for dysuria, frequency and urgency  Musculoskeletal: Negative  Negative for myalgias and neck pain  Skin: Negative  Negative for rash  Neurological: Positive for headaches  Negative for dizziness, tremors, seizures, syncope, facial asymmetry, speech difficulty, weakness, light-headedness and numbness  Hematological: Negative  Does not bruise/bleed easily  Psychiatric/Behavioral: Negative    Negative for confusion, hallucinations and sleep disturbance  All other systems reviewed and are negative  I have spent 31 minutes with Patient  today in which greater than 50% of this time was spent in counseling/coordination of care  I also spent 6 minutes non face to face for this patient the same day         Author:  Lupe Levine MD 8/15/2022 10:59 AM

## 2022-08-15 NOTE — PROGRESS NOTES
- Patient is here for her 2nd left knee Euflexxa injection  She tolerated the procedure well  - A medrol dose pack prescription can be considered for a wedding in September   - Follow up in 1 week for 3/3 Euflexxa injection  Large joint arthrocentesis: L knee  Universal Protocol:  Consent: Verbal consent obtained    Risks and benefits: risks, benefits and alternatives were discussed  Consent given by: patient  Patient understanding: patient states understanding of the procedure being performed  Patient consent: the patient's understanding of the procedure matches consent given    Supporting Documentation  Indications: pain   Procedure Details  Location: knee - L knee  Needle size: 22 G  Ultrasound guidance: no  Approach: anterolateral  Medications administered: 20 mg Sodium Hyaluronate 20 MG/2ML    Patient tolerance: patient tolerated the procedure well with no immediate complications  Dressing:  Sterile dressing applied

## 2022-08-22 ENCOUNTER — OFFICE VISIT (OUTPATIENT)
Dept: OBGYN CLINIC | Facility: MEDICAL CENTER | Age: 57
End: 2022-08-22
Payer: COMMERCIAL

## 2022-08-22 VITALS
BODY MASS INDEX: 29.72 KG/M2 | HEART RATE: 82 BPM | HEIGHT: 61 IN | SYSTOLIC BLOOD PRESSURE: 132 MMHG | WEIGHT: 157.4 LBS | DIASTOLIC BLOOD PRESSURE: 81 MMHG

## 2022-08-22 DIAGNOSIS — M17.12 PRIMARY OSTEOARTHRITIS OF LEFT KNEE: Primary | ICD-10-CM

## 2022-08-22 PROCEDURE — 20610 DRAIN/INJ JOINT/BURSA W/O US: CPT | Performed by: ORTHOPAEDIC SURGERY

## 2022-08-22 RX ORDER — HYALURONATE SODIUM 10 MG/ML
20 SYRINGE (ML) INTRAARTICULAR
Status: COMPLETED | OUTPATIENT
Start: 2022-08-22 | End: 2022-08-22

## 2022-08-22 RX ORDER — METHYLPREDNISOLONE 4 MG/1
TABLET ORAL
Qty: 21 EACH | Refills: 0 | Status: SHIPPED | OUTPATIENT
Start: 2022-08-22

## 2022-08-22 RX ADMIN — Medication 20 MG: at 09:09

## 2022-08-22 NOTE — PROGRESS NOTES
Patient is here for her 3rd left knee Euflexxa injection  She tolerated the procedure well  Post injection instructions reviewed  Script provided for medrol dose pack  Follow up as needed  Aware she can have CSI in 2 months or VS in 6 months  Large joint arthrocentesis: L knee  Universal Protocol:  Consent: Verbal consent obtained    Risks and benefits: risks, benefits and alternatives were discussed  Consent given by: patient  Patient understanding: patient states understanding of the procedure being performed  Patient consent: the patient's understanding of the procedure matches consent given  Site marked: the operative site was marked  Supporting Documentation  Indications: pain   Procedure Details  Location: knee - L knee  Needle size: 22 G  Ultrasound guidance: no  Approach: anterolateral  Medications administered: 20 mg Sodium Hyaluronate 20 MG/2ML    Patient tolerance: patient tolerated the procedure well with no immediate complications  Dressing:  Sterile dressing applied

## 2022-09-10 DIAGNOSIS — R10.13 EPIGASTRIC PAIN: ICD-10-CM

## 2022-09-11 RX ORDER — OMEPRAZOLE 20 MG/1
CAPSULE, DELAYED RELEASE ORAL
Qty: 90 CAPSULE | Refills: 1 | Status: SHIPPED | OUTPATIENT
Start: 2022-09-11 | End: 2022-10-14 | Stop reason: SDUPTHER

## 2022-10-14 DIAGNOSIS — R10.13 EPIGASTRIC PAIN: ICD-10-CM

## 2022-10-14 DIAGNOSIS — I10 BENIGN ESSENTIAL HYPERTENSION: ICD-10-CM

## 2022-10-14 RX ORDER — LISINOPRIL 20 MG/1
20 TABLET ORAL DAILY
Qty: 90 TABLET | Refills: 1 | Status: SHIPPED | OUTPATIENT
Start: 2022-10-14

## 2022-10-14 RX ORDER — OMEPRAZOLE 20 MG/1
20 CAPSULE, DELAYED RELEASE ORAL DAILY
Qty: 90 CAPSULE | Refills: 1 | Status: SHIPPED | OUTPATIENT
Start: 2022-10-14

## 2022-10-14 RX ORDER — LISINOPRIL 20 MG/1
TABLET ORAL
Qty: 90 TABLET | Refills: 1 | OUTPATIENT
Start: 2022-10-14

## 2022-11-03 ENCOUNTER — OFFICE VISIT (OUTPATIENT)
Dept: GASTROENTEROLOGY | Facility: MEDICAL CENTER | Age: 57
End: 2022-11-03

## 2022-11-03 VITALS
WEIGHT: 158 LBS | HEIGHT: 61 IN | DIASTOLIC BLOOD PRESSURE: 74 MMHG | BODY MASS INDEX: 29.83 KG/M2 | SYSTOLIC BLOOD PRESSURE: 112 MMHG

## 2022-11-03 DIAGNOSIS — K21.9 GASTROESOPHAGEAL REFLUX DISEASE WITHOUT ESOPHAGITIS: ICD-10-CM

## 2022-11-03 DIAGNOSIS — R79.0 LOW FERRITIN: Primary | ICD-10-CM

## 2022-11-03 DIAGNOSIS — Z12.11 SCREENING FOR COLON CANCER: ICD-10-CM

## 2022-11-03 RX ORDER — SOD SULF/POT CHLORIDE/MAG SULF 1.479 G
TABLET ORAL
Qty: 24 TABLET | Refills: 0 | Status: SHIPPED | OUTPATIENT
Start: 2022-11-03

## 2022-11-03 NOTE — PROGRESS NOTES
[facility] - Outpatient Consultation  Larry Pascual 56 y o  female MRN: [id number]  Encounter: [id number]          ASSESSMENT AND PLAN:      1  Screening for colon cancer:  She is never had a colonoscopy in the past, denies any change in bowel habits or alarm symptoms, there is family history of colon cancer in paternal grandfather diagnosed in his [de-identified]  She is interested in using Sous tab, she is willing to pay out-of-pocket  - Ambulatory Referral to Gastroenterology  - Colonoscopy; Future  - Sodium Sulfate-Mag Sulfate-KCl (Sutab) 6705-824-532 MG TABS; Take as instructed by gi office  Dispense: 24 tablet; Refill: 0    2  Gastroesophageal reflux disease without esophagitis:  She admits to longstanding history of acid reflux, currently well controlled on Prilosec 20 mg daily  Can plan for EGD at time of colonoscopy to rule out underlying Barretts esophagus  - Ambulatory Referral to Gastroenterology  - EGD; Future    3  Low ferritin:  She was also referred for low ferritin  She is a history of having low ferritin of 24 and even after starting iron supplementation this increased only to 50  No signs of active bleeding  Will check a complete iron panel and can evaluate during EGD and colonoscopy  - Iron Panel (Includes Ferritin, Iron Sat%, Iron, and TIBC); Future    Risks of EGD and colonoscopy were discussed including but not limited to bleeding, infection, perforation  She understands and agrees to proceed with procedure        ______________________________________________________________________    HPI:  Ghazala Britton is a 61-year-old female with past medical history of fibromyalgia, hypertension, hyperlipidemia who is here as a new patient for colon cancer screening purposes  She is never had a colonoscopy in the past   She denies any alarm symptoms including change in bowel habits, melena or hematochezia    She does have a family history in her paternal grandfather however, he was not diagnosed until he was in his [de-identified]  She does admit to longstanding history of acid reflux and has been on Prilosec 20 mg daily  As long as she takes this medication she does feel like her symptoms are controlled  She was also referred for low ferritin  It appears that in the past this has been low and found to be 14 and 2019, 24 and 2020 and 51 in March 2022  She is on iron supplementation  There has never been a complete iron panel checked recently  She is never had EGD or colonoscopy in the past      REVIEW OF SYSTEMS:    CONSTITUTIONAL: Denies any fever, chills, rigors, and weight loss  HEENT: No earache or tinnitus  Denies hearing loss or visual disturbances  CARDIOVASCULAR: No chest pain or palpitations  RESPIRATORY: Denies any cough, hemoptysis, shortness of breath or dyspnea on exertion  GASTROINTESTINAL: As noted in the History of Present Illness  GENITOURINARY: No problems with urination  Denies any hematuria or dysuria  NEUROLOGIC: No dizziness or vertigo, denies headaches  MUSCULOSKELETAL: Denies any muscle or joint pain  SKIN: Denies skin rashes or itching  ENDOCRINE: Denies excessive thirst  Denies intolerance to heat or cold  PSYCHOSOCIAL: Denies depression or anxiety  Denies any recent memory loss         Historical Information   Past Medical History:   Diagnosis Date   • Allergic 02/01/2020   • BMI 30 0-30 9,adult    • Carpal tunnel syndrome on right    • Cervical spondylosis without myelopathy    • COVID 12/01/2021   • Fibromyalgia    • Fibromyalgia, primary    • History of fetal loss     Recurrent   • Hyperlipidemia    • Hypertension    • Migraine    • Neck pain    • Osteoarthritis     lumbar spine   • Sicca syndrome (HCC)    • Syncopal episodes     with severe migraines   • Undifferentiated connective tissue disease ([facility] [address] )      Past Surgical History:   Procedure Laterality Date   • CHOLECYSTECTOMY     • DILATION AND CURETTAGE OF UTERUS     • TUBAL LIGATION       Social History Social History     Substance and Sexual Activity   Alcohol Use Not Currently    Comment: rarely     Social History     Substance and Sexual Activity   Drug Use Never     Social History     Tobacco Use   Smoking Status Never Smoker   Smokeless Tobacco Never Used     Family History   Problem Relation Age of Onset   • MORGAN disease Mother    • Arthritis Mother    • Depression Mother    • Hypertension Mother    • Heart attack Father 79   • Diabetes Father    • Prostate cancer Father 79   • Hypertension Father    • Arthritis Father    • Stroke Father    • Lupus Sister    • Raynaud syndrome Sister    • Sjogren's syndrome Sister    • Breast cancer Maternal Grandmother 79   • Arthritis Maternal Grandmother    • Cancer Maternal Grandmother    • Alcohol abuse Paternal Grandfather    • No Known Problems Daughter    • Prostate cancer Maternal Grandfather 79   • Arthritis Maternal Grandfather    • Cancer Maternal Grandfather    • Colon cancer Paternal Grandmother [de-identified]   • Depression Sister    • No Known Problems Brother    • No Known Problems Son    • No Known Problems Son    • No Known Problems Son    • No Known Problems Son    • No Known Problems Son    • No Known Problems Maternal Uncle    • No Known Problems Paternal Aunt    • No Known Problems Paternal Uncle        Meds/Allergies       Current Outpatient Medications:   •  acetaminophen (TYLENOL) 500 mg tablet  •  ALPRAZolam (XANAX) 0 5 mg tablet  •  cetirizine (ZyrTEC) 10 mg tablet  •  cholecalciferol (VITAMIN D3) 1,000 units tablet  •  estradiol-norethindrone (ACTIVELLA) 1-0 5 MG per tablet  •  ferrous sulfate 325 (65 Fe) mg tablet  •  lisinopril (ZESTRIL) 20 mg tablet  •  meloxicam (Mobic) 7 5 mg tablet  •  Multiple Vitamins-Minerals (MULTIVITAMIN ADULT PO)  •  omeprazole (PriLOSEC) 20 mg delayed release capsule  •  pregabalin (LYRICA) 75 mg capsule  •  Sodium Sulfate-Mag Sulfate-KCl (Sutab) 4996-158-249 MG TABS  •  SUMAtriptan (IMITREX) 100 mg tablet  • Aspirin-Acetaminophen-Caffeine (EXCEDRIN MIGRAINE PO)  •  methylPREDNISolone 4 MG tablet therapy pack  •  Riboflavin (Vitamin B-2) 100 MG TABS    Allergies   Allergen Reactions   • Propranolol Other (See Comments)     Halluncinations   • Raspberry - Food Allergy Allergic Rhinitis, Itching and Nasal Congestion   • Latex Rash           Objective     Blood pressure 112/74, height 5' 1" (1 549 m), weight 71 7 kg (158 lb)  Body mass index is 29 85 kg/m²  PHYSICAL EXAM:      General Appearance:   Alert, cooperative, no distress   HEENT:   Normocephalic, atraumatic, anicteric      Neck:  Supple, symmetrical, trachea midline   Lungs:   Clear to auscultation bilaterally; no rales, rhonchi or wheezing; respirations unlabored    Heart[de-identified]   Regular rate and rhythm; no murmur, rub, or gallop  Abdomen:   Soft, non-tender, non-distended; normal bowel sounds; no masses, no organomegaly    Genitalia:   Deferred    Rectal:   Deferred    Extremities:  No cyanosis, clubbing or edema        Skin:  No jaundice, rashes, or lesions          Lab Results:   No visits with results within 1 Day(s) from this visit     Latest known visit with results is:   Appointment on 07/05/2022   Component Date Value   • Miscellaneous Lab Test R* 07/05/2022     • CRP 07/05/2022 <3 0    • Sed Rate 07/05/2022 15    • WBC 07/05/2022 10 91 (A)   • RBC 07/05/2022 5 19 (A)   • Hemoglobin 07/05/2022 14 8    • Hematocrit 07/05/2022 44 3    • MCV 07/05/2022 85    • MCH 07/05/2022 28 5    • MCHC 07/05/2022 33 4    • RDW 07/05/2022 12 6    • MPV 07/05/2022 10 6    • Platelets 73/18/5604 290    • nRBC 07/05/2022 0    • Neutrophils Relative 07/05/2022 78 (A)   • Immat GRANS % 07/05/2022 1    • Lymphocytes Relative 07/05/2022 16    • Monocytes Relative 07/05/2022 5    • Eosinophils Relative 07/05/2022 0    • Basophils Relative 07/05/2022 0    • Neutrophils Absolute 07/05/2022 8 55 (A)   • Immature Grans Absolute 07/05/2022 0 10    • Lymphocytes Absolute 07/05/2022 1 71    • Monocytes Absolute 07/05/2022 0 51    • Eosinophils Absolute 07/05/2022 0 01    • Basophils Absolute 07/05/2022 0 03    • Sodium 07/05/2022 138    • Potassium 07/05/2022 4 5    • Chloride 07/05/2022 105    • CO2 07/05/2022 28    • ANION GAP 07/05/2022 5    • BUN 07/05/2022 14    • Creatinine 07/05/2022 0 79    • Glucose, Fasting 07/05/2022 114 (A)   • Calcium 07/05/2022 9 6    • AST 07/05/2022 16    • ALT 07/05/2022 28    • Alkaline Phosphatase 07/05/2022 67    • Total Protein 07/05/2022 7 2    • Albumin 07/05/2022 3 7    • Total Bilirubin 07/05/2022 0 44    • eGFR 07/05/2022 85          Radiology Results:   No results found

## 2022-11-03 NOTE — PATIENT INSTRUCTIONS
Scheduled date of colon/egd (as of today) 12/19/22  Physician performing Dr Haider Beck:  Location of procedure  west end:  Bowel prep reviewed with patient: Sutab  Instructions reviewed with patient by: ma  Clearances: na

## 2022-11-10 DIAGNOSIS — M17.12 PRIMARY OSTEOARTHRITIS OF LEFT KNEE: ICD-10-CM

## 2022-11-10 RX ORDER — MELOXICAM 7.5 MG/1
TABLET ORAL
Qty: 60 TABLET | Refills: 1 | Status: SHIPPED | OUTPATIENT
Start: 2022-11-10

## 2022-12-04 PROBLEM — M06.09 SERONEGATIVE ARTHROPATHY OF MULTIPLE SITES (HCC): Status: ACTIVE | Noted: 2022-12-04

## 2022-12-06 ENCOUNTER — OFFICE VISIT (OUTPATIENT)
Dept: SLEEP CENTER | Facility: CLINIC | Age: 57
End: 2022-12-06

## 2022-12-06 VITALS
HEIGHT: 61 IN | WEIGHT: 159 LBS | HEART RATE: 70 BPM | DIASTOLIC BLOOD PRESSURE: 78 MMHG | BODY MASS INDEX: 30.02 KG/M2 | SYSTOLIC BLOOD PRESSURE: 151 MMHG

## 2022-12-06 DIAGNOSIS — J30.2 SEASONAL ALLERGIC RHINITIS, UNSPECIFIED TRIGGER: ICD-10-CM

## 2022-12-06 DIAGNOSIS — G47.33 OBSTRUCTIVE SLEEP APNEA-HYPOPNEA SYNDROME: Primary | ICD-10-CM

## 2022-12-06 RX ORDER — AZELASTINE 1 MG/ML
1 SPRAY, METERED NASAL 2 TIMES DAILY
Qty: 30 ML | Refills: 0 | Status: SHIPPED | OUTPATIENT
Start: 2022-12-06

## 2022-12-06 NOTE — PROGRESS NOTES
Review of Systems      Genitourinary post menopausal (no peroids)   Cardiology none   Gastrointestinal none   Neurology frequent headaches, awaken with headache, numbness/tingling of an extremity, forgetfulness, poor concentration or confusion, , difficulty with memory and loss of consciousness/blacking out   Constitutional claustrophobia   Integumentary none   Psychiatry anxiety   Musculoskeletal muscle aches   Pulmonary none   ENT none   Endocrine none   Hematological none

## 2022-12-06 NOTE — PROGRESS NOTES
Assessment/Plan:      1  Obstructive sleep apnea-hypopnea syndrome  -     PAP DME Resupply/Reorder  -     CPAP Study; Future    2  Seasonal allergic rhinitis, unspecified trigger  -     azelastine (ASTELIN) 0 1 % nasal spray; 1 spray into each nostril 2 (two) times a day Use in each nostril as directed     Ms Roel Alvarado struggle to use CPAP thus far, she describes anxiety with PAP use  We discussed that desensitization is recommended, if she is able to successfully wear CPAP during the day when awake, that may allow her to better acclimate to CPAP use at night  If she continues to have problems with acclimating, we may consider a formal titration study  I did order this today but if she has significant improvement, that could be canceled prior  She describes significant rhinitis which potentially could contribute to difficulty using CPAP  I therefore have prescribed azelastine for use at night  Side effects reviewed  If this is effective, she may do well with a nasal mask and chinstrap which I have ordered today  Subjective:      Patient ID: Tarun Fonseca is a 56 y o  female  This is a 53-year-old woman returns in follow-up, last seen by Deandra Yeboah in April  She had presented with snoring, frequent awakenings, sleepiness, and poor sleep  She has a medical history of fibromyalgia and migraines  She had a home sleep test in June which showed moderate obstructive sleep apnea with a respiratory event index of 16  Hypoxemia was noted but artifact cannot be ruled out  Auto-CPAP was ordered and this is her first follow-up    In reviewing chart notes, she follows with rheumatology, has a diagnosis of undifferentiated connective tissue disease  Per their notes, the patient is intolerant of CPAP as she had panic attacks  The patient also has fibromyalgia, pregabalin was added for bedtime use by her rheumatologist   The patient now takes this twice daily, finds it to be helpful         CPAP data reviewed today-Arianne auto CPAP set at 5-15 cm H2O, no usage since July 6, 2022, airfit n30i    In discussing with the patient, she turned it on and "had an anxiety attack"  Gets in bed 930-10 to read  She tries to sleep 10-11 pm   Sometimes it is hard for her to fall asleep, if she has a lot on her mind  Sleeps through the night  Awake at 7 AM to an alarm     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  Does not feel sleepy in the day  No napping, no dozing    No RLS     [place] Sleepiness Scale:     Sitting and reading: Slight chance of dozing  Watching TV: Slight chance of dozing  Sitting, inactive in a public place (e g  a theatre or a meeting):  Would never doze  As a passenger in a car for an hour without a break: Would never doze  Lying down to rest in the afternoon when circumstances permit: Moderate chance of dozing  Sitting and talking to someone: Would never doze  Sitting quietly after a lunch without alcohol: Would never doze  In a car, while stopped for a few minutes in traffic: Would never doze  Total score: 4     The following portions of the patient's history were reviewed and updated as appropriate: allergies, current medications, past family history, past medical history, past social history, past surgical history, and problem list     Review of Systems    Genitourinary post menopausal (no peroids)   Cardiology none      gastrointestinal none   Neurology frequent headaches, awaken with headache, numbness/tingling of an extremity, forgetfulness, poor concentration or confusion, , difficulty with memory and loss of consciousness/blacking out   Constitutional claustrophobia   Integumentary none   Psychiatry anxiety   Musculoskeletal muscle aches   Pulmonary none   ENT none   Endocrine none   Hematological none       Objective:        /78 (BP Location: Left arm, Patient Position: Sitting, Cuff Size: Adult)   Pulse 70   Ht 5' 1" (1 549 m)   Wt 72 1 kg (159 lb)   BMI 30 04 kg/m²     Physical Exam   RRR  Lungs CTA    I spent 31 minutes on the day of the patient's visit in chart review, documentation, and face-to-face

## 2022-12-06 NOTE — PATIENT INSTRUCTIONS
Each day for 15 minutes, try to wear CPAP in the day when relaxing, such as watching TV or reading    With your nose piece, it is important to breathe through your nose not your mouth

## 2022-12-07 ENCOUNTER — OFFICE VISIT (OUTPATIENT)
Dept: OBGYN CLINIC | Facility: MEDICAL CENTER | Age: 57
End: 2022-12-07

## 2022-12-07 ENCOUNTER — TELEPHONE (OUTPATIENT)
Dept: SLEEP CENTER | Facility: CLINIC | Age: 57
End: 2022-12-07

## 2022-12-07 VITALS
SYSTOLIC BLOOD PRESSURE: 126 MMHG | WEIGHT: 161.2 LBS | HEIGHT: 61 IN | HEART RATE: 68 BPM | DIASTOLIC BLOOD PRESSURE: 84 MMHG | BODY MASS INDEX: 30.43 KG/M2

## 2022-12-07 DIAGNOSIS — M17.12 PRIMARY OSTEOARTHRITIS OF LEFT KNEE: ICD-10-CM

## 2022-12-07 DIAGNOSIS — M23.92 INTERNAL DERANGEMENT OF LEFT KNEE: Primary | ICD-10-CM

## 2022-12-07 NOTE — PROGRESS NOTES
Assessment/Plan:  1  Internal derangement of left knee    2  Primary osteoarthritis of left knee      No orders of the defined types were placed in this encounter  · Patient has moderate left knee osteoarthritis with possible meniscal tear   · Will be ordering MRI left knee to r/o soft tissue pathology   · Continue with current pain regimen   Return for Discuss MRI left knee results   I answered all of the patient's questions during the visit and provided education of the patient's condition during the visit  The patient verbalized understanding of the information given and agrees with the plan  This note was dictated using [employer] software  It may contain errors including improperly dictated words  Please contact physician directly for any questions  Subjective   Chief Complaint: No chief complaint on file  HPI  Kyara Last is a 56 y o  female who presents for follow up for left knee pain due to moderate osteoarthritis  She had a left knee Euflexxa 3 series injections, last injection on 8/22/22 with no relief  She notes the pain is getting worse in the left knee  She is having constant pain over the medial , lateral, distal quad and posterior aspect of the left knee  She does feel her knee is unstable  Pain is worse with weight bearing,walking, stairs  and throbbing pain at night  She is taking Tylenol and Meloxicam for pain relief  She notes the meloxicam is not helping with the pain  She did go to physical therapy and was working on stretching and range of motion exercises  She did try wearing compression with discomfort  Patient does have hx of fibromyalgia  Review of Systems  ROS:    See HPI for musculoskeletal review     All other systems reviewed are negative     History:  Past Medical History:   Diagnosis Date   • Allergic 02/01/2020   • BMI 30 0-30 9,adult    • Carpal tunnel syndrome on right    • Cervical spondylosis without myelopathy    • COVID 12/01/2021   • Fibromyalgia    • Fibromyalgia, primary    • History of fetal loss     Recurrent   • Hyperlipidemia    • Hypertension    • Migraine    • Neck pain    • Osteoarthritis     lumbar spine   • Sicca syndrome (HCC)    • Syncopal episodes     with severe migraines   • Undifferentiated connective tissue disease ([facility] [address] )      Past Surgical History:   Procedure Laterality Date   • CHOLECYSTECTOMY     • DILATION AND CURETTAGE OF UTERUS     • TUBAL LIGATION       Social History   Social History     Substance and Sexual Activity   Alcohol Use Not Currently    Comment: rarely     Social History     Substance and Sexual Activity   Drug Use Never     Social History     Tobacco Use   Smoking Status Never   Smokeless Tobacco Never     Family History:   Family History   Problem Relation Age of Onset   • MORGAN disease Mother    • Arthritis Mother    • Depression Mother    • Hypertension Mother    • Heart attack Father 79   • Diabetes Father    • Prostate cancer Father 79   • Hypertension Father    • Arthritis Father    • Stroke Father    • Lupus Sister    • Raynaud syndrome Sister    • Sjogren's syndrome Sister    • Breast cancer Maternal Grandmother 79   • Arthritis Maternal Grandmother    • Cancer Maternal Grandmother    • Alcohol abuse Paternal Grandfather    • No Known Problems Daughter    • Prostate cancer Maternal Grandfather 79   • Arthritis Maternal Grandfather    • Cancer Maternal Grandfather    • Colon cancer Paternal Grandmother [de-identified]   • Depression Sister    • No Known Problems Brother    • No Known Problems Son    • No Known Problems Son    • No Known Problems Son    • No Known Problems Son    • No Known Problems Son    • No Known Problems Maternal Uncle    • No Known Problems Paternal Aunt    • No Known Problems Paternal Uncle        Current Outpatient Medications on File Prior to Visit   Medication Sig Dispense Refill   • acetaminophen (TYLENOL) 500 mg tablet Take 500 mg by mouth every 6 (six) hours as needed for mild pain     • ALPRAZolam [place]) 0 5 mg tablet Take 1 tablet (0 5 mg total) by mouth 30 min pre-procedure Take 1 tablet p o  30 minutes prior to MR I  Continue another 1 tablet p o  5 minutes prior to MRI as well in regards to anxiety 2 tablet 0   • Aspirin-Acetaminophen-Caffeine (EXCEDRIN MIGRAINE PO) Take 1 tablet by mouth as needed (migraines)     • azelastine (ASTELIN) 0 1 % nasal spray 1 spray into each nostril 2 (two) times a day Use in each nostril as directed 30 mL 0   • cetirizine (ZyrTEC) 10 mg tablet Take 10 mg by mouth daily     • cholecalciferol (VITAMIN D3) 1,000 units tablet Take 1,000 Units by mouth daily      • estradiol-norethindrone (ACTIVELLA) 1-0 5 MG per tablet Take 1 tablet by mouth daily     • ferrous sulfate 325 (65 Fe) mg tablet Take 325 mg by mouth daily with breakfast      • lisinopril (ZESTRIL) 20 mg tablet Take 1 tablet (20 mg total) by mouth daily 90 tablet 1   • meloxicam (MOBIC) 7 5 mg tablet TAKE 1 TABLET(7 5 MG) BY MOUTH TWICE DAILY WITH FOOD AS NEEDED FOR MILD PAIN 60 tablet 1   • methylPREDNISolone 4 MG tablet therapy pack Use as directed on package 21 each 0   • Multiple Vitamins-Minerals (MULTIVITAMIN ADULT PO) Take 1 tablet by mouth daily     • omeprazole (PriLOSEC) 20 mg delayed release capsule Take 1 capsule (20 mg total) by mouth daily 90 capsule 1   • pregabalin (LYRICA) 75 mg capsule Take 1 tablet at bedtime for 2 weeks then increase to either 2 tablets at bedtime and hit make sure sleepy or 1 tablet twice daily thereafter 60 capsule 3   • Riboflavin (Vitamin B-2) 100 MG TABS Take 400 mg by mouth daily   (Patient not taking: Reported on 8/15/2022)     • Sodium Sulfate-Mag Sulfate-KCl (Sutab) 9822-183-361 MG TABS Take as instructed by gi office 24 tablet 0   • SUMAtriptan (IMITREX) 100 mg tablet One tab daily prn migraine, may repeat in 2 hours if needed  Max 2 tabs per 24 hr 10 tablet 2     No current facility-administered medications on file prior to visit       Allergies   Allergen Reactions • Propranolol Other (See Comments)     Halluncinations   • Raspberry - Food Allergy Allergic Rhinitis, Itching and Nasal Congestion   • Latex Rash        Objective     /84   Pulse 68   Ht 5' 1" (1 549 m)   Wt 73 1 kg (161 lb 3 2 oz)   BMI 30 46 kg/m²      PE:  AAOx 3  WDWN  Hearing intact, no drainage from eyes  no audible wheezing  no abdominal distension  LE compartments soft, skin intact    Ortho Exam:  right Knee:   No erythema  no swelling  no effusion  no warmth  AROM: 0-120   PROM 0-130   TTP medial joint line   No pain with hip range of motion   + Apley's test   Negative Ly's test   Stable to varus/valgus stress      Scribe Attestation    I,:  Tamar Baker am acting as a scribe while in the presence of the attending physician :       I,:  Rocky Nguyen, DO personally performed the services described in this documentation    as scribed in my presence :

## 2022-12-09 ENCOUNTER — OFFICE VISIT (OUTPATIENT)
Dept: FAMILY MEDICINE CLINIC | Facility: CLINIC | Age: 57
End: 2022-12-09

## 2022-12-09 VITALS
HEIGHT: 61 IN | BODY MASS INDEX: 29.83 KG/M2 | WEIGHT: 158 LBS | SYSTOLIC BLOOD PRESSURE: 118 MMHG | OXYGEN SATURATION: 98 % | DIASTOLIC BLOOD PRESSURE: 70 MMHG | HEART RATE: 77 BPM | TEMPERATURE: 96.9 F

## 2022-12-09 DIAGNOSIS — G47.00 PERSISTENT INSOMNIA: ICD-10-CM

## 2022-12-09 DIAGNOSIS — M75.81 TENDINITIS OF RIGHT ROTATOR CUFF: ICD-10-CM

## 2022-12-09 DIAGNOSIS — F41.9 ANXIETY: ICD-10-CM

## 2022-12-09 DIAGNOSIS — I10 BENIGN ESSENTIAL HYPERTENSION: ICD-10-CM

## 2022-12-09 DIAGNOSIS — E78.00 HYPERCHOLESTEREMIA: ICD-10-CM

## 2022-12-09 DIAGNOSIS — M47.812 CERVICAL SPONDYLOSIS WITHOUT MYELOPATHY: ICD-10-CM

## 2022-12-09 DIAGNOSIS — M06.09 SERONEGATIVE ARTHROPATHY OF MULTIPLE SITES (HCC): ICD-10-CM

## 2022-12-09 DIAGNOSIS — M15.0 PRIMARY GENERALIZED (OSTEO)ARTHRITIS: ICD-10-CM

## 2022-12-09 DIAGNOSIS — M79.7 FIBROMYALGIA: ICD-10-CM

## 2022-12-09 DIAGNOSIS — Z00.00 WELL ADULT EXAM: Primary | ICD-10-CM

## 2022-12-09 DIAGNOSIS — M35.9 UNDIFFERENTIATED CONNECTIVE TISSUE DISEASE (HCC): ICD-10-CM

## 2022-12-09 DIAGNOSIS — M17.12 PRIMARY OSTEOARTHRITIS OF LEFT KNEE: ICD-10-CM

## 2022-12-09 DIAGNOSIS — F33.0 MDD (MAJOR DEPRESSIVE DISORDER), RECURRENT EPISODE, MILD (HCC): ICD-10-CM

## 2022-12-09 DIAGNOSIS — E55.9 VITAMIN D DEFICIENCY: ICD-10-CM

## 2022-12-09 LAB

## 2022-12-09 RX ORDER — BUPROPION HYDROCHLORIDE 150 MG/1
150 TABLET ORAL EVERY MORNING
Qty: 90 TABLET | Refills: 3 | Status: SHIPPED | OUTPATIENT
Start: 2022-12-09

## 2022-12-09 NOTE — PROGRESS NOTES
Assessment/Plan:     1  Well adult exam  See below  2  MDD (major depressive disorder), recurrent episode, mild ([address] )  Today we talked about the complex nature of pain, childhood trauma, neurotransmitters  We talked about how often depression and anxiety do not manifest as sadness and worry but as physical symptoms  I recommended doing a trial of medications that work on the neurotransmitters to try to help some of her symptoms  She agrees to give this a try  Today her daytime fatigue and lack of concentration are her biggest concern as far as psychological symptoms  For this reason we will start with Wellbutrin 150 mg daily  We will have her follow-up in 4 to 8 weeks  Risks benefits and side effects of this medication were reviewed  If she is doing well she may benefit from the addition of a low-dose SSRI to this medication  We also reviewed the importance of counseling  If she agrees she would benefit from a counselor that was trained in trauma counseling  The only other medication she has tried has been Cymbalta that was primarily used for fibromyalgia  She is currently on Lyrica and is not sure if this is helping her fibromyalgia pain  - buPROPion (Wellbutrin XL) 150 mg 24 hr tablet; Take 1 tablet (150 mg total) by mouth every morning  Dispense: 90 tablet; Refill: 3    3  Anxiety  See above  - buPROPion (Wellbutrin XL) 150 mg 24 hr tablet; Take 1 tablet (150 mg total) by mouth every morning  Dispense: 90 tablet; Refill: 3    4  Persistent insomnia  See above  5  Hypercholesteremia  Cholesterol is stable with a low ASCVD risk of 2 2%  6  Vitamin D deficiency  Vitamin D level has improved as has her calcium level  7  Benign essential hypertension  Blood pressure is well controlled  8  Fibromyalgia  Patient has now seen a second rheumatologist   She is now on Lyrica  Again just starting this medication but not sure if it is helpful yet      9  Seronegative arthropathy of multiple sites ([facility] )  See above  10  Undifferentiated connective tissue disease ([facility] )  See above  11  Cervical spondylosis without myelopathy  Patient following with orthopedic doctor for various joints in her body  Not great getting great relief from her pain with the Tylenol  Her main area of pain recently is her left knee  12  Primary generalized (osteo)arthritis  See above  13  Primary osteoarthritis of left knee  See above  14  Tendinitis of right rotator cuff  See above  Well adult exam  ·         Continue healthy diet   ·         Encourage exercise 4 times a week or more for minimum 30 minutes  ·         Continue to see dentist, wear seatbelt  ·         Health maintenance reviewed -declines flu shot and COVID-vaccine  Schedule for Pap smear and colonoscopy  Due for mammogram   We will then monitor at a later date     Reviewed age appropriate health maintenance screenings and immunizations that are due, risks and benefits of these  Health Maintenance   Topic Date Due   • Hepatitis B Vaccine (1 of 3 - 3-dose series) Never done   • COVID-19 Vaccine (1) Never done   • Colorectal Cancer Screening  Never done   • Osteoporosis Screening  Never done   • Cervical Cancer Screening  06/12/2022   • Influenza Vaccine (1) Never done   • Breast Cancer Screening: Mammogram  09/23/2022   • BMI: Followup Plan  03/05/2023   • Depression Screening  12/09/2023   • BMI: Adult  12/09/2023   • Annual Physical  12/09/2023   • DTaP,Tdap,and Td Vaccines (2 - Td or Tdap) 09/29/2027   • HIV Screening  Completed   • Hepatitis C Screening  Completed   • Pneumococcal Vaccine: Pediatrics (0 to 5 Years) and At-Risk Patients (6 to 59 Years)  Aged Out   • HIB Vaccine  Aged Out   • IPV Vaccine  Aged Out   • Hepatitis A Vaccine  Aged Out   • Meningococcal ACWY Vaccine  Aged Out   • HPV Vaccine  Aged Out     Return in about 2 months (around 2/9/2023) for mood       Subjective:    LUCIE Fall is a 56 y o  female who presents today for a physical      Chief Complaint   Patient presents with   • Annual Exam         Patient Care Team:  Oralia Rain DO as PCP - General (Family Medicine)  Citlalli Shoemaker DO (Cardiology)  Matty Wilson MD (Rheumatology)  Jc Mancia MD (Neurology)  Naa Mina MD (Dermatology)    PHQ-2/9 Depression Screening    Little interest or pleasure in doing things: 1 - several days  Feeling down, depressed, or hopeless: 1 - several days  Trouble falling or staying asleep, or sleeping too much: 2 - more than half the days  Feeling tired or having little energy: 1 - several days  Poor appetite or overeatin - several days  Feeling bad about yourself - or that you are a failure or have let yourself or your family down: 0 - not at all  Trouble concentrating on things, such as reading the newspaper or watching television: 0 - not at all  Moving or speaking so slowly that other people could have noticed  Or the opposite - being so fidgety or restless that you have been moving around a lot more than usual: 1 - several days  Thoughts that you would be better off dead, or of hurting yourself in some way: 0 - not at all  PHQ-2 Score: 2  PHQ-2 Interpretation: Negative depression screen  PHQ-9 Score: 7   PHQ-9 Interpretation: Mild depression         MIGDALIA-7 Flowsheet Screening    Flowsheet Row Most Recent Value   Over the last 2 weeks, how often have you been bothered by any of the following problems? Feeling nervous, anxious, or on edge 1   Not being able to stop or control worrying 1   Worrying too much about different things 1   Trouble relaxing 1   Being so restless that it is hard to sit still 0   Becoming easily annoyed or irritable 2   Feeling afraid as if something awful might happen 0   MIGDALIA-7 Total Score 6          ---Above per clinical staff & reviewed   ---  Patient here today for a physical:    Diet: not much of an appetite  Exercise:  not formal exercise but very active at work   Dental visits:  Yes Seatbelt: yes     Concerns today:      Today she reports feeling exhausted  Very frustrated that everyone tells her there is nothing wrong and is in her head  Had to put her dog down   So many aches and pains   Not motivated, not feeling like she can do anything   Wants to feel better  Nothing making her feel better  Not sleeping and does not know why  Tries to read and that distracts her  Never really a good sleeper  Discouraged but not depressed  Irritable often  Worry is not like it used to be  Last few months not wanting to do things or go  Last 6 months stressed importance of counseloing   Focus and lack of motivation is poor   Struggles with word retrival and coversations  Had a very difficult childhood  Mother did not do much and positive father had dyslexia and put her in charge of taking care of the other kids and paying the bills etc   Her  travels often and she is only seeing him but about 1 weekend a month  The kids are all out of the house now  The following portions of the patient's history were reviewed and updated as appropriate: allergies, current medications, past family history, past medical history, past social history, past surgical history and problem list      Current Medications:  Current Outpatient Medications   Medication Sig Dispense Refill   • acetaminophen (TYLENOL) 500 mg tablet Take 500 mg by mouth every 6 (six) hours as needed for mild pain     • ALPRAZolam (XANAX) 0 5 mg tablet Take 1 tablet (0 5 mg total) by mouth 30 min pre-procedure Take 1 tablet p o  30 minutes prior to MR I   Continue another 1 tablet p o  5 minutes prior to MRI as well in regards to anxiety 2 tablet 0   • Aspirin-Acetaminophen-Caffeine (EXCEDRIN MIGRAINE PO) Take 1 tablet by mouth as needed (migraines)     • azelastine (ASTELIN) 0 1 % nasal spray 1 spray into each nostril 2 (two) times a day Use in each nostril as directed 30 mL 0   • buPROPion (Wellbutrin XL) 150 mg 24 hr tablet Take 1 tablet (150 mg total) by mouth every morning 90 tablet 3   • cetirizine (ZyrTEC) 10 mg tablet Take 10 mg by mouth daily     • cholecalciferol (VITAMIN D3) 1,000 units tablet Take 1,000 Units by mouth daily      • estradiol-norethindrone (ACTIVELLA) 1-0 5 MG per tablet Take 1 tablet by mouth daily     • ferrous sulfate 325 (65 Fe) mg tablet Take 325 mg by mouth daily with breakfast      • lisinopril (ZESTRIL) 20 mg tablet Take 1 tablet (20 mg total) by mouth daily 90 tablet 1   • meloxicam (MOBIC) 7 5 mg tablet TAKE 1 TABLET(7 5 MG) BY MOUTH TWICE DAILY WITH FOOD AS NEEDED FOR MILD PAIN 60 tablet 1   • Multiple Vitamins-Minerals (MULTIVITAMIN ADULT PO) Take 1 tablet by mouth daily     • omeprazole (PriLOSEC) 20 mg delayed release capsule Take 1 capsule (20 mg total) by mouth daily 90 capsule 1   • pregabalin (LYRICA) 75 mg capsule Take 1 tablet at bedtime for 2 weeks then increase to either 2 tablets at bedtime and hit make sure sleepy or 1 tablet twice daily thereafter 60 capsule 3   • Riboflavin (Vitamin B-2) 100 MG TABS Take 400 mg by mouth daily     • Sodium Sulfate-Mag Sulfate-KCl (Sutab) 3391-533-483 MG TABS Take as instructed by gi office 24 tablet 0   • SUMAtriptan (IMITREX) 100 mg tablet One tab daily prn migraine, may repeat in 2 hours if needed  Max 2 tabs per 24 hr 10 tablet 2     No current facility-administered medications for this visit  Objective:      /70 (BP Location: Left arm, Patient Position: Sitting, Cuff Size: Adult)   Pulse 77   Temp (!) 96 9 °F (36 1 °C)   Ht 5' 1" (1 549 m)   Wt 71 7 kg (158 lb)   SpO2 98%   BMI 29 85 kg/m²   BP Readings from Last 3 Encounters:   12/09/22 118/70   12/07/22 126/84   12/06/22 151/78     Wt Readings from Last 3 Encounters:   12/09/22 71 7 kg (158 lb)   12/07/22 73 1 kg (161 lb 3 2 oz)   12/06/22 72 1 kg (159 lb)       Review of Systems  ROS:  all others negative - no chest pain, SOB, normal urine and bowels  no GERD    Not sleeping well  mood not good  Multiple joint pains and muscle pains  Physical Exam   Constitutional: she appears well-developed and well-nourished  HENT: Head: Normocephalic  Right Ear: External ear normal  Tympanic membrane normal    Left Ear: External ear normal  Tympanic membrane normal    Nose: Nose normal  No mucosal edema, No rhinorrhea  Right sinus exhibits no maxillary sinus tenderness  Left sinus exhibits no maxillary sinus tenderness  Mouth/Throat: Oropharynx is clear and moist    Eyes: Normal conjunctiva  No erythema  No discharge  Neck: No pain on exam  Neck supple  Cardiovascular: Normal rate, regular rhythm and normal heart sounds  Pulmonary/Chest: Effort normal and breath sounds normal  No wheezes  No rales  No rhonchi  Abdominal: Soft  Bowel sounds are normal  There is no tenderness  Musculoskeletal: she exhibits no edema  Lymphadenopathy: she has no cervical adenopathy  Neurological: she  is alert and oriented to person, place, and time  Skin: Skin is warm and dry  No rashes  Psychiatric: she  has a tearful mood and affect when talking about her pain  her behavior is normal  Thought content normal    Vitals reviewed  Depression Screening and Follow-up Plan: Patient was screened for depression during today's encounter  They screened negative with a PHQ-2 score of 2

## 2022-12-14 ENCOUNTER — TELEPHONE (OUTPATIENT)
Dept: SLEEP CENTER | Facility: CLINIC | Age: 57
End: 2022-12-14

## 2022-12-14 NOTE — TELEPHONE ENCOUNTER
Patient's insurance denied the authorization for the CPAP sleep study  Peer to peer can be done by calling [phone] ref #[id number]  How would you like to proceed?

## 2022-12-16 RX ORDER — SODIUM CHLORIDE 9 MG/ML
125 INJECTION, SOLUTION INTRAVENOUS CONTINUOUS
Status: CANCELLED | OUTPATIENT
Start: 2022-12-16

## 2022-12-19 ENCOUNTER — HOSPITAL ENCOUNTER (OUTPATIENT)
Dept: GASTROENTEROLOGY | Facility: MEDICAL CENTER | Age: 57
Setting detail: OUTPATIENT SURGERY
Discharge: HOME | End: 2022-12-19

## 2022-12-19 ENCOUNTER — ANESTHESIA EVENT (OUTPATIENT)
Dept: GASTROENTEROLOGY | Facility: MEDICAL CENTER | Age: 57
End: 2022-12-19

## 2022-12-19 ENCOUNTER — ANESTHESIA (OUTPATIENT)
Dept: GASTROENTEROLOGY | Facility: MEDICAL CENTER | Age: 57
End: 2022-12-19

## 2022-12-19 VITALS
HEART RATE: 90 BPM | RESPIRATION RATE: 20 BRPM | SYSTOLIC BLOOD PRESSURE: 150 MMHG | TEMPERATURE: 96.7 F | DIASTOLIC BLOOD PRESSURE: 90 MMHG | HEIGHT: 61 IN | WEIGHT: 155 LBS | OXYGEN SATURATION: 99 % | BODY MASS INDEX: 29.27 KG/M2

## 2022-12-19 DIAGNOSIS — K21.9 GASTROESOPHAGEAL REFLUX DISEASE WITHOUT ESOPHAGITIS: ICD-10-CM

## 2022-12-19 DIAGNOSIS — Z12.11 SCREENING FOR COLON CANCER: ICD-10-CM

## 2022-12-19 RX ORDER — LIDOCAINE HYDROCHLORIDE 20 MG/ML
INJECTION, SOLUTION EPIDURAL; INFILTRATION; INTRACAUDAL; PERINEURAL AS NEEDED
Status: DISCONTINUED | OUTPATIENT
Start: 2022-12-19 | End: 2022-12-19

## 2022-12-19 RX ORDER — SODIUM CHLORIDE 9 MG/ML
125 INJECTION, SOLUTION INTRAVENOUS CONTINUOUS
Status: DISCONTINUED | OUTPATIENT
Start: 2022-12-19 | End: 2022-12-23 | Stop reason: HOSPADM

## 2022-12-19 RX ORDER — PROPOFOL 10 MG/ML
INJECTION, EMULSION INTRAVENOUS AS NEEDED
Status: DISCONTINUED | OUTPATIENT
Start: 2022-12-19 | End: 2022-12-19

## 2022-12-19 RX ADMIN — PROPOFOL 50 MG: 10 INJECTION, EMULSION INTRAVENOUS at 14:41

## 2022-12-19 RX ADMIN — LIDOCAINE HYDROCHLORIDE 50 MG: 20 INJECTION, SOLUTION EPIDURAL; INFILTRATION; INTRACAUDAL at 14:15

## 2022-12-19 RX ADMIN — PROPOFOL 50 MG: 10 INJECTION, EMULSION INTRAVENOUS at 14:24

## 2022-12-19 RX ADMIN — PROPOFOL 150 MG: 10 INJECTION, EMULSION INTRAVENOUS at 14:16

## 2022-12-19 RX ADMIN — PROPOFOL 50 MG: 10 INJECTION, EMULSION INTRAVENOUS at 14:43

## 2022-12-19 RX ADMIN — PROPOFOL 50 MG: 10 INJECTION, EMULSION INTRAVENOUS at 14:26

## 2022-12-19 RX ADMIN — PROPOFOL 50 MG: 10 INJECTION, EMULSION INTRAVENOUS at 14:18

## 2022-12-19 RX ADMIN — PROPOFOL 50 MG: 10 INJECTION, EMULSION INTRAVENOUS at 14:22

## 2022-12-19 RX ADMIN — SODIUM CHLORIDE 125 ML/HR: 0.9 INJECTION, SOLUTION INTRAVENOUS at 14:07

## 2022-12-19 RX ADMIN — PROPOFOL 50 MG: 10 INJECTION, EMULSION INTRAVENOUS at 14:38

## 2022-12-19 RX ADMIN — PROPOFOL 50 MG: 10 INJECTION, EMULSION INTRAVENOUS at 14:20

## 2022-12-19 RX ADMIN — PROPOFOL 50 MG: 10 INJECTION, EMULSION INTRAVENOUS at 14:31

## 2022-12-19 RX ADMIN — PROPOFOL 50 MG: 10 INJECTION, EMULSION INTRAVENOUS at 14:35

## 2022-12-19 NOTE — H&P
History and Physical - [facility]  Ct Richard 56 y o  female MRN: [id number]                  HPI: Ct Richard is a 56y o  year old female who presents for GERD, low ferritin, screening      REVIEW OF SYSTEMS: Per the HPI, and otherwise unremarkable      Historical Information   Past Medical History:   Diagnosis Date   • Allergic 02/01/2020   • BMI 30 0-30 9,adult    • Carpal tunnel syndrome on right    • Cervical spondylosis without myelopathy    • COVID 12/01/2021   • Fibromyalgia    • Fibromyalgia, primary    • History of fetal loss     Recurrent   • Hyperlipidemia    • Hypertension    • Migraine    • Neck pain    • Osteoarthritis     lumbar spine   • Sicca syndrome (HCC)    • Syncopal episodes     with severe migraines   • Undifferentiated connective tissue disease ([facility] )      Past Surgical History:   Procedure Laterality Date   • CHOLECYSTECTOMY     • DILATION AND CURETTAGE OF UTERUS     • TUBAL LIGATION       Social History   Social History     Substance and Sexual Activity   Alcohol Use Not Currently    Comment: rarely     Social History     Substance and Sexual Activity   Drug Use Never     Social History     Tobacco Use   Smoking Status Never   Smokeless Tobacco Never     Family History   Problem Relation Age of Onset   • MORGAN disease Mother    • Arthritis Mother    • Depression Mother    • Hypertension Mother    • Heart attack Father 79   • Diabetes Father    • Prostate cancer Father 79   • Hypertension Father    • Arthritis Father    • Stroke Father    • Lupus Sister    • Raynaud syndrome Sister    • Sjogren's syndrome Sister    • Breast cancer Maternal Grandmother 79   • Arthritis Maternal Grandmother    • Cancer Maternal Grandmother    • Alcohol abuse Paternal Grandfather    • No Known Problems Daughter    • Prostate cancer Maternal Grandfather 79   • Arthritis Maternal Grandfather    • Cancer Maternal Grandfather    • Colon cancer Paternal Grandmother [de-identified]   • Depression Sister    • No Known Problems Brother    • No Known Problems Son    • No Known Problems Son    • No Known Problems Son    • No Known Problems Son    • No Known Problems Son    • No Known Problems Maternal Uncle    • No Known Problems Paternal Aunt    • No Known Problems Paternal Uncle        Meds/Allergies     (Not in a hospital admission)      Allergies   Allergen Reactions   • Propranolol Other (See Comments)     Halluncinations   • Raspberry - Food Allergy Allergic Rhinitis, Itching and Nasal Congestion   • Latex Rash       Objective     There were no vitals taken for this visit  PHYSICAL EXAMINATION:    General Appearance:   Alert, cooperative, no distress   HEENT:  Normocephalic, atraumatic, anicteric  Neck supple, symmetrical, trachea midline  Lungs:   Equal chest rise and unlabored breathing, normal effort, no coughing  Cardiovascular:   No visualized JVD  Abdomen:   No abdominal distension  Skin:   No jaundice, rashes, or lesions  Musculoskeletal:   Normal range of motion visualized  Psych:  Normal affect and normal insight  Neuro:  Alert and appropriate  ASSESSMENT/PLAN:  This is a 56y o  year old female here for EGD and colonoscopy, and she is stable and optimized for her procedure

## 2022-12-19 NOTE — DISCHARGE INSTRUCTIONS
Upper Endoscopy and Colonoscopy   WHAT YOU NEED TO KNOW:   An upper endoscopy is also called an upper gastrointestinal (GI) endoscopy, or an esophagogastroduodenoscopy (EGD)  It is a procedure to examine the inside of your esophagus, stomach, and duodenum (first part of the small intestine) with a scope  You may feel bloated, gassy, or have some abdominal discomfort after your procedure  Your throat may be sore for 24 to 36 hours  You may burp or pass gas from air that is still inside your body  A colonoscopy is a procedure to examine the inside of your colon (intestine) with a scope  Polyps or tissue growths may have been removed during your colonoscopy  It is normal to feel bloated and to have some abdominal discomfort  You should be passing gas  If you have hemorrhoids or you had polyps removed, you may have a small amount of bleeding  DISCHARGE INSTRUCTIONS:   Seek care immediately if:   You have sudden, severe abdominal pain  You have problems swallowing  You have a large amount of black, sticky bowel movements or blood in your bowel movements  You have sudden trouble breathing  You feel weak, lightheaded, or faint or your heart beats faster than normal for you  Contact your healthcare provider if:   You have a fever and chills  You have nausea or are vomiting  Your abdomen is bloated or feels full and hard  You have abdominal pain  You have a large amount of black, sticky bowel movements or blood in your bowel movements  You have not had a bowel movement for 3 days after your procedure  You have rash or hives  You have questions or concerns about your procedure  Activity:   ·       Do not lift, strain, or run for 24 hours after your procedure  ·       Rest after your procedure  You have been given medicine to relax you  Do not drive or make important decisions until the day after your procedure   Return to your normal activity as directed  ·       Relieve gas and discomfort from bloating by lying on your right side with a heating pad on your abdomen  You may need to take short walks to help the gas move out  Eat small meals until bloating is relieved  Follow up with your healthcare provider as directed: Write down your questions so you remember to ask them during your visits  If you take a “blood thinner”, please review the specific instructions from your endoscopist about when you should resume it  These can be found in the “Recommendation” and “Your Medication list” sections of this After Visit Summary   ;e

## 2022-12-19 NOTE — ANESTHESIA PREPROCEDURE EVALUATION
Procedure:  COLONOSCOPY  EGD    Relevant Problems   ANESTHESIA  woke up during D&Cs more than once      CARDIO   (+) Benign essential hypertension   (+) Hypercholesteremia   (+) Migraine without aura and without status migrainosus, not intractable      GI/HEPATIC   (+) Gastroesophageal reflux disease without esophagitis      MUSCULOSKELETAL   (+) Cervical spondylosis without myelopathy   (+) Fibromyalgia   (+) Primary generalized (osteo)arthritis   (+) Primary osteoarthritis of left knee      NEURO/PSYCH   (+) Fibromyalgia   (+) History of fetal loss   (+) Migraine without aura and without status migrainosus, not intractable      PULMONARY   (+) Obstructive sleep apnea-hypopnea syndrome        Physical Exam    Airway    Mallampati score: II  TM Distance: >3 FB  Neck ROM: full     Dental       Cardiovascular  Cardiovascular exam normal    Pulmonary  Pulmonary exam normal     Other Findings        Anesthesia Plan  ASA Score- 2     Anesthesia Type- IV sedation with anesthesia with ASA Monitors  Additional Monitors:   Airway Plan:     Comment: Mask claustrophobia  Plan Factors-    Chart reviewed  Patient summary reviewed  Induction- intravenous  Postoperative Plan-     Informed Consent- Anesthetic plan and risks discussed with patient

## 2022-12-19 NOTE — ANESTHESIA POSTPROCEDURE EVALUATION
Post-Op Assessment Note    CV Status:  Stable    Pain management: adequate     Mental Status:  Alert and awake   Hydration Status:  Euvolemic   PONV Controlled:  Controlled   Airway Patency:  Patent      Post Op Vitals Reviewed: Yes      Staff: Anesthesiologist         No notable events documented    /90   Pulse 90   Temp (!) 96 7 °F (35 9 °C) (Temporal)   Resp 20   Ht 5' 1" (1 549 m)   Wt 70 3 kg (155 lb)   LMP 08/31/2019 (Exact Date)   SpO2 99%   BMI 29 29 kg/m²   BP      Temp     Pulse     Resp      SpO2

## 2022-12-20 ENCOUNTER — TELEPHONE (OUTPATIENT)
Dept: SLEEP CENTER | Facility: CLINIC | Age: 57
End: 2022-12-20

## 2022-12-20 PROBLEM — Z86.010 HISTORY OF COLON POLYPS: Status: ACTIVE | Noted: 2022-12-20

## 2022-12-20 PROBLEM — Z86.0100 HISTORY OF COLON POLYPS: Status: ACTIVE | Noted: 2022-12-20

## 2022-12-23 DIAGNOSIS — G43.009 MIGRAINE WITHOUT AURA AND WITHOUT STATUS MIGRAINOSUS, NOT INTRACTABLE: ICD-10-CM

## 2022-12-23 RX ORDER — SUMATRIPTAN 100 MG/1
TABLET, FILM COATED ORAL
Qty: 10 TABLET | Refills: 0 | OUTPATIENT
Start: 2022-12-23

## 2022-12-23 NOTE — TELEPHONE ENCOUNTER
Pt returned phone call  States MyChart prescription refill automatically sent refill request to Dr Clarke since she had been ordering this medication previously  Pt will call neuro and request a refill at this time

## 2022-12-27 ENCOUNTER — HOSPITAL ENCOUNTER (OUTPATIENT)
Dept: MRI IMAGING | Facility: HOSPITAL | Age: 57
Discharge: HOME | End: 2022-12-27
Attending: ORTHOPAEDIC SURGERY

## 2022-12-27 DIAGNOSIS — M23.92 INTERNAL DERANGEMENT OF LEFT KNEE: ICD-10-CM

## 2022-12-30 ENCOUNTER — OFFICE VISIT (OUTPATIENT)
Dept: OBGYN CLINIC | Facility: MEDICAL CENTER | Age: 57
End: 2022-12-30

## 2022-12-30 VITALS
WEIGHT: 159 LBS | HEIGHT: 61 IN | DIASTOLIC BLOOD PRESSURE: 81 MMHG | SYSTOLIC BLOOD PRESSURE: 125 MMHG | BODY MASS INDEX: 30.02 KG/M2 | HEART RATE: 77 BPM

## 2022-12-30 DIAGNOSIS — M23.204 OLD TEAR OF MEDIAL MENISCUS OF LEFT KNEE, UNSPECIFIED TEAR TYPE: ICD-10-CM

## 2022-12-30 DIAGNOSIS — M17.12 PRIMARY OSTEOARTHRITIS OF LEFT KNEE: Primary | ICD-10-CM

## 2022-12-30 NOTE — PROGRESS NOTES
Assessment/Plan:  1  Primary osteoarthritis of left knee    2  Old tear of medial meniscus of left knee, unspecified tear type      Orders Placed This Encounter   Procedures   • Ambulatory Referral to Orthopedic Surgery     · Patient has moderate left knee osteoarthritis and degenerative medial meniscal tear  We discussed treatment options as well as risks and benefits of treatment options  We discussed knee arthroscopy vs  Total knee replacement  I will refer her to  to also get their opinion  Her  is known to [facility]  She will go see him  · Discussed with patient if she would have a arthroscopy it will help with the symptoms from the meniscal tear but will not help with the arthritis pain  total knee arthroplasty will help with the arthritis  pain when appropriate  · Will be referring patient to [facility] for consultation for arthroscopy vs total knee arthroplasty  Patient is aware I am not available for TKA until June  Return if symptoms worsen or fail to improve  I answered all of the patient's questions during the visit and provided education of the patient's condition during the visit  The patient verbalized understanding of the information given and agrees with the plan  This note was dictated using Footnote software  It may contain errors including improperly dictated words  Please contact physician directly for any questions  Subjective   Chief Complaint: No chief complaint on file  HPI  Amaris Whalen is a 56 y o  female who presents for follow up for left knee pain due to moderate osteoarthritis and possible meniscal tear  She is here to discuss MRI of the left knee  She continues to have pain over the anterolateral and distal quad  She states she also has tenderness over the anterior medial and posterior aspect of the left knee  She does feel her knee is unstable and notes clicking    Pain is worse with stairs, walking and states throbbing at night   She finished the meloxicam 15 mg a couple days ago and states she feels no difference without taking it  Has been taking Tylenol for pain relief  She has tired CSI and states the the first two injection helped but not the 3rd injection  She also tried Euflexxa injection with no relief  Review of Systems  ROS:    See HPI for musculoskeletal review     All other systems reviewed are negative     History:  Past Medical History:   Diagnosis Date   • Allergic 02/01/2020   • BMI 30 0-30 9,adult    • Carpal tunnel syndrome on right    • Cervical spondylosis without myelopathy    • COVID 12/01/2021   • Fibromyalgia    • Fibromyalgia, primary    • GERD (gastroesophageal reflux disease)    • History of fetal loss     Recurrent   • Hyperlipidemia    • Hypertension    • Migraine    • Neck pain    • Osteoarthritis     lumbar spine   • Sicca syndrome (HCC)    • Syncopal episodes     with severe migraines   • Undifferentiated connective tissue disease ([facility] [address] )      Past Surgical History:   Procedure Laterality Date   • CHOLECYSTECTOMY     • DILATION AND CURETTAGE OF UTERUS     • TUBAL LIGATION       Social History   Social History     Substance and Sexual Activity   Alcohol Use Not Currently    Comment: rarely     Social History     Substance and Sexual Activity   Drug Use Never     Social History     Tobacco Use   Smoking Status Never   Smokeless Tobacco Never     Family History:   Family History   Problem Relation Age of Onset   • MORGAN disease Mother    • Arthritis Mother    • Depression Mother    • Hypertension Mother    • Heart attack Father 79   • Diabetes Father    • Prostate cancer Father 79   • Hypertension Father    • Arthritis Father    • Stroke Father    • Lupus Sister    • Raynaud syndrome Sister    • Sjogren's syndrome Sister    • Breast cancer Maternal Grandmother 79   • Arthritis Maternal Grandmother    • Cancer Maternal Grandmother    • Alcohol abuse Paternal Grandfather    • No Known Problems Daughter    • Prostate cancer Maternal Grandfather 79   • Arthritis Maternal Grandfather    • Cancer Maternal Grandfather    • Colon cancer Paternal Grandmother [de-identified]   • Depression Sister    • No Known Problems Brother    • No Known Problems Son    • No Known Problems Son    • No Known Problems Son    • No Known Problems Son    • No Known Problems Son    • No Known Problems Maternal Uncle    • No Known Problems Paternal Aunt    • No Known Problems Paternal Uncle        Current Outpatient Medications on File Prior to Visit   Medication Sig Dispense Refill   • acetaminophen (TYLENOL) 500 mg tablet Take 500 mg by mouth every 6 (six) hours as needed for mild pain     • ALPRAZolam (XANAX) 0 5 mg tablet Take 1 tablet (0 5 mg total) by mouth 30 min pre-procedure Take 1 tablet p o  30 minutes prior to MR I   Continue another 1 tablet p o  5 minutes prior to MRI as well in regards to anxiety 2 tablet 0   • Aspirin-Acetaminophen-Caffeine (EXCEDRIN MIGRAINE PO) Take 1 tablet by mouth as needed (migraines)     • azelastine (ASTELIN) 0 1 % nasal spray 1 spray into each nostril 2 (two) times a day Use in each nostril as directed 30 mL 0   • buPROPion (Wellbutrin XL) 150 mg 24 hr tablet Take 1 tablet (150 mg total) by mouth every morning 90 tablet 3   • cetirizine (ZyrTEC) 10 mg tablet Take 10 mg by mouth daily     • cholecalciferol (VITAMIN D3) 1,000 units tablet Take 1,000 Units by mouth daily      • estradiol-norethindrone (ACTIVELLA) 1-0 5 MG per tablet Take 1 tablet by mouth daily     • ferrous sulfate 325 (65 Fe) mg tablet Take 325 mg by mouth daily with breakfast      • lisinopril (ZESTRIL) 20 mg tablet Take 1 tablet (20 mg total) by mouth daily 90 tablet 1   • meloxicam (MOBIC) 7 5 mg tablet TAKE 1 TABLET(7 5 MG) BY MOUTH TWICE DAILY WITH FOOD AS NEEDED FOR MILD PAIN 60 tablet 1   • Multiple Vitamins-Minerals (MULTIVITAMIN ADULT PO) Take 1 tablet by mouth daily     • omeprazole (PriLOSEC) 20 mg delayed release capsule Take 1 capsule (20 mg total) by mouth daily 90 capsule 1   • pregabalin (LYRICA) 75 mg capsule Take 1 tablet at bedtime for 2 weeks then increase to either 2 tablets at bedtime and hit make sure sleepy or 1 tablet twice daily thereafter 60 capsule 3   • Riboflavin (Vitamin B-2) 100 MG TABS Take 400 mg by mouth daily     • Sodium Sulfate-Mag Sulfate-KCl (Sutab) 1038-234-755 MG TABS Take as instructed by gi office 24 tablet 0   • SUMAtriptan (IMITREX) 100 mg tablet Take 1 tablet (100 mg total) by mouth once as needed for migraine May repeat x1 in 2 hours, max 2/24 hours, 9/month 9 tablet 12     No current facility-administered medications on file prior to visit       Allergies   Allergen Reactions   • Propranolol Other (See Comments)     Halluncinations   • Raspberry - Food Allergy Allergic Rhinitis, Itching and Nasal Congestion   • Latex Rash        Objective     /81   Pulse 77   Ht 5' 1" (1 549 m)   Wt 72 1 kg (159 lb)   LMP 08/31/2019 (Exact Date)   BMI 30 04 kg/m²      PE:  AAOx 3  WDWN  Hearing intact, no drainage from eyes  no audible wheezing  no abdominal distension  LE compartments soft, skin intact    Ortho Exam:  left Knee:   No erythema  no swelling  no effusion  no warmth  AROM: 0-125   Stable to varus/valgus stress    Imaging Studies: I have personally reviewed pertinent films in PACS  XR  left knee: degenerative medial meniscal tear and moderate DJD       Scribe Attestation    I,:  Columba Baker am acting as a scribe while in the presence of the attending physician :       I,:  Kapil Weldon DO personally performed the services described in this documentation    as scribed in my presence :

## 2023-01-16 ENCOUNTER — OFFICE VISIT (OUTPATIENT)
Dept: GASTROENTEROLOGY | Facility: MEDICAL CENTER | Age: 58
End: 2023-01-16

## 2023-01-16 VITALS
BODY MASS INDEX: 29.48 KG/M2 | HEART RATE: 66 BPM | DIASTOLIC BLOOD PRESSURE: 74 MMHG | TEMPERATURE: 97.8 F | WEIGHT: 156 LBS | SYSTOLIC BLOOD PRESSURE: 126 MMHG

## 2023-01-16 DIAGNOSIS — D36.9 ADENOMATOUS POLYPS: ICD-10-CM

## 2023-01-16 DIAGNOSIS — K21.9 GASTROESOPHAGEAL REFLUX DISEASE, UNSPECIFIED WHETHER ESOPHAGITIS PRESENT: Primary | ICD-10-CM

## 2023-01-16 RX ORDER — MAGNESIUM 30 MG
600 TABLET ORAL 2 TIMES DAILY
COMMUNITY

## 2023-01-16 NOTE — PROGRESS NOTES
[facility] - Outpatient Follow-up Note  Radha Verma 56 y o  female MRN: [id number]  Encounter: [id number]          ASSESSMENT AND PLAN:      1  Gastroesophageal reflux disease, unspecified whether esophagitis present:EGD 12/22 with sliding hiatal hernia, mild patchy and erythematous mucosa in the stomach, duodenal polyp  Biopsies were negative  Her symptoms are well controlled with prilosec 20mg daily  -continue prilosec 20mg daily  -acid reflux diet  -f/u PRN    2  Adenomatous polyps: colonoscopy 12/22 with 5 polyps removed and external hemorrhoids, some of which were tubular adenomas  -repeat colonoscopy in 7 years    ______________________________________________________________________    SUBJECTIVE:  Jai Chand is a 82-year-old female with a past medical history of fibromyalgia, GERD, hyperlipidemia, hypertension who is here for follow-up after EGD and colonoscopy  Colonoscopy December 2022  EGD with sliding hiatal hernia, mild patchy erythematous mucosa in the stomach and a duodenal polyp  Fortunately her biopsies were negative  She also underwent a colonoscopy at the same time and had 5 polyps removed as well as external hemorrhoids  Some of the polyps were tubular adenomas and a repeat colonoscopy was recommended in 7 years  She is currently on Prilosec 20 mg daily, which controls her acid reflux symptoms well  Does not have any GI complaints today  REVIEW OF SYSTEMS IS OTHERWISE NEGATIVE        Historical Information   Past Medical History:   Diagnosis Date   • Allergic 02/01/2020   • BMI 30 0-30 9,adult    • Carpal tunnel syndrome on right    • Cervical spondylosis without myelopathy    • COVID 12/01/2021   • Fibromyalgia    • Fibromyalgia, primary    • GERD (gastroesophageal reflux disease)    • History of fetal loss     Recurrent   • Hyperlipidemia    • Hypertension    • Migraine    • Neck pain    • Osteoarthritis     lumbar spine   • Sicca syndrome ([address] ) • Syncopal episodes     with severe migraines   • Undifferentiated connective tissue disease (HCC)      Past Surgical History:   Procedure Laterality Date   • CHOLECYSTECTOMY     • DILATION AND CURETTAGE OF UTERUS     • TUBAL LIGATION       Social History   Social History     Substance and Sexual Activity   Alcohol Use Not Currently    Comment: rarely     Social History     Substance and Sexual Activity   Drug Use Never     Social History     Tobacco Use   Smoking Status Never   Smokeless Tobacco Never     Family History   Problem Relation Age of Onset   • MORGAN disease Mother    • Arthritis Mother    • Depression Mother    • Hypertension Mother    • Heart attack Father 79   • Diabetes Father    • Prostate cancer Father 79   • Hypertension Father    • Arthritis Father    • Stroke Father    • Lupus Sister    • Raynaud syndrome Sister    • Sjogren's syndrome Sister    • Breast cancer Maternal Grandmother 79   • Arthritis Maternal Grandmother    • Cancer Maternal Grandmother    • Alcohol abuse Paternal Grandfather    • No Known Problems Daughter    • Prostate cancer Maternal Grandfather 79   • Arthritis Maternal Grandfather    • Cancer Maternal Grandfather    • Colon cancer Paternal Grandmother [de-identified]   • Depression Sister    • No Known Problems Brother    • No Known Problems Son    • No Known Problems Son    • No Known Problems Son    • No Known Problems Son    • No Known Problems Son    • No Known Problems Maternal Uncle    • No Known Problems Paternal Aunt    • No Known Problems Paternal Uncle        Meds/Allergies       Current Outpatient Medications:   •  acetaminophen (TYLENOL) 500 mg tablet  •  ALPRAZolam (XANAX) 0 5 mg tablet  •  Aspirin-Acetaminophen-Caffeine (EXCEDRIN MIGRAINE PO)  •  azelastine (ASTELIN) 0 1 % nasal spray  •  buPROPion (Wellbutrin XL) 150 mg 24 hr tablet  •  cetirizine (ZyrTEC) 10 mg tablet  •  cholecalciferol (VITAMIN D3) 1,000 units tablet  •  ferrous sulfate 325 (65 Fe) mg tablet  • lisinopril (ZESTRIL) 20 mg tablet  •  magnesium 30 MG tablet  •  Multiple Vitamins-Minerals (MULTIVITAMIN ADULT PO)  •  omeprazole (PriLOSEC) 20 mg delayed release capsule  •  pregabalin (LYRICA) 75 mg capsule  •  Riboflavin (Vitamin B-2) 100 MG TABS  •  SUMAtriptan (IMITREX) 100 mg tablet  •  estradiol-norethindrone (ACTIVELLA) 1-0 5 MG per tablet  •  meloxicam (MOBIC) 7 5 mg tablet  •  Sodium Sulfate-Mag Sulfate-KCl (Sutab) 8408-516-777 MG TABS    Allergies   Allergen Reactions   • Propranolol Other (See Comments)     Halluncinations   • Raspberry - Food Allergy Allergic Rhinitis, Itching and Nasal Congestion   • Latex Rash           Objective     Blood pressure 126/74, pulse 66, temperature 97 8 °F (36 6 °C), weight 70 8 kg (156 lb), last menstrual period 08/31/2019  Body mass index is 29 48 kg/m²  PHYSICAL EXAM:      General Appearance:   Alert, cooperative, no distress   HEENT:   Normocephalic, atraumatic, anicteric      Neck:  Supple, symmetrical, trachea midline   Lungs:   Clear to auscultation bilaterally; no rales, rhonchi or wheezing; respirations unlabored    Heart[de-identified]   Regular rate and rhythm; no murmur, rub, or gallop  Abdomen:   Soft, non-tender, non-distended; normal bowel sounds; no masses, no organomegaly    Genitalia:   Deferred    Rectal:   Deferred    Extremities:  No cyanosis, clubbing or edema        Skin:  No jaundice, rashes, or lesions          Lab Results:   No visits with results within 1 Day(s) from this visit     Latest known visit with results is:   Hospital Outpatient Visit on 12/19/2022   Component Date Value   • Case Report 12/19/2022                      Value:Surgical Pathology Report                         Case: [id number]                                   Authorizing Provider:  Anabella Murrieta MD    Collected:           12/19/2022 1418              Ordering Location:     [address] [place] [address]        Received:            12/19/2022 [address] [place] [address] Endoscopy                                                     Pathologist:           Ricardo Barraza MD                                                             Specimens:   A) - Duodenum, Duodenum bx-cold                                                                     B) - Polyp, Stomach/Small Intestine, Duodenal polyp bx-cold                                         C) - Stomach, Gastric bx-cold                                                                       D) - Polyp, Colorectal, transverse colon polyp-cold bx                                              E) - Polyp, Colorectal, Sigmoid colon polyp-cold snare                                                                        F) - Polyp, Colorectal, Rectal polyp-cold bx X3                                           • Final Diagnosis 12/19/2022                      Value: This result contains rich text formatting which cannot be displayed here  • Additional Information 12/19/2022                      Value: This result contains rich text formatting which cannot be displayed here  • Synoptic Checklist 12/19/2022                      Value:                            COLON/RECTUM POLYP FORM - GI - All Specimens                                                                                     :    Adenoma(s)                                                         : Other                                                         :    hyperplstic polyp     • Gross Description 12/19/2022                      Value: This result contains rich text formatting which cannot be displayed here     • Clinical Information 12/19/2022                      Value:R/O Celiac disease    IMPRESSION:  · The esophagus appeared normal   · Z-line 36 cm from the incisors  · Sliding hiatal hernia (type I hiatal hernia)  · Mild, patchy erythematous mucosa in the stomach; performed cold forceps biopsy to rule out H  pylori  · Polyp in the duodenal bulb; performed partial removal by cold forceps biopsy  · Performed forceps biopsies to rule out celiac disease in the duodenum    IMPRESSION:  · Five polyps measuring smaller than 5 mm in the transverse colon, sigmoid colon and rectum; performed complete en bloc removal by cold forceps biopsy; completely removed en bloc by cold snare and retrieved specimen  · External hemorrhoids  · The examination was otherwise normal on direct and retroflexion views             Radiology Results:   EGD    Result Date: 12/19/2022  Narrative: Table formatting from the original result was not included  [address] [facility] [address] [place] [address] [phone] DATE OF SERVICE: 12/19/22 PHYSICIAN(S): Attending: Shirlene Wallace MD Fellow: No Staff Documented INDICATION: Gastroesophageal reflux disease without esophagitis POST-OP DIAGNOSIS: See the impression below  PREPROCEDURE: Informed consent was obtained for the procedure, including sedation  Risks of perforation, hemorrhage, adverse drug reaction and aspiration were discussed  The patient was placed in the left lateral decubitus position  Patient was explained about the risks and benefits of the procedure  Risks including but not limited to bleeding, infection, and perforation were explained in detail  Also explained about less than 100% sensitivity with the exam and other alternatives  PROCEDURE: EGD DETAILS OF PROCEDURE: Patient was taken to the procedure room where a time out was performed to confirm correct patient and correct procedure  The patient underwent monitored anesthesia care, which was administered by an anesthesia professional  The patient's blood pressure, heart rate, level of consciousness, respirations, oxygen and ETCO2 were monitored throughout the procedure  The scope was introduced through the mouth and advanced to the second part of the duodenum  Retroflexion was performed in the fundus  The patient experienced no blood loss   The procedure was not difficult  The patient tolerated the procedure well  There were no apparent complications  ANESTHESIA INFORMATION: ASA: II Anesthesia Type: IV Sedation with Anesthesia MEDICATIONS: No administrations occurring from 1413 to 1425 on 12/19/22 FINDINGS: The esophagus appeared normal  Z-line 36 cm from the incisors Sliding hiatal hernia (type I hiatal hernia) Mild, patchy erythematous mucosa in the stomach; performed cold forceps biopsy to rule out H  pylori Polyp in the duodenal bulb; performed partial removal by cold forceps biopsy Performed forceps biopsies to rule out celiac disease in the duodenum SPECIMENS: ID Type Source Tests Collected by Time Destination 1 : Duodenum bx-cold Tissue Duodenum TISSUE EXAM Marcel Townsend MD 12/19/2022  2:18 PM  2 : Duodenal polyp bx-cold Tissue Polyp, Stomach/Small Intestine TISSUE EXAM Marcel Townsend MD 12/19/2022  2:19 PM  3 : Gastric bx-cold Tissue Stomach TISSUE EXAM Marcel Townsend MD 12/19/2022  2:23 PM      Impression: The esophagus appeared normal  Z-line 36 cm from the incisors Sliding hiatal hernia (type I hiatal hernia) Mild, patchy erythematous mucosa in the stomach; performed cold forceps biopsy to rule out H  pylori Polyp in the duodenal bulb; performed partial removal by cold forceps biopsy Performed forceps biopsies to rule out celiac disease in the duodenum RECOMMENDATION:  Await pathology results   Marcel Townsend MD     Colonoscopy    Addendum Date: 12/21/2022 Addendum:   [address] [address] [place] [address] [phone] DATE OF SERVICE: 12/19/22 PHYSICIAN(S): Attending: Marcel Townsend MD Fellow: No Staff Documented INDICATION: Screening for colon cancer POST-OP DIAGNOSIS: See the impression below  HISTORY: Prior colonoscopy: No prior colonoscopy  BOWEL PREPARATION:  PREPROCEDURE: Informed consent was obtained for the procedure, including sedation   Risks including but not limited to bleeding, infection, perforation, adverse drug reaction and aspiration were explained in detail  Also explained about less than 100% sensitivity with the exam and other alternatives  The patient was placed in the left lateral decubitus position  Procedure: Colonoscopy DETAILS OF PROCEDURE: Patient was taken to the procedure room where a time out was performed to confirm correct patient and correct procedure  The patient underwent monitored anesthesia care, which was administered by an anesthesia professional  The patient's blood pressure, heart rate, level of consciousness, oxygen, respirations and ETCO2 were monitored throughout the procedure  A digital rectal exam was performed  A perianal exam was performed  The scope was introduced through the anus and advanced to the cecum  Retroflexion was performed in the cecum and rectum  The quality of bowel preparation was evaluated using the [facility] Bowel Preparation Scale with scores of: right colon = 2, transverse colon = 2, left colon = 2  The total BBPS score was 6  Bowel prep was adequate  The patient experienced no blood loss  The procedure was not difficult  The patient tolerated the procedure well  There were no apparent complications  ANESTHESIA INFORMATION: ASA: II Anesthesia Type: IV Sedation with Anesthesia MEDICATIONS: sodium chloride 0 9 % infusion 800 mL*  *From user-documented volume (Totals for administrations occurring from 1413 to 1448 on 12/19/22) FINDINGS: Five polyps measuring smaller than 5 mm in the transverse colon, sigmoid colon and rectum; performed complete en bloc removal by cold forceps biopsy; completely removed en bloc by cold snare and retrieved specimen External hemorrhoids The examination was otherwise normal on direct and retroflexion views   EVENTS: Procedure Events Event Event Time ENDO CECUM REACHED 12/19/2022  2:31 PM ENDO SCOPE OUT TIME 12/19/2022  2:47 PM SPECIMENS: ID Type Source Tests Collected by Time Destination 1 : Duodenum bx-cold Tissue Duodenum TISSUE EXAM Belem Garcia MD 12/19/2022  2:18 PM  2 : Duodenal polyp bx-cold Tissue Polyp, Stomach/Small Intestine TISSUE EXAM Belem Garcia MD 12/19/2022  2:19 PM  3 : Gastric bx-cold Tissue Stomach TISSUE EXAM Belem Garcia MD 12/19/2022  2:23 PM  4 : transverse colon polyp-cold bx Tissue Polyp, Colorectal TISSUE EXAM Belem Garcia MD 12/19/2022  2:38 PM  5 : Sigmoid colon polyp-cold snare Tissue Polyp, Colorectal TISSUE EXAM Belem Garcia MD 12/19/2022  2:44 PM  6 : Rectal polyp-cold bx X3 Tissue Polyp, Colorectal TISSUE EXAM Belem Garcia MD 12/19/2022  2:47 PM  EQUIPMENT: Colonoscope -[id number] ENDOCUFF VISION LRG GREEN ID [id number] IMPRESSION: Five polyps measuring smaller than 5 mm in the transverse colon, sigmoid colon and rectum; performed complete en bloc removal by cold forceps biopsy; completely removed en bloc by cold snare and retrieved specimen External hemorrhoids The examination was otherwise normal on direct and retroflexion views  RECOMMENDATION:  Await pathology results  Repeat colonoscopy in 7 years  Personal history of colon polyps    Belem Garcia MD     Result Date: 12/21/2022  Narrative: Table formatting from the original result was not included  [address] [phone] DATE OF SERVICE: 12/19/22 PHYSICIAN(S): Attending: Belem Garcia MD Fellow: No Staff Documented INDICATION: Screening for colon cancer POST-OP DIAGNOSIS: See the impression below  HISTORY: Prior colonoscopy: No prior colonoscopy  BOWEL PREPARATION: Sutab PREPROCEDURE: Informed consent was obtained for the procedure, including sedation  Risks including but not limited to bleeding, infection, perforation, adverse drug reaction and aspiration were explained in detail  Also explained about less than 100% sensitivity with the exam and other alternatives   The patient was placed in the left lateral decubitus position  Procedure: Colonoscopy DETAILS OF PROCEDURE: Patient was taken to the procedure room where a time out was performed to confirm correct patient and correct procedure  The patient underwent monitored anesthesia care, which was administered by an anesthesia professional  The patient's blood pressure, heart rate, level of consciousness, oxygen, respirations and ETCO2 were monitored throughout the procedure  A digital rectal exam was performed  A perianal exam was performed  The scope was introduced through the anus and advanced to the cecum  Retroflexion was performed in the cecum and rectum  The quality of bowel preparation was evaluated using the [facility] Bowel Preparation Scale with scores of: right colon = 2, transverse colon = 2, left colon = 2  The total BBPS score was 6  Bowel prep was adequate  The patient experienced no blood loss  The procedure was not difficult  The patient tolerated the procedure well  There were no apparent complications  ANESTHESIA INFORMATION: ASA: II Anesthesia Type: IV Sedation with Anesthesia MEDICATIONS: sodium chloride 0 9 % infusion 800 mL*  *From user-documented volume (Totals for administrations occurring from 1413 to 1448 on 12/19/22) FINDINGS: Five polyps measuring smaller than 5 mm in the transverse colon, sigmoid colon and rectum; performed complete en bloc removal by cold forceps biopsy; completely removed en bloc by cold snare and retrieved specimen External hemorrhoids The examination was otherwise normal on direct and retroflexion views   EVENTS: Procedure Events Event Event Time ENDO CECUM REACHED 12/19/2022  2:31 PM ENDO SCOPE OUT TIME 12/19/2022  2:47 PM SPECIMENS: ID Type Source Tests Collected by Time Destination 1 : Duodenum bx-cold Tissue Duodenum TISSUE EXAM Belem Garcia MD 12/19/2022  2:18 PM  2 : Duodenal polyp bx-cold Tissue Polyp, Stomach/Small Intestine TISSUE EXAM Belem Garcia MD 12/19/2022  2:19 PM  3 : Gastric bx-cold Tissue Stomach TISSUE EXAM Shirlene Wallace MD 12/19/2022  2:23 PM  4 : transverse colon polyp-cold bx Tissue Polyp, Colorectal TISSUE EXAM Shirlene Wallace MD 12/19/2022  2:38 PM  5 : Sigmoid colon polyp-cold snare Tissue Polyp, Colorectal TISSUE EXAM Shirlene Wallace MD 12/19/2022  2:44 PM  6 : Rectal polyp-cold bx X3 Tissue Polyp, Colorectal TISSUE EXAM Shirlene Wallace MD 12/19/2022  2:47 PM  EQUIPMENT: Colonoscope -[id number] ENDOCUFF VISION LRG GREEN ID [id number]     Impression: Five polyps measuring smaller than 5 mm in the transverse colon, sigmoid colon and rectum; performed complete en bloc removal by cold forceps biopsy; completely removed en bloc by cold snare and retrieved specimen External hemorrhoids The examination was otherwise normal on direct and retroflexion views  RECOMMENDATION:  Await pathology results  Repeat screening colonoscopy in 10 years   Shirlene Wallace MD     MRI knee left  wo contrast    Result Date: 12/28/2022  Narrative: MRI LEFT KNEE INDICATION:   M23 92: Unspecified internal derangement of left knee  COMPARISON: Correlation is made with the prior radiograph dated 3/4/2021  TECHNIQUE:    Multiplanar/multisequence MR of the left knee was performed  Imaging performed on 1 5T MRI FINDINGS: SUBCUTANEOUS TISSUES: Normal JOINT EFFUSION: There is a moderate joint effusion  BAKER'S CYST: Tiny Le's cyst  MENISCI: There is a complex tear/maceration of the body and posterior horn of the medial meniscus with mild medial extrusion of the remaining medial meniscus  The lateral meniscus is intact  CRUCIATE LIGAMENTS: Intact  EXTENSOR APPARATUS: Intact  COLLATERAL LIGAMENTS: Intact  ARTICULAR SURFACES: There is moderate to severe cartilage loss weightbearing portion of the medial femoral tibial compartment  There is a 1 1 x 0 6 x 0 6 cm calcified loose body seen posteriorly as seen on series 5, image 12 and series 3, image 20   BONES: Minimal stress response seen within the medial femoral condyle and medial tibial plateau  No acute fracture  MUSCULATURE:  Intact  Impression: Moderate medial compartment osteoarthrosis with minimal stress response in the medial femoral condyle and medial tibial plateau as above  Calcified loose bodies seen posteriorly  Complex tear/maceration of the body and posterior horn of the medial meniscus   Moderate-sized joint effusion and tiny Baker's cyst  Workstation performed: [id number]

## 2023-01-18 ENCOUNTER — PATIENT MESSAGE (OUTPATIENT)
Dept: FAMILY MEDICINE CLINIC | Facility: CLINIC | Age: 58
End: 2023-01-18

## 2023-01-18 DIAGNOSIS — R73.09 ELEVATED GLUCOSE: ICD-10-CM

## 2023-01-18 DIAGNOSIS — I10 BENIGN ESSENTIAL HYPERTENSION: ICD-10-CM

## 2023-01-18 DIAGNOSIS — E78.00 HYPERCHOLESTEREMIA: ICD-10-CM

## 2023-01-18 DIAGNOSIS — I10 BENIGN ESSENTIAL HYPERTENSION: Primary | ICD-10-CM

## 2023-01-18 RX ORDER — LISINOPRIL 20 MG/1
20 TABLET ORAL DAILY
Qty: 90 TABLET | Refills: 0 | Status: SHIPPED | OUTPATIENT
Start: 2023-01-18

## 2023-01-20 ENCOUNTER — TELEPHONE (OUTPATIENT)
Dept: OBGYN CLINIC | Facility: HOSPITAL | Age: 58
End: 2023-01-20

## 2023-01-20 NOTE — TELEPHONE ENCOUNTER
Caller: [facility]    Doctor: Yennifer Torres    Reason for call:  Patient called she will be 6 min late for appt      Call back#: [phone]

## 2023-01-23 ENCOUNTER — OFFICE VISIT (OUTPATIENT)
Dept: OBGYN CLINIC | Facility: MEDICAL CENTER | Age: 58
End: 2023-01-23

## 2023-01-23 ENCOUNTER — APPOINTMENT (OUTPATIENT)
Dept: RADIOLOGY | Facility: MEDICAL CENTER | Age: 58
End: 2023-01-23

## 2023-01-23 VITALS
HEIGHT: 61 IN | BODY MASS INDEX: 29.45 KG/M2 | DIASTOLIC BLOOD PRESSURE: 90 MMHG | SYSTOLIC BLOOD PRESSURE: 141 MMHG | HEART RATE: 88 BPM | WEIGHT: 156 LBS

## 2023-01-23 DIAGNOSIS — M17.12 PRIMARY OSTEOARTHRITIS OF LEFT KNEE: Primary | ICD-10-CM

## 2023-01-23 DIAGNOSIS — S83.232A COMPLEX TEAR OF MEDIAL MENISCUS OF LEFT KNEE AS CURRENT INJURY, INITIAL ENCOUNTER: ICD-10-CM

## 2023-01-23 DIAGNOSIS — Z01.89 ENCOUNTER FOR LOWER EXTREMITY COMPARISON IMAGING STUDY: ICD-10-CM

## 2023-01-23 DIAGNOSIS — M17.12 PRIMARY OSTEOARTHRITIS OF LEFT KNEE: ICD-10-CM

## 2023-01-23 DIAGNOSIS — M23.204 OLD TEAR OF MEDIAL MENISCUS OF LEFT KNEE, UNSPECIFIED TEAR TYPE: ICD-10-CM

## 2023-01-23 NOTE — PROGRESS NOTES
Orthopaedic Surgery - Office Note  Kirstie Ramsey (56 y o  female)   : 1965   MRN: [id number]  Encounter Date: 2023    Chief Complaint   Patient presents with   • Left Knee - Pain       Assessment / Plan  Left knee: osteoarthritis (primarily medial compartment)with medial meniscus tear    · Reviewed surgical interventions left knee arthroscopy medial menisectomy and a total/partial knee arthroplasty vs conservative treatment options (anti-inflammatories, ice, bracing, cortisone steroid injections, physical therapy and activity modifications)  · Discussed with patient today that an arthroscopic medial menisectomy, will take away her mechanical symptoms, but will likely not take away all of her pain due to her level of arthritis- I would not recommend arthroscopy  · MRI of left knee and XR of left knee reviewed with patient today- results noted below  · Patient would like to take the information provided today and put plans into place for a total/partial knee arthroplasty  Return in about 4 weeks (around 2023) for w/ Dr Elvia Espinal  History of Present Illness  Kirstie Ramsey is a 56 y o  female who presents for an evaluation of left knee pain  Patient was referred here today by Dr Jackson- moderate OA and medial meniscus  The pain began 2 year(s) ago and is not associated with an acute injury  The patient describes the pain as aching, dull, sharp and throbbing  It is intermittent in timing, and localizes the pain to the medial and lateral knee  The pain is worse with walking, ascending stairs, descending stairs, sleeping, squatting and planting and twisting and relieved with rest, medication: Tylenol used and beneficial, avoiding painful activities  She admits to mechanical symptoms such as locking and catching  She denies instability of the knee  Patient has tried cortisone steroid injections, VISCO (no relief) and PT (no relief)    Review of Systems  Pertinent items are noted in HPI    All other systems were reviewed and are negative  Physical Exam  /90   Pulse 88   Ht 5' 1" (1 549 m)   Wt 70 8 kg (156 lb)   LMP 08/31/2019 (Exact Date)   BMI 29 48 kg/m²   Cons: Appears well  No apparent distress  Psych: Alert  Oriented x3  Mood and affect normal   Eyes: PERRLA, EOMI  Resp: Normal effort  No audible wheezing or stridor  CV: Palpable pulse  No discernable arrhythmia  No LE edema  Lymph:  No palpable cervical, axillary, or inguinal lymphadenopathy  Skin: Warm  No palpable masses  No visible lesions  Neuro: Normal muscle tone  Normal and symmetric DTR's  Left Knee Exam  Alignment:  Normal knee alignment  Inspection:  No swelling  No edema  No erythema  No ecchymosis  Palpation:  medial joint line tenderness  ROM:  Knee Extension 0  Knee Flexion 130  Strength:  5/5 quadriceps and hamstrings  Stability:  No objective knee instability  Stable Varus / Valgus stress, Lachman, and Posterior drawer  Tests:  (+) Ly  Patella:  Patella tracks centrally without crepitus  Neurovascular:  Sensation intact in DP/SP/Tate/Sa/T nerve distributions  2+ DP & PT pulses  Gait:  Antalgic  Studies Reviewed  I have personally reviewed pertinent films in PACS  XR of left knee - demonstrates severe degenerative changes primarily in the medial compartment- severe OA  MRI of left knee - complex tear of the posterior horn and body of the medial meniscus    Procedures  No procedures today  Medical, Surgical, Family, and Social History  The patient's medical history, family history, and social history, were reviewed and updated as appropriate      Past Medical History:   Diagnosis Date   • Allergic 02/01/2020   • BMI 30 0-30 9,adult    • Carpal tunnel syndrome on right    • Cervical spondylosis without myelopathy    • COVID 12/01/2021   • Fibromyalgia    • Fibromyalgia, primary    • GERD (gastroesophageal reflux disease)    • History of fetal loss     Recurrent   • Hyperlipidemia    • Hypertension    • Migraine    • Neck pain    • Osteoarthritis     lumbar spine   • Sicca syndrome (HCC)    • Syncopal episodes     with severe migraines   • Undifferentiated connective tissue disease (HCC)        Past Surgical History:   Procedure Laterality Date   • CHOLECYSTECTOMY     • DILATION AND CURETTAGE OF UTERUS     • TUBAL LIGATION         Family History   Problem Relation Age of Onset   • MORGAN disease Mother    • Arthritis Mother    • Depression Mother    • Hypertension Mother    • Heart attack Father 79   • Diabetes Father    • Prostate cancer Father 79   • Hypertension Father    • Arthritis Father    • Stroke Father    • Lupus Sister    • Raynaud syndrome Sister    • Sjogren's syndrome Sister    • Breast cancer Maternal Grandmother 79   • Arthritis Maternal Grandmother    • Cancer Maternal Grandmother    • Alcohol abuse Paternal Grandfather    • No Known Problems Daughter    • Prostate cancer Maternal Grandfather 79   • Arthritis Maternal Grandfather    • Cancer Maternal Grandfather    • Colon cancer Paternal Grandmother [de-identified]   • Depression Sister    • No Known Problems Brother    • No Known Problems Son    • No Known Problems Son    • No Known Problems Son    • No Known Problems Son    • No Known Problems Son    • No Known Problems Maternal Uncle    • No Known Problems Paternal Aunt    • No Known Problems Paternal Uncle        Social History     Occupational History   • Occupation: Administration   Tobacco Use   • Smoking status: Never   • Smokeless tobacco: Never   Vaping Use   • Vaping Use: Never used   Substance and Sexual Activity   • Alcohol use: Not Currently     Comment: rarely   • Drug use: Never   • Sexual activity: Yes     Partners: Male     Birth control/protection: Female Sterilization       Allergies   Allergen Reactions   • Propranolol Other (See Comments)     Halluncinations   • Raspberry - Food Allergy Allergic Rhinitis, Itching and Nasal Congestion   • Latex Rash         Current Outpatient Medications:   •  acetaminophen (TYLENOL) 500 mg tablet, Take 500 mg by mouth every 6 (six) hours as needed for mild pain, Disp: , Rfl:   •  ALPRAZolam (XANAX) 0 5 mg tablet, Take 1 tablet (0 5 mg total) by mouth 30 min pre-procedure Take 1 tablet p o  30 minutes prior to MR I   Continue another 1 tablet p o  5 minutes prior to MRI as well in regards to anxiety, Disp: 2 tablet, Rfl: 0  •  Aspirin-Acetaminophen-Caffeine (EXCEDRIN MIGRAINE PO), Take 1 tablet by mouth as needed (migraines), Disp: , Rfl:   •  azelastine (ASTELIN) 0 1 % nasal spray, 1 spray into each nostril 2 (two) times a day Use in each nostril as directed, Disp: 30 mL, Rfl: 0  •  buPROPion (Wellbutrin XL) 150 mg 24 hr tablet, Take 1 tablet (150 mg total) by mouth every morning, Disp: 90 tablet, Rfl: 3  •  cetirizine (ZyrTEC) 10 mg tablet, Take 10 mg by mouth daily, Disp: , Rfl:   •  cholecalciferol (VITAMIN D3) 1,000 units tablet, Take 1,000 Units by mouth daily , Disp: , Rfl:   •  ferrous sulfate 325 (65 Fe) mg tablet, Take 325 mg by mouth daily with breakfast , Disp: , Rfl:   •  lisinopril (ZESTRIL) 20 mg tablet, Take 1 tablet (20 mg total) by mouth daily, Disp: 90 tablet, Rfl: 0  •  magnesium 30 MG tablet, Take 600 mg by mouth 2 (two) times a day Chewable, Disp: , Rfl:   •  Multiple Vitamins-Minerals (MULTIVITAMIN ADULT PO), Take 1 tablet by mouth daily, Disp: , Rfl:   •  omeprazole (PriLOSEC) 20 mg delayed release capsule, Take 1 capsule (20 mg total) by mouth daily, Disp: 90 capsule, Rfl: 1  •  pregabalin (LYRICA) 75 mg capsule, Take 1 tablet at bedtime for 2 weeks then increase to either 2 tablets at bedtime and hit make sure sleepy or 1 tablet twice daily thereafter, Disp: 60 capsule, Rfl: 3  •  Riboflavin (Vitamin B-2) 100 MG TABS, Take 400 mg by mouth daily, Disp: , Rfl:   •  SUMAtriptan (IMITREX) 100 mg tablet, Take 1 tablet (100 mg total) by mouth once as needed for migraine May repeat x1 in 2 hours, max 2/24 hours, 9/month, Disp: 9 tablet, Rfl: 12  •  estradiol-norethindrone (ACTIVELLA) 1-0 5 MG per tablet, Take 1 tablet by mouth daily, Disp: , Rfl:   •  meloxicam (MOBIC) 7 5 mg tablet, TAKE 1 TABLET(7 5 MG) BY MOUTH TWICE DAILY WITH FOOD AS NEEDED FOR MILD PAIN, Disp: 60 tablet, Rfl: 1  •  Sodium Sulfate-Mag Sulfate-KCl (Sutab) 0618-661-195 MG TABS, Take as instructed by gi office, Disp: 24 tablet, Rfl: 0      Sharita Morales    Scribe Attestation    I,:  Scott Rock am acting as a scribe while in the presence of the attending physician :       I,:  Mat Garibay MD personally performed the services described in this documentation    as scribed in my presence :

## 2023-01-26 ENCOUNTER — TELEPHONE (OUTPATIENT)
Dept: FAMILY MEDICINE CLINIC | Facility: CLINIC | Age: 58
End: 2023-01-26

## 2023-01-26 NOTE — TELEPHONE ENCOUNTER
Pt called and left a message, phone was breaking up and could not understand the reason for pt's call  Call placed to pt, no answer  LM for call back

## 2023-02-07 ENCOUNTER — TELEPHONE (OUTPATIENT)
Dept: DERMATOLOGY | Facility: CLINIC | Age: 58
End: 2023-02-07

## 2023-02-10 ENCOUNTER — TELEPHONE (OUTPATIENT)
Dept: NEUROLOGY | Facility: CLINIC | Age: 58
End: 2023-02-10

## 2023-02-10 ENCOUNTER — OFFICE VISIT (OUTPATIENT)
Dept: FAMILY MEDICINE CLINIC | Facility: CLINIC | Age: 58
End: 2023-02-10

## 2023-02-10 VITALS
DIASTOLIC BLOOD PRESSURE: 74 MMHG | SYSTOLIC BLOOD PRESSURE: 110 MMHG | TEMPERATURE: 97.6 F | HEART RATE: 74 BPM | OXYGEN SATURATION: 97 % | HEIGHT: 61 IN | BODY MASS INDEX: 29.07 KG/M2 | WEIGHT: 154 LBS

## 2023-02-10 DIAGNOSIS — F33.0 MDD (MAJOR DEPRESSIVE DISORDER), RECURRENT EPISODE, MILD (HCC): Primary | ICD-10-CM

## 2023-02-10 DIAGNOSIS — G47.00 PERSISTENT INSOMNIA: ICD-10-CM

## 2023-02-10 DIAGNOSIS — M06.09 SERONEGATIVE ARTHROPATHY OF MULTIPLE SITES (HCC): ICD-10-CM

## 2023-02-10 DIAGNOSIS — G43.009 MIGRAINE WITHOUT AURA AND WITHOUT STATUS MIGRAINOSUS, NOT INTRACTABLE: ICD-10-CM

## 2023-02-10 DIAGNOSIS — Z82.62 FAMILY HISTORY OF OSTEOPOROSIS: ICD-10-CM

## 2023-02-10 DIAGNOSIS — F41.9 ANXIETY: ICD-10-CM

## 2023-02-10 DIAGNOSIS — E66.3 OVERWEIGHT WITH BODY MASS INDEX (BMI) OF 29 TO 29.9 IN ADULT: ICD-10-CM

## 2023-02-10 DIAGNOSIS — Z12.31 VISIT FOR SCREENING MAMMOGRAM: ICD-10-CM

## 2023-02-10 DIAGNOSIS — Z78.0 POST-MENOPAUSE: ICD-10-CM

## 2023-02-10 RX ORDER — BUPROPION HYDROCHLORIDE 300 MG/1
300 TABLET ORAL EVERY MORNING
Qty: 90 TABLET | Refills: 3 | Status: SHIPPED | OUTPATIENT
Start: 2023-02-10

## 2023-02-10 NOTE — TELEPHONE ENCOUNTER
Called and spoke to patient to confirm their upcoming appointment with Dr Trevor Murray  Informed patient about arriving in the [place] location 15 minutes prior to their appointment to get checked in and going over chart

## 2023-02-10 NOTE — PROGRESS NOTES
1  MDD (major depressive disorder), recurrent episode, mild (HCC)  Not at goal  Feels no side effects from wellbutrin and maybe a little help  Thinks she may have ADD  On talking to her today her symptoms are more c/w anxiety and maybe mood ups and downs  Reviewed treatment options   Do not recommend stimulant for her even if she were dx with ADD  Instead, try increase in wellbutrin to maximize effect  Consider GeneSIght testing is affordable  Consider mood stablizer as this may be helpful for both mood and persistent headaches (consider Lamictil, topamax or depakote if neurologist thinks any of these would be helpful as headache prevention)   - buPROPion (Wellbutrin XL) 300 mg 24 hr tablet; Take 1 tablet (300 mg total) by mouth every morning  Dispense: 90 tablet; Refill: 3    2  Anxiety  See above  - buPROPion (Wellbutrin XL) 300 mg 24 hr tablet; Take 1 tablet (300 mg total) by mouth every morning  Dispense: 90 tablet; Refill: 3    3  Persistent insomnia  Stable  4  Visit for screening mammogram  Prescription   - Mammo screening bilateral w 3d & cad; Future    5  Seronegative arthropathy of multiple sites [facility])  Following with rheum  Feeling like no meds are helping her     6  Post-menopause  She was not aware she was in menopause   She was also not aware that what GYN gave her was HRT - she said it was a low dose OCP  Encouraged her to call her GYN regarding this     7  Family history of osteoporosis  Update dexa due to family history   Advised her that OP and OA are not the same   - DXA bone density spine hip and pelvis; Future    8  Overweight with body mass index (BMI) of 29 to 29 9 in adult  She is doing well with weight loss     9  Migraine without aura and without status migrainosus, not intractable  See above       7 minutes spent on chart prep, 40 minutes spent with patient counseling/educating on their diagnoses, tests completed and any new tests ordered, any referrals placed, treatment options, and documentation of above today  In prescribing new medications, or changing doses, we reviewed the risks and benefits and side effects of these medications along with other treatment options if appropriate  Return in about 1 month (around 3/10/2023) for 40 minutes       Subjective:   Sheri Fernandez is a 56 y o  female here today for a follow-up on her current medical conditions:    Patient Active Problem List   Diagnosis   • Fibromyalgia   • Hypercholesteremia   • Migraine without aura and without status migrainosus, not intractable   • Insomnia   • Vitamin D deficiency   • Low ferritin   • Benign essential hypertension   • Carpal tunnel syndrome, bilateral   • Syncopal episodes   • Sleep disturbance   • COVID-19   • Overweight with body mass index (BMI) of 29 to 29 9 in adult   • Primary generalized (osteo)arthritis   • Cervical spondylosis without myelopathy   • Gastroesophageal reflux disease without esophagitis   • History of fetal loss   • Undifferentiated connective tissue disease (HCC)   • Obstructive sleep apnea-hypopnea syndrome   • Excessive daytime sleepiness   • Primary osteoarthritis of left knee   • Tendinitis of right rotator cuff   • Seronegative arthropathy of multiple sites [facility])   • History of colon polyps       Patient Care Team:  Arslan Oreilly DO as PCP - General (Family Medicine)  Vishnu Turcios DO (Cardiology)  Tara Calvillo MD (Rheumatology)  Nelsy Paz MD (Neurology)  Lizette Clark MD (Dermatology)    Current Medications:  Current Outpatient Medications   Medication Sig Dispense Refill   • acetaminophen (TYLENOL) 500 mg tablet Take 500 mg by mouth every 6 (six) hours as needed for mild pain     • Aspirin-Acetaminophen-Caffeine (EXCEDRIN MIGRAINE PO) Take 1 tablet by mouth as needed (migraines)     • buPROPion (Wellbutrin XL) 300 mg 24 hr tablet Take 1 tablet (300 mg total) by mouth every morning 90 tablet 3   • cetirizine (ZyrTEC) 10 mg tablet Take 10 mg by mouth daily PRN     • cholecalciferol (VITAMIN D3) 1,000 units tablet Take 1,000 Units by mouth daily      • ferrous sulfate 325 (65 Fe) mg tablet Take 325 mg by mouth daily with breakfast      • lisinopril (ZESTRIL) 20 mg tablet Take 1 tablet (20 mg total) by mouth daily 90 tablet 0   • magnesium 30 MG tablet Take 60 mg by mouth in the morning Chewable     • Multiple Vitamins-Minerals (MULTIVITAMIN ADULT PO) Take 1 tablet by mouth daily     • omeprazole (PriLOSEC) 20 mg delayed release capsule Take 1 capsule (20 mg total) by mouth daily 90 capsule 1   • Riboflavin (Vitamin B-2) 100 MG TABS Take 400 mg by mouth daily     • SUMAtriptan (IMITREX) 100 mg tablet Take 1 tablet (100 mg total) by mouth once as needed for migraine May repeat x1 in 2 hours, max 2/24 hours, 9/month 9 tablet 12   • ALPRAZolam (XANAX) 0 5 mg tablet Take 1 tablet (0 5 mg total) by mouth 30 min pre-procedure Take 1 tablet p o  30 minutes prior to MR I  Continue another 1 tablet p o  5 minutes prior to MRI as well in regards to anxiety (Patient not taking: Reported on 2/10/2023) 2 tablet 0     No current facility-administered medications for this visit  HPI:  Chief Complaint   Patient presents with   • Follow-up     2 month mood f/u     -- Above per clinical staff and reviewed  --    PHQ-2/9 Depression Screening    Little interest or pleasure in doing things: 1 - several days  Feeling down, depressed, or hopeless: 0 - not at all  Trouble falling or staying asleep, or sleeping too much: 2 - more than half the days  Feeling tired or having little energy: 1 - several days  Poor appetite or overeatin - not at all  Feeling bad about yourself - or that you are a failure or have let yourself or your family down: 0 - not at all  Trouble concentrating on things, such as reading the newspaper or watching television: 1 - several days  Moving or speaking so slowly that other people could have noticed   Or the opposite - being so fidgety or restless that you have been moving around a lot more than usual: 3 - nearly every day  Thoughts that you would be better off dead, or of hurting yourself in some way: 0 - not at all  PHQ-2 Score: 1  PHQ-2 Interpretation: Negative depression screen  PHQ-9 Score: 8   PHQ-9 Interpretation: Mild depression        MIGDALIA-7 Flowsheet Screening    Flowsheet Row Most Recent Value   Over the last 2 weeks, how often have you been bothered by any of the following problems? Feeling nervous, anxious, or on edge 1   Not being able to stop or control worrying 1   Worrying too much about different things 1   Trouble relaxing 2   Being so restless that it is hard to sit still 0   Becoming easily annoyed or irritable 2   Feeling afraid as if something awful might happen 1   MIGDALIA-7 Total Score 8           dec appt started wellbutrin to help with psychcological symptoms from pain, trauma  ?SSRI   review april 2022 labs - hihg calcium resolved, vit D 69   chol 224, TG 88, HDL 58,  (stable) -ASCVD risk low 2 2%   parotid tumor  CT scan 10/2021 stable   compared to 6 months prior   home BP not accurate  lipids cmp HbA1C   Today:  Talking fast, goes from one topic to another  Does well at work   Does not want to go out to dinner  - does nto want to be around people who drinks   More of an introvert  Stopped [employer] - no differece without  She asks about a test because so many meds do not work for her   Oldest son with ADHD, sister brother nephew dad - difficulty focusing, pile of laundry not done   Forgets to eat, does not feel liek makin gdinner  Then does everything like reorganizes a whole room   No side effects from wellbutrin, not feeling much improvement    No period for 3 years   OB pt states gave her low dose OCP activella but was expensive      Hand, then shoulder on right   Now needs left knee replacement bone on bone with complex meniscus tear    The following portions of the patient's history were reviewed and updated as appropriate: allergies, current medications, past family history, past medical history, past social history, past surgical history and problem list     Objective:  Vitals:  /74 (BP Location: Left arm, Patient Position: Sitting, Cuff Size: Adult)   Pulse 74   Temp 97 6 °F (36 4 °C)   Ht 5' 1" (1 549 m)   Wt 69 9 kg (154 lb)   LMP 08/31/2019 (Exact Date)   SpO2 97%   BMI 29 10 kg/m²    Wt Readings from Last 3 Encounters:   02/10/23 69 9 kg (154 lb)   01/23/23 70 8 kg (156 lb)   01/16/23 70 8 kg (156 lb)      BP Readings from Last 3 Encounters:   02/10/23 110/74   01/23/23 141/90   01/16/23 126/74        Review of Systems   She has no other concerns  No unexpected weight changes  No chest pain, SOB, or palpitations  No GERD  No changes in bowels or bladder  Sleeping same + mood changes  + pain in shoulder, knee       Physical Exam   Constitutional:  she appears well-developed and well-nourished  HENT: Head: Normocephalic  Neck: Neck supple  Cardiovascular: Normal rate, regular rhythm and normal heart sounds  Pulmonary/Chest: Effort normal and breath sounds normal  No wheezes, rales, or rhonchi  Abdominal: Soft  Bowel sounds are normal  There is no tenderness  No hepatosplenomegaly  Musculoskeletal: she exhibits no edema  Lymphadenopathy: she has no cervical adenopathy  Neurological: she is alert and oriented to person, place, and time  Skin: Skin is warm and dry  Psychiatric: she has a normal mood and affect   her behavior is normal  Thought content normal

## 2023-02-10 NOTE — PROGRESS NOTES
Betzaida Johansen was seen today for follow-up  Diagnoses and all orders for this visit:    MDD (major depressive disorder), recurrent episode, mild (HCC)  -     buPROPion (Wellbutrin XL) 300 mg 24 hr tablet; Take 1 tablet (300 mg total) by mouth every morning    Anxiety  -     buPROPion (Wellbutrin XL) 300 mg 24 hr tablet; Take 1 tablet (300 mg total) by mouth every morning    Persistent insomnia    Visit for screening mammogram  -     Mammo screening bilateral w 3d & cad; Future    Seronegative arthropathy of multiple sites ([facility] [address] )    Post-menopause    Family history of osteoporosis  -     DXA bone density spine hip and pelvis; Future         Return in about 1 month (around 3/10/2023) for 40 minutes       Subjective:   Betzaida Johansen is a 56 y o  female here today for a follow-up on her current medical conditions:    Patient Active Problem List   Diagnosis   • Fibromyalgia   • Hypercholesteremia   • Migraine without aura and without status migrainosus, not intractable   • Insomnia   • Vitamin D deficiency   • Low ferritin   • Benign essential hypertension   • Carpal tunnel syndrome, bilateral   • Syncopal episodes   • Sleep disturbance   • COVID-19   • Class 1 obesity due to excess calories with serious comorbidity and body mass index (BMI) of 30 0 to 30 9 in adult   • Primary generalized (osteo)arthritis   • Cervical spondylosis without myelopathy   • Gastroesophageal reflux disease without esophagitis   • History of fetal loss   • Undifferentiated connective tissue disease (HCC)   • Obstructive sleep apnea-hypopnea syndrome   • Excessive daytime sleepiness   • Primary osteoarthritis of left knee   • Tendinitis of right rotator cuff   • Seronegative arthropathy of multiple sites [facility])   • History of colon polyps       Patient Care Team:  Andressa Dia DO as PCP - General (Family Medicine)  Dagoberto Anderson DO (Cardiology)  Awilda Dozier MD (Rheumatology)  Avinash Terry MD (Neurology)  Flores Isaac MD (Dermatology)    Current Medications:  Current Outpatient Medications   Medication Sig Dispense Refill   • acetaminophen (TYLENOL) 500 mg tablet Take 500 mg by mouth every 6 (six) hours as needed for mild pain     • Aspirin-Acetaminophen-Caffeine (EXCEDRIN MIGRAINE PO) Take 1 tablet by mouth as needed (migraines)     • azelastine (ASTELIN) 0 1 % nasal spray 1 spray into each nostril 2 (two) times a day Use in each nostril as directed (Patient taking differently: 1 spray into each nostril 2 (two) times a day Use in each nostril as directed PRN) 30 mL 0   • buPROPion (Wellbutrin XL) 300 mg 24 hr tablet Take 1 tablet (300 mg total) by mouth every morning 90 tablet 3   • cetirizine (ZyrTEC) 10 mg tablet Take 10 mg by mouth daily PRN     • cholecalciferol (VITAMIN D3) 1,000 units tablet Take 1,000 Units by mouth daily      • ferrous sulfate 325 (65 Fe) mg tablet Take 325 mg by mouth daily with breakfast      • lisinopril (ZESTRIL) 20 mg tablet Take 1 tablet (20 mg total) by mouth daily 90 tablet 0   • magnesium 30 MG tablet Take 60 mg by mouth in the morning Chewable     • Multiple Vitamins-Minerals (MULTIVITAMIN ADULT PO) Take 1 tablet by mouth daily     • omeprazole (PriLOSEC) 20 mg delayed release capsule Take 1 capsule (20 mg total) by mouth daily 90 capsule 1   • Riboflavin (Vitamin B-2) 100 MG TABS Take 400 mg by mouth daily     • SUMAtriptan (IMITREX) 100 mg tablet Take 1 tablet (100 mg total) by mouth once as needed for migraine May repeat x1 in 2 hours, max 2/24 hours, 9/month 9 tablet 12   • ALPRAZolam (XANAX) 0 5 mg tablet Take 1 tablet (0 5 mg total) by mouth 30 min pre-procedure Take 1 tablet p o  30 minutes prior to MR I   Continue another 1 tablet p o  5 minutes prior to MRI as well in regards to anxiety (Patient not taking: Reported on 2/10/2023) 2 tablet 0   • estradiol-norethindrone (ACTIVELLA) 1-0 5 MG per tablet Take 1 tablet by mouth daily       No current facility-administered medications for this visit        HPI:  Chief Complaint   Patient presents with   • Follow-up     2 month mood f/u     -- Above per clinical staff and reviewed  --    PHQ-2/9 Depression Screening    Little interest or pleasure in doing things: 1 - several days  Feeling down, depressed, or hopeless: 0 - not at all  Trouble falling or staying asleep, or sleeping too much: 2 - more than half the days  Feeling tired or having little energy: 1 - several days  Poor appetite or overeatin - not at all  Feeling bad about yourself - or that you are a failure or have let yourself or your family down: 0 - not at all  Trouble concentrating on things, such as reading the newspaper or watching television: 1 - several days  Moving or speaking so slowly that other people could have noticed  Or the opposite - being so fidgety or restless that you have been moving around a lot more than usual: 3 - nearly every day  Thoughts that you would be better off dead, or of hurting yourself in some way: 0 - not at all  PHQ-2 Score: 1  PHQ-2 Interpretation: Negative depression screen  PHQ-9 Score: 8   PHQ-9 Interpretation: Mild depression        MIGDALIA-7 Flowsheet Screening    Flowsheet Row Most Recent Value   Over the last 2 weeks, how often have you been bothered by any of the following problems? Feeling nervous, anxious, or on edge 1   Not being able to stop or control worrying 1   Worrying too much about different things 1   Trouble relaxing 2   Being so restless that it is hard to sit still 0   Becoming easily annoyed or irritable 2   Feeling afraid as if something awful might happen 1   MIGDALIA-7 Total Score 8           dec appt started wellbutrin to help with psychcological symptoms from pain, trauma  ?SSRI   review 2022 labs - hihg calcium resolved, vit D 69   chol 224, TG 88, HDL 58,  (stable) -ASCVD risk low 2 2%   parotid tumor  CT scan 10/2021 stable   compared to 6 months prior   home BP not accurate     lipids cmp HbA1C   Today:  ***    The following portions of the patient's history were reviewed and updated as appropriate: allergies, current medications, past family history, past medical history, past social history, past surgical history and problem list     Objective:  Vitals:  /74 (BP Location: Left arm, Patient Position: Sitting, Cuff Size: Adult)   Pulse 74   Temp 97 6 °F (36 4 °C)   Ht 5' 1" (1 549 m)   Wt 69 9 kg (154 lb)   LMP 08/31/2019 (Exact Date)   SpO2 97%   BMI 29 10 kg/m²    Wt Readings from Last 3 Encounters:   02/10/23 69 9 kg (154 lb)   01/23/23 70 8 kg (156 lb)   01/16/23 70 8 kg (156 lb)      BP Readings from Last 3 Encounters:   02/10/23 110/74   01/23/23 141/90   01/16/23 126/74        Review of Systems   She has no other concerns  No unexpected weight changes  No chest pain, SOB, or palpitations  No GERD  No changes in bowels or bladder  Sleeping well  No mood changes  ***    Physical Exam   Constitutional:  she appears well-developed and well-nourished  HENT: Head: Normocephalic  Neck: Neck supple  Cardiovascular: Normal rate, regular rhythm and normal heart sounds  Pulmonary/Chest: Effort normal and breath sounds normal  No wheezes, rales, or rhonchi  Abdominal: Soft  Bowel sounds are normal  There is no tenderness  No hepatosplenomegaly  Musculoskeletal: she exhibits no edema  Lymphadenopathy: she has no cervical adenopathy  Neurological: she is alert and oriented to person, place, and time  Skin: Skin is warm and dry  Psychiatric: she has a normal mood and affect   her behavior is normal  Thought content normal

## 2023-02-13 ENCOUNTER — TELEPHONE (OUTPATIENT)
Dept: ADMINISTRATIVE | Facility: OTHER | Age: 58
End: 2023-02-13

## 2023-02-13 NOTE — TELEPHONE ENCOUNTER
----- Message from Ashley Arndt DO sent at 2/10/2023  7:37 PM EST -----  Regarding: Care Gap from [facility]   I have found documentation regarding Sandra Huntmason within Care Everywhere (CE) Activity  Please link and update the Patient's chart  Thank You  In Care Everywhere:  7/14/22 [facility]     Thank you!

## 2023-02-17 ENCOUNTER — OFFICE VISIT (OUTPATIENT)
Dept: NEUROLOGY | Facility: CLINIC | Age: 58
End: 2023-02-17

## 2023-02-17 VITALS
SYSTOLIC BLOOD PRESSURE: 130 MMHG | HEART RATE: 88 BPM | WEIGHT: 148 LBS | BODY MASS INDEX: 27.94 KG/M2 | TEMPERATURE: 96.9 F | DIASTOLIC BLOOD PRESSURE: 78 MMHG | HEIGHT: 61 IN

## 2023-02-17 DIAGNOSIS — G43.709 CHRONIC MIGRAINE WITHOUT AURA WITHOUT STATUS MIGRAINOSUS, NOT INTRACTABLE: Primary | ICD-10-CM

## 2023-02-17 RX ORDER — FREMANEZUMAB-VFRM 225 MG/1.5ML
INJECTION SUBCUTANEOUS
Qty: 3 ML | Refills: 4 | Status: SHIPPED | OUTPATIENT
Start: 2023-02-17

## 2023-02-17 NOTE — PROGRESS NOTES
Review of Systems   Constitutional: Negative for appetite change and fever  HENT: Positive for tinnitus  Negative for hearing loss, trouble swallowing and voice change  Eyes: Negative  Negative for photophobia and pain  Respiratory: Negative  Negative for shortness of breath  Cardiovascular: Negative  Negative for palpitations  Gastrointestinal: Negative  Negative for nausea and vomiting  Endocrine: Negative  Negative for cold intolerance  Genitourinary: Negative  Negative for dysuria, frequency and urgency  Musculoskeletal: Negative  Negative for myalgias and neck pain  Skin: Negative  Negative for rash  Neurological: Positive for headaches  Negative for dizziness, tremors, seizures, syncope, facial asymmetry, speech difficulty, weakness, light-headedness and numbness  Hematological: Negative  Does not bruise/bleed easily  Psychiatric/Behavioral: Positive for sleep disturbance  Negative for confusion and hallucinations  All other systems reviewed and are negative

## 2023-02-17 NOTE — PROGRESS NOTES
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) followed in ENT referred here for evaluation of headache  My initial evaluation 2021    Chronic migraine with and without aura without status migrainosus, not intractable  She reports a long history of headaches and migraines dating back to age 15  She reports the pain is typically bilateral and various locations as discussed in HPI  She reports sometimes she has a possible aura and otherwise has typical associated migrainous features without significant autonomic features  - as of 2021: chronic daily headaches for years, severe migraines 1-2 times a month that don't even respond to sumatriptan, migraines 10 days a month  Trial of emgality for prevention  Continue sumatriptan for abortive  - as of 2021: She reports headaches have improved to 3-4 milder migraines per week on emgality, Also severity of migraines has significantly improved as well, no longer preventing her from working  Trial of nurtec for abortive to see if this works better than sumatriptan  - as of 2022: She self discontinued emgality about  or Feb, but still feels like headaches and migraines are better, daily milder headaches she feels is related her fibro, worse migraines once every 3 weeks  Sleep study pending   Would consider botox next if she would like a alternative prescription preventative  Sumatriptan for abortive  - as of 8/15/2022: She was found to have moderate VIKAS and is struggling to tolerate CPAP  Headaches have improved from daily to a few times a week and worse migraines about 5-6 a month that respond to sumatriptan  Severity decreased as well, once every couple months is worse only now  - as of 2/17/2023: She reports headaches have been daily for the past month and prior to that 2-3 times a week in the setting of untreated moderate VIKAS and she would like to add trial of ajovy  We discussed if this does not help enough we could add trial of topiramate as this can sometimes help more in the setting of untreated VIKAS  Continue sumatriptan for migraine rescue  Workup:  - MRI brain with without contrast 10/07/2021:  1  No acute infarction, intracranial hemorrhage or enhancing mass lesion  2   Small, 1 1 cm arachnoid cyst in the right cerebellopontine angle cistern  Preventative:  - we discussed headache hygiene and lifestyle factors that may improve headaches  -   Trial of  fremanezumab-vfrm (Ajovy) 225 MG/1 5ML auto-injector; Inject every 28 days subcutaneously (3 month supply) and we discussed if this is well-tolerated and she has wearing off effect or if she would prefer to do 3 shots in 1 month we can switch to 3 shots every 3 months  Discussed proper use, possible side effects and risks  - on through other providers: lisinopril 20 mg, wellbutrin recently increased to 300 mg, magnesium and riboflavin   -  She is not interested in prescription preventative at this time    Discussed supplements she could try   - Past/ failed/contraindicated:   Gabapentin, amitriptyline, propranolol, lisinopril, emgality seemed to help initially and then she felt less so and anxiety provoking painful shot and self discontinued 1/2022, Hydrochlorothiazide, duloxetine , wellbutrin  - future options: alternative CGRP, botox     Abortive:  - discussed not taking over-the-counter or prescription pain medications more than 3 days per week to prevent medication overuse/rebound headache  - sumatriptan 100 mg  Discussed proper use, possible side effects and risks  - she is currently on through other providers:  alprazolam/xanax prn anxiety, meloxicam,  - Past/ failed/contraindicated: fioricet, Fiorinal, ubrelvy, nurtec, decadron for 5 days did not seem to help, tizanidine, naproxen  - future options: Alternative Triptan,  prochlorperazine, Toradol IM or p o , could consider trial for 5 days of Depakote 4 prolonged migraine,  reyvow      Patient instructions      Try every way you can to treat the sleep apnea   Generally we do not recommend estrogen in the setting of possible migraine aura, due to stroke risk     Headache/migraine treatment:   Abortive medications (for immediate treatment of a headache): It is ok to take ibuprofen, acetaminophen or naproxen (Advil, Tylenol,  Aleve, Excedrin) if they help your headaches you should limit these to No more than 3 times a week to avoid medication overuse/rebound headaches  For your more moderate to severe migraines take this medication early   Sumatriptan/imitrex 100mg tabs - take one at the onset of headache  May repeat one time after 1-2 hours if pain has not resolved  (Max 2 a day and 9 a month)       Over the counter preventive supplements for headaches/migraines (if you try, try for 3 months straight)  (to take every day to help prevent headaches - not to take at the time of headache):   There are combo pills online of these - none of which regulated by FDA and double check dosing - take appropriate dose only once a day- preventa migraine, migravent, mind ease, migrelief   [] Magnesium 400mg daily (If any diarrhea or upset stomach, decrease dose  as tolerated)  [] Riboflavin (Vitamin B2) 400mg daily - try online   (FYI B2 may make your urine bright/neon yellow)  AND/OR  [] Herbal medication: Petasites/Butterbur 150 mg daily - try online  (When choosing your Butterbur online or in the store, beware that there are some poor preps containing pyrrolizidine alkaloids (PAs) that can be harmful to the liver  Therefore, do not use butterbur products that are not labeled as PA-free )      Prescription preventive medications for headaches/migraines   (to take every day to help prevent headaches - not to take at the time of headache):  [x] ajovy -   -     fremanezumab-vfrm (Ajovy) 225 MG/1 5ML auto-injector; Inject every 28 days subcutaneously (3 month supply)    If needed there is a coupon card for the copay at [employer]  com     READ INSTRUCTIONS that come with the medication  REFRIGERATE  Keep out of direct sunlight  Prior to administration, allow to come to room temperature for 30 minutes  Do not warm using a heat source (eg, microwave or hot water)  Do not shake  Administer in preferably abdomen (avoiding 2 inches around the navel), thigh, upper arm, or buttocks avoiding areas of skin that are tender, bruised, red or hard  Deliver entire contents of single-use prefilled pen or syringe  Unknown impact in pregnancy therefore would recommend stopping 6 months prior to considering pregnancy  *Typically these types of medications take time untill you see the benefit, although some may see improvement in days, often it may take weeks, especially if the medication is being titrated up to a beneficial level  Please contact us if there are any concerns or questions regarding the medication  Lifestyle Recommendations:  [x] SLEEP - Maintain a regular sleep schedule: Adults need at least 7-8 hours of uninterrupted a night  Maintain good sleep hygiene:  Going to bed and waking up at consistent times, avoiding excessive daytime naps, avoiding caffeinated beverages in the evening, avoid excessive stimulation in the evening and generally using bed primarily for sleeping    One hour before bedtime would recommend turning lights down lower, decreasing your activity (may read quietly, listen to music at a low volume)  When you get into bed, should eliminate all technology (no texting, emailing, playing with your phone, iPad or tablet in bed)  [x] HYDRATION - Maintain good hydration  Drink  2L of fluid a day (4 typical small water bottles)  [x] DIET - Maintain good nutrition  In particular don't skip meals and try and eat healthy balanced meals regularly  [x] TRIGGERS - Look for other triggers and avoid them: Limit caffeine to 1-2 cups a day or less  Avoid dietary triggers that you have noticed bring on your headaches (this could include aged cheese, peanuts, MSG, aspartame and nitrates)  [x] EXERCISE - physical exercise as we all know is good for you in many ways, and not only is good for your heart, but also is beneficial for your mental health, cognitive health and  chronic pain/headaches  I would encourage at the least 5 days of physical exercise weekly for at least 30 minutes  Education and Follow-up  [x] Please call with any questions or concerns  Of course if any new concerning symptoms go to the emergency department  [x] Follow up 3-4 months, sooner if needed       CC: We had the pleasure of evaluating Vinh Robison in neurological consultation today  Vinh Roibson is a   right handed female who presents today for evaluation of headaches  History obtained from patient as well as available medical record review    History of Present Illness:   Interval history as of 2/17/2023  - denies any new or concerning neurologic symptoms since last visit   - sleep study in June - in lab, Moderate 16, nasal - not treating now but plans on it   - Anxiety bad as it is and had a lot with the mask    Headaches and migraines   Daily for the last month, sinus region, prior to that 2-3 times a week     Preventative: none  - on through other providers: lisinopril 20 mg, alprazolam/xanax prn anxiety, meloxicam, wellbutrin added and increased to 300 mg, magnesium and riboflavin     Abortive: sumatriptan 100 mg, 9 tabs in 4-6 weeks, 600 mg ibuprofen and coke  nurtec did not work as well   Denies bothersome side effects     Interval history as of 8/15/2022  - denies any new or concerning neurologic symptoms since last visit  - saw sleep medicine 04/29/2022 - dx with moderate VIKAS - 18 - still trying to get used to it - planning wedding   - following with cardiology and had a few episodes of vasovagal syncope on the toilet while also having N/V, discussed safety precautions, no safety issues driving  - on estrogen for menopause now through OB and we discussed not recommended in setting of migraine aura     Headaches and migraines   She reports improvement in headaches from daily, to a few times a week and worse migraines about 5-6 a month that respond to sumatriptan, that may be related to weather changes   Severity decreased as well, once every couple months is worse only now   Can go months without a bad one at times     Preventative:   - B2, ran out and going to refill   - on through other providers: lisinopril, alprazolam prn anxiety, meloxicam     Abortive:   Sumatriptan 100 mg    Interval history as of 4/1/2022   - denies any new or concerning neurologic symptoms since last visit   - 5 hours max broken, occasional word finding difficulty, feels like she talks tangentially about things and wonders if she has ADHD to begin with, stepped down from primary job,  Follow-up with sleep medicine scheduled 04/29/2022    Headaches and migraines   - she had been doing well on emgality, although in January wrote a message stating headaches were more frequent and significant 2-3 days per week after COVID early December 2021, Decadron trial sent  - severity of migraines have decreased, still mild daily headaches that she feels is fibromyalgia is causing and plans on making some dietary changes to improve this  - today daily still, but worse once every 3 weeks  - weather changes can def trigger, still dealing with menopause   - drinking more water, cut back on caffeine  - starting aquatic PT soon   - entire scalp  hurts at times  - typically pain with headaches is diffuse and every location     Preventative:   - emgality - stopped in Jan or Feb because she did not feel like it was making a difference anymore and deductible at new year was going to be 450   - since last visit discontinued duloxetine and HCTZ     Abortive:   - exedrin with allergy med first   Trial of nurtec - did not help  Sumatriptan  - Decadron 2 mg daily for 1-5 days sent 01/12/2022 for migraine - did not feel like it helped    Interval history as of 11/1/2021  - denies any new or concerning neurologic symptoms since last visit    - Zohreh Garduno 2022 apmt with sleep   - MRI brain with without contrast 10/07/2021:  1  No acute infarction, intracranial hemorrhage or enhancing mass lesion  2   Small, 1 1 cm arachnoid cyst in the right cerebellopontine angle cistern  Headaches and migraines   Headaches have improved to 3-4 per week   Also severity of migraines has significantly improved  Also dealing with fibromyalgia     Preventative: Trial of emgality started July or August, just had 4th shot -   Denies bothersome side effects      Abortive: sumatriptan 100 mg - often works     History as of initial visit 5/17/21  She has seen my neurology colleague Dr Chio Montano on 03/27/2020 for migraines, vasovagal syncope  -  He ordered MRI brain,  Referral to sleep medicine for insomnia, started gabapentin for prevention, sumatriptan for abortive,  Sleep-deprived EEG 05/01/2020 normal      Headaches started at what age?  14 yo   How often do the headaches occur?   - waxing and waned during life, better during pregnancy  -  As of 03/27/2020:  1-2 significant episodes weekly  - as of 5/17/2021: chronic daily headaches for years, severe migraines 1-2 times a month that dont respond to sumatriptan, migraines 10 days a month   What time of the day do the headaches start? Not there when she wakes up usually (they are there 10/30 morning) Start in am and worse by mid afternoon   How long do the headaches last? All day   Are you ever headache free? Yes    Aura? Sometimes with   - handwriting less than an hour prior        Last eye exam: around 5/2020 - nothing going on except for some macular degeneration     Where is your headache located and pain quality?  - bitemporal   - under sinus  - in front of ears  - bioccipital  - rarely apex  - usually bilateral  - rarely unilateral   -throbbing, tight band, pressure, achy, shooting, dull, stabbing   What is the intensity of pain? Average: 7-8/10, worst 10/10  Associated symptoms:   [x] Nausea       [x] Vomiting        [] Diarrhea  [x] Stiff or sore neck   [x] Problems with concentration  [x] Photophobia - not too much     [x]Phonophobia  - not too much     [x] Osmophobia  [x] Light-headed or dizzy       [] Ptosis      [] Facial droop  [] Lacrimation  [x] Nasal congestion    Number of days missed per month because of headaches:  Social or Family activities: 2-3     Things that make the headache worse? No specific movements, any movement     Headache triggers:   weather changes, alcohol, stress, missing meals, fatigue    Have you seen someone else for headaches or pain? Yes, ENT, neurology Dr Kalina Sarmiento, pain management  Have you had trigger point injection performed and how often? No  Have you had Botox injection performed and how often? No   Have you had epidural injections or transforaminal injections performed? No  Are you current pregnant or planning on getting pregnant? Tubes tied, irregular   Have you ever had any Brain imaging? Unknown    What medications do you take or have you taken for your headaches?    ABORTIVE:    OTC medications have been ineffective     exedrin migraine   sumatriptan 100 mg- helps usually, rarely     Past:  fioricet and fiorinal   ED - migraine cocktails - sometimes dont work Bree Amelie  Piercetriptsidra     PREVENTIVE:      riboflavin 400 mg, magnesium 400 mg   for BP:  Lisinopril, hydrochlorothiazide    Past/ failed/contraindicated:  Gabapentin up to twice a day about 3 months - can not remember how high, didn't work   Amitriptyline -   Duloxetine - side effects  Propranolol - side effects hallucinating       Alternative therapies used in the past for headaches? Massage     LIFESTYLE  Sleep   - averages: 4-6 hours   Problems falling asleep?:   Yes  Problems staying asleep?:  Yes, 2-3, body hurts   - never had a sleep study  - snores     Water: trying to drink 1-2 bottles, forgets   Caffeine: 12- 24 oz per day - not daily     Mood:   Denies history of anxiety or depression or other diagnosed mood disorder      Right arm>Left arm paresthesias for years - entire hand   - EMG 12/13/2019  Ordered for 6 months of bilateral  Hand numbness, right greater than left   demonstrated mild carpal tunnel syndrome on the left  - plans to see rheumatology to discuss other etiologies or fibro  - plans to follow up with pain medicine     The following portions of the patient's history were reviewed and updated as appropriate: allergies, current medications, past family history, past medical history, past social history, past surgical history and problem list     Pertinent family history:  Family history of headaches:  migraine headaches in mother, grandmother and probably sons  Any family history of aneurysms - No    Pertinent social history:  Work: admin - [employer]   Education: associates degree   Lives with 2-3 children   Total 5 boys, 1 girl  ( lives in [place] with some of the kids)    Illicit Drugs: denies  Alcohol/tobacco: Denies tobacco use, alcohol intake: social drinker    Past Medical History:     Past Medical History:   Diagnosis Date   • Allergic 02/01/2020   • BMI 30 0-30 9,adult    • Carpal tunnel syndrome on right    • Cervical spondylosis without myelopathy    • COVID 12/01/2021   • Fibromyalgia    • Fibromyalgia, primary    • GERD (gastroesophageal reflux disease)    • History of fetal loss     Recurrent   • Hyperlipidemia    • Hypertension    • Migraine    • Neck pain    • Osteoarthritis     lumbar spine   • Sicca syndrome (HCC)    • Syncopal episodes     with severe migraines   • Undifferentiated connective tissue disease ([facility] )        Patient Active Problem List   Diagnosis   • Fibromyalgia   • Hypercholesteremia   • Migraine without aura and without status migrainosus, not intractable   • Insomnia   • Vitamin D deficiency   • Low ferritin   • Benign essential hypertension   • Carpal tunnel syndrome, bilateral   • Syncopal episodes   • Sleep disturbance   • COVID-19   • Overweight with body mass index (BMI) of 29 to 29 9 in adult   • Primary generalized (osteo)arthritis   • Cervical spondylosis without myelopathy   • Gastroesophageal reflux disease without esophagitis   • History of fetal loss   • Undifferentiated connective tissue disease (HCC)   • Obstructive sleep apnea-hypopnea syndrome   • Excessive daytime sleepiness   • Primary osteoarthritis of left knee   • Tendinitis of right rotator cuff   • Seronegative arthropathy of multiple sites ([place] [address] )   • History of colon polyps       Medications:      Current Outpatient Medications   Medication Sig Dispense Refill   • acetaminophen (TYLENOL) 500 mg tablet Take 500 mg by mouth every 6 (six) hours as needed for mild pain     • ALPRAZolam (XANAX) 0 5 mg tablet Take 1 tablet (0 5 mg total) by mouth 30 min pre-procedure Take 1 tablet p o  30 minutes prior to MR I   Continue another 1 tablet p o  5 minutes prior to MRI as well in regards to anxiety 2 tablet 0   • Aspirin-Acetaminophen-Caffeine (EXCEDRIN MIGRAINE PO) Take 1 tablet by mouth as needed (migraines)     • buPROPion (Wellbutrin XL) 300 mg 24 hr tablet Take 1 tablet (300 mg total) by mouth every morning 90 tablet 3   • cetirizine (ZyrTEC) 10 mg tablet Take 10 mg by mouth daily PRN     • cholecalciferol (VITAMIN D3) 1,000 units tablet Take 1,000 Units by mouth daily      • ferrous sulfate 325 (65 Fe) mg tablet Take 325 mg by mouth daily with breakfast      • fremanezumab-vfrm (Ajovy) 225 MG/1 5ML auto-injector Inject every 28 days subcutaneously (3 month supply) 3 mL 4   • lisinopril (ZESTRIL) 20 mg tablet Take 1 tablet (20 mg total) by mouth daily 90 tablet 0   • magnesium 30 MG tablet Take 400 mg by mouth 2 (two) times a day Chewable     • Multiple Vitamins-Minerals (MULTIVITAMIN ADULT PO) Take 1 tablet by mouth daily     • omeprazole (PriLOSEC) 20 mg delayed release capsule Take 1 capsule (20 mg total) by mouth daily 90 capsule 1   • Riboflavin (Vitamin B-2) 100 MG TABS Take 200 mg by mouth daily     • SUMAtriptan (IMITREX) 100 mg tablet Take 1 tablet (100 mg total) by mouth once as needed for migraine May repeat x1 in 2 hours, max 2/24 hours, 9/month 9 tablet 12     No current facility-administered medications for this visit  Allergies:       Allergies   Allergen Reactions   • Propranolol Other (See Comments)     Halluncinations   • Raspberry - Food Allergy Allergic Rhinitis, Itching and Nasal Congestion   • Latex Rash       Family History:     Family History   Problem Relation Age of Onset   • MORGAN disease Mother    • Arthritis Mother    • Depression Mother    • Hypertension Mother    • Heart attack Father 79   • Diabetes Father    • Prostate cancer Father 79   • Hypertension Father    • Arthritis Father    • Stroke Father    • Lupus Sister    • Raynaud syndrome Sister    • Sjogren's syndrome Sister    • Breast cancer Maternal Grandmother 79   • Arthritis Maternal Grandmother    • Cancer Maternal Grandmother    • Alcohol abuse Paternal Grandfather    • No Known Problems Daughter    • Prostate cancer Maternal Grandfather 79   • Arthritis Maternal Grandfather    • Cancer Maternal Grandfather    • Colon cancer Paternal Grandmother [de-identified]   • Depression Sister    • No Known Problems Brother    • No Known Problems Son    • No Known Problems Son    • No Known Problems Son    • No Known Problems Son    • No Known Problems Son    • No Known Problems Maternal Uncle    • No Known Problems Paternal Aunt    • No Known Problems Paternal Uncle        Social History:     Social History     Socioeconomic History   • Marital status: /Civil Union     Spouse name: Not on file   • Number of children: 6   • Years of education: Not on file   • Highest education level: Not on file   Occupational History   • Occupation: Administration   Tobacco Use   • Smoking status: Never   • Smokeless tobacco: Never   Vaping Use   • Vaping Use: Never used   Substance and Sexual Activity   • Alcohol use: Not Currently     Comment: rarely   • Drug use: Never   • Sexual activity: Yes     Partners: Male     Birth control/protection: Female Sterilization   Other Topics Concern   • Not on file   Social History Narrative    6 kids 25, 25, 23, 16, 15, 8     Moved from [place] for husbands job - [employer] for  at [employer] with spouse     Social Determinants of Health     Financial Resource Strain: Not on file   Food Insecurity: Not on file   Transportation Needs: Not on file   Physical Activity: Not on file   Stress: Not on file   Social Connections: Not on file   Intimate Partner Violence: Not on file   Housing Stability: Not on file         Objective:       Physical Exam:                                                                 Vitals:            Constitutional:    /78 (BP Location: Left arm, Patient Position: Sitting, Cuff Size: Standard)   Pulse 88   Temp (!) 96 9 °F (36 1 °C) (Tympanic)   Ht 5' 1" (1 549 m)   Wt 67 1 kg (148 lb)   LMP 08/31/2019 (Exact Date)   BMI 27 96 kg/m²   BP Readings from Last 3 Encounters:   02/17/23 130/78   02/10/23 110/74   01/23/23 141/90     Pulse Readings from Last 3 Encounters:   02/17/23 88   02/10/23 74   01/23/23 88         Well developed, well nourished, well groomed  Psychiatric:  Normal behavior and appropriate affect        Neurological Examination:     Mental status/cognitive function:   Recent and remote memory appear intact  Attention span and concentration as well as fund of knowledge are appropriate for age  Normal language and spontaneous speech  Cranial Nerves:   VII-facial expression symmetric  Motor Exam: symmetric bulk throughout  no atrophy, fasciculations or abnormal movements noted  Coordination:  no apparent dysmetria, ataxia or tremor noted  Gait: steady casual gait        Pertinent lab results:   See EMR for recent labs  6/17/20: CMP unremarkable  Vit D 41 1  4/8/20: CBC unremarkable, B12 836  TSH normal      Normal stress test - 12/17/19     Imaging: I have personally reviewed imaging and radiology read   - MRI brain with without contrast 10/07/2021:  1   No acute infarction, intracranial hemorrhage or enhancing mass lesion  2   Small, 1 1 cm arachnoid cyst in the right cerebellopontine angle cistern      Sleep-deprived EEG 05/01/2020 normal  Review of Systems:   ROS obtained by medical assistant Personally reviewed and updated if indicated  I recommended PCP follow up for non neurologic problems  Review of Systems   Constitutional: Negative for appetite change and fever  HENT: Positive for tinnitus  Negative for hearing loss, trouble swallowing and voice change  Eyes: Negative  Negative for photophobia and pain  Respiratory: Negative  Negative for shortness of breath  Cardiovascular: Negative  Negative for palpitations  Gastrointestinal: Negative  Negative for nausea and vomiting  Endocrine: Negative  Negative for cold intolerance  Genitourinary: Negative  Negative for dysuria, frequency and urgency  Musculoskeletal: Negative  Negative for myalgias and neck pain  Skin: Negative  Negative for rash  Neurological: Positive for headaches   Negative for dizziness, tremors, seizures, syncope, facial asymmetry, speech difficulty, weakness, light-headedness and numbness  Hematological: Negative  Does not bruise/bleed easily  Psychiatric/Behavioral: Positive for sleep disturbance  Negative for confusion and hallucinations  All other systems reviewed and are negative  I have spent 26 minutes with Patient  today In which greater than 50% of this time was spent in counseling/coordination of care regarding Diagnostic results, Prognosis, Risks and benefits of tx options, Instructions for management, Importance of tx compliance, Risk factor reductions, Impressions, Counseling / Coordination of care, Documenting in the medical record, Reviewing / ordering tests, medicine, procedures   and Obtaining or reviewing history    I also spent 15 minutes non face to face for this patient the same day         Author:  Sha Lo MD 2/17/2023 11:06 AM

## 2023-02-17 NOTE — PATIENT INSTRUCTIONS
Try every way you can to treat the sleep apnea   Generally we do not recommend estrogen in the setting of possible migraine aura, due to stroke risk     Headache/migraine treatment:   Abortive medications (for immediate treatment of a headache): It is ok to take ibuprofen, acetaminophen or naproxen (Advil, Tylenol,  Aleve, Excedrin) if they help your headaches you should limit these to No more than 3 times a week to avoid medication overuse/rebound headaches  For your more moderate to severe migraines take this medication early   Sumatriptan/imitrex 100mg tabs - take one at the onset of headache  May repeat one time after 1-2 hours if pain has not resolved  (Max 2 a day and 9 a month)       Over the counter preventive supplements for headaches/migraines (if you try, try for 3 months straight)  (to take every day to help prevent headaches - not to take at the time of headache): There are combo pills online of these - none of which regulated by FDA and double check dosing - take appropriate dose only once a day- preventa migraine, migravent, mind ease, migrelief   [] Magnesium 400mg daily (If any diarrhea or upset stomach, decrease dose  as tolerated)  [] Riboflavin (Vitamin B2) 400mg daily - try online   (FYI B2 may make your urine bright/neon yellow)  AND/OR  [] Herbal medication: Petasites/Butterbur 150 mg daily - try online  (When choosing your Butterbur online or in the store, beware that there are some poor preps containing pyrrolizidine alkaloids (PAs) that can be harmful to the liver  Therefore, do not use butterbur products that are not labeled as PA-free )      Prescription preventive medications for headaches/migraines   (to take every day to help prevent headaches - not to take at the time of headache):  [x] ajovy -   -     fremanezumab-vfrm (Ajovy) 225 MG/1 5ML auto-injector; Inject every 28 days subcutaneously (3 month supply)    If needed there is a coupon card for the copay at [employer] READ INSTRUCTIONS that come with the medication  REFRIGERATE  Keep out of direct sunlight  Prior to administration, allow to come to room temperature for 30 minutes  Do not warm using a heat source (eg, microwave or hot water)  Do not shake  Administer in preferably abdomen (avoiding 2 inches around the navel), thigh, upper arm, or buttocks avoiding areas of skin that are tender, bruised, red or hard  Deliver entire contents of single-use prefilled pen or syringe  Unknown impact in pregnancy therefore would recommend stopping 6 months prior to considering pregnancy  *Typically these types of medications take time untill you see the benefit, although some may see improvement in days, often it may take weeks, especially if the medication is being titrated up to a beneficial level  Please contact us if there are any concerns or questions regarding the medication  Lifestyle Recommendations:  [x] SLEEP - Maintain a regular sleep schedule: Adults need at least 7-8 hours of uninterrupted a night  Maintain good sleep hygiene:  Going to bed and waking up at consistent times, avoiding excessive daytime naps, avoiding caffeinated beverages in the evening, avoid excessive stimulation in the evening and generally using bed primarily for sleeping  One hour before bedtime would recommend turning lights down lower, decreasing your activity (may read quietly, listen to music at a low volume)  When you get into bed, should eliminate all technology (no texting, emailing, playing with your phone, iPad or tablet in bed)  [x] HYDRATION - Maintain good hydration  Drink  2L of fluid a day (4 typical small water bottles)  [x] DIET - Maintain good nutrition  In particular don't skip meals and try and eat healthy balanced meals regularly  [x] TRIGGERS - Look for other triggers and avoid them: Limit caffeine to 1-2 cups a day or less   Avoid dietary triggers that you have noticed bring on your headaches (this could include aged cheese, peanuts, MSG, aspartame and nitrates)  [x] EXERCISE - physical exercise as we all know is good for you in many ways, and not only is good for your heart, but also is beneficial for your mental health, cognitive health and  chronic pain/headaches  I would encourage at the least 5 days of physical exercise weekly for at least 30 minutes  Education and Follow-up  [x] Please call with any questions or concerns  Of course if any new concerning symptoms go to the emergency department    [x] Follow up 3-4 months, sooner if needed

## 2023-02-24 ENCOUNTER — APPOINTMENT (OUTPATIENT)
Dept: LAB | Facility: MEDICAL CENTER | Age: 58
End: 2023-02-24

## 2023-02-24 ENCOUNTER — OFFICE VISIT (OUTPATIENT)
Dept: OBGYN CLINIC | Facility: MEDICAL CENTER | Age: 58
End: 2023-02-24

## 2023-02-24 VITALS
HEART RATE: 82 BPM | HEIGHT: 61 IN | DIASTOLIC BLOOD PRESSURE: 78 MMHG | SYSTOLIC BLOOD PRESSURE: 126 MMHG | BODY MASS INDEX: 28.89 KG/M2 | WEIGHT: 153 LBS

## 2023-02-24 DIAGNOSIS — E78.00 HYPERCHOLESTEREMIA: ICD-10-CM

## 2023-02-24 DIAGNOSIS — M35.9 UNDIFFERENTIATED CONNECTIVE TISSUE DISEASE (HCC): ICD-10-CM

## 2023-02-24 DIAGNOSIS — M17.12 PRIMARY OSTEOARTHRITIS OF LEFT KNEE: Primary | ICD-10-CM

## 2023-02-24 DIAGNOSIS — R79.0 LOW FERRITIN: ICD-10-CM

## 2023-02-24 DIAGNOSIS — I10 BENIGN ESSENTIAL HYPERTENSION: ICD-10-CM

## 2023-02-24 DIAGNOSIS — R73.09 ELEVATED GLUCOSE: ICD-10-CM

## 2023-02-24 LAB
ALBUMIN SERPL BCP-MCNC: 4.3 G/DL (ref 3.5–5)
ALP SERPL-CCNC: 63 U/L (ref 46–116)
ALT SERPL W P-5'-P-CCNC: 26 U/L (ref 12–78)
ANION GAP SERPL CALCULATED.3IONS-SCNC: 0 MMOL/L (ref 4–13)
AST SERPL W P-5'-P-CCNC: 14 U/L (ref 5–45)
BASOPHILS # BLD AUTO: 0.04 THOUSANDS/ÂΜL (ref 0–0.1)
BASOPHILS NFR BLD AUTO: 1 % (ref 0–1)
BILIRUB SERPL-MCNC: 0.58 MG/DL (ref 0.2–1)
BUN SERPL-MCNC: 16 MG/DL (ref 5–25)
C3 SERPL-MCNC: 146 MG/DL (ref 90–180)
C4 SERPL-MCNC: 39 MG/DL (ref 10–40)
CALCIUM SERPL-MCNC: 10.4 MG/DL (ref 8.3–10.1)
CHLORIDE SERPL-SCNC: 107 MMOL/L (ref 96–108)
CHOLEST SERPL-MCNC: 246 MG/DL
CO2 SERPL-SCNC: 31 MMOL/L (ref 21–32)
CREAT SERPL-MCNC: 0.81 MG/DL (ref 0.6–1.3)
CRP SERPL QL: <3 MG/L
EOSINOPHIL # BLD AUTO: 0.17 THOUSAND/ÂΜL (ref 0–0.61)
EOSINOPHIL NFR BLD AUTO: 3 % (ref 0–6)
ERYTHROCYTE [DISTWIDTH] IN BLOOD BY AUTOMATED COUNT: 12.3 % (ref 11.6–15.1)
EST. AVERAGE GLUCOSE BLD GHB EST-MCNC: 103 MG/DL
FERRITIN SERPL-MCNC: 108 NG/ML (ref 8–388)
GFR SERPL CREATININE-BSD FRML MDRD: 82 ML/MIN/1.73SQ M
GLUCOSE P FAST SERPL-MCNC: 101 MG/DL (ref 65–99)
HBA1C MFR BLD: 5.2 %
HCT VFR BLD AUTO: 47.4 % (ref 34.8–46.1)
HDLC SERPL-MCNC: 58 MG/DL
HGB BLD-MCNC: 15.6 G/DL (ref 11.5–15.4)
IMM GRANULOCYTES # BLD AUTO: 0.01 THOUSAND/UL (ref 0–0.2)
IMM GRANULOCYTES NFR BLD AUTO: 0 % (ref 0–2)
IRON SATN MFR SERPL: 36 % (ref 15–50)
IRON SERPL-MCNC: 105 UG/DL (ref 50–170)
LDLC SERPL CALC-MCNC: 164 MG/DL (ref 0–100)
LYMPHOCYTES # BLD AUTO: 1.83 THOUSANDS/ÂΜL (ref 0.6–4.47)
LYMPHOCYTES NFR BLD AUTO: 27 % (ref 14–44)
MCH RBC QN AUTO: 28.4 PG (ref 26.8–34.3)
MCHC RBC AUTO-ENTMCNC: 32.9 G/DL (ref 31.4–37.4)
MCV RBC AUTO: 86 FL (ref 82–98)
MONOCYTES # BLD AUTO: 0.55 THOUSAND/ÂΜL (ref 0.17–1.22)
MONOCYTES NFR BLD AUTO: 8 % (ref 4–12)
NEUTROPHILS # BLD AUTO: 4.09 THOUSANDS/ÂΜL (ref 1.85–7.62)
NEUTS SEG NFR BLD AUTO: 61 % (ref 43–75)
NONHDLC SERPL-MCNC: 188 MG/DL
NRBC BLD AUTO-RTO: 0 /100 WBCS
PLATELET # BLD AUTO: 274 THOUSANDS/UL (ref 149–390)
PMV BLD AUTO: 10.1 FL (ref 8.9–12.7)
POTASSIUM SERPL-SCNC: 4.4 MMOL/L (ref 3.5–5.3)
PROT SERPL-MCNC: 7.6 G/DL (ref 6.4–8.4)
RBC # BLD AUTO: 5.5 MILLION/UL (ref 3.81–5.12)
SODIUM SERPL-SCNC: 138 MMOL/L (ref 135–147)
TIBC SERPL-MCNC: 291 UG/DL (ref 250–450)
TRIGL SERPL-MCNC: 121 MG/DL
WBC # BLD AUTO: 6.69 THOUSAND/UL (ref 4.31–10.16)

## 2023-02-24 RX ORDER — CEFAZOLIN SODIUM 1 G/50ML
1000 SOLUTION INTRAVENOUS ONCE
OUTPATIENT
Start: 2023-02-24 | End: 2023-02-24

## 2023-02-24 RX ORDER — CHLORHEXIDINE GLUCONATE 0.12 MG/ML
15 RINSE ORAL ONCE
OUTPATIENT
Start: 2023-02-24 | End: 2023-02-24

## 2023-02-24 NOTE — PROGRESS NOTES
Orthopaedic Surgery - Office Note  Cam Chi (78 y o  female)   : 1965   MRN: [id number]  Encounter Date: 2023    Chief Complaint   Patient presents with   • Left Knee - Pain, Follow-up       Assessment / Plan  Left knee: osteoarthritis    · The diagnosis and treatment options were reviewed  · The patient wishes to proceed with left total knee arthroplasty vs partial knee arthroplasty  · The risks, benefits, and alternatives were discussed  · Written consent was obtained  · Patient instructed to continue her HEP  · Patient was questioning if she needed allergy testing for nickel prior to surgery as her brother is having an issue his implant  I do not feel it is necessary as she had never had an issue with jewelry however if she wants testing I would be happy to order it  Return for Post op  History of Present Illness  Cam Chi is a 56 y o  female who presents for follow up left knee osteoarthritis  Discussed treatment options at the last visit  Patient wanted to hold on treatment at that time and think about her treatment options  Patient would like to proceed with a total knee arthroplasty  Patient has tried cortisone steroid injections, VISCO (no relief) and PT (no relief)    Review of Systems  Pertinent items are noted in HPI  All other systems were reviewed and are negative  Physical Exam  /78   Pulse 82   Ht 5' 1" (1 549 m)   Wt 69 4 kg (153 lb)   LMP 2019 (Exact Date)   BMI 28 91 kg/m²   Cons: Appears well  No apparent distress  Psych: Alert  Oriented x3  Mood and affect normal   Eyes: PERRLA, EOMI  Resp: Normal effort  No audible wheezing or stridor  CV: Palpable pulse  No discernable arrhythmia  No LE edema  Lymph:  No palpable cervical, axillary, or inguinal lymphadenopathy  Skin: Warm  No palpable masses  No visible lesions  Neuro: Normal muscle tone  Normal and symmetric DTR's       Left Knee Exam  Alignment:  Normal knee alignment  Inspection:  No swelling  No edema  No erythema  No ecchymosis  Palpation:  medial joint line tenderness  ROM:  Knee Extension 0  Knee Flexion 130  Strength:  5/5 quadriceps and hamstrings  Stability:  No objective knee instability  Stable Varus / Valgus stress, Lachman, and Posterior drawer  Tests:  (+) Ly  Patella:  Patella tracks centrally with crepitus  Neurovascular:  Sensation intact in DP/SP/Tate/Sa/T nerve distributions  2+ DP & PT pulses  Gait:  Antalgic  Studies Reviewed  I have personally reviewed pertinent films in PACS  XR of left knee - demonstrates severe degenerative changes primarily in the medial compartment- severe OA  MRI of left knee - complex tear of the posterior horn and body of the medial meniscus    Procedures  No procedures today  Medical, Surgical, Family, and Social History  The patient's medical history, family history, and social history, were reviewed and updated as appropriate      Past Medical History:   Diagnosis Date   • Allergic 02/01/2020   • BMI 30 0-30 9,adult    • Carpal tunnel syndrome on right    • Cervical spondylosis without myelopathy    • COVID 12/01/2021   • Fibromyalgia    • Fibromyalgia, primary    • GERD (gastroesophageal reflux disease)    • History of fetal loss     Recurrent   • Hyperlipidemia    • Hypertension    • Migraine    • Neck pain    • Osteoarthritis     lumbar spine   • Sicca syndrome (HCC)    • Syncopal episodes     with severe migraines   • Undifferentiated connective tissue disease ([address] )        Past Surgical History:   Procedure Laterality Date   • CHOLECYSTECTOMY     • DILATION AND CURETTAGE OF UTERUS     • TUBAL LIGATION         Family History   Problem Relation Age of Onset   • MORGAN disease Mother    • Arthritis Mother    • Depression Mother    • Hypertension Mother    • Heart attack Father 79   • Diabetes Father    • Prostate cancer Father 79   • Hypertension Father    • Arthritis Father    • Stroke Father    • Lupus Sister    • Raynaud syndrome Sister    • Sjogren's syndrome Sister    • Breast cancer Maternal Grandmother 79   • Arthritis Maternal Grandmother    • Cancer Maternal Grandmother    • Alcohol abuse Paternal Grandfather    • No Known Problems Daughter    • Prostate cancer Maternal Grandfather 79   • Arthritis Maternal Grandfather    • Cancer Maternal Grandfather    • Colon cancer Paternal Grandmother [de-identified]   • Depression Sister    • No Known Problems Brother    • No Known Problems Son    • No Known Problems Son    • No Known Problems Son    • No Known Problems Son    • No Known Problems Son    • No Known Problems Maternal Uncle    • No Known Problems Paternal Aunt    • No Known Problems Paternal Uncle        Social History     Occupational History   • Occupation: Administration   Tobacco Use   • Smoking status: Never   • Smokeless tobacco: Never   Vaping Use   • Vaping Use: Never used   Substance and Sexual Activity   • Alcohol use: Not Currently     Comment: rarely   • Drug use: Never   • Sexual activity: Yes     Partners: Male     Birth control/protection: Female Sterilization       Allergies   Allergen Reactions   • Propranolol Other (See Comments)     Halluncinations   • Raspberry - Food Allergy Allergic Rhinitis, Itching and Nasal Congestion   • Latex Rash         Current Outpatient Medications:   •  acetaminophen (TYLENOL) 500 mg tablet, Take 500 mg by mouth every 6 (six) hours as needed for mild pain, Disp: , Rfl:   •  ALPRAZolam (XANAX) 0 5 mg tablet, Take 1 tablet (0 5 mg total) by mouth 30 min pre-procedure Take 1 tablet p o  30 minutes prior to MR I   Continue another 1 tablet p o  5 minutes prior to MRI as well in regards to anxiety, Disp: 2 tablet, Rfl: 0  •  Aspirin-Acetaminophen-Caffeine (EXCEDRIN MIGRAINE PO), Take 1 tablet by mouth as needed (migraines), Disp: , Rfl:   •  buPROPion (Wellbutrin XL) 300 mg 24 hr tablet, Take 1 tablet (300 mg total) by mouth every morning, Disp: 90 tablet, Rfl: 3  • cetirizine (ZyrTEC) 10 mg tablet, Take 10 mg by mouth daily PRN, Disp: , Rfl:   •  cholecalciferol (VITAMIN D3) 1,000 units tablet, Take 1,000 Units by mouth daily , Disp: , Rfl:   •  ferrous sulfate 325 (65 Fe) mg tablet, Take 325 mg by mouth daily with breakfast , Disp: , Rfl:   •  fremanezumab-vfrm (Ajovy) 225 MG/1 5ML auto-injector, Inject every 28 days subcutaneously (3 month supply), Disp: 3 mL, Rfl: 4  •  lisinopril (ZESTRIL) 20 mg tablet, Take 1 tablet (20 mg total) by mouth daily, Disp: 90 tablet, Rfl: 0  •  Multiple Vitamins-Minerals (MULTIVITAMIN ADULT PO), Take 1 tablet by mouth daily, Disp: , Rfl:   •  omeprazole (PriLOSEC) 20 mg delayed release capsule, Take 1 capsule (20 mg total) by mouth daily, Disp: 90 capsule, Rfl: 1  •  Riboflavin (Vitamin B-2) 100 MG TABS, Take 200 mg by mouth daily, Disp: , Rfl:   •  SUMAtriptan (IMITREX) 100 mg tablet, Take 1 tablet (100 mg total) by mouth once as needed for migraine May repeat x1 in 2 hours, max 2/24 hours, 9/month, Disp: 9 tablet, Rfl: 12  •  magnesium 30 MG tablet, Take 400 mg by mouth 2 (two) times a day Chewable, Disp: , Rfl:       Tony Kerr    I,:  Yani Nails am acting as a scribe while in the presence of the attending physician :       I,:  Jere Mercer MD personally performed the services described in this documentation    as scribed in my presence :

## 2023-02-28 ENCOUNTER — PATIENT MESSAGE (OUTPATIENT)
Dept: FAMILY MEDICINE CLINIC | Facility: CLINIC | Age: 58
End: 2023-02-28

## 2023-03-03 LAB — DSDNA AB SER QL CLIF: NEGATIVE

## 2023-03-28 DIAGNOSIS — M17.12 PRIMARY OSTEOARTHRITIS OF LEFT KNEE: Primary | ICD-10-CM

## 2023-04-02 PROBLEM — F41.9 ANXIETY: Status: ACTIVE | Noted: 2023-04-02

## 2023-04-02 PROBLEM — F33.0 MDD (MAJOR DEPRESSIVE DISORDER), RECURRENT EPISODE, MILD (HCC): Status: ACTIVE | Noted: 2023-04-02

## 2023-04-03 ENCOUNTER — OFFICE VISIT (OUTPATIENT)
Dept: FAMILY MEDICINE CLINIC | Facility: CLINIC | Age: 58
End: 2023-04-03

## 2023-04-03 ENCOUNTER — APPOINTMENT (OUTPATIENT)
Dept: LAB | Facility: CLINIC | Age: 58
End: 2023-04-03

## 2023-04-03 ENCOUNTER — TELEPHONE (OUTPATIENT)
Dept: OBGYN CLINIC | Facility: HOSPITAL | Age: 58
End: 2023-04-03

## 2023-04-03 VITALS
TEMPERATURE: 99.3 F | WEIGHT: 153.6 LBS | OXYGEN SATURATION: 95 % | RESPIRATION RATE: 18 BRPM | BODY MASS INDEX: 29 KG/M2 | HEIGHT: 61 IN | SYSTOLIC BLOOD PRESSURE: 128 MMHG | DIASTOLIC BLOOD PRESSURE: 82 MMHG | HEART RATE: 79 BPM

## 2023-04-03 DIAGNOSIS — E78.00 HYPERCHOLESTEREMIA: Primary | ICD-10-CM

## 2023-04-03 DIAGNOSIS — F41.9 ANXIETY: ICD-10-CM

## 2023-04-03 DIAGNOSIS — E83.52 SERUM CALCIUM ELEVATED: ICD-10-CM

## 2023-04-03 DIAGNOSIS — F33.0 MDD (MAJOR DEPRESSIVE DISORDER), RECURRENT EPISODE, MILD (HCC): ICD-10-CM

## 2023-04-03 DIAGNOSIS — I10 BENIGN ESSENTIAL HYPERTENSION: ICD-10-CM

## 2023-04-03 DIAGNOSIS — G47.00 PERSISTENT INSOMNIA: ICD-10-CM

## 2023-04-03 DIAGNOSIS — G43.009 MIGRAINE WITHOUT AURA AND WITHOUT STATUS MIGRAINOSUS, NOT INTRACTABLE: ICD-10-CM

## 2023-04-03 DIAGNOSIS — M79.7 FIBROMYALGIA: ICD-10-CM

## 2023-04-03 DIAGNOSIS — R73.01 IFG (IMPAIRED FASTING GLUCOSE): ICD-10-CM

## 2023-04-03 DIAGNOSIS — Z12.31 VISIT FOR SCREENING MAMMOGRAM: ICD-10-CM

## 2023-04-03 DIAGNOSIS — M17.12 PRIMARY OSTEOARTHRITIS OF LEFT KNEE: Primary | ICD-10-CM

## 2023-04-03 LAB
ALBUMIN SERPL BCP-MCNC: 4.1 G/DL (ref 3.5–5)
ALP SERPL-CCNC: 58 U/L (ref 34–104)
ALT SERPL W P-5'-P-CCNC: 18 U/L (ref 7–52)
ANION GAP SERPL CALCULATED.3IONS-SCNC: 6 MMOL/L (ref 4–13)
AST SERPL W P-5'-P-CCNC: 16 U/L (ref 13–39)
BILIRUB SERPL-MCNC: 0.53 MG/DL (ref 0.2–1)
BUN SERPL-MCNC: 15 MG/DL (ref 5–25)
CA-I BLD-SCNC: 1.27 MMOL/L (ref 1.12–1.32)
CALCIUM SERPL-MCNC: 10.1 MG/DL (ref 8.4–10.2)
CHLORIDE SERPL-SCNC: 106 MMOL/L (ref 96–108)
CO2 SERPL-SCNC: 29 MMOL/L (ref 21–32)
CREAT SERPL-MCNC: 0.88 MG/DL (ref 0.6–1.3)
GFR SERPL CREATININE-BSD FRML MDRD: 74 ML/MIN/1.73SQ M
GLUCOSE P FAST SERPL-MCNC: 92 MG/DL (ref 65–99)
POTASSIUM SERPL-SCNC: 4.1 MMOL/L (ref 3.5–5.3)
PROT SERPL-MCNC: 6.9 G/DL (ref 6.4–8.4)
SODIUM SERPL-SCNC: 141 MMOL/L (ref 135–147)

## 2023-04-03 RX ORDER — FOLIC ACID 1 MG/1
1 TABLET ORAL DAILY
Qty: 30 TABLET | Refills: 0 | Status: SHIPPED | OUTPATIENT
Start: 2023-04-03 | End: 2023-05-03

## 2023-04-03 RX ORDER — MULTIVITAMIN
1 TABLET ORAL DAILY
Qty: 30 TABLET | Refills: 1 | Status: SHIPPED | OUTPATIENT
Start: 2023-04-03

## 2023-04-03 RX ORDER — FERROUS SULFATE TAB EC 324 MG (65 MG FE EQUIVALENT) 324 (65 FE) MG
324 TABLET DELAYED RESPONSE ORAL
Qty: 60 TABLET | Refills: 0 | Status: SHIPPED | OUTPATIENT
Start: 2023-04-03 | End: 2023-08-03

## 2023-04-03 RX ORDER — ASCORBIC ACID 500 MG
500 TABLET ORAL 2 TIMES DAILY
Qty: 60 TABLET | Refills: 1 | Status: SHIPPED | OUTPATIENT
Start: 2023-04-03 | End: 2023-08-03

## 2023-04-03 NOTE — TELEPHONE ENCOUNTER
Spoke with the patient  Addressed her questions regarding her preoperative clearance appointment with Dr Gus Matt as well as her question about the pre-op vitamins

## 2023-04-03 NOTE — PROGRESS NOTES
Pedro Bangura was seen today for anxiety and depression  Diagnoses and all orders for this visit:    Hypercholesteremia    Fibromyalgia    Migraine without aura and without status migrainosus, not intractable    Benign essential hypertension    MDD (major depressive disorder), recurrent episode, mild (HCC)    Anxiety    Persistent insomnia    Visit for screening mammogram  -     Mammo screening bilateral w 3d & cad; Future    IFG (impaired fasting glucose)  -     Comprehensive metabolic panel; Future  -     Hemoglobin A1C; Future   update fasting blood work  Wait 6 months to check lipids - reassured pt   Believes Wellbutrin may be helping, counseling also helping  reassurance given     She will talk to Dr Grant Lucas if she thinks CIRS is a possibility  Update mammo   [facility]      Return in about 8 months (around 12/9/2023) for Annual physical     Subjective:   Pedro Bangura is a 56 y o  female here today for a follow-up on her current medical conditions:    Patient Active Problem List   Diagnosis   • Fibromyalgia   • Hypercholesteremia   • Migraine without aura and without status migrainosus, not intractable   • Persistent insomnia   • Vitamin D deficiency   • Low ferritin   • Benign essential hypertension   • Carpal tunnel syndrome, bilateral   • Syncopal episodes   • Sleep disturbance   • COVID-19   • Overweight with body mass index (BMI) of 28 to 28 9 in adult   • Primary generalized (osteo)arthritis   • Cervical spondylosis without myelopathy   • Gastroesophageal reflux disease without esophagitis   • History of fetal loss   • Undifferentiated connective tissue disease (HCC)   • Obstructive sleep apnea-hypopnea syndrome   • Excessive daytime sleepiness   • Primary osteoarthritis of left knee   • Tendinitis of right rotator cuff   • Seronegative arthropathy of multiple sites [facility])   • History of colon polyps   • MDD (major depressive disorder), recurrent episode, mild ([facility] [address] ) • Anxiety       Patient Care Team:  Milan Rodriguez DO as PCP - General (Family Medicine)  Cecille Rowland DO (Cardiology)  Jaswant Simmons MD (Rheumatology)  Romie Méndez MD (Neurology)  Stephanie Bhatia MD (Dermatology)    Current Medications:  Current Outpatient Medications   Medication Sig Dispense Refill   • acetaminophen (TYLENOL) 500 mg tablet Take 500 mg by mouth every 6 (six) hours as needed for mild pain     • ALPRAZolam (XANAX) 0 5 mg tablet Take 1 tablet (0 5 mg total) by mouth 30 min pre-procedure Take 1 tablet p o  30 minutes prior to MR I  Continue another 1 tablet p o  5 minutes prior to MRI as well in regards to anxiety 2 tablet 0   • Aspirin-Acetaminophen-Caffeine (EXCEDRIN MIGRAINE PO) Take 1 tablet by mouth as needed (migraines)     • buPROPion (Wellbutrin XL) 300 mg 24 hr tablet Take 1 tablet (300 mg total) by mouth every morning 90 tablet 3   • cetirizine (ZyrTEC) 10 mg tablet Take 10 mg by mouth daily PRN     • cholecalciferol (VITAMIN D3) 1,000 units tablet Take 1,000 Units by mouth daily      • ferrous sulfate 325 (65 Fe) mg tablet Take 325 mg by mouth daily with breakfast      • lisinopril (ZESTRIL) 20 mg tablet Take 1 tablet (20 mg total) by mouth daily 90 tablet 0   • Multiple Vitamins-Minerals (MULTIVITAMIN ADULT PO) Take 1 tablet by mouth daily     • omeprazole (PriLOSEC) 20 mg delayed release capsule Take 1 capsule (20 mg total) by mouth daily 90 capsule 1   • Riboflavin (Vitamin B-2) 100 MG TABS Take 200 mg by mouth daily     • SUMAtriptan (IMITREX) 100 mg tablet Take 1 tablet (100 mg total) by mouth once as needed for migraine May repeat x1 in 2 hours, max 2/24 hours, 9/month 9 tablet 12   • fremanezumab-vfrm (Ajovy) 225 MG/1 5ML auto-injector Inject every 28 days subcutaneously (3 month supply) 3 mL 4     No current facility-administered medications for this visit         HPI:  Chief Complaint   Patient presents with   • Anxiety   • Depression     -- Above per clinical staff and "reviewed  --    PHQ-2/9 Depression Screening         ?     last visit on wellbutrin, may be helping  She was concerned about ADD  consider Gene SIght, mood stablizer   watch VIKAS, considering topamax   Feb 2023 labs per rheum chol 246, tg 121, HDL 58,  to 164 (ASCVD 3 1%)  glu 1010, A1C 5 2dec appt started   wellbutrin to help with psychcological symptoms from pain, trauma  ?SSRI   review april 2022 labs - hihg calcium resolved, vit D 69   chol 224, TG 88, HDL 58,  (stable) -ASCVD risk low 2 2%   parotid tumor  CT scan 10/2021 stable compared to 6 months prior   home BP not accurate  lipids cmp HbA1C   Today:  Likes to be home and reading   Was doing counseling  Season is changing and figuring herself out   Mom had post partum  She took care of her siblings  Knee replacement in May   - he is still struggling   She is wondering about CIRS   They have very tight muscles and had it remanipulated   Plans to start PT right away       The following portions of the patient's history were reviewed and updated as appropriate: allergies, current medications, past family history, past medical history, past social history, past surgical history and problem list     Objective:  Vitals:  /82   Pulse 79   Temp 99 3 °F (37 4 °C)   Resp 18   Ht 5' 1\" (1 549 m)   Wt 69 7 kg (153 lb 9 6 oz)   LMP 08/31/2019 (Exact Date)   SpO2 95%   BMI 29 02 kg/m²    Wt Readings from Last 3 Encounters:   04/03/23 69 7 kg (153 lb 9 6 oz)   02/24/23 69 4 kg (153 lb)   02/23/23 69 4 kg (153 lb)      BP Readings from Last 3 Encounters:   04/03/23 128/82   02/24/23 126/78   02/23/23 128/80        Review of Systems   She has no other concerns  No unexpected weight changes  No chest pain, SOB, or palpitations  No GERD  No changes in bowels or bladder  Sleeping well  Mood as above  Physical Exam   Constitutional:  she appears well-developed and well-nourished  HENT: Head: Normocephalic  Neck: Neck supple   " Cardiovascular: Normal rate, regular rhythm and normal heart sounds  Pulmonary/Chest: Effort normal and breath sounds normal  No wheezes, rales, or rhonchi  Abdominal: Soft  Bowel sounds are normal  There is no tenderness  No hepatosplenomegaly  Musculoskeletal: she exhibits no edema  Lymphadenopathy: she has no cervical adenopathy  Neurological: she is alert and oriented to person, place, and time  Skin: Skin is warm and dry  Psychiatric: she has a normal mood and affect   her behavior is normal  Thought content normal

## 2023-04-03 NOTE — TELEPHONE ENCOUNTER
Caller: patient    Doctor: Tiara Yu    Reason for call: pt called she has question about her upcoming sx on 5/16    Call back#: [phone]

## 2023-04-04 LAB
EST. AVERAGE GLUCOSE BLD GHB EST-MCNC: 100 MG/DL
HBA1C MFR BLD: 5.1 %

## 2023-04-19 DIAGNOSIS — M17.12 PRIMARY OSTEOARTHRITIS OF LEFT KNEE: Primary | ICD-10-CM

## 2023-04-26 NOTE — H&P (VIEW-ONLY)
Internal Medicine Pre-Operative Evaluation:     Reason for Visit: Pre-operative Evaluation for Risk Stratification and Optimization    Patient ID: Timothy Machado is a 56 y o  female  Surgery: Arthroplasty of left knee  Referring Provider: Dr Humphrey Serna      Recommendations to Proceed withSurgery    Patient is considered to be Low risk for Medium risk procedure  After evaluation and discussion with patient with emphasis that all surgery has some degree of inherent risk it is determined this procedure is of acceptable risk  medically  Patient may proceed with planned procedure      Assessment      Primary osteoarthritis left knee  • Failed conservative measures  • Electing to undergo total joint arthroplasty    Pre-operative Medical Evaluation for planned surgery  • Patient meets preoperative quality goals as noted below  • Recommendations as listed in PLAN section below  • Contact surgical nurse  navigator with any questions regarding preoperative plan or schedule      HTN  • Stable  • Cont current medications as directed  • Monitor post operative BP   • Hold lisinopril the day prior to surgery and the morning of surgery  • Cleared by cardiology 4/27/23    Non specific connective tissue disorder  · F/b Dr Alan Davey  · +MARY    VIKAS  · Unable to tolerate mask    GERD  • Cont PPI  • Monitor for nausea/vomiting    Migraine  · F/b neurology  · Takes Ajovy  · imitrex for acute    Anxiety/depression  · Continue medications as prescribed          Patient Active Problem List   Diagnosis   • Fibromyalgia   • Hypercholesteremia   • Migraine without aura and without status migrainosus, not intractable   • Persistent insomnia   • Vitamin D deficiency   • Low ferritin   • Benign essential hypertension   • Carpal tunnel syndrome, bilateral   • Syncopal episodes   • Sleep disturbance   • COVID-19   • Overweight with body mass index (BMI) of 28 to 28 9 in adult   • Primary generalized (osteo)arthritis   • Cervical spondylosis without myelopathy   • Gastroesophageal reflux disease without esophagitis   • History of fetal loss   • Undifferentiated connective tissue disease (HCC)   • Obstructive sleep apnea-hypopnea syndrome   • Excessive daytime sleepiness   • Primary osteoarthritis of left knee   • Tendinitis of right rotator cuff   • Seronegative arthropathy of multiple sites [facility])   • History of colon polyps   • MDD (major depressive disorder), recurrent episode, mild (HCC)   • Anxiety        Plan:     1  Further preoperative workup as follows:   - none no further testing required may proceed with surgery    2  Medication Management/Recommendations:   - continue all current medicines through morning of surgery with sip of water except the following:  - hold ACE / ARB specifically  24 hours prior to surgery  - hold aspirin 7 days prior to surgery  - avoid use of NSAID such as motrin, advil, aleve for 7 days prior to surgery  - hold rheumatologic medicines as instructed by your rheumatologist  - hold all OTC herbal or nutritional supplements 7 days before surgery    3  Patient requires further consultation with:   No Consults Required    4  Discharge Planning / Barriers to Discharge  none identified - patients has post discharge therapy plan in place, transportation arranged for discharge day, adequate family support at home to assist with discharge to home  Subjective:           History of Present Illness:     Sloane Pope is a 56 y o  female who presents to the office today for a preoperative consultation at the request of surgeon  The patient understands this is an elective procedure and not emergent  They are electing to undergo planned procedure with an understanding that all surgery has inherent risk  They have worked with their surgeon and failed conservative treatment measures  Today they present for preoperative risk assessment and recommendations for optimization in preparation for surgery      Pt seen in surgical optimization center for upcoming proposed surgery  They have failed previous conservative measures and have elected surgical intervention  Pt meets presurgical lab and BMI optimization goals  Upon interview questioning patient is able to perform greater than 4 METs workload in daily life without any reported cardio-pulmonary symptoms  ROS: No TIA's or unusual headaches, no dysphagia  No prolonged cough  No dyspnea or chest pain on exertion  No abdominal pain, change in bowel habits, black or bloody stools  No urinary tract or BPH symptoms  Positive reported pain in arthritic joint  Positive difficulty with gait  No skin rashes or issues              Objective:      LMP 08/31/2019 (Exact Date)       General Appearance: no distress, conversive  HEENT: PERRLA, conjuctiva normal; oropharynx clear; mucous membranes moist;   Neck:  Supple, no lymphadenopathy or thyromegaly  Lungs: breath sounds normal, normal respiratory effort, no retractions, expiratory effort normal  CV: normal heart sounds S1/S2, PMI normal   ABD: soft non tender, no masses , no hepatic or splenomegaly  EXT: DP pulses intact, no lymphadenopathy, no edema  Skin: normal turgor, normal texture, no rash  Psych: affect normal, mood normal  Neuro: AAOx3        The following portions of the patient's history were reviewed and updated as appropriate: allergies, current medications, past family history, past medical history, past social history, past surgical history and problem list      Past History:       Past Medical History:   Diagnosis Date   • Allergic 02/01/2020   • Arthritis    • BMI 30 0-30 9,adult    • Carpal tunnel syndrome on right    • Cervical spondylosis without myelopathy    • COVID 12/01/2021   • Fibromyalgia    • Fibromyalgia, primary    • GERD (gastroesophageal reflux disease)    • Headache(784 0)    • History of fetal loss     Recurrent   • Hyperlipidemia    • Hypertension    • Migraine    • Neck pain    • Osteoarthritis     lumbar spine   • Sicca syndrome (HCC)    • Syncopal episodes     with severe migraines   • Undifferentiated connective tissue disease ([facility] [address] )     Past Surgical History:   Procedure Laterality Date   • CHOLECYSTECTOMY     • DILATION AND CURETTAGE OF UTERUS     • TUBAL LIGATION            Social History     Tobacco Use   • Smoking status: Never   • Smokeless tobacco: Never   Vaping Use   • Vaping Use: Never used   Substance Use Topics   • Alcohol use: Not Currently     Comment: rarely   • Drug use: Not Currently     Family History   Problem Relation Age of Onset   • MORGAN disease Mother    • Arthritis Mother    • Depression Mother    • Hypertension Mother    • Coronary artery disease Mother    • Heart attack Father 79   • Diabetes Father    • Prostate cancer Father    • Hypertension Father    • Arthritis Father    • Stroke Father    • Cancer Father         Prostate   • ADD / ADHD Father    • Lupus Sister    • Raynaud syndrome Sister    • Sjogren's syndrome Sister    • Breast cancer Maternal Grandmother    • Arthritis Maternal Grandmother    • Cancer Maternal Grandmother         Breast   • Glaucoma Maternal Grandmother    • Alcohol abuse Paternal Grandfather    • No Known Problems Daughter    • Prostate cancer Maternal Grandfather    • Arthritis Maternal Grandfather    • Cancer Maternal Grandfather         Prostate   • Colon cancer Paternal Grandmother    • Cancer Paternal Grandmother         Colon   • Depression Sister    • ADD / ADHD Sister    • Anxiety disorder Sister    • ADD / ADHD Brother    • OCD Brother    • No Known Problems Son    • No Known Problems Son    • No Known Problems Son    • No Known Problems Son    • No Known Problems Son    • No Known Problems Maternal Uncle    • No Known Problems Paternal Aunt    • No Known Problems Paternal Uncle           Allergies:      Allergies   Allergen Reactions   • Propranolol Other (See Comments)     Halluncinations   • Raspberry - Food Allergy Allergic "Rhinitis, Itching and Nasal Congestion   • Latex Rash        Current Medications:     Current Outpatient Medications   Medication Instructions   • acetaminophen (TYLENOL) 500 mg, Oral, Every 6 hours PRN   • ALPRAZolam (XANAX) 0 5 mg, Oral, 30 min pre-procedure, Take 1 tablet p o  30 minutes prior to MR I  Continue another 1 tablet p o  5 minutes prior to MRI as well in regards to anxiety   • ascorbic acid (VITAMIN C) 500 mg, Oral, 2 times daily   • Aspirin-Acetaminophen-Caffeine (EXCEDRIN MIGRAINE PO) 1 tablet, Oral, As needed   • buPROPion (WELLBUTRIN XL) 300 mg, Oral, Every morning   • cetirizine (ZYRTEC) 10 mg, Oral, Daily, PRN   • cholecalciferol (VITAMIN D3) 1,000 Units, Oral, Daily   • ferrous sulfate 325 mg, Oral, Daily with breakfast   • ferrous sulfate 324 mg, Oral, 2 times daily before meals   • folic acid (FOLVITE) 1 mg, Oral, Daily   • fremanezumab-vfrm (Ajovy) 225 MG/1 5ML auto-injector Inject every 28 days subcutaneously (3 month supply)   • lisinopril (ZESTRIL) 20 mg tablet TAKE 1 TABLET(20 MG) BY MOUTH DAILY   • Multiple Vitamin (multivitamin) tablet 1 tablet, Oral, Daily   • Multiple Vitamins-Minerals (MULTIVITAMIN ADULT PO) 1 tablet, Oral, Daily   • omeprazole (PRILOSEC) 20 mg, Oral, Daily   • SUMAtriptan (IMITREX) 100 mg, Oral, Once as needed, May repeat x1 in 2 hours, max 2/24 hours, 9/month   • Vitamin B-2 200 mg, Oral, Daily             PRE-OP WORKSHEET DATA    Assessment of Pre-Operative Risks     MLJ Quality Hard Stops:  BMI (<40) : Estimated body mass index is 29 02 kg/m² as calculated from the following:    Height as of 4/3/23: 5' 1\" (1 549 m)  Weight as of 4/3/23: 69 7 kg (153 lb 9 6 oz)  Hgb ( >11):    Lab Results   Component Value Date    HGB 15 6 (H) 02/24/2023     HbA1c (<7 0) :   Lab Results   Component Value Date    HGBA1C 5 1 04/03/2023     GFR (>60) (Less then 45 = Nephrology consult):  CrCl cannot be calculated (Patient's most recent lab result is older than the maximum 7 " days allowed  )  Active Decompensated Chronic Conditions which would delay surgery  No acutely decompensated medical issues such as recent CVA, MI, new onset arrhythmia, severe aortic stenosis, CHF, uncontrolled COPD       Exercise Capacity: (if less the 4 mets consider functional assessment via cardiac stress testing or consultation)    · Able to walk 2 blocks without symptoms?: Yes  · Able to walk 1 flights without symptoms?: Yes    Assessment of intra and post operative respiratory, hemodynamic and thrombotic risks     Prior Anesthesia Reactions: No     Personal history of venous thromboembolic disease? No    History of steroid use > 5 mg for >2 weeks within last year? No    Cardiac Risk Estimation: per the Revised Cardiac Risk Index (Circ  100:1043, 1999),     The patient's risk factors for cardiac complications include :  none    Sha Davis has a in hospital cardiac risk of RCI RISK CLASS I (0 risk factors, risk of major cardiac compl  appr  0 5%) based on RCRI calculator          Pre-Op Data Reviewed:       Laboratory Results: I have personally reviewed the pertinent laboratory results/reports     EKG:I have personally reviewed pertinent reports    I personally reviewed and interpreted available tracings in the electronic medical record    4/27/23=NSR in cardiology office scanned into media    OLD RECORDS: reviewed old records in the chart review section if EHR on day of visit      Previous cardiopulmonary studies within the past year:  · Echocardiogram: yes, 2020 ef 65%  · Cardiac Catheterization: no  · Stress Test: yes, 2019 negative      Time of visit including pre-visit chart review, visit and post-visit coordination of plan and care , review of pre-surgical lab work, preparation and time spent documenting note in electronic medical record, time spent face-to-face in physical examination answering patient questions by care team 55 minutes             Surgical [address] [facility]

## 2023-04-26 NOTE — PROGRESS NOTES
Internal Medicine Pre-Operative Evaluation:     Reason for Visit: Pre-operative Evaluation for Risk Stratification and Optimization    Patient ID: Rik Zhao is a 56 y o  female  Surgery: Arthroplasty of left knee  Referring Provider: Dr Magdalen Mcburney      Recommendations to Proceed withSurgery    Patient is considered to be Low risk for Medium risk procedure  After evaluation and discussion with patient with emphasis that all surgery has some degree of inherent risk it is determined this procedure is of acceptable risk  medically  Patient may proceed with planned procedure      Assessment      Primary osteoarthritis left knee   Failed conservative measures   Electing to undergo total joint arthroplasty    Pre-operative Medical Evaluation for planned surgery   Patient meets preoperative quality goals as noted below   Recommendations as listed in PLAN section below  Jessica Knight 6554 surgical nurse  navigator with any questions regarding preoperative plan or schedule      HTN   Stable   Cont current medications as directed   Monitor post operative BP    Hold lisinopril the day prior to surgery and the morning of surgery   Cleared by cardiology 4/27/23    Non specific connective tissue disorder  · F/b Dr Jennifer Hope  · +MARY    VIKAS  · Unable to tolerate mask    GERD   Cont PPI   Monitor for nausea/vomiting    Migraine  · F/b neurology  · Takes Ajovy  · imitrex for acute    Anxiety/depression  · Continue medications as prescribed          Patient Active Problem List   Diagnosis    Fibromyalgia    Hypercholesteremia    Migraine without aura and without status migrainosus, not intractable    Persistent insomnia    Vitamin D deficiency    Low ferritin    Benign essential hypertension    Carpal tunnel syndrome, bilateral    Syncopal episodes    Sleep disturbance    COVID-19    Overweight with body mass index (BMI) of 28 to 28 9 in adult    Primary generalized (osteo)arthritis    Cervical spondylosis without myelopathy    Gastroesophageal reflux disease without esophagitis    History of fetal loss    Undifferentiated connective tissue disease (HCC)    Obstructive sleep apnea-hypopnea syndrome    Excessive daytime sleepiness    Primary osteoarthritis of left knee    Tendinitis of right rotator cuff    Seronegative [facility]ropathy [facility] multiple sites ([facility] )    History of colon polyps    MDD (major depressive disorder), [facility] episode, mild [facility]    Anxiety        Plan:     1  Further preoperative workup as follows:   - none no further testing required may proceed with surgery    2  Medication Management/Recommendations:   - continue all current medicines through morning of surgery with sip of water except the following:  - hold ACE / ARB specifically  24 hours prior to surgery  - hold aspirin 7 days prior to surgery  - avoid use of NSAID such as motrin, advil, aleve for 7 days prior to surgery  - hold rheumatologic medicines as instructed by your rheumatologist  - hold all OTC herbal or nutritional supplements 7 days before surgery    3  Patient requires further consultation with:   No Consults Required    4  Discharge Planning / Barriers to Discharge  none identified - patients has post discharge therapy plan in place, transportation arranged for discharge day, adequate family support at home to assist with discharge to home  Subjective:           History of Present Illness:     Simon Cifuentes is a 56 y o  female who presents to the office today for a preoperative consultation at the request of surgeon  The patient understands this is an elective procedure and not emergent  They are electing to undergo planned procedure with an understanding that all surgery has inherent risk  They have worked with their surgeon and failed conservative treatment measures  Today they present for preoperative risk assessment and recommendations for optimization in preparation for surgery      Pt seen in surgical optimization center for upcoming proposed surgery  They have failed previous conservative measures and have elected surgical intervention  Pt meets presurgical lab and BMI optimization goals  Upon interview questioning patient is able to perform greater than 4 METs workload in daily life without any reported cardio-pulmonary symptoms  ROS: No TIA's or unusual headaches, no dysphagia  No prolonged cough  No dyspnea or chest pain on exertion  No abdominal pain, change in bowel habits, black or bloody stools  No urinary tract or BPH symptoms  Positive reported pain in arthritic joint  Positive difficulty with gait  No skin rashes or issues              Objective:      LMP 08/31/2019 (Exact Date)       General Appearance: no distress, conversive  HEENT: PERRLA, conjuctiva normal; oropharynx clear; mucous membranes moist;   Neck:  Supple, no lymphadenopathy or thyromegaly  Lungs: breath sounds normal, normal respiratory effort, no retractions, expiratory effort normal  CV: normal heart sounds S1/S2, PMI normal   ABD: soft non tender, no masses , no hepatic or splenomegaly  EXT: DP pulses intact, no lymphadenopathy, no edema  Skin: normal turgor, normal texture, no rash  Psych: affect normal, mood normal  Neuro: AAOx3        The following portions of the patient's history were reviewed and updated as appropriate: allergies, current medications, past family history, past medical history, past social history, past surgical history and problem list      Past History:       Past Medical History:   Diagnosis Date    Allergic 02/01/2020    Arthritis     BMI 30 0-30 9,adult     Carpal tunnel syndrome on right     Cervical spondylosis without myelopathy     COVID 12/01/2021    Fibromyalgia     Fibromyalgia, primary     GERD (gastroesophageal reflux disease)     Headache(784 0)     History of fetal loss     Recurrent    Hyperlipidemia     Hypertension     Migraine     Neck pain     Osteoarthritis     lumbar spine    Sicca syndrome (HCC)     Syncopal episodes     with severe migraines    Undifferentiated connective tissue disease (HCC)     Past Surgical History:   Procedure Laterality Date    CHOLECYSTECTOMY      DILATION AND CURETTAGE OF UTERUS      TUBAL LIGATION            Social History     Tobacco Use    Smoking status: Never    Smokeless tobacco: Never   Vaping Use    Vaping Use: Never used   Substance Use Topics    Alcohol use: Not Currently     Comment: rarely    Drug use: Not Currently     Family History   Problem Relation Age of Onset    MORGAN disease Mother     Arthritis Mother     Depression Mother     Hypertension Mother     Coronary artery disease Mother     Heart attack Father 79    Diabetes Father     Prostate cancer Father     Hypertension Father     Arthritis Father     Stroke Father     Cancer Father         Prostate    ADD / ADHD Father     Lupus Sister     Raynaud syndrome Sister     Sjogren's syndrome Sister     Breast cancer Maternal Grandmother     Arthritis Maternal Grandmother     Cancer Maternal Grandmother         Breast    Glaucoma Maternal Grandmother     Alcohol abuse Paternal Grandfather     No Known Problems Daughter     Prostate cancer Maternal Grandfather     Arthritis Maternal Grandfather     Cancer Maternal Grandfather         Prostate    Colon cancer Paternal Grandmother     Cancer Paternal Grandmother         Colon    Depression Sister     ADD / ADHD Sister     Anxiety disorder Sister     ADD / ADHD Brother     OCD Brother     No Known Problems Son     No Known Problems Son     No Known Problems Son     No Known Problems Son     No Known Problems Son     No Known Problems Maternal Uncle     No Known Problems Paternal Aunt     No Known Problems Paternal Uncle           Allergies:      Allergies   Allergen Reactions    Propranolol Other (See Comments)     Halluncinations    Raspberry - Food Allergy Allergic "Rhinitis, Itching and Nasal Congestion    Latex Rash        Current Medications:     Current Outpatient Medications   Medication Instructions    acetaminophen (TYLENOL) 500 mg, Oral, Every 6 hours PRN    ALPRAZolam (XANAX) 0 5 mg, Oral, 30 min pre-procedure, Take 1 tablet p o  30 minutes prior to MR I  Continue another 1 tablet p o  5 minutes prior to MRI as well in regards to anxiety    ascorbic acid (VITAMIN C) 500 mg, Oral, 2 times daily    Aspirin-Acetaminophen-Caffeine (EXCEDRIN MIGRAINE PO) 1 tablet, Oral, As needed    buPROPion (WELLBUTRIN XL) 300 mg, Oral, Every morning    cetirizine (ZYRTEC) 10 mg, Oral, Daily, PRN    cholecalciferol (VITAMIN D3) 1,000 Units, Oral, Daily    ferrous sulfate 325 mg, Oral, Daily with breakfast    ferrous sulfate 324 mg, Oral, 2 times daily before meals    folic acid (FOLVITE) 1 mg, Oral, Daily    fremanezumab-vfrm (Ajovy) 225 MG/1 5ML auto-injector Inject every 28 days subcutaneously (3 month supply)    lisinopril (ZESTRIL) 20 mg tablet TAKE 1 TABLET(20 MG) BY MOUTH DAILY    Multiple Vitamin (multivitamin) tablet 1 tablet, Oral, Daily    Multiple Vitamins-Minerals (MULTIVITAMIN ADULT PO) 1 tablet, Oral, Daily    omeprazole (PRILOSEC) 20 mg, Oral, Daily    SUMAtriptan (IMITREX) 100 mg, Oral, Once as needed, May repeat x1 in 2 hours, max 2/24 hours, 9/month    Vitamin B-2 200 mg, Oral, Daily             PRE-OP WORKSHEET DATA    Assessment of Pre-Operative Risks     MLJ Quality Hard Stops:  BMI (<40) : Estimated body mass index is 29 02 kg/m² as calculated from the following:    Height as of 4/3/23: 5' 1\" (1 549 m)  Weight as of 4/3/23: 69 7 kg (153 lb 9 6 oz)  Hgb ( >11):    Lab Results   Component Value Date    HGB 15 6 (H) 02/24/2023     HbA1c (<7 0) :   Lab Results   Component Value Date    HGBA1C 5 1 04/03/2023     GFR (>60) (Less then 45 = Nephrology consult):  CrCl cannot be calculated (Patient's most recent lab result is older than the maximum 7 " days allowed  )  Active Decompensated Chronic Conditions which would delay surgery  No acutely decompensated medical issues such as recent CVA, MI, new onset arrhythmia, severe aortic stenosis, CHF, uncontrolled COPD       Exercise Capacity: (if less the 4 mets consider functional assessment via cardiac stress testing or consultation)    · Able to walk 2 blocks without symptoms?: Yes  · Able to walk 1 flights without symptoms?: Yes    Assessment of intra and post operative respiratory, hemodynamic and thrombotic risks     Prior Anesthesia Reactions: No     Personal history of venous thromboembolic disease? No    History of steroid use > 5 mg for >2 weeks within last year? No    Cardiac Risk Estimation: per the Revised Cardiac Risk Index (Circ  100:1043, 1999),     The patient's risk factors for cardiac complications include :  none    Rik Zhao has a in hospital cardiac risk of RCI RISK CLASS I (0 risk factors, risk of major cardiac compl  appr  0 5%) based on RCRI calculator          Pre-Op Data Reviewed:       Laboratory Results: I have personally reviewed the pertinent laboratory results/reports     EKG:I have personally reviewed pertinent reports    I personally reviewed and interpreted available tracings in the electronic medical record    4/27/23=NSR in cardiology office scanned into media    OLD RECORDS: reviewed old records in the chart review section if EHR on day of visit      Previous cardiopulmonary studies within the past year:  · Echocardiogram: yes, 2020 ef 65%  · Cardiac Catheterization: no  · Stress Test: yes, 2019 negative      Time of visit including pre-visit chart review, visit and post-visit coordination of plan and care , review of pre-surgical lab work, preparation and time spent documenting note in electronic medical record, time spent face-to-face in physical examination answering patient questions by care team 55 minutes             Surgical [address] [facility]

## 2023-04-26 NOTE — PATIENT INSTRUCTIONS
Contact surgical nurse  navigator with any questions regarding preoperative plan or schedule    Stop all over the counter supplements, herbal, naturopathic  medications for 1 week prior to surgery UNLESS prescribed by your surgeon  Hold NSAIDS (i e  advil, alleve, motrin, ibuprofen, celebrex) minimum 7 days prior to surgery  Hold Asprin minimum 7 days prior to surgery  Recommend using Tylenol ( acetaminophen ) 500mg every eight hours during the first week post discharge in conjunction with any additional pain medicine prescribed by your surgeon  Use bowel medicines prescribed by your surgeon ( colace) daily post op during the first 1-2 weeks to avoid post operative constipation issues  Call [phone] with any post discharge concerns or medical issues  The morning of surgery take only the following medication with small sip of water: none  Hold lisinopril the morning prior to surgery and the morning of surgery

## 2023-04-27 RX ORDER — CALCIUM CARBONATE 300MG(750)
2 TABLET,CHEWABLE ORAL DAILY
COMMUNITY

## 2023-04-27 NOTE — PRE-PROCEDURE INSTRUCTIONS
Pre-Surgery Instructions:   Medication Instructions   • acetaminophen (TYLENOL) 500 mg tablet Uses PRN- OK to take day of surgery   • ascorbic acid (VITAMIN C) 500 MG tablet Hold day of surgery  • Aspirin-Acetaminophen-Caffeine (EXCEDRIN MIGRAINE PO) Stop taking 7 days prior to surgery  • buPROPion (Wellbutrin XL) 300 mg 24 hr tablet Take day of surgery  • Calcium Carb-Cholecalciferol (CALCIUM 500 + D3 PO) Hold day of surgery  • cetirizine (ZyrTEC) 10 mg tablet Uses PRN- OK to take day of surgery   • ferrous sulfate 324 (65 Fe) mg Hold day of surgery  • folic acid (FOLVITE) 1 mg tablet Hold day of surgery  • lisinopril (ZESTRIL) 20 mg tablet Take night before surgery   • Magnesium 400 MG TABS Hold day of surgery  • Multiple Vitamin (multivitamin) tablet Hold day of surgery  • omeprazole (PriLOSEC) 20 mg delayed release capsule Take day of surgery  • SUMAtriptan (IMITREX) 100 mg tablet Hold day of surgery  Medication instructions for day surgery reviewed  Please use only a sip of water to take your instructed medications  Avoid all over the counter vitamins, supplements and NSAIDS for one week prior to surgery per anesthesia guidelines  Tylenol is ok to take as needed  You will receive a call one business day prior to surgery with an arrival time and hospital directions  If your surgery is scheduled on a Monday, the hospital will be calling you on the Friday prior to your surgery  If you have not heard from anyone by 8pm, please call the hospital supervisor through the hospital  at [phone]  [facility] [phone])  Do not eat or drink anything after midnight the night before your surgery, including candy, mints, lifesavers, or chewing gum  Do not drink alcohol 24hrs before your surgery  Try not to smoke at least 24hrs before your surgery         Follow the pre surgery showering instructions as listed in the [facility] Surgical Experience Booklet” or otherwise provided by your surgeon's office  Do not shave the surgical area 24 hours before surgery  Do not apply any lotions, creams, including makeup, cologne, deodorant, or perfumes after showering on the day of your surgery  No contact lenses, eye make-up, or artificial eyelashes  Remove nail polish, including gel polish, and any artificial, gel, or acrylic nails if possible  Remove all jewelry including rings and body piercing jewelry  Wear causal clothing that is easy to take on and off  Consider your type of surgery  Keep any valuables, jewelry, piercings at home  Please bring any specially ordered equipment (sling, braces) if indicated  Arrange for a responsible person to drive you to and from the hospital on the day of your surgery  Visitor Guidelines discussed  Call the surgeon's office with any new illnesses, exposures, or additional questions prior to surgery  Please reference your [facility] Surgical Experience Booklet” for additional information to prepare for your upcoming surgery

## 2023-04-28 ENCOUNTER — TELEPHONE (OUTPATIENT)
Age: 58
End: 2023-04-28

## 2023-04-28 NOTE — TELEPHONE ENCOUNTER
Called patient to remind her to get her blood work done prior to seeing Dr Sotero Farfan next Wednesday  LM for patient to call office back

## 2023-04-29 ENCOUNTER — APPOINTMENT (OUTPATIENT)
Dept: LAB | Facility: CLINIC | Age: 58
End: 2023-04-29

## 2023-04-29 DIAGNOSIS — M17.12 PRIMARY OSTEOARTHRITIS OF LEFT KNEE: Primary | ICD-10-CM

## 2023-04-29 LAB
ALBUMIN SERPL BCP-MCNC: 3.9 G/DL (ref 3.5–5)
ALP SERPL-CCNC: 63 U/L (ref 46–116)
ALT SERPL W P-5'-P-CCNC: 28 U/L (ref 12–78)
ANION GAP SERPL CALCULATED.3IONS-SCNC: -1 MMOL/L (ref 4–13)
APTT PPP: 25 SECONDS (ref 23–37)
AST SERPL W P-5'-P-CCNC: 17 U/L (ref 5–45)
BASOPHILS # BLD AUTO: 0.04 THOUSANDS/ÂΜL (ref 0–0.1)
BASOPHILS NFR BLD AUTO: 1 % (ref 0–1)
BILIRUB SERPL-MCNC: 0.39 MG/DL (ref 0.2–1)
BUN SERPL-MCNC: 11 MG/DL (ref 5–25)
CALCIUM SERPL-MCNC: 10 MG/DL (ref 8.3–10.1)
CHLORIDE SERPL-SCNC: 110 MMOL/L (ref 96–108)
CO2 SERPL-SCNC: 31 MMOL/L (ref 21–32)
CREAT SERPL-MCNC: 0.88 MG/DL (ref 0.6–1.3)
EOSINOPHIL # BLD AUTO: 0.16 THOUSAND/ÂΜL (ref 0–0.61)
EOSINOPHIL NFR BLD AUTO: 2 % (ref 0–6)
ERYTHROCYTE [DISTWIDTH] IN BLOOD BY AUTOMATED COUNT: 12.8 % (ref 11.6–15.1)
GFR SERPL CREATININE-BSD FRML MDRD: 74 ML/MIN/1.73SQ M
GLUCOSE P FAST SERPL-MCNC: 89 MG/DL (ref 65–99)
HCT VFR BLD AUTO: 44.3 % (ref 34.8–46.1)
HGB BLD-MCNC: 14.8 G/DL (ref 11.5–15.4)
IMM GRANULOCYTES # BLD AUTO: 0.03 THOUSAND/UL (ref 0–0.2)
IMM GRANULOCYTES NFR BLD AUTO: 1 % (ref 0–2)
INR PPP: 0.93 (ref 0.84–1.19)
LYMPHOCYTES # BLD AUTO: 2.01 THOUSANDS/ÂΜL (ref 0.6–4.47)
LYMPHOCYTES NFR BLD AUTO: 31 % (ref 14–44)
MCH RBC QN AUTO: 29.4 PG (ref 26.8–34.3)
MCHC RBC AUTO-ENTMCNC: 33.4 G/DL (ref 31.4–37.4)
MCV RBC AUTO: 88 FL (ref 82–98)
MONOCYTES # BLD AUTO: 0.53 THOUSAND/ÂΜL (ref 0.17–1.22)
MONOCYTES NFR BLD AUTO: 8 % (ref 4–12)
NEUTROPHILS # BLD AUTO: 3.82 THOUSANDS/ÂΜL (ref 1.85–7.62)
NEUTS SEG NFR BLD AUTO: 57 % (ref 43–75)
NRBC BLD AUTO-RTO: 0 /100 WBCS
PLATELET # BLD AUTO: 277 THOUSANDS/UL (ref 149–390)
PMV BLD AUTO: 10.2 FL (ref 8.9–12.7)
POTASSIUM SERPL-SCNC: 4.3 MMOL/L (ref 3.5–5.3)
PROT SERPL-MCNC: 7.3 G/DL (ref 6.4–8.4)
PROTHROMBIN TIME: 12.6 SECONDS (ref 11.6–14.5)
RBC # BLD AUTO: 5.04 MILLION/UL (ref 3.81–5.12)
SODIUM SERPL-SCNC: 140 MMOL/L (ref 135–147)
WBC # BLD AUTO: 6.59 THOUSAND/UL (ref 4.31–10.16)

## 2023-04-30 LAB
ABO GROUP BLD: NORMAL
BLD GP AB SCN SERPL QL: NEGATIVE
RH BLD: POSITIVE
SPECIMEN EXPIRATION DATE: NORMAL

## 2023-05-02 LAB
EST. AVERAGE GLUCOSE BLD GHB EST-MCNC: 91 MG/DL
HBA1C MFR BLD: 4.8 %

## 2023-05-03 ENCOUNTER — OFFICE VISIT (OUTPATIENT)
Age: 58
End: 2023-05-03

## 2023-05-03 VITALS
HEIGHT: 61 IN | SYSTOLIC BLOOD PRESSURE: 119 MMHG | WEIGHT: 151.2 LBS | DIASTOLIC BLOOD PRESSURE: 81 MMHG | BODY MASS INDEX: 28.55 KG/M2 | HEART RATE: 80 BPM

## 2023-05-03 DIAGNOSIS — G47.33 OBSTRUCTIVE SLEEP APNEA-HYPOPNEA SYNDROME: ICD-10-CM

## 2023-05-03 DIAGNOSIS — I10 BENIGN ESSENTIAL HYPERTENSION: ICD-10-CM

## 2023-05-03 DIAGNOSIS — E66.3 OVERWEIGHT WITH BODY MASS INDEX (BMI) OF 28 TO 28.9 IN ADULT: ICD-10-CM

## 2023-05-03 DIAGNOSIS — M17.12 PRIMARY OSTEOARTHRITIS OF LEFT KNEE: ICD-10-CM

## 2023-05-03 DIAGNOSIS — K21.9 GASTROESOPHAGEAL REFLUX DISEASE WITHOUT ESOPHAGITIS: Primary | ICD-10-CM

## 2023-05-03 DIAGNOSIS — Z01.818 PRE-OPERATIVE CLEARANCE: ICD-10-CM

## 2023-05-03 DIAGNOSIS — F33.0 MDD (MAJOR DEPRESSIVE DISORDER), RECURRENT EPISODE, MILD (HCC): ICD-10-CM

## 2023-05-13 ENCOUNTER — ANESTHESIA EVENT (OUTPATIENT)
Dept: PERIOP | Facility: HOSPITAL | Age: 58
End: 2023-05-13

## 2023-05-16 ENCOUNTER — ANESTHESIA (OUTPATIENT)
Dept: PERIOP | Facility: HOSPITAL | Age: 58
End: 2023-05-16

## 2023-05-16 ENCOUNTER — HOSPITAL ENCOUNTER (OUTPATIENT)
Facility: HOSPITAL | Age: 58
Setting detail: OUTPATIENT SURGERY
Discharge: HOME | End: 2023-05-17
Attending: ORTHOPAEDIC SURGERY | Admitting: ORTHOPAEDIC SURGERY

## 2023-05-16 DIAGNOSIS — M17.12 PRIMARY OSTEOARTHRITIS OF LEFT KNEE: Primary | ICD-10-CM

## 2023-05-16 LAB
ABO GROUP BLD: NORMAL
RH BLD: POSITIVE

## 2023-05-16 DEVICE — ATTUNE KNEE SYSTEM TIBIAL BASE AFFIXIUM FIXED BEARING SIZE 3
Type: IMPLANTABLE DEVICE | Site: KNEE | Status: FUNCTIONAL
Brand: ATTUNE AFFIXIUM

## 2023-05-16 DEVICE — ATTUNE KNEE SYSTEM TIBIAL INSERT FIXED BEARING POSTERIOR STABILIZED 3 6MM AOX
Type: IMPLANTABLE DEVICE | Site: KNEE | Status: FUNCTIONAL
Brand: ATTUNE

## 2023-05-16 DEVICE — ATTUNE FEMORAL POROCOAT POSTERIOR STABILIZED SIZE 3 LEFT CEMENTLESS
Type: IMPLANTABLE DEVICE | Site: KNEE | Status: FUNCTIONAL
Brand: ATTUNE

## 2023-05-16 DEVICE — ATTUNE PATELLA MEDIALIZED DOME 32MM CEMENTED AOX
Type: IMPLANTABLE DEVICE | Site: KNEE | Status: FUNCTIONAL
Brand: ATTUNE

## 2023-05-16 DEVICE — DEPUY CMW 2 FAST SET BONE CEMENT 20G: Type: IMPLANTABLE DEVICE | Site: KNEE | Status: FUNCTIONAL

## 2023-05-16 RX ORDER — ASCORBIC ACID 500 MG
500 TABLET ORAL 2 TIMES DAILY
Status: DISCONTINUED | OUTPATIENT
Start: 2023-05-16 | End: 2023-05-17 | Stop reason: HOSPADM

## 2023-05-16 RX ORDER — GABAPENTIN 300 MG/1
300 CAPSULE ORAL
Status: DISCONTINUED | OUTPATIENT
Start: 2023-05-16 | End: 2023-05-17 | Stop reason: HOSPADM

## 2023-05-16 RX ORDER — ACETAMINOPHEN 325 MG/1
650 TABLET ORAL EVERY 6 HOURS SCHEDULED
Status: DISCONTINUED | OUTPATIENT
Start: 2023-05-16 | End: 2023-05-17 | Stop reason: HOSPADM

## 2023-05-16 RX ORDER — PROPOFOL 10 MG/ML
INJECTION, EMULSION INTRAVENOUS CONTINUOUS PRN
Status: DISCONTINUED | OUTPATIENT
Start: 2023-05-16 | End: 2023-05-16

## 2023-05-16 RX ORDER — MAGNESIUM HYDROXIDE 1200 MG/15ML
LIQUID ORAL AS NEEDED
Status: DISCONTINUED | OUTPATIENT
Start: 2023-05-16 | End: 2023-05-16 | Stop reason: HOSPADM

## 2023-05-16 RX ORDER — PANTOPRAZOLE SODIUM 40 MG/1
40 TABLET, DELAYED RELEASE ORAL DAILY
Status: DISCONTINUED | OUTPATIENT
Start: 2023-05-16 | End: 2023-05-17 | Stop reason: HOSPADM

## 2023-05-16 RX ORDER — SODIUM CHLORIDE 9 MG/ML
125 INJECTION, SOLUTION INTRAVENOUS CONTINUOUS
Status: DISCONTINUED | OUTPATIENT
Start: 2023-05-16 | End: 2023-05-17 | Stop reason: HOSPADM

## 2023-05-16 RX ORDER — SENNOSIDES 8.6 MG
1 TABLET ORAL DAILY
Status: DISCONTINUED | OUTPATIENT
Start: 2023-05-16 | End: 2023-05-17 | Stop reason: HOSPADM

## 2023-05-16 RX ORDER — CALCIUM CARBONATE 200(500)MG
1000 TABLET,CHEWABLE ORAL DAILY PRN
Status: DISCONTINUED | OUTPATIENT
Start: 2023-05-16 | End: 2023-05-17 | Stop reason: HOSPADM

## 2023-05-16 RX ORDER — ACETAMINOPHEN 325 MG/1
650 TABLET ORAL EVERY 4 HOURS PRN
Status: DISCONTINUED | OUTPATIENT
Start: 2023-05-16 | End: 2023-05-17 | Stop reason: HOSPADM

## 2023-05-16 RX ORDER — CEFAZOLIN SODIUM 1 G/50ML
1000 SOLUTION INTRAVENOUS EVERY 8 HOURS
Status: COMPLETED | OUTPATIENT
Start: 2023-05-16 | End: 2023-05-17

## 2023-05-16 RX ORDER — MEPERIDINE HYDROCHLORIDE 25 MG/ML
12.5 INJECTION INTRAMUSCULAR; INTRAVENOUS; SUBCUTANEOUS ONCE
Status: COMPLETED | OUTPATIENT
Start: 2023-05-16 | End: 2023-05-16

## 2023-05-16 RX ORDER — FERROUS SULFATE 325(65) MG
325 TABLET ORAL 2 TIMES DAILY WITH MEALS
Status: DISCONTINUED | OUTPATIENT
Start: 2023-05-16 | End: 2023-05-17 | Stop reason: HOSPADM

## 2023-05-16 RX ORDER — FENTANYL CITRATE 50 UG/ML
INJECTION, SOLUTION INTRAMUSCULAR; INTRAVENOUS AS NEEDED
Status: DISCONTINUED | OUTPATIENT
Start: 2023-05-16 | End: 2023-05-16

## 2023-05-16 RX ORDER — CEFAZOLIN SODIUM 1 G/50ML
1000 SOLUTION INTRAVENOUS ONCE
Status: COMPLETED | OUTPATIENT
Start: 2023-05-16 | End: 2023-05-16

## 2023-05-16 RX ORDER — OXYCODONE HYDROCHLORIDE 10 MG/1
10 TABLET ORAL EVERY 4 HOURS PRN
Status: DISCONTINUED | OUTPATIENT
Start: 2023-05-16 | End: 2023-05-17 | Stop reason: HOSPADM

## 2023-05-16 RX ORDER — SODIUM CHLORIDE, SODIUM LACTATE, POTASSIUM CHLORIDE, CALCIUM CHLORIDE 600; 310; 30; 20 MG/100ML; MG/100ML; MG/100ML; MG/100ML
75 INJECTION, SOLUTION INTRAVENOUS CONTINUOUS
Status: DISCONTINUED | OUTPATIENT
Start: 2023-05-16 | End: 2023-05-17 | Stop reason: HOSPADM

## 2023-05-16 RX ORDER — OXYCODONE HYDROCHLORIDE 5 MG/1
5 TABLET ORAL EVERY 4 HOURS PRN
Qty: 60 TABLET | Refills: 0 | Status: SHIPPED | OUTPATIENT
Start: 2023-05-16 | End: 2023-05-26

## 2023-05-16 RX ORDER — ONDANSETRON 4 MG/1
4 TABLET, ORALLY DISINTEGRATING ORAL EVERY 8 HOURS PRN
Qty: 15 TABLET | Refills: 0 | Status: SHIPPED | OUTPATIENT
Start: 2023-05-16

## 2023-05-16 RX ORDER — SODIUM CHLORIDE, SODIUM LACTATE, POTASSIUM CHLORIDE, CALCIUM CHLORIDE 600; 310; 30; 20 MG/100ML; MG/100ML; MG/100ML; MG/100ML
INJECTION, SOLUTION INTRAVENOUS CONTINUOUS PRN
Status: DISCONTINUED | OUTPATIENT
Start: 2023-05-16 | End: 2023-05-16

## 2023-05-16 RX ORDER — ONDANSETRON 2 MG/ML
INJECTION INTRAMUSCULAR; INTRAVENOUS AS NEEDED
Status: DISCONTINUED | OUTPATIENT
Start: 2023-05-16 | End: 2023-05-16

## 2023-05-16 RX ORDER — TRAMADOL HYDROCHLORIDE 50 MG/1
50 TABLET ORAL EVERY 6 HOURS SCHEDULED
Status: DISCONTINUED | OUTPATIENT
Start: 2023-05-16 | End: 2023-05-17 | Stop reason: HOSPADM

## 2023-05-16 RX ORDER — SIMETHICONE 80 MG
80 TABLET,CHEWABLE ORAL 4 TIMES DAILY PRN
Status: DISCONTINUED | OUTPATIENT
Start: 2023-05-16 | End: 2023-05-17 | Stop reason: HOSPADM

## 2023-05-16 RX ORDER — CEPHALEXIN 500 MG/1
1000 CAPSULE ORAL EVERY 8 HOURS SCHEDULED
Qty: 4 CAPSULE | Refills: 0 | Status: SHIPPED | OUTPATIENT
Start: 2023-05-16 | End: 2023-05-17

## 2023-05-16 RX ORDER — BUPIVACAINE HYDROCHLORIDE 5 MG/ML
INJECTION, SOLUTION EPIDURAL; INTRACAUDAL AS NEEDED
Status: DISCONTINUED | OUTPATIENT
Start: 2023-05-16 | End: 2023-05-16

## 2023-05-16 RX ORDER — OXYCODONE HYDROCHLORIDE 5 MG/1
5 TABLET ORAL EVERY 4 HOURS PRN
Status: DISCONTINUED | OUTPATIENT
Start: 2023-05-16 | End: 2023-05-17 | Stop reason: HOSPADM

## 2023-05-16 RX ORDER — MIDAZOLAM HYDROCHLORIDE 2 MG/2ML
INJECTION, SOLUTION INTRAMUSCULAR; INTRAVENOUS AS NEEDED
Status: DISCONTINUED | OUTPATIENT
Start: 2023-05-16 | End: 2023-05-16

## 2023-05-16 RX ORDER — DOCUSATE SODIUM 100 MG/1
100 CAPSULE, LIQUID FILLED ORAL 2 TIMES DAILY
Status: DISCONTINUED | OUTPATIENT
Start: 2023-05-16 | End: 2023-05-17 | Stop reason: HOSPADM

## 2023-05-16 RX ORDER — FOLIC ACID 1 MG/1
1 TABLET ORAL DAILY
Status: DISCONTINUED | OUTPATIENT
Start: 2023-05-16 | End: 2023-05-17 | Stop reason: HOSPADM

## 2023-05-16 RX ORDER — CHLORHEXIDINE GLUCONATE 0.12 MG/ML
15 RINSE ORAL ONCE
Status: COMPLETED | OUTPATIENT
Start: 2023-05-16 | End: 2023-05-16

## 2023-05-16 RX ORDER — FENTANYL CITRATE/PF 50 MCG/ML
25 SYRINGE (ML) INJECTION
Status: DISCONTINUED | OUTPATIENT
Start: 2023-05-16 | End: 2023-05-16 | Stop reason: HOSPADM

## 2023-05-16 RX ORDER — TRANEXAMIC ACID 10 MG/ML
1000 INJECTION, SOLUTION INTRAVENOUS ONCE
Status: COMPLETED | OUTPATIENT
Start: 2023-05-16 | End: 2023-05-16

## 2023-05-16 RX ORDER — BUPIVACAINE HYDROCHLORIDE 7.5 MG/ML
INJECTION, SOLUTION INTRASPINAL AS NEEDED
Status: DISCONTINUED | OUTPATIENT
Start: 2023-05-16 | End: 2023-05-16

## 2023-05-16 RX ORDER — ASPIRIN 81 MG/1
81 TABLET ORAL 2 TIMES DAILY
Status: DISCONTINUED | OUTPATIENT
Start: 2023-05-16 | End: 2023-05-17 | Stop reason: HOSPADM

## 2023-05-16 RX ORDER — ASPIRIN 81 MG/1
81 TABLET ORAL 2 TIMES DAILY
Qty: 60 TABLET | Refills: 0 | Status: SHIPPED | OUTPATIENT
Start: 2023-05-16 | End: 2023-06-15

## 2023-05-16 RX ORDER — ONDANSETRON 2 MG/ML
4 INJECTION INTRAMUSCULAR; INTRAVENOUS ONCE AS NEEDED
Status: DISCONTINUED | OUTPATIENT
Start: 2023-05-16 | End: 2023-05-16 | Stop reason: HOSPADM

## 2023-05-16 RX ADMIN — FENTANYL CITRATE 50 MCG: 50 INJECTION INTRAMUSCULAR; INTRAVENOUS at 08:09

## 2023-05-16 RX ADMIN — SENNOSIDES 8.6 MG: 8.6 TABLET, FILM COATED ORAL at 15:55

## 2023-05-16 RX ADMIN — MIDAZOLAM 2 MG: 1 INJECTION INTRAMUSCULAR; INTRAVENOUS at 09:08

## 2023-05-16 RX ADMIN — MIDAZOLAM 1 MG: 1 INJECTION INTRAMUSCULAR; INTRAVENOUS at 09:03

## 2023-05-16 RX ADMIN — TRAMADOL HYDROCHLORIDE 50 MG: 50 TABLET, COATED ORAL at 18:52

## 2023-05-16 RX ADMIN — BUPIVACAINE HYDROCHLORIDE 10 ML: 5 INJECTION, SOLUTION EPIDURAL; INTRACAUDAL at 08:13

## 2023-05-16 RX ADMIN — OXYCODONE HYDROCHLORIDE 10 MG: 10 TABLET ORAL at 22:47

## 2023-05-16 RX ADMIN — OXYCODONE HYDROCHLORIDE 10 MG: 10 TABLET ORAL at 15:55

## 2023-05-16 RX ADMIN — PANTOPRAZOLE SODIUM 40 MG: 40 TABLET, DELAYED RELEASE ORAL at 15:55

## 2023-05-16 RX ADMIN — TRANEXAMIC ACID 1000 MG: 10 INJECTION, SOLUTION INTRAVENOUS at 09:00

## 2023-05-16 RX ADMIN — FOLIC ACID 1 MG: 1 TABLET ORAL at 17:02

## 2023-05-16 RX ADMIN — ASPIRIN 81 MG: 81 TABLET, COATED ORAL at 21:12

## 2023-05-16 RX ADMIN — MIDAZOLAM 3 MG: 1 INJECTION INTRAMUSCULAR; INTRAVENOUS at 08:09

## 2023-05-16 RX ADMIN — OXYCODONE HYDROCHLORIDE AND ACETAMINOPHEN 500 MG: 500 TABLET ORAL at 17:02

## 2023-05-16 RX ADMIN — CHLORHEXIDINE GLUCONATE 0.12% ORAL RINSE 15 ML: 1.2 LIQUID ORAL at 07:24

## 2023-05-16 RX ADMIN — FERROUS SULFATE TAB 325 MG (65 MG ELEMENTAL FE) 325 MG: 325 (65 FE) TAB at 17:02

## 2023-05-16 RX ADMIN — MEPERIDINE HYDROCHLORIDE 12.5 MG: 25 INJECTION INTRAMUSCULAR; INTRAVENOUS; SUBCUTANEOUS at 11:15

## 2023-05-16 RX ADMIN — ONDANSETRON 4 MG: 2 INJECTION INTRAMUSCULAR; INTRAVENOUS at 09:35

## 2023-05-16 RX ADMIN — DOCUSATE SODIUM 100 MG: 100 CAPSULE, LIQUID FILLED ORAL at 17:03

## 2023-05-16 RX ADMIN — CEFAZOLIN SODIUM 1000 MG: 1 SOLUTION INTRAVENOUS at 17:03

## 2023-05-16 RX ADMIN — LIDOCAINE HYDROCHLORIDE 40 MG: 20 INJECTION INTRAVENOUS at 09:14

## 2023-05-16 RX ADMIN — ACETAMINOPHEN 650 MG: 325 TABLET ORAL at 23:19

## 2023-05-16 RX ADMIN — BUPIVACAINE 10 ML: 13.3 INJECTION, SUSPENSION, LIPOSOMAL INFILTRATION at 08:13

## 2023-05-16 RX ADMIN — GABAPENTIN 300 MG: 300 CAPSULE ORAL at 21:12

## 2023-05-16 RX ADMIN — MULTIPLE VITAMINS W/ MINERALS TAB 1 TABLET: TAB ORAL at 15:55

## 2023-05-16 RX ADMIN — SODIUM CHLORIDE, SODIUM LACTATE, POTASSIUM CHLORIDE, AND CALCIUM CHLORIDE 75 ML/HR: .6; .31; .03; .02 INJECTION, SOLUTION INTRAVENOUS at 11:23

## 2023-05-16 RX ADMIN — FENTANYL CITRATE 50 MCG: 50 INJECTION INTRAMUSCULAR; INTRAVENOUS at 09:03

## 2023-05-16 RX ADMIN — SODIUM CHLORIDE 125 ML/HR: 0.9 INJECTION, SOLUTION INTRAVENOUS at 07:25

## 2023-05-16 RX ADMIN — TRAMADOL HYDROCHLORIDE 50 MG: 50 TABLET, COATED ORAL at 23:19

## 2023-05-16 RX ADMIN — CEFAZOLIN SODIUM 1000 MG: 1 SOLUTION INTRAVENOUS at 09:00

## 2023-05-16 RX ADMIN — BUPIVACAINE HYDROCHLORIDE IN DEXTROSE 1.2 ML: 7.5 INJECTION, SOLUTION SUBARACHNOID at 09:12

## 2023-05-16 RX ADMIN — TRAMADOL HYDROCHLORIDE 50 MG: 50 TABLET, COATED ORAL at 14:13

## 2023-05-16 RX ADMIN — ACETAMINOPHEN 650 MG: 325 TABLET ORAL at 17:02

## 2023-05-16 RX ADMIN — PROPOFOL 75 MCG/KG/MIN: 10 INJECTION, EMULSION INTRAVENOUS at 09:14

## 2023-05-16 RX ADMIN — SODIUM CHLORIDE, SODIUM LACTATE, POTASSIUM CHLORIDE, AND CALCIUM CHLORIDE: .6; .31; .03; .02 INJECTION, SOLUTION INTRAVENOUS at 09:40

## 2023-05-16 RX ADMIN — IRON SUCROSE 300 MG: 20 INJECTION, SOLUTION INTRAVENOUS at 18:00

## 2023-05-16 NOTE — PLAN OF CARE
Problem: PHYSICAL THERAPY ADULT  Goal: Performs mobility at highest level of function for planned discharge setting  See evaluation for individualized goals  Description: Treatment/Interventions: Functional transfer training, LE strengthening/ROM, Therapeutic exercise, Elevations, Endurance training, Patient/family training, Bed mobility, Gait training, Spoke to nursing, OT  Equipment Recommended: Neena Ramírez (Pt owns walker)       See flowsheet documentation for full assessment, interventions and recommendations  Note: Prognosis: Good  Problem List: Decreased strength, Decreased range of motion, Decreased endurance, Impaired balance, Decreased mobility, Impaired sensation, Pain  Assessment: Pt  56 y  o female presents for elective surgery  Past medical hx includes HTN, fibromyalgia  Pt admitted for Primary osteoarthritis of left knee w/ Primary osteoarthritis of left knee (M17 12)  S/p elective TKR POD #0  Pt referred to PT for functional mobility evaluation & D/C planning w/ orders of up w/ assistance  PTA, pt reports being I w/o AD  Pain 5/10 in L knee with inc pain following mobility  Educated pt on WBAT and maintaining knee extension when seated and supine with good understanding  During evaluation, deficits included dec mobility, balance, ambulation  Please see flow sheet above for objective findings and level of assistance required for safe completion of functional tasks  Pt demonstrated dec endurance and tolerance to activity  Pt educated on how to use RW and educated on stair navigation  BP in supine 118/61  BP seated /58  Required S for rolling L, supine to sit, sit to stand, and ambulation  Required minAx1 for stand to sit due to decreased BP with inc facial warmth and lightheadedness  Pt was able to ambulate 10' w/ RW and S with antalgic gait and step to pattern and no LOB noted  Inc warmth and lightheadedness with ambulation therefore pt was educated to sit in recliner   BP when seated in recliner post ambulation 89/50  Performed ankle pumps and after 2 minute rest BP was 89/53  RN notified  Pt was educated on fall precautions and reinforced w/ good understanding  Pt would benefit from continued PT to address deficits as defined above and maximize level of independence with functional mobility and safety  The patient's AM-PAC Basic Mobility Inpatient Short Form Raw Score is 18  A Raw score of greater than 16 suggests the patient may benefit from discharge to home  Please also refer to the recommendation of the Physical Therapist for safe discharge planning  From PT/mobility standpoint recommendation for D/C to OPPT, when medically cleared based on objective measures above, current function  Nsg staff to continue to mobilized pt (OOB in chair for all meals & ambulate in room/unit) as tolerated to prevent further decline in function  Nsg notified  PT Discharge Recommendation: Home with outpatient rehabilitation    See flowsheet documentation for full assessment

## 2023-05-16 NOTE — ANESTHESIA PROCEDURE NOTES
Spinal Block    Patient location during procedure: OR  Start time: 5/16/2023 9:12 AM  Reason for block: procedure for pain  Staffing  Performed: Anesthesiologist   Anesthesiologist: Susana Pineda DO  Preanesthetic Checklist  Completed: patient identified, IV checked, site marked, risks and benefits discussed, surgical consent, monitors and equipment checked, pre-op evaluation and timeout performed  Spinal Block  Patient position: sitting  Prep: Betadine  Patient monitoring: heart rate, continuous pulse ox and frequent blood pressure checks  Approach: midline  Location: L3-4  Injection technique: single-shot  Needle  Needle type: pencil-tip   Needle gauge: 25 G  Needle length: 5 cm  Assessment  Injection Assessment:  negative aspiration for heme, positive aspiration for clear CSF and no paresthesia on injection    Post-procedure:  site cleaned

## 2023-05-16 NOTE — PHYSICAL THERAPY NOTE
PT EVALUATION    Pt  Name: Brayden Christianson  Pt  Age: 56 y o    MRN: [id number]  LENGTH OF STAY: 0    Patient Active Problem List   Diagnosis    Fibromyalgia    Hypercholesteremia    Migraine without aura and without status migrainosus, not intractable    Persistent insomnia    Vitamin D deficiency    Low ferritin    Benign essential hypertension    Carpal tunnel syndrome, bilateral    Syncopal episodes    Sleep disturbance    COVID-19    Overweight with body mass index (BMI) of 28 to 28 9 in adult    Primary generalized (osteo)arthritis    Cervical spondylosis without myelopathy    Gastroesophageal reflux disease without esophagitis    History of fetal loss    Undifferentiated connective tissue disease (HCC)    Obstructive sleep apnea-hypopnea syndrome    Excessive daytime sleepiness    Primary osteoarthritis of left knee    Tendinitis of right rotator cuff    Seronegative arthropathy of multiple sites ([facility] [address] )    History of colon polyps    MDD (major depressive disorder), recurrent episode, mild (HCC)    Anxiety       Admitting Diagnoses:   Primary osteoarthritis of left knee [M17 12]    Past Medical History:   Diagnosis Date    Allergic 02/01/2020    Anemia     Anesthesia complication     woke up during D/C    Anxiety     Arthritis     BMI 30 0-30 9,adult     Carpal tunnel syndrome on right     Cervical spondylosis without myelopathy     COVID 12/01/2021    Depression     Fibromyalgia     Fibromyalgia, primary     GERD (gastroesophageal reflux disease)     History of fetal loss     Recurrent    Hyperlipidemia     Hypertension     Jaw pain     and both ears with migraines    Migraine     Neck pain     Osteoarthritis     lumbar spine    Sicca syndrome (HCC)     Sleep apnea     unable to use CPAP    Syncopal episodes     with severe migraines    Undifferentiated connective tissue disease ([address] )     Wears contact lenses     or glasses       Past Surgical History:   Procedure Laterality Date    CHOLECYSTECTOMY COLONOSCOPY      DILATION AND CURETTAGE OF UTERUS      TUBAL LIGATION         Imaging Studies:  No orders to display        05/16/23 1552   PT Last Visit   PT Visit Date 05/16/23   Note Type   Note type Evaluation   Pain Assessment   Pain Assessment Tool 0-10   Pain Score 5   Pain Location/Orientation Orientation: Left; Location: Knee   Hospital Pain Intervention(s) Cold applied;Repositioned; Ambulation/increased activity; Elevated; Emotional support; Rest   Restrictions/Precautions   Weight Bearing Precautions Per Order Yes   LLE Weight Bearing Per Order WBAT   Other Precautions WBS; Fall Risk;Pain   Home Living   Type of Home House  (0STE)   Home Layout Two level;Bed/bath upstairs  (13 steps to 2nd floor with hand railing)   Bathroom Shower/Tub Tub/shower unit   Bathroom Toilet Standard   Bathroom Equipment Other (Comment)  (No DME)   Home Equipment Walker;Cane   Additional Comments Pt can stay on first floor if needed but bedroom and bath are both located on 2nd floor   Prior Function   Level of [place] Independent with ADLs; Independent with functional mobility; Independent with IADLS   Lives With Spouse   Receives Help From Family   IADLs Independent with driving; Independent with meal prep; Independent with medication management   Falls in the last 6 months 1 to 4  (2x)   Vocational Part time employment   Comments PTA, pt independent with ADLs and IADLs without AD  Pt works part-time with no time restraints for back to work     General   Family/Caregiver Present Yes  ()   Cognition   Overall Cognitive Status WFL   Arousal/Participation Alert   Orientation Level Oriented X4   Following Commands Follows all commands and directions without difficulty   Comments Cooperative and pleasant   Subjective   Subjective Pt agreeable to PT evaluation   RUE Assessment   RUE Assessment WFL  (4+/5 grossly)   LUE Assessment   LUE Assessment WFL  (4+/5 grossly)   RLE Assessment   RLE Assessment WFL  (4+/5 grossly)   LLE Assessment   LLE Assessment X   Strength LLE   L Hip Flexion 3/5  (able to move against gravity)   L Knee Flexion 3/5  (able to move against gravity)   L Knee Extension 3/5  (able to move against gravity)   L Ankle Dorsiflexion 4/5   L Ankle Plantar Flexion 4/5   Light Touch   RLE Light Touch Grossly intact   LLE Light Touch Impaired   LLE Light Touch Comments Dec sensation in medial knee compartment and dorsal aspect of L foot 2* post op status   Bed Mobility   Rolling L 5  Supervision   Additional items Bedrails; Increased time required;Verbal cues   Supine to Sit 5  Supervision   Additional items Bedrails; Increased time required;Verbal cues   Additional Comments Cues for technique and hand placement   Transfers   Sit to Stand 5  Supervision   Additional items Armrests; Increased time required   Stand to Sit 4  Minimal assistance   Additional items Assist x 1; Armrests; Increased time required;Verbal cues   Additional Comments Cues for technique and safety   Ambulation/Elevation   Gait pattern Antalgic;Decreased foot clearance;Decreased L stance; Short stride; Step to;Excessively slow   Gait Assistance 5  Supervision   Additional items Assist x 1;Verbal cues; Tactile cues   Assistive Device Rolling walker   Distance 10'x1   Ambulation/Elevation Additional Comments Cues for technique and safety   Balance   Static Sitting Normal   Dynamic Sitting Good   Static Standing Fair +   Dynamic Standing Fair   Ambulatory Fair   Endurance Deficit   Endurance Deficit Yes   Endurance Deficit Description fatigue, pain   Activity Tolerance   Activity Tolerance Patient limited by pain; Other (Comment)  (Inc lightheadedness and warmth due to drop in BP)   Nurse Made Aware CURLY Cali   Assessment   Prognosis Good   Problem List Decreased strength;Decreased range of motion;Decreased endurance; Impaired balance;Decreased mobility; Impaired sensation;Pain   Assessment Pt  56 y  o female presents for elective surgery   Past medical hx includes HTN, fibromyalgia  Pt admitted for Primary osteoarthritis of left knee w/ Primary osteoarthritis of left knee (M17 12)  S/p elective TKR POD #0  Pt referred to PT for functional mobility evaluation & D/C planning w/ orders of up w/ assistance  PTA, pt reports being I w/o AD  Pain 5/10 in L knee with inc pain following mobility  Educated pt on WBAT and maintaining knee extension when seated and supine with good understanding  During evaluation, deficits included dec mobility, balance, ambulation  Please see flow sheet above for objective findings and level of assistance required for safe completion of functional tasks  Pt demonstrated dec endurance and tolerance to activity  Pt educated on how to use RW and educated on stair navigation  BP in supine 118/61  BP seated /58  Required S for rolling L, supine to sit, sit to stand, and ambulation  Required minAx1 for stand to sit due to decreased BP with inc facial warmth and lightheadedness  Pt was able to ambulate 10' w/ RW and S with antalgic gait and step to pattern and no LOB noted  Inc warmth and lightheadedness with ambulation therefore pt was educated to sit in recliner  BP when seated in recliner post ambulation 89/50  Performed ankle pumps and after 2 minute rest BP was 89/53  RN notified  Pt was educated on fall precautions and reinforced w/ good understanding  Pt would benefit from continued PT to address deficits as defined above and maximize level of independence with functional mobility and safety  The patient's AM-PAC Basic Mobility Inpatient Short Form Raw Score is 18  A Raw score of greater than 16 suggests the patient may benefit from discharge to home  Please also refer to the recommendation of the Physical Therapist for safe discharge planning  From PT/mobility standpoint recommendation for D/C to OPPT, when medically cleared based on objective measures above, current function   [facility] staff to continue to mobilized pt (OOB in chair for all meals & ambulate in room/unit) as tolerated to prevent further decline in function  Nsg notified  Goals   Patient Goals to have less pain   STG Expiration Date 05/23/23   Short Term Goal #1 1) Inc overall LE strength by 1/2 MMT grade to improve functional mobility; 2) Pt will demonstrate improved bed mobility with mod I to dec caregiver burden; 3) Pt will demonstrate improved transfers w/ mod I for inc safety; 4) Pt will be able to amb w/ mod I >150' w/ RW for household distances to inc safety and dec caregiver burden; 5) Pt will be able to navigate stairs with S to dec caregiver burden and inc safety with functional mobility; 6) Improve general balance by 1 grade to inc safety; 7) PT for ongoing patient and caregiver education   PT Treatment Day 0   Plan   Treatment/Interventions Functional transfer training;LE strengthening/ROM; Therapeutic exercise;Elevations; Endurance training;Patient/family training;Bed mobility;Gait training;Spoke to nursing;OT   PT Frequency Twice a day   Recommendation   PT Discharge Recommendation Home with outpatient rehabilitation   Equipment Recommended Walker  (Pt owns walker)   AM-PAC Basic Mobility Inpatient   Turning in Flat Bed Without Bedrails 3   Lying on Back to Sitting on Edge of Flat Bed Without Bedrails 3   Moving Bed to Chair 3   Standing Up From Chair Using Arms 3   Walk in Room 3   Climb 3-5 Stairs With Railing 3   Basic Mobility Inpatient Raw Score 18   Basic Mobility Standardized Score 41 05   Highest Level Of Mobility   JH-HLM Goal 6: Walk 10 steps or more   JH-HLM Achieved 6: Walk 10 steps or more   End of Consult   Patient Position at End of Consult Bedside chair; All needs within reach  (Reclined in bedside chair)   End of Consult Comments Pt reclined in bedside chair at end of session due to low BP  BP at end of session 89/53  RN notified     Hx/personal factors: co-morbidities, use of AD, pain, h/o of falls, fall risk, and assist w/ ADL's  Examination: dec mobility, dec balance, dec endurance, dec amb, risk for falls, pain, assessed body system, balance, endurance, amb, D/C disposition & fall risk, impairements in locomotion, musculoskeletal, balance, endurance, posture, coordination  Clinical: unpredictable (ongoing medical status, abnormal lab values, risk for falls, POD #0, and pain mgt)  Complexity: high      Jose Eduardo Santo, PT

## 2023-05-16 NOTE — PLAN OF CARE
Problem: PAIN - ADULT  Goal: Verbalizes/displays adequate comfort level or baseline comfort level  Description: Interventions:  - Encourage patient to monitor pain and request assistance  - Assess pain using appropriate pain scale  - Administer analgesics based on type and severity of pain and evaluate response  - Implement non-pharmacological measures as appropriate and evaluate response  - Consider cultural and social influences on pain and pain management  - Notify physician/advanced practitioner if interventions unsuccessful or patient reports new pain  Outcome: Progressing     Problem: MOBILITY - ADULT  Goal: Maintain or return to baseline ADL function  Description: INTERVENTIONS:  -  Assess patient's ability to carry out ADLs; assess patient's baseline for ADL function and identify physical deficits which impact ability to perform ADLs (bathing, care of mouth/teeth, toileting, grooming, dressing, etc )  - Assess/evaluate cause of self-care deficits   - Assess range of motion  - Assess patient's mobility; develop plan if impaired  - Assess patient's need for assistive devices and provide as appropriate  - Encourage maximum independence but intervene and supervise when necessary  - Involve family in performance of ADLs  - Assess for home care needs following discharge   - Consider OT consult to assist with ADL evaluation and planning for discharge  - Provide patient education as appropriate  Outcome: Progressing     Problem: Knowledge Deficit  Goal: Patient/family/caregiver demonstrates understanding of disease process, treatment plan, medications, and discharge instructions  Description: Complete learning assessment and assess knowledge base    Interventions:  - Provide teaching at level of understanding  - Provide teaching via preferred learning methods  Outcome: Progressing     Problem: DISCHARGE PLANNING  Goal: Discharge to home or other facility with appropriate resources  Description: INTERVENTIONS:  - Identify barriers to discharge w/patient and caregiver  - Arrange for needed discharge resources and transportation as appropriate  - Identify discharge learning needs (meds, wound care, etc )  - Arrange for interpretive services to assist at discharge as needed  - Refer to Case Management Department for coordinating discharge planning if the patient needs post-hospital services based on physician/advanced practitioner order or complex needs related to functional status, cognitive ability, or social support system  Outcome: Progressing

## 2023-05-16 NOTE — ANESTHESIA PROCEDURE NOTES
Peripheral Block    Start time: 5/16/2023 8:09 AM  Reason for block: at surgeon's request and post-op pain management  Staffing  Performed: Anesthesiologist   Anesthesiologist: Carolina Romero DO  Preanesthetic Checklist  Completed: patient identified, IV checked, site marked, risks and benefits discussed, surgical consent, monitors and equipment checked, pre-op evaluation and timeout performed  Peripheral Block  Patient position: supine  Prep: ChloraPrep  Patient monitoring: heart rate, continuous pulse ox and frequent blood pressure checks  Block type: adductor canal block  Laterality: left  Injection technique: single-shot  Ultrasound permanent image saved  Needle  Needle type: Stimuplex   Needle gauge: 21 G  Needle length: 4 in  Needle localization: ultrasound guidance  Assessment  Injection assessment: incremental injection, local visualized surrounding nerve on ultrasound, negative aspiration for heme and no paresthesia on injection  Paresthesia pain: none  Heart rate change: no  Slow fractionated injection: yes  Post-procedure:  site cleaned  patient tolerated the procedure well with no immediate complications

## 2023-05-16 NOTE — ANESTHESIA POSTPROCEDURE EVALUATION
"Post-Op Assessment Note    CV Status:  Stable  Pain Score: 0    Pain management: adequate     Mental Status:  Alert and awake   Hydration Status:  Euvolemic   PONV Controlled:  Controlled   Airway Patency:  Patent      Post Op Vitals Reviewed: Yes      Staff: Anesthesiologist         No notable events documented      BP      Temp      Pulse     Resp      SpO2      /63 (BP Location: Left arm)   Pulse 76   Temp 97 9 °F (36 6 °C) (Temporal)   Resp 18   Ht 5' 1\" (1 549 m)   Wt 68 4 kg (150 lb 12 7 oz)   LMP 08/31/2019 (Exact Date)   SpO2 96%   BMI 28 49 kg/m²     "

## 2023-05-16 NOTE — OP NOTE
OPERATIVE REPORT    PATIENT NAME: Sloane Pope   :  1965  MRN: [id number]  Pt Location: [facility] OR ROOM 01    SURGERY DATE: 2023    SURGEON(S) and ROLE:  Primary: Ravindra Suazo MD  Assisting: Lee Hurtado MD; Rodrigo Tapia PA-C    NOTE:  The presence of a physician assistant was necessary to help with patient positioning, surgical exposure, wound retraction, wound closure, and other key portions of the procedure  No qualified resident was available for this case  PREOPERATIVE DIAGNOSES:  Left Knee Osteoarthritis    POSTOPERATIVE DIAGNOSES:  Same as Preoperative Diagnosis    PROCEDURES:  Left Total Knee Arthroplasty      ANESTHESIA TYPE:  Spinal    ANESTHESIA STAFF:   Anesthesiologist: Sandra Knowles DO  CRNA: Boone Mckenzie CRNA    ESTIMATED BLOOD LOSS:  200 mL    TOURNIQUET TIME:  Not used    PERIOPERATIVE ANTIBIOTICS:  cefazolin, 2 grams    IMPLANTS: Depuy Attune    Femur:  Size 3 PS, cementless    Tibia:  Size 3 Affixium fixed bearing, cementless    Patella: 32 mm oval dome    Poly : 6 mm      Implant Name Type Inv  Item Serial No   Lot No  LRB No  Used Action   DEPUY BONE CEMENT CMW2 - [id number]  DEPUY BONE CEMENT CMW2  DEPUY [id number] Left 1 Implanted   ATTUNE FEMORAL POROCOAT POSTERIOR STABILIZED SIZE 3 LEFT CEMENTLESS    DEPUY [id number] Left 1 Implanted   INSERT TIBIAL 6MM SZ 3 PS ATTUNE - [id number]  INSERT TIBIAL 6MM SZ 3 PS ATTUNE  DEPUY [id number] Left 1 Implanted   BASEPLATE TIBIAL SZ 3 FX BRNG  ATTUNE - [id number]  BASEPLATE TIBIAL SZ 3 FX BRNG  ATTUNE  DEPUY [id number] Left 1 Implanted   COMPONENT PATELLAR 32MM MEDIAL DOME ATTUNE - [id number]  COMPONENT PATELLAR 32MM MEDIAL DOME ATTUNE  DEPUY [id number] Left 1 Implanted       SPECIMENS:  * No specimens in log *    DRAINS:  None      OPERATIVE INDICATIONS:  The patient is a 56 y o  female with left knee pain and osteoarthritis  Surgical treatment was indicated due to persistent symptoms despite non-surgical treatment  After a thorough discussion of the potential risks, benefits, and alternative treatments, the patient agreed to proceed with surgery  The patient understands that the risks of surgery include, but are not limited to: infection, neurovascular injury, wound healing complications, venous thromboembolism, persistent pain, stiffness, instability, recurrence of symptoms, potential need for additional surgeries, and loss of limb or life  Oral and written consent for surgery was obtained from the patient preoperatively  PROCEDURE AND TECHNIQUE:  On the day of surgery, the patient was identified in the preoperative holding area  The operative site was marked by the surgeon  The patient was taken into the operating room  A time-out was conducted to confirm the patient's identity, the operative site, and the proposed procedure  The patient was anesthetized, and perioperative antibiotics were administered  The patient was positioned supine on the OR table  All bony prominences were padded  A tourniquet was not used  The operative site was prepped and draped using standard sterile technique  An anterior midline incision was carried sharply to the extensor mechanism  Hemostasis was obtained with electrocautery  A medial parapatellar arthrotomy was made, and the patella was subluxated laterally  Severe tri-compartmental degenerative changes were noted  The infrapatellar fat pad, anterior horns of the menisci, cruciate ligaments, and synovium over the anterior femur were excised  The lateral patellofemoral ligament was divided  A lateral retinacular release was not performed  The medial soft tissue sleeve was elevated from the tibia to the midcoronal plane  Medial and lateral Z-retractors were placed on the tibia  A Elzbieta retractor placed in the intercondylar notch was used to subluxate the tibia anteriorly    The extramedullary tibial guide was used to position the tibial cutting block perpendicular to the tibial mechanical axis  The tibial cut was made with a 3 degree posterior slope, removing 2 mm from the medial plateau and 9 mm from the lateral plateau  The femoral canal was accessed with a reamer, and the intramedullary alignment estela was placed  The distal femoral cut was made in 5 degrees of valgus, removing 9 mm of bone  A size 3 femoral cutting guide was placed  Rotational alignment was referenced using the transepicondylar axis and Monroe's line  The anterior, posterior, and chamfer cuts were made  The extension and flexion gaps were balanced to accept a 6 mm insert  The box cuts were made using the appropriate sized cutting guide  A posterior capsular release was performed with  A Fam elevator  Meniscus remnant and posterior osteophytes were removed from the posterior recess  Hemostasis was obtained with electrocautery  Trial components were placed  The trial liner was adjusted as necessary to provide appropriate soft tissue tension and knee range of motion  The knee demonstrated excellent range of motion from full extension to 120 degrees of flexion and appropriated varus / valgus stability in full extension and mid-flexion  The patella was cut freehand, sized, and prepared to accept the patellar component  A patellar trial was placed  Patellar tracking was stable using the no-thumbs method  Tibial component rotation was marked with electocautery  The trial components were removed  The tibia was exposed, sized, and prepared with the reamer, keel-punch, and lug drill  Cement was mixed on the back table while the knee was irrigated with sterile saline, and the femoral canal was plugged with autograft bone  The bone cuts were dried, and the tibial cementless, femoral cementless, and patellar cemented components were placed  Compression was applied to the patellar component while the cement cured  Excess cement was removed  The polyethylene liner was placed    Hemostasis was obtained with electrocautery  The knee was again irrigated with sterile saline  A Hemovac drain was not placed  The arthrotomy and the deep fasica were closed with #1 Vicryl sutures and #1 Stratafix  The skin was closed with 2-0 monocryl and 2-0 stratafix  A sterile dressing was applied, and the drapes were removed  The patient was awakened from anesthesia and taken to the PACU in stable condition        COMPLICATIONS:  None    PATIENT DISPOSITION:  PACU       SIGNATURE:  Patrick More MD  DATE:  May 16, 2023  TIME:  10:37 AM

## 2023-05-16 NOTE — ANESTHESIA PREPROCEDURE EVALUATION
Procedure:  ARTHROPLASTY KNEE TOTAL vs partial (Left: Knee)    Relevant Problems   CARDIO   (+) Benign essential hypertension   (+) Hypercholesteremia   (+) Migraine without aura and without status migrainosus, not intractable      GI/HEPATIC   (+) Gastroesophageal reflux disease without esophagitis      MUSCULOSKELETAL   (+) Cervical spondylosis without myelopathy   (+) Fibromyalgia   (+) Primary generalized (osteo)arthritis   (+) Primary osteoarthritis of left knee      NEURO/PSYCH   (+) Anxiety   (+) Fibromyalgia   (+) History of colon polyps   (+) History of fetal loss   (+) MDD (major depressive disorder), recurrent episode, mild (HCC)   (+) Migraine without aura and without status migrainosus, not intractable      PULMONARY   (+) Obstructive sleep apnea-hypopnea syndrome        Physical Exam    Airway    Mallampati score: II  TM Distance: >3 FB  Neck ROM: full     Dental   No notable dental hx     Cardiovascular  Rhythm: regular, Rate: normal, Cardiovascular exam normal    Pulmonary  Pulmonary exam normal Breath sounds clear to auscultation,     Other Findings        Anesthesia Plan  ASA Score- 2     Anesthesia Type- spinal with ASA Monitors  Additional Monitors:   Airway Plan:     Comment: Adductor canal block--for postop pain control-- requested by surgeon, and discussed with patient and spouse preop          Plan Factors-    Chart reviewed  EKG reviewed  Existing labs reviewed  Patient summary reviewed  Patient is not a current smoker  Patient not instructed to abstain from smoking on day of procedure  Patient did not smoke on day of surgery  There is medical exclusion for perioperative obstructive sleep apnea risk education  Induction- intravenous  Postoperative Plan-     Informed Consent- Anesthetic plan and risks discussed with patient and spouse

## 2023-05-16 NOTE — DISCHARGE INSTR - AVS FIRST PAGE
POSTOPERATIVE INSTRUCTIONS following KNEE SURGERY    MEDICATIONS:  Resume all home medications unless otherwise instructed by your surgeon  Pain Medication:  Oxycodone 5 mg, 1-3 tablets every 3 hours as needed  If you were given a regional anesthetic (nerve block), please begin taking the pain medication as soon as you get home, even if you have minimal or no pain  DO NOT WAIT FOR THE NERVE BLOCK TO WEAR OFF  Possible side effects include nausea, constipation, and urinary retention  If you experience these side effects, please call our office for assistance  Pain med refills are authorized only during office hours (8am-4pm, Mon-Fri)  Nausea Medication:  Zofran ODT 4 mg, 1 tablet every 6 hours as needed  Fill prescription ONLY if you expericnce severe nausea  Blood Clot Prevention:  Aspirin 81 mg, 1 tablet twice daily for 30 days  Pump your foot up and down 20 times per hour while you are less mobile  WOUND CARE:  Keep the dressing clean and dry  Light drainage may occur the first 2 days postop  Remove gauze dressings on postoperative day #2, maintain JAVIER stockings over the bandage like Mepilex dressing until postoperative day #7 at which time you can remove the bandage like Mepilex dressing  Continue with the JAVIER stockings for compression and swelling management until you are seen in the office  Please call our office ([phone]) if you experience either of the following:  Sudden increase in swelling, redness, or warmth at the surgical site  Excessive incisional drainage that persists beyond the 3rd day after surgery  Oral temperature greater than 101 degrees, not relieved with Tylenol  Shortness of breath, chest pain, nausea, or any other concerning symptoms    SWELLING CONTROL:  Cold Therapy: The cold therapy device may be used either continuously or only as needed, according to your preference  Do not let the pad directly touch your skin    Alternatively, apply ice (20 min on, 20 min off) as "often as you feel is necessary  Elevation:  Elevate the entire leg above heart level  Place pillows under your ankle to keep your knee straight  Compression:  Apply ACE wraps or a thigh-length compression stocking as needed  RANGE OF MOTION:  You are allowed FULL RANGE OF MOTION as tolerated  IMMOBILIZATION:  None  You are allowed full range of motion as tolerated  ACTIVITY:   BEAR FULL WEIGHT AS TOLERATED on the operative leg  Use crutches to assist only as needed  Using Crutches on Stairs:  Going up, lead with your \"good\" (nonoperative) leg  Going down, lead with your \"bad\" (operative) leg  Use a hand rail when available  Knee Extension:  Place a rolled towel or pillow under your ankle for 20-30 minutes 3-5 times per day  This will help to maintain full knee extension  Quad Sets:  Sit or lie with your knee straight  Tighten your quadriceps (front thigh) muscle  Hold for 3 seconds, then relax  Repeat 20 times per hour while awake  PHYSICAL THERAPY:  Begin therapy 5 TO 7 DAYS AFTER SURGERY  You were given a prescription for therapy at your preoperative office visit  If you do not have physical therapy scheduled yet, please call our office for assistance  FOLLOW-UP APPOINTMENT:  2 weeks after surgery with:    Dr Leila Serna, [facility]  [address] [place] [address], [address] [place] [address], [place], [address] [facility]  [phone] ([facility])  [phone] (After-Hours)   "

## 2023-05-17 VITALS
RESPIRATION RATE: 16 BRPM | OXYGEN SATURATION: 98 % | SYSTOLIC BLOOD PRESSURE: 133 MMHG | HEART RATE: 92 BPM | WEIGHT: 150.79 LBS | BODY MASS INDEX: 28.47 KG/M2 | TEMPERATURE: 98.1 F | HEIGHT: 61 IN | DIASTOLIC BLOOD PRESSURE: 74 MMHG

## 2023-05-17 PROBLEM — F32.A ANXIETY AND DEPRESSION: Status: ACTIVE | Noted: 2023-04-02

## 2023-05-17 LAB
ANION GAP SERPL CALCULATED.3IONS-SCNC: 4 MMOL/L (ref 4–13)
BUN SERPL-MCNC: 9 MG/DL (ref 5–25)
CALCIUM SERPL-MCNC: 9.2 MG/DL (ref 8.4–10.2)
CHLORIDE SERPL-SCNC: 103 MMOL/L (ref 96–108)
CO2 SERPL-SCNC: 31 MMOL/L (ref 21–32)
CREAT SERPL-MCNC: 0.7 MG/DL (ref 0.6–1.3)
GFR SERPL CREATININE-BSD FRML MDRD: 97 ML/MIN/1.73SQ M
GLUCOSE P FAST SERPL-MCNC: 123 MG/DL (ref 65–99)
GLUCOSE SERPL-MCNC: 123 MG/DL (ref 65–140)
HCT VFR BLD AUTO: 36.3 % (ref 34.8–46.1)
HGB BLD-MCNC: 11.9 G/DL (ref 11.5–15.4)
POTASSIUM SERPL-SCNC: 4.3 MMOL/L (ref 3.5–5.3)
SODIUM SERPL-SCNC: 138 MMOL/L (ref 135–147)

## 2023-05-17 RX ORDER — LISINOPRIL 20 MG/1
20 TABLET ORAL DAILY
Status: DISCONTINUED | OUTPATIENT
Start: 2023-05-17 | End: 2023-05-17 | Stop reason: HOSPADM

## 2023-05-17 RX ORDER — BUPROPION HYDROCHLORIDE 150 MG/1
300 TABLET ORAL DAILY
Status: DISCONTINUED | OUTPATIENT
Start: 2023-05-17 | End: 2023-05-17 | Stop reason: HOSPADM

## 2023-05-17 RX ADMIN — LISINOPRIL 20 MG: 20 TABLET ORAL at 08:03

## 2023-05-17 RX ADMIN — DOCUSATE SODIUM 100 MG: 100 CAPSULE, LIQUID FILLED ORAL at 08:05

## 2023-05-17 RX ADMIN — TRAMADOL HYDROCHLORIDE 50 MG: 50 TABLET, COATED ORAL at 11:05

## 2023-05-17 RX ADMIN — OXYCODONE HYDROCHLORIDE 10 MG: 10 TABLET ORAL at 08:02

## 2023-05-17 RX ADMIN — CEFAZOLIN SODIUM 1000 MG: 1 SOLUTION INTRAVENOUS at 09:21

## 2023-05-17 RX ADMIN — BUPROPION HYDROCHLORIDE 300 MG: 150 TABLET, EXTENDED RELEASE ORAL at 11:00

## 2023-05-17 RX ADMIN — CEFAZOLIN SODIUM 1000 MG: 1 SOLUTION INTRAVENOUS at 01:56

## 2023-05-17 RX ADMIN — ACETAMINOPHEN 650 MG: 325 TABLET ORAL at 05:38

## 2023-05-17 RX ADMIN — ASPIRIN 81 MG: 81 TABLET, COATED ORAL at 08:05

## 2023-05-17 RX ADMIN — SENNOSIDES 8.6 MG: 8.6 TABLET, FILM COATED ORAL at 08:06

## 2023-05-17 RX ADMIN — MULTIPLE VITAMINS W/ MINERALS TAB 1 TABLET: TAB ORAL at 08:06

## 2023-05-17 RX ADMIN — OXYCODONE HYDROCHLORIDE 10 MG: 10 TABLET ORAL at 02:44

## 2023-05-17 RX ADMIN — FOLIC ACID 1 MG: 1 TABLET ORAL at 08:06

## 2023-05-17 RX ADMIN — PANTOPRAZOLE SODIUM 40 MG: 40 TABLET, DELAYED RELEASE ORAL at 08:06

## 2023-05-17 RX ADMIN — TRAMADOL HYDROCHLORIDE 50 MG: 50 TABLET, COATED ORAL at 05:38

## 2023-05-17 RX ADMIN — FERROUS SULFATE TAB 325 MG (65 MG ELEMENTAL FE) 325 MG: 325 (65 FE) TAB at 08:05

## 2023-05-17 RX ADMIN — OXYCODONE HYDROCHLORIDE AND ACETAMINOPHEN 500 MG: 500 TABLET ORAL at 08:05

## 2023-05-17 NOTE — CONSULTS
[facility]  Consult  Name: Brayden Christianson 56 y o  female I MRN: [id number]  Unit/Bed#: E2 -01 I Date of Admission: 5/16/2023   Date of Service: 5/17/2023 I Hospital Day: 0    Inpatient consult to Internal Medicine  Consult performed by: Morena Simmons PA-C  Consult ordered by: Gloria Fletcher PA-C          Assessment/Plan   * Primary osteoarthritis of left knee  Assessment & Plan  · Patient s/p total left knee arthroplasty 5/16/2023  · Doing well post-operatively, hemoglobin stable  · Rest of care per primary  · OP therapy  · AC with aspirin 81 mg BID    Benign essential hypertension  Assessment & Plan  · Resume lisinopril 20 mg daily    Gastroesophageal reflux disease without esophagitis  Assessment & Plan  · Continue omeprazole    Anxiety and depression  Assessment & Plan  · Mood stable  · Resume Wellbutrin 300 mg 24 hr tablet    Low ferritin  Assessment & Plan  · Continue iron supplement    Vitamin D deficiency  Assessment & Plan  · Continue vitamin D supplementation    Migraine without aura and without status migrainosus, not intractable  Assessment & Plan  · With migraine for past 3 days, resolved today with IVF and pain medications  · Follow up with neurologist as scheduled - may trial OP Migraine injections, awaiting insurance auth      VTE Prophylaxis:   Aspirin 81 mg BID per primary    Recommendations for Discharge:  · Follow up orthopedics outpatient  · Continue therapy  · Continue scheduled follow up with neurology for migraine treatment    Total Time Spent on Date of Encounter in care of patient: 35 minutes This time was spent on one or more of the following: performing physical exam; counseling and coordination of care; obtaining or reviewing history; documenting in the medical record; reviewing/ordering tests, medications or procedures; communicating with other healthcare professionals and discussing with patient's family/caregivers  Collaboration of Care:  Were Recommendations Directly Discussed with Primary Treatment Team?    History of Present Illness:  Mike Alejo is a 56 y o  female who is originally admitted to the orthopedic service due to left total knee arthroplasty  We are consulted for medical management  Patient reports she is feeling well  Had some lightheadedness yesterday after the procedure when working with therapy that has improved today  Denies fever, chills, chest pain, shortness of breath, abdominal pain, nausea, vomiting, decreased appetite, headache, visual changes or urinary retention  Her pain is well controlled  Denies numbness/tingling or new weakness in lower legs or arms  Drinks socially, about 1x/month  Denies nicotine use  Review of Systems:  Review of Systems   Constitutional: Negative for appetite change, chills and fever  HENT: Negative for rhinorrhea and sore throat  Eyes: Negative for visual disturbance  Respiratory: Negative for cough, shortness of breath and wheezing  Cardiovascular: Negative for chest pain, palpitations and leg swelling  Gastrointestinal: Negative for abdominal pain, constipation, diarrhea, nausea and vomiting  Genitourinary: Negative for decreased urine volume, dysuria and hematuria  Musculoskeletal: Positive for arthralgias (neck and right arm - chronic)  Skin: Negative for rash  Neurological: Negative for dizziness, syncope, weakness, light-headedness, numbness and headaches  Psychiatric/Behavioral: The patient is not nervous/anxious  All other systems reviewed and are negative        Past Medical and Surgical History:   Past Medical History:   Diagnosis Date   • Allergic 02/01/2020   • Anemia    • Anesthesia complication     woke up during D/C   • Anxiety    • Arthritis    • BMI 30 0-30 9,adult    • Carpal tunnel syndrome on right    • Cervical spondylosis without myelopathy    • COVID 12/01/2021   • Depression    • Fibromyalgia    • Fibromyalgia, primary    • GERD (gastroesophageal reflux disease)    • History of fetal loss     Recurrent   • Hyperlipidemia    • Hypertension    • Jaw pain     and both ears with migraines   • Migraine    • Neck pain    • Osteoarthritis     lumbar spine   • Sicca syndrome (HCC)    • Sleep apnea     unable to use CPAP   • Syncopal episodes     with severe migraines   • Undifferentiated connective tissue disease ([facility] [address] )    • Wears contact lenses     or glasses       Past Surgical History:   Procedure Laterality Date   • CHOLECYSTECTOMY     • COLONOSCOPY     • DILATION AND CURETTAGE OF UTERUS     • AK ARTHRP KNE CONDYLE&PLATU MEDIAL&LAT COMPARTMENTS Left 5/16/2023    Procedure: ARTHROPLASTY KNEE TOTAL;  Surgeon: Ned Peck MD;  Location: [facility];  Service: Orthopedics   • TUBAL LIGATION         Meds/Allergies:  all medications and allergies reviewed    Allergies:    Allergies   Allergen Reactions   • Propranolol Other (See Comments)     Halluncinations   • Raspberry - Food Allergy Allergic Rhinitis, Itching and Nasal Congestion   • Latex Rash       Social History:  Marital Status: /Civil Union  Substance Use History:   Social History     Substance and Sexual Activity   Alcohol Use Yes    Comment: rarely     Social History     Tobacco Use   Smoking Status Never   Smokeless Tobacco Never     Social History     Substance and Sexual Activity   Drug Use Not Currently       Family History:  Family History   Problem Relation Age of Onset   • MORGAN disease Mother    • Arthritis Mother    • Depression Mother    • Hypertension Mother    • Coronary artery disease Mother    • Heart attack Father 79   • Diabetes Father    • Prostate cancer Father    • Hypertension Father    • Arthritis Father    • Stroke Father    • Cancer Father         Prostate   • ADD / ADHD Father    • Lupus Sister    • Raynaud syndrome Sister    • Sjogren's syndrome Sister    • Breast cancer Maternal Grandmother    • Arthritis Maternal Grandmother    • Cancer Maternal Grandmother "Breast   • Glaucoma Maternal Grandmother    • Alcohol abuse Paternal Grandfather    • No Known Problems Daughter    • Prostate cancer Maternal Grandfather    • Arthritis Maternal Grandfather    • Cancer Maternal Grandfather         Prostate   • Colon cancer Paternal Grandmother    • Cancer Paternal Grandmother         Colon   • Depression Sister    • ADD / ADHD Sister    • Anxiety disorder Sister    • ADD / ADHD Brother    • OCD Brother    • No Known Problems Son    • No Known Problems Son    • No Known Problems Son    • No Known Problems Son    • No Known Problems Son    • No Known Problems Maternal Uncle    • No Known Problems Paternal Aunt    • No Known Problems Paternal Uncle        Physical Exam:   Vitals:   Blood Pressure: (!) 141/108 (05/17/23 0751)  Pulse: 92 (05/17/23 0751)  Temperature: 98 1 °F (36 7 °C) (05/17/23 0751)  Temp Source: Temporal (05/17/23 0751)  Respirations: 16 (05/17/23 0751)  Height: 5' 1\" (154 9 cm) (05/16/23 0656)  Weight - Scale: 68 4 kg (150 lb 12 7 oz) (05/16/23 0656)  SpO2: 98 % (05/17/23 0751)    Physical Exam  Vitals and nursing note reviewed  Constitutional:       General: She is not in acute distress  Appearance: Normal appearance  She is well-developed  She is not ill-appearing  HENT:      Head: Normocephalic and atraumatic  Eyes:      Extraocular Movements: Extraocular movements intact  Conjunctiva/sclera: Conjunctivae normal    Cardiovascular:      Rate and Rhythm: Normal rate and regular rhythm  Pulses:           Dorsalis pedis pulses are 2+ on the right side and 2+ on the left side  Heart sounds: Normal heart sounds  No murmur heard  Pulmonary:      Effort: Pulmonary effort is normal  No respiratory distress  Breath sounds: Normal breath sounds  Abdominal:      General: Bowel sounds are normal       Palpations: Abdomen is soft  Tenderness: There is no abdominal tenderness  Musculoskeletal:      Right lower leg: No edema        Left " lower leg: No edema  Comments: Left knee wrapped  Lower leg compartment soft to palpation, without tenderness   Skin:     General: Skin is warm and dry  Neurological:      General: No focal deficit present  Mental Status: She is alert and oriented to person, place, and time  Comments: Normal sensation to left lower leg   Psychiatric:         Mood and Affect: Mood normal          Behavior: Behavior normal           Additional Data:   Lab Results:    Results from last 7 days   Lab Units 05/17/23  0543   HEMOGLOBIN g/dL 11 9   HEMATOCRIT % 36 3     Results from last 7 days   Lab Units 05/17/23  0543   SODIUM mmol/L 138   POTASSIUM mmol/L 4 3   CHLORIDE mmol/L 103   CO2 mmol/L 31   BUN mg/dL 9   CREATININE mg/dL 0 70   ANION GAP mmol/L 4   CALCIUM mg/dL 9 2   GLUCOSE RANDOM mg/dL 123     Lab Results   Component Value Date/Time    HGBA1C 4 8 04/29/2023 09:37 AM    HGBA1C 5 1 04/03/2023 09:58 AM    HGBA1C 5 2 02/24/2023 09:03 AM     Imaging: No pertinent imaging reviewed  No orders to display       EKG, Pathology, and Other Studies Reviewed on Admission:   · EKG: No EKG obtained  ** Please Note: This note may have been constructed using a voice recognition system   **

## 2023-05-17 NOTE — CASE MANAGEMENT
Case Management Progress Note    Patient name George   Location [place] [address] /[address]-* MRN [id number]  : 1965 Date 2023       LOS (days): 0  Geometric Mean LOS (GMLOS) (days):   Days to GMLOS:        OBJECTIVE:        Current admission status: Outpatient Surgery  Preferred Pharmacy:   [facility] [place] [address] [facility], [address] [address]  Phone: [phone] Fax: [phone]    Primary Care Provider: Ana Maria Chawla DO    Primary Insurance: BLUE CROSS  Secondary Insurance:     PROGRESS NOTE:    CM consulted s/p total knee replacement  Therapy recommending OP therapy and pt with needed DME  No CM needs at this time  CM will continue to follow

## 2023-05-17 NOTE — PHYSICAL THERAPY NOTE
PHYSICAL THERAPY NOTE          Patient Name: Sona Landaverde  [facility]'S Date: 5/17/2023 05/17/23 2781   Note Type   Note Type Treatment   Pain Assessment   Pain Assessment Tool 0-10   Pain Score 5   Pain Location/Orientation Orientation: Left; Location: Knee   Hospital Pain Intervention(s) Repositioned; Ambulation/increased activity; Emotional support   Restrictions/Precautions   LLE Weight Bearing Per Order WBAT   Other Precautions WBS; Fall Risk;Pain   General   Chart Reviewed Yes   Family/Caregiver Present Yes  ()   Cognition   Overall Cognitive Status WFL   Arousal/Participation Alert; Responsive; Cooperative   Attention Within functional limits   Orientation Level Oriented X4   Memory Within functional limits   Following Commands Follows all commands and directions without difficulty   Subjective   Subjective pt reports moderate apin 5/10 to R knee  pt agreeable to PT  Bed Mobility   Supine to Sit 6  Modified independent   Additional items Assist x 1;HOB elevated; Increased time required   Sit to Supine 7  Independent   Transfers   Sit to Stand 6  Modified independent   Additional items Assist x 1   Stand to Sit 6  Modified independent   Additional items Assist x 1; Increased time required;Verbal cues   Toilet transfer 6  Modified independent   Additional items   (grab bar use)   Additional Comments cues for hand placement   Ambulation/Elevation   Gait pattern Antalgic;Decreased L stance  (long stride with L,)   Gait Assistance 5  Supervision   Additional items Assist x 1   Assistive Device Rolling walker   Distance 195' x1, 15'x1   Stair Management Assistance 6  Modified independent   Additional items Verbal cues   Stair Management Technique Two rails; One rail R;Foreward;Nonreciprocal;With cane  (b /l rails progressing to R rail and spc on L)   Number of Stairs 20   Ambulation/Elevation Additional Comments cues for placement of spc    Balance   Static Sitting Normal   Dynamic Sitting Good   Static Standing Good   Dynamic Standing Good   Ambulatory Good   Endurance Deficit   Endurance Deficit Description bp supine 134/75, sit eob 120/84 sit post 3 minutes eob 165/76 standing 116/72, mild lightheadedness reported  Exercises   TKR Supine;10 reps;AROM; Left   Equipment Use   Comments pt  issued written hep for TKR, reviewed and performed with pt  pt ed on car transfers, stair climbing, use of ice, frequency of HEP and mobility upon return to home  Assessment   Prognosis Good   Problem List Decreased range of motion;Decreased endurance; Impaired balance;Decreased strength;Decreased mobility; Decreased skin integrity;Pain   Assessment Pt seen for PT treatment session this date with interventions consisting of bed mobility, transfer training, gait training, stair training and HEP, and education provided as needed for safety and direction to improve functional mobility, safety awareness, and activity tolerance  Pt agreeable to PT treatment session upon arrival, pt found supine in bed reports 5/10 L knee pain  At end of session, pt left supine in bed with ice to L knee and anterior thigh, and scd's to b/l le's with all needs in reach  In comparison to previous session, pt with improvement in activity tolerance, endurance, static sitting balance,  dynamic sitting balance, standing balance, ambulation distances, ambulatory balance, L le strength, AM- pac score and functional mobility  Pt  Has made great progress toward PT goals with progression to mod I for bed mobility, transfers and supervision with ambulation on levels with use of Rw  pt demonstrates steady gait on level increased l stride noted with posterior trunk sway/ lean when advancing l le forward  No gross lob noted  Pt progressed with ambulation distances to > household distances and progressed to stair climbing with use of b/l rails > R rail and spc on L   Pt performs with mod I and demonstrates safe stair climbing techniques, able to negotiate 20 steps  Pt  Requires cues for hand placement with transfers  Pt Performs TKR HEP, performing all exercises actively and is able to I'ly SLR  pt  Is appropriate for d/c to home with OPPT and family support  mild lightheadness noted with standing however improved with ambulation and mobility  Please refer to endurance deficit section of flowsheet for vitals  Continue to recommend home with OPPT and home with family support at time of d/c in order to maximize pt's functional independence and safety w/ mobility  Pt continues to be functioning below baseline level  PT will continue to see pt while here in order to address the deficits listed above and provide interventions consistent w/ POC in effort to achieve STGs  The patient's AM-[facility] Basic Mobility Inpatient Short Form Raw Score is 24  A raw score greater than 16 suggests the patient may benefit from discharge to home  Please also refer to the recommendation of the Physical Therapist for safe discharge planning  Goals   Patient Goals getting on the  and weeding  STG Expiration Date 05/23/23   PT Treatment Day 1   Plan   Treatment/Interventions Functional transfer training;LE strengthening/ROM; Elevations; Therapeutic exercise; Endurance training;Patient/family training;Equipment eval/education; Bed mobility;Gait training;Spoke to nursing   Progress Improving as expected   PT Frequency Twice a day   Recommendation   PT Discharge Recommendation Home with outpatient rehabilitation   AM-PAC Basic Mobility Inpatient   Turning in Flat Bed Without Bedrails 4   Lying on Back to Sitting on Edge of Flat Bed Without Bedrails 4   Moving Bed to Chair 4   Standing Up From Chair Using Arms 4   Walk in Room 4   Climb 3-5 Stairs With Railing 4   Basic Mobility Inpatient Raw Score 24   Basic Mobility Standardized Score 57 68   Highest Level Of Mobility   -[facility] Goal 8: Walk 250 feet or more   [facility] Achieved 7: Walk 25 feet or more   Education   Education Provided Mobility training;Home exercise program;Assistive device   End of Consult   Patient Position at End of Consult Supine; All needs within reach   End of Consult Comments ice to L thigh and cold to L knee, b/l scd's to le's       05/17/23 7897   Note Type   Note Type Treatment   Pain Assessment   Pain Assessment Tool 0-10   Pain Score 5   Pain Location/Orientation Orientation: Left; Location: Knee   Hospital Pain Intervention(s) Repositioned; Ambulation/increased activity; Emotional support   Restrictions/Precautions   LLE Weight Bearing Per Order WBAT   Other Precautions WBS; Fall Risk;Pain   General   Chart Reviewed Yes   Family/Caregiver Present Yes  ()   Cognition   Overall Cognitive Status WFL   Arousal/Participation Alert; Responsive; Cooperative   Attention Within functional limits   Orientation Level Oriented X4   Memory Within functional limits   Following Commands Follows all commands and directions without difficulty   Subjective   Subjective pt reports moderate apin 5/10 to R knee  pt agreeable to PT  Bed Mobility   Supine to Sit 6  Modified independent   Additional items Assist x 1;HOB elevated; Increased time required   Sit to Supine 7  Independent   Transfers   Sit to Stand 6  Modified independent   Additional items Assist x 1   Stand to Sit 6  Modified independent   Additional items Assist x 1; Increased time required;Verbal cues   Toilet transfer 6  Modified independent   Additional items   (grab bar use)   Additional Comments cues for hand placement   Ambulation/Elevation   Gait pattern Antalgic;Decreased L stance  (long stride with L,)   Gait Assistance 5  Supervision   Additional items Assist x 1   Assistive Device Rolling walker   Distance 195' x1, 15'x1   Stair Management Assistance 6  Modified independent   Additional items Verbal cues   Stair Management Technique Two rails; One rail R;Foreward;Nonreciprocal;With cane  (b /l rails progressing to R rail and spc on L)   Number of Stairs 20   Ambulation/Elevation Additional Comments cues for placement of spc  Balance   Static Sitting Normal   Dynamic Sitting Good   Static Standing Good   Dynamic Standing Good   Ambulatory Good   Endurance Deficit   Endurance Deficit Description bp supine 134/75, sit eob 120/84 sit post 3 minutes eob 165/76 standing 116/72, mild lightheadedness reported  Exercises   TKR Supine;10 reps;AROM; Left   Equipment Use   Comments pt  issued written hep for TKR, reviewed and performed with pt  pt ed on car transfers, stair climbing, use of ice, frequency of HEP and mobility upon return to home  Assessment   Prognosis Good   Problem List Decreased range of motion;Decreased endurance; Impaired balance;Decreased strength;Decreased mobility; Decreased skin integrity;Pain   Assessment Pt seen for PT treatment session this date with interventions consisting of bed mobility, transfer training, gait training, stair training and HEP, and education provided as needed for safety and direction to improve functional mobility, safety awareness, and activity tolerance  Pt agreeable to PT treatment session upon arrival, pt found supine in bed reports 5/10 L knee pain  At end of session, pt left supine in bed with ice to L knee and anterior thigh, and scd's to b/l le's with all needs in reach  In comparison to previous session, pt with improvement in activity tolerance, endurance, static sitting balance,  dynamic sitting balance, standing balance, ambulation distances, ambulatory balance, L le strength, AM- pac score and functional mobility  Pt  Has made great progress toward PT goals with progression to mod I for bed mobility, transfers and supervision with ambulation on levels with use of Rw  pt demonstrates steady gait on level increased l stride noted with posterior trunk sway/ lean when advancing l le forward  No gross lob noted    Pt progressed with ambulation distances to > household distances and progressed to stair climbing with use of b/l rails > R rail and spc on L  Pt performs with mod I and demonstrates safe stair climbing techniques, able to negotiate 20 steps  Pt  Requires cues for hand placement with transfers  Pt Performs TKR HEP, performing all exercises actively and is able to I'ly SLR  pt  Is appropriate for d/c to home with OPPT and family support  mild lightheadness noted with standing however improved with ambulation and mobility  Please refer to endurance deficit section of flowsheet for vitals  Continue to recommend home with OPPT and home with family support at time of d/c in order to maximize pt's functional independence and safety w/ mobility  Pt continues to be functioning below baseline level  PT will continue to see pt while here in order to address the deficits listed above and provide interventions consistent w/ POC in effort to achieve STGs  [facility] is 24  A raw score greater than 16 suggests the patient may benefit from discharge to home  Please also refer to the recommendation of the Physical Therapist for safe discharge planning  Goals   Patient Goals getting on the  and weeding  STG Expiration Date 05/23/23   PT Treatment Day 1   Plan   Treatment/Interventions Functional transfer training;LE strengthening/ROM; Elevations; Therapeutic exercise; Endurance training;Patient/family training;Equipment eval/education; Bed mobility;Gait training;Spoke to nursing   Progress Improving as expected   PT Frequency Twice a day   Recommendation   PT Discharge Recommendation Home with outpatient rehabilitation   AM-PAC Basic Mobility Inpatient   Turning in Flat Bed Without Bedrails 4   Lying on Back to Sitting on Edge of Flat Bed Without Bedrails 4   Moving Bed to Chair 4   Standing Up From Chair Using Arms 4   Walk in Room 4   Climb 3-5 Stairs With Railing 4   Basic Mobility Inpatient Raw Score 24 Basic Mobility Standardized Score 57 68   Highest Level Of Mobility   -[facility] Goal 8: Walk 250 feet or more   -[facility] Achieved 7: Walk 25 feet or more   Education   Education Provided Mobility training;Home exercise program;Assistive device   End of Consult   Patient Position at End of Consult Supine; All needs within reach   End of Consult Comments ice to L thigh and cold to L knee, b/l scd's to le's     Annabelle Dubose, PTA

## 2023-05-17 NOTE — DISCHARGE SUMMARY
Orthopaedic Surgery - Discharge Summary  Sona Landaverde (84 y o  female)   : 1965   MRN: [id number]  Encounter: [id number]   Unit/Bed#: E2 -01    Admission Date: 2023    Discharge Date: 23    Discharge Diagnosis: Primary osteoarthritis of left knee [M17 12]    Procedures Performed: Procedure(s) (LRB):  ARTHROPLASTY KNEE TOTAL (Left)    Hospital Course: Sona Landaverde is a 56 y o  female admitted on 2023 with a diagnosis of osteoarthritis of the left knee  On the day of admission she was brought to surgery successfully underwent a left total knee replacement     Throughout the postoperative period, the patient remained afebrile with stable vital signs  At the time of discharge on 2023 , the patient was tolerating PO diet, voiding, and pain was well-controlled on oral medications  Significant Findings, Care, Treatment and Services Provided: None    Complications: None    Condition at Discharge: good     Discharge instructions:   See After Visit Summary for discharge instructions  Follow-Up Care:  See After Visit Summary for information related to follow-up care  Disposition: Home    Planned Readmission: No    Discharge Statement   I spent 20 minutes discharging the patient  This time was spent on the day of discharge  I had direct contact with the patient on the day of discharge  Discharge Medications:  See after visit summary for reconciled discharge medications provided to patient and family      Tammy Faye PA-C

## 2023-05-17 NOTE — ASSESSMENT & PLAN NOTE
· With migraine for past 3 days, resolved today with IVF and pain medications  · Follow up with neurologist as scheduled - may trial OP Migraine injections, awaiting insurance auth

## 2023-05-17 NOTE — PLAN OF CARE
Problem: PHYSICAL THERAPY ADULT  Goal: Performs mobility at highest level of function for planned discharge setting  See evaluation for individualized goals  Description: Treatment/Interventions: Functional transfer training, LE strengthening/ROM, Therapeutic exercise, Elevations, Endurance training, Patient/family training, Bed mobility, Gait training, Spoke to nursing, OT  Equipment Recommended: Micah Briceño (Pt owns walker)       See flowsheet documentation for full assessment, interventions and recommendations  Outcome: Adequate for Discharge  Note: Prognosis: Good  Problem List: Decreased range of motion, Decreased endurance, Impaired balance, Decreased strength, Decreased mobility, Decreased skin integrity, Pain  Assessment: Pt seen for PT treatment session this date with interventions consisting of bed mobility, transfer training, gait training, stair training and HEP, and education provided as needed for safety and direction to improve functional mobility, safety awareness, and activity tolerance  Pt agreeable to PT treatment session upon arrival, pt found supine in bed reports 5/10 L knee pain  At end of session, pt left supine in bed with ice to L knee and anterior thigh, and scd's to b/l le's with all needs in reach  In comparison to previous session, pt with improvement in activity tolerance, endurance, static sitting balance,  dynamic sitting balance, standing balance, ambulation distances, ambulatory balance, L le strength, AM- pac score and functional mobility  Pt  Has made great progress toward PT goals with progression to mod I for bed mobility, transfers and supervision with ambulation on levels with use of Rw  pt demonstrates steady gait on level increased l stride noted with posterior trunk sway/ lean when advancing l le forward  No gross lob noted  Pt progressed with ambulation distances to > household distances and progressed to stair climbing with use of b/l rails > R rail and spc on L   Pt performs with mod I and demonstrates safe stair climbing techniques, able to negotiate 20 steps  Pt  Requires cues for hand placement with transfers  Pt Performs TKR HEP, performing all exercises actively and is able to I'ly SLR  pt  Is appropriate for d/c to home with OPPT and family support  mild lightheadness noted with standing however improved with ambulation and mobility  Please refer to endurance deficit section of flowsheet for vitals  Continue to recommend home with OPPT and home with family support at time of d/c in order to maximize pt's functional independence and safety w/ mobility  Pt continues to be functioning below baseline level  PT will continue to see pt while here in order to address the deficits listed above and provide interventions consistent w/ POC in effort to achieve STGs  The patient's AM-[facility] Basic Mobility Inpatient Short Form Raw Score is 24  A raw score greater than 16 suggests the patient may benefit from discharge to home  Please also refer to the recommendation of the Physical Therapist for safe discharge planning  PT Discharge Recommendation: Home with outpatient rehabilitation    See flowsheet documentation for full assessment

## 2023-05-17 NOTE — PLAN OF CARE
Problem: PAIN - ADULT  Goal: Verbalizes/displays adequate comfort level or baseline comfort level  Description: Interventions:  - Encourage patient to monitor pain and request assistance  - Assess pain using appropriate pain scale  - Administer analgesics based on type and severity of pain and evaluate response  - Implement non-pharmacological measures as appropriate and evaluate response  - Consider cultural and social influences on pain and pain management  - Notify physician/advanced practitioner if interventions unsuccessful or patient reports new pain  Outcome: Progressing     Problem: DISCHARGE PLANNING  Goal: Discharge to home or other facility with appropriate resources  Description: INTERVENTIONS:  - Identify barriers to discharge w/patient and caregiver  - Arrange for needed discharge resources and transportation as appropriate  - Identify discharge learning needs (meds, wound care, etc )  - Arrange for interpretive services to assist at discharge as needed  - Refer to Case Management Department for coordinating discharge planning if the patient needs post-hospital services based on physician/advanced practitioner order or complex needs related to functional status, cognitive ability, or social support system  Outcome: Progressing     Problem: SAFETY ADULT  Goal: Patient will remain free of falls  Description: INTERVENTIONS:  - Educate patient/family on patient safety including physical limitations  - Instruct patient to call for assistance with activity   - Consult OT/PT to assist with strengthening/mobility   - Keep Call bell within reach  - Keep bed low and locked with side rails adjusted as appropriate  - Keep care items and personal belongings within reach  - Initiate and maintain comfort rounds  - Make Fall Risk Sign visible to staff  - Offer Toileting every 2 Hours, in advance of need  - Initiate/Maintain bed alarm  - Obtain necessary fall risk management equipment: walker  - Apply yellow socks and bracelet for high fall risk patients  - Consider moving patient to room near nurses station  Outcome: Progressing

## 2023-05-17 NOTE — PROGRESS NOTES
[address] [place] [address] 56 y o  female MRN: [id number]  Unit/Bed#: E2 -01    Assessment: 56 y  o female post operative day 1 left total knee arthroplasty  Doing well    Plan:  · Weight Bearing as tolerated  · Up and out of bed  · DVT prophylaxis  · Analgesics  · PT/OT  · Will continue to assess for acute blood loss anemia  · Ortho stable for discharge    Subjective:  56 y  o female post operative day 1 left total knee arthroplasty  Pt doing well  Pain controlled  Patient seen and examined this morning lying comfortably in bed  She describes pain is well controlled and feels safe and ready to return home today    She had no further questions for orthopedics    Labs:  0   Lab Value Date/Time    HCT 36 3 05/17/2023 0543    HCT 44 3 04/29/2023 0937    HCT 47 4 (H) 02/24/2023 0903    HGB 11 9 05/17/2023 0543    HGB 14 8 04/29/2023 0937    HGB 15 6 (H) 02/24/2023 0903    INR 0 93 04/29/2023 0937    WBC 6 59 04/29/2023 0937    WBC 6 69 02/24/2023 0903    WBC 10 91 (H) 07/05/2022 0949    ESR 15 07/05/2022 0949    CRP <3 0 02/24/2023 0903       Meds:    Current Facility-Administered Medications:   •  acetaminophen (TYLENOL) tablet 650 mg, 650 mg, Oral, Q6H [address], Destinee Lindo PA-C, 650 mg at 05/17/23 1397  •  acetaminophen (TYLENOL) tablet 650 mg, 650 mg, Oral, Q4H PRN, Destinee Lindo PA-C  •  ascorbic acid (VITAMIN C) tablet 500 mg, 500 mg, Oral, BID, Destinee Lindo, PA-C, 500 mg at 05/17/23 0805  •  aspirin (ECOTRIN LOW STRENGTH) EC tablet 81 mg, 81 mg, Oral, BID, Destinee Lindo PA-C, 81 mg at 05/17/23 3282  •  calcium carbonate (TUMS) chewable tablet 1,000 mg, 1,000 mg, Oral, Daily PRN, Destinee Lindo PA-C  •  docusate sodium (COLACE) capsule 100 mg, 100 mg, Oral, BID, Dsetinee Lindo PA-C, 100 mg at 05/17/23 0698  •  ferrous sulfate tablet 325 mg, 325 mg, Oral, BID With Meals, Destinee Lindo PA-C, 325 mg at 51/89/68 8009  •  folic acid (FOLVITE) tablet 1 mg, 1 mg, Oral, Daily, Destinee Lindo PA-C, 1 mg at 05/17/23 8615  •  gabapentin (NEURONTIN) capsule 300 mg, 300 mg, Oral, HS, Rolin Points, PA-C, 300 mg at 05/16/23 2112  •  iron sucrose (VENOFER) 300 mg in sodium chloride 0 9 % 250 mL IVPB, 300 mg, Intravenous, Q24H, Rolin Points, PA-C, Last Rate: 175 mL/hr at 05/16/23 1800, 300 mg at 05/16/23 1800  •  lactated ringers bolus 1,000 mL, 1,000 mL, Intravenous, Once PRN **AND** lactated ringers bolus 1,000 mL, 1,000 mL, Intravenous, Once PRN, Rolin Points, PA-C  •  lactated ringers infusion, 75 mL/hr, Intravenous, Continuous, Rolin Points, PA-C, Stopped at 05/16/23 1549  •  lisinopril (ZESTRIL) tablet 20 mg, 20 mg, Oral, Daily, Yumiko Alves, PA-C, 20 mg at 05/17/23 3030  •  morphine injection 2 mg, 2 mg, Intravenous, Q2H PRN, Rolin Points, PA-C  •  multivitamin-minerals (CENTRUM) tablet 1 tablet, 1 tablet, Oral, Daily, Rolin Points, PA-C, 1 tablet at 05/17/23 5029  •  oxyCODONE (ROXICODONE) immediate release tablet 10 mg, 10 mg, Oral, Q4H PRN, Rolin Points, PA-C, 10 mg at 05/17/23 0802  •  oxyCODONE (ROXICODONE) IR tablet 5 mg, 5 mg, Oral, Q4H PRN, Rolin Points, PA-C  •  pantoprazole (PROTONIX) EC tablet 40 mg, 40 mg, Oral, Daily, Rolin Points, PA-C, 40 mg at 05/17/23 5952  •  senna (SENOKOT) tablet 8 6 mg, 1 tablet, Oral, Daily, Rolin Points, PA-C, 8 6 mg at 05/17/23 0806  •  simethicone (MYLICON) chewable tablet 80 mg, 80 mg, Oral, 4x Daily PRN, Rolin Points, PA-C  •  sodium chloride 0 9 % bolus 1,000 mL, 1,000 mL, Intravenous, Once PRN **AND** sodium chloride 0 9 % bolus 1,000 mL, 1,000 mL, Intravenous, Once PRN, Raj Cyr PA-C  •  sodium chloride 0 9 % infusion, 125 mL/hr, Intravenous, Continuous, Manuel Santana DO, Last Rate: 125 mL/hr at 05/16/23 0902, Restarted at 05/16/23 0940  •  traMADol (ULTRAM) tablet 50 mg, 50 mg, Oral, Q6H [facility], Izaiah Lorenz PA-C, 50 mg at 05/17/23 0538    Blood Culture:   No results found for: BLOODCX    Wound Culture:    No results found for: WOUNDCULT    Ins and Outs:  I/O last 24 hours:   In: 2817 5 [I V :2567 5; IV Piggyback:250]  Out: 950 [Urine:950]          Physical:  Vitals:    05/17/23 0751   BP: (!) 141/108   Pulse: 92   Resp: 16   Temp: 98 1 °F (36 7 °C)   SpO2: 98%     left lower extremity:  · Dressings C/D/I, Ace wrap in place  · Toes warm and well perfused  · Slight tenderness to palpation over the anterior aspect of the knee as expected  · Intact EHL/FHL DF/PF  · Compartment soft and compressible      Kvng Puri PA-C

## 2023-05-17 NOTE — ASSESSMENT & PLAN NOTE
· Patient s/p total left knee arthroplasty 5/16/2023  · Doing well post-operatively, hemoglobin stable  · Rest of care per primary  · OP therapy  · AC with aspirin 81 mg BID

## 2023-05-18 ENCOUNTER — TELEPHONE (OUTPATIENT)
Dept: OBGYN CLINIC | Facility: HOSPITAL | Age: 58
End: 2023-05-18

## 2023-05-19 ENCOUNTER — OFFICE VISIT (OUTPATIENT)
Dept: PHYSICAL THERAPY | Facility: REHABILITATION | Age: 58
End: 2023-05-19

## 2023-05-19 DIAGNOSIS — Z96.652 STATUS POST TOTAL LEFT KNEE REPLACEMENT: ICD-10-CM

## 2023-05-19 DIAGNOSIS — M25.562 ACUTE PAIN OF LEFT KNEE: Primary | ICD-10-CM

## 2023-05-19 NOTE — TELEPHONE ENCOUNTER
Patient contacted for a postoperative follow up assessment  Patient reports 3/10 pain when sitting and 4/10 when walking with RW  Patient states if she has to adjust or shift reports 7/10 muscle pain especially in the back and inner part of knee  Patient is taking Tylenol 1000mg PRN, Oxycodone 5mg 4-6 hours, ASA 81mg BID  Patient denies OTC stool softener  Patient has not yet had a BM but is passing gas  Patient denies increase in swelling and dressing is clean, dry and intact  Patient is icing the site regularly  Patient reports intermittent nausea  Denies vomiting, abdominal pain, chest pain, shortness of breath, fever, dizziness and calf pain   Patient confirmed post-op appointment with surgeon on 5/26 at 8:30AM  Patient does not have any other questions or concerns at this time

## 2023-05-19 NOTE — PROGRESS NOTES
PT Evaluation     Today's date: 2023  Patient name: Opal Cai  : 1965  MRN: [id number]  Referring provider: Ondina Jin, *  Dx:   Encounter Diagnosis     ICD-10-CM    1  Acute pain of left knee  M25 562       2  Status post total left knee replacement  Z96 652                      Assessment  Assessment details: Opal Cai is a 56 y o  female referred to outpt PT s/p left TKA  Pt presents with decrease in left knee ROM flex and ext, decrease in left LE strength including diminished quad firing, and positive pain and edema  Pt funct is limited with ambulation, sleep tolerance, negotiation of stairs, and general dressing/bathing ADL's  Pt would benefit from skilled PT to address these limitations and max funct  Impairments: abnormal or restricted ROM, impaired physical strength, lacks appropriate home exercise program and pain with function    Goals  Funct 1  Improve amb with use of SPC 1/2 mile x4 weeks  2  Negotiate stairs reciprocally with rail x4 weeks  3  Improve sleep tolerance 4-5 hours x4 weeks  Impairment 1  Increase ROM by 25% x4 weeks  2  Decrease pain by 25 % x4 weeks  3  Increase strength by 1/2 grade x4 weeks  Plan  Patient would benefit from: skilled physical therapy  Planned therapy interventions: home exercise program, manual therapy, neuromuscular re-education, patient education, therapeutic exercise, stretching and strengthening  Frequency: 2x week  Duration in weeks: 4        Subjective Evaluation    History of Present Illness  Mechanism of injury: Pt had left TKA performed on 23 and was present in hospital x1 night prior to being [address] on 23  Pt has been having difficulty with sleeping due to pain and diff finding a comfortable position  Pt is currently using tylenol and oxycodone alternating to assist with sx's  Pt is using ice frequently throughout her day and using stockings for edema    Pt is amb with RW, does note having numbness "into left LE up until this AM and having diff lifting left LE, currently using right leg to assist   Pt scores a 43 on her FOTO index  Pt has scheduled f/u with ortho next 23  Pain  Current pain ratin  At best pain ratin  At worst pain ratin    Patient Goals  Patient goals for therapy: decreased pain, independence with ADLs/IADLs and increased motion          Objective     Active Range of Motion   Left Knee   Flexion: 75 degrees   Extension: -20 degrees     Additional Active Range of Motion Details  Pt has positive ecchymosis present med joint space and along med quad  Pt has mod TTP surrounding knee and with patellar testing  Strength/Myotome Testing     Left Hip   Planes of Motion   Flexion: 5  Extension: 4-  Abduction: 4+  Adduction: 5    Left Knee   Flexion: 3-  Quadriceps contraction: poor    Additional Strength Details  Pt struggles with independent quad contraction on left           Precautions: migraine        Manual  4/10/23 5/19/23       PROM left knee flex/ext    10 mins           RE   15 mins                                                          Neuro Re-Ed             Stand HR          Back to bar TR           Side stepping at bar                                                                Therex            QS 5\"x10 5\"x10        LAQ  3\"x5          heel slides supine  5\"x10 5\"x10           AAROM knee flex seated 5\"x10 5\"x10          HR/TR stand 5\"x10             rec bike aarom                                                        Gait Training                                 Modalities             CP  10 mins                                         "

## 2023-05-20 ENCOUNTER — NURSE TRIAGE (OUTPATIENT)
Dept: OTHER | Facility: OTHER | Age: 58
End: 2023-05-20

## 2023-05-20 NOTE — TELEPHONE ENCOUNTER
Regarding: Post total knee replacement with continue pain  ----- Message from La Levine sent at 5/20/2023  1:17 PM EDT -----  I had a total knee replacement on 5/16/23 and the medication provided is not helping and my knees are starting to get painful  There is minimal swelling and the pain level is about a 6  I want to know if there is something else that can be prescribed instead of the oxycodone that I still have  Did mention to the patient triage nurse will not prescribe any pain meds over the weekend

## 2023-05-20 NOTE — TELEPHONE ENCOUNTER
One call attempt made due to high call volume  Message left for pt to call back if still in need of care advice

## 2023-05-22 DIAGNOSIS — M17.12 PRIMARY OSTEOARTHRITIS OF LEFT KNEE: Primary | ICD-10-CM

## 2023-05-22 RX ORDER — HYDROCODONE BITARTRATE AND ACETAMINOPHEN 5; 325 MG/1; MG/1
1 TABLET ORAL EVERY 4 HOURS PRN
Qty: 45 TABLET | Refills: 0 | Status: SHIPPED | OUTPATIENT
Start: 2023-05-22 | End: 2023-08-03

## 2023-05-22 NOTE — TELEPHONE ENCOUNTER
I did speak with the patient and sent a prescription for hydrocodone/APAP to her pharmacy  I did discuss taking less Tylenol and she was taking with oxycodone since oxycodone does not seem to help her with any pain  She is frequently icing the knee and consistently wearing the JAVIER stockings during the day

## 2023-05-23 ENCOUNTER — OFFICE VISIT (OUTPATIENT)
Dept: PHYSICAL THERAPY | Facility: REHABILITATION | Age: 58
End: 2023-05-23

## 2023-05-23 DIAGNOSIS — Z96.652 STATUS POST TOTAL LEFT KNEE REPLACEMENT: ICD-10-CM

## 2023-05-23 DIAGNOSIS — M25.562 ACUTE PAIN OF LEFT KNEE: Primary | ICD-10-CM

## 2023-05-23 NOTE — PROGRESS NOTES
"Daily Note     Today's date: 2023  Patient name: Drake Maria  : 1965  MRN: [id number]  Referring provider: Bhupinder James, *  Dx:   Encounter Diagnosis     ICD-10-CM    1  Acute pain of left knee  M25 562       2  Status post total left knee replacement  Z96 652                      Subjective: Pt notes struggling through the weekend until having a change in her pain medication  Pt notes improvement in sx's and ability to lift her left LE since improvement in pain meds  Pt notes having discomfort in left ankle that does make walking a little \"strange  \"      Objective: See treatment diary below    Precautions: migraine        Manual  4/10/23 5/19/23  5/23/23        PROM left knee flex/ext    10 mins   15 mins         RE   15 mins                                                          Neuro Re-Ed             Stand HR      3\"x10        Back to bar TR      5\"x10       Side stepping at bar              lateral shift at bar     5\"x10                                                                Therex             QS 5\"x10 5\"x10   5\"x10 seated with visual feedback       LAQ   3\"x5  3\"x10         heel slides supine  5\"x10 5\"x10   5\"x10         AAROM knee flex seated 5\"x10 5\"x10   5\"x10        HR/TR stand 5\"x10             rec bike aarom                                                        Gait Training                                 Modalities             CP   10 mins   10  mins                                           Assessment: Pt able to perform rec bike for aarom initially in PT visit  Pt able to achieve -15 to 90 degrees ROM today, does have more restriction and discomfort into endrange extension  Pt encouraged to perform standing weight shift for quad activation and knee extension TERT to assist with ROM  Pt has diff with independent QS, performed with biofeedback with visual activation of muscle and carry over from right QS  Plan: Continue per plan of care             "

## 2023-05-25 ENCOUNTER — OFFICE VISIT (OUTPATIENT)
Dept: PHYSICAL THERAPY | Facility: REHABILITATION | Age: 58
End: 2023-05-25

## 2023-05-25 DIAGNOSIS — M25.562 ACUTE PAIN OF LEFT KNEE: Primary | ICD-10-CM

## 2023-05-25 DIAGNOSIS — Z96.652 STATUS POST TOTAL LEFT KNEE REPLACEMENT: ICD-10-CM

## 2023-05-25 NOTE — PROGRESS NOTES
"Daily Note     Today's date: 2023  Patient name: Leticia Chavez  : 1965  MRN: [id number]  Referring provider: Ramone Walton, *  Dx:   Encounter Diagnosis     ICD-10-CM    1  Acute pain of left knee  M25 562       2  Status post total left knee replacement  Z96 652                      Subjective: Pt cont to note struggling with pain  Pt has been taking new meds as issued by ortho but feels they aren't helping to diminish her sx's  Pt is c/o pain in left ankle, knee, and hip  Pt is scheduled to f/u with ortho tomorrow  Objective: See treatment diary below    Precautions: migraine        Manual  4/10/23 5/19/23  5/23/23  5/25/23      PROM left knee flex/ext    10 mins   15 mins  15 mins       RE   15 mins                                                          Neuro Re-Ed             Stand HR      3\"x10   3\"x20     Back to bar TR      5\"x10  5\"x20      Side stepping at bar        nv      lateral shift at bar     5\"x10   hep      mini squat       nv                                               Therex             QS 5\"x10 5\"x10   5\"x10 seated with visual feedback  5\"x10 seated with visual feedback     LAQ   3\"x5  3\"x10   3\"x10       heel slides supine  5\"x10 5\"x10   5\"x10   5\"x10       AAROM knee flex seated 5\"x10 5\"x10   5\"x10   5\"x10                rec bike aarom        5 mins       SAQ       3\"x10      SLR       x5                    Gait Training                                 Modalities             CP   10 mins   10  mins                                           Assessment: Pt does perform transition of directions with ambulation with holding her left LE up off the floor  Pt encouraged to WB through her left LE even when she feels painful and stiff  Pt also demo's lifting left LE on table with hands and use of right LE, practiced ability to peform funct transfers in which she is able to perform without right LE assistance    Pt is progressing well with ability to activate quad " with self visual cues, tapping, and addition of SAQ  Plan: Continue per plan of care

## 2023-05-26 ENCOUNTER — OFFICE VISIT (OUTPATIENT)
Dept: OBGYN CLINIC | Facility: MEDICAL CENTER | Age: 58
End: 2023-05-26

## 2023-05-26 ENCOUNTER — APPOINTMENT (OUTPATIENT)
Dept: RADIOLOGY | Facility: MEDICAL CENTER | Age: 58
End: 2023-05-26

## 2023-05-26 VITALS
DIASTOLIC BLOOD PRESSURE: 80 MMHG | BODY MASS INDEX: 28.32 KG/M2 | SYSTOLIC BLOOD PRESSURE: 118 MMHG | HEIGHT: 61 IN | HEART RATE: 103 BPM | WEIGHT: 150 LBS

## 2023-05-26 DIAGNOSIS — M17.12 PRIMARY OSTEOARTHRITIS OF LEFT KNEE: Primary | ICD-10-CM

## 2023-05-26 DIAGNOSIS — M25.562 LEFT KNEE PAIN, UNSPECIFIED CHRONICITY: ICD-10-CM

## 2023-05-26 NOTE — PROGRESS NOTES
"Orthopaedic Surgery - Office Note  Aurelia Waldron (5 y o  female)   : 1965   MRN: [id number]  Encounter Date: 2023    Chief Complaint   Patient presents with   • Left Knee - Post-op       Assessment / Plan  Status post left total knee replacement on 2023    · Continue with ice and anti-inflammatories/analgesics as needed  · Continue with physical therapy as instructed with focus on regaining full range of motion  · Aspirin 81 mg twice daily for 30 days total   · We did discuss the need for dental antibiotic prophylaxis for the rest of her life  She will contact the office when she needs something prescribed  · Patient was provided with a TROM brace to be worn locked in full extension at nighttime to maintain extension  · Did review the patient's x-rays from today which look like they were in good alignment  Return in about 6 weeks (around 2023) for Follow up with Dr Vasile Vasquez  History of Present Illness  Aurelia Waldron is a 56 y o  female who presents for follow-up status post left total knee replacement on 2023  Overall patient feels she is progressing well  She still does have pain in the operative knee  She has transition to taking mostly Aleve if needed for pain but did feel that the hydrocodone/APAP worked the best for her for pain management  She has been icing frequently and has been consistently wearing the JAVIER stockings to help with swelling management  Review of Systems  Pertinent items are noted in HPI  All other systems were reviewed and are negative  Physical Exam  /80   Pulse 103   Ht 5' 1\" (1 549 m)   Wt 68 kg (150 lb)   LMP 2019 (Exact Date)   BMI 28 34 kg/m²   Cons: Appears well  No apparent distress  Psych: Alert  Oriented x3  Mood and affect normal   Eyes: PERRLA, EOMI  Resp: Normal effort  No audible wheezing or stridor  CV: Palpable pulse  No discernable arrhythmia  No LE edema    Lymph:  No palpable cervical, axillary, or " inguinal lymphadenopathy  Skin: Warm  No palpable masses  No visible lesions  Neuro: Normal muscle tone  Normal and symmetric DTR's  Left Knee Exam  Alignment:  Normal knee alignment  Inspection:  Mild swelling, incision healing well at this time without erythema or ecchymosis  Palpation:  Mild tenderness at The trent-incisional area  ROM:  Knee Extension 3 degrees  Knee Flexion 100 degrees  Strength:  Able to actively extend knee against gravity  Stability:  No objective knee instability  Stable Varus / Valgus stress, Lachman, and Posterior drawer  Tests:  No pertinent positive or negative tests  Patella:  Not tested  Neurovascular:  Sensation intact in DP/SP/Tate/Sa/T nerve distributions  Sensation intact in all digital nerve distributions  Toes warm and perfused  Gait:  Steady with walker  Studies Reviewed  I have personally reviewed pertinent films in PACS  XR of right knee - From 5/26/2023 showed position with no signs of loosening, no fracture or dislocation    Procedures  No procedures today  Medical, Surgical, Family, and Social History  The patient's medical history, family history, and social history, were reviewed and updated as appropriate      Past Medical History:   Diagnosis Date   • Allergic 02/01/2020   • Anemia    • Anesthesia complication     woke up during D/C   • Anxiety    • Arthritis    • BMI 30 0-30 9,adult    • Carpal tunnel syndrome on right    • Cervical spondylosis without myelopathy    • COVID 12/01/2021   • Depression    • Fibromyalgia    • Fibromyalgia, primary    • GERD (gastroesophageal reflux disease)    • History of fetal loss     Recurrent   • Hyperlipidemia    • Hypertension    • Jaw pain     and both ears with migraines   • Migraine    • Neck pain    • Osteoarthritis     lumbar spine   • Sicca syndrome (HCC)    • Sleep apnea     unable to use CPAP   • Syncopal episodes     with severe migraines   • Undifferentiated connective tissue disease ([address] )    • Wears contact lenses     or glasses       Past Surgical History:   Procedure Laterality Date   • CHOLECYSTECTOMY     • COLONOSCOPY     • DILATION AND CURETTAGE OF UTERUS     • NH ARTHRP KNE CONDYLE&PLATU MEDIAL&LAT COMPARTMENTS Left 5/16/2023    Procedure: ARTHROPLASTY KNEE TOTAL;  Surgeon: John Venegas MD;  Location: [facility];  Service: Orthopedics   • TUBAL LIGATION         Family History   Problem Relation Age of Onset   • MORGAN disease Mother    • Arthritis Mother    • Depression Mother    • Hypertension Mother    • Coronary artery disease Mother    • Heart attack Father 79   • Diabetes Father    • Prostate cancer Father    • Hypertension Father    • Arthritis Father    • Stroke Father    • Cancer Father         Prostate   • ADD / ADHD Father    • Lupus Sister    • Raynaud syndrome Sister    • Sjogren's syndrome Sister    • Breast cancer Maternal Grandmother    • Arthritis Maternal Grandmother    • Cancer Maternal Grandmother         Breast   • Glaucoma Maternal Grandmother    • Alcohol abuse Paternal Grandfather    • No Known Problems Daughter    • Prostate cancer Maternal Grandfather    • Arthritis Maternal Grandfather    • Cancer Maternal Grandfather         Prostate   • Colon cancer Paternal Grandmother    • Cancer Paternal Grandmother         Colon   • Depression Sister    • ADD / ADHD Sister    • Anxiety disorder Sister    • ADD / ADHD Brother    • OCD Brother    • No Known Problems Son    • No Known Problems Son    • No Known Problems Son    • No Known Problems Son    • No Known Problems Son    • No Known Problems Maternal Uncle    • No Known Problems Paternal Aunt    • No Known Problems Paternal Uncle        Social History     Occupational History   • Occupation: Administration   Tobacco Use   • Smoking status: Never   • Smokeless tobacco: Never   Vaping Use   • Vaping Use: Never used   Substance and Sexual Activity   • Alcohol use: Yes     Comment: rarely   • Drug use: Not Currently   • Sexual activity: Yes     Partners: Male     Birth control/protection: Female Sterilization       Allergies   Allergen Reactions   • Propranolol Other (See Comments)     Halluncinations   • Raspberry - Food Allergy Allergic Rhinitis, Itching and Nasal Congestion   • Latex Rash         Current Outpatient Medications:   •  aspirin (ECOTRIN LOW STRENGTH) 81 mg EC tablet, Take 1 tablet (81 mg total) by mouth 2 (two) times a day, Disp: 60 tablet, Rfl: 0  •  Aspirin-Acetaminophen-Caffeine (EXCEDRIN MIGRAINE PO), Take 1 tablet by mouth as needed (migraines), Disp: , Rfl:   •  buPROPion (Wellbutrin XL) 300 mg 24 hr tablet, Take 1 tablet (300 mg total) by mouth every morning, Disp: 90 tablet, Rfl: 3  •  Calcium Carb-Cholecalciferol (CALCIUM 500 + D3 PO), Take 2 capsules by mouth daily, Disp: , Rfl:   •  cetirizine (ZyrTEC) 10 mg tablet, Take 10 mg by mouth daily PRN, Disp: , Rfl:   •  HYDROcodone-acetaminophen (Norco) 5-325 mg per tablet, Take 1 tablet by mouth every 4 (four) hours as needed for pain Max Daily Amount: 6 tablets, Disp: 45 tablet, Rfl: 0  •  Magnesium 400 MG TABS, Take 2 tablets by mouth in the morning, Disp: , Rfl:   •  Multiple Vitamin (multivitamin) tablet, Take 1 tablet by mouth daily, Disp: 30 tablet, Rfl: 1  •  omeprazole (PriLOSEC) 20 mg delayed release capsule, Take 1 capsule (20 mg total) by mouth daily, Disp: 90 capsule, Rfl: 1  •  SUMAtriptan (IMITREX) 100 mg tablet, Take 1 tablet (100 mg total) by mouth once as needed for migraine May repeat x1 in 2 hours, max 2/24 hours, 9/month, Disp: 9 tablet, Rfl: 12  •  acetaminophen (TYLENOL) 500 mg tablet, Take 500-1,000 mg by mouth every 6 (six) hours as needed for mild pain, Disp: , Rfl:   •  ascorbic acid (VITAMIN C) 500 MG tablet, Take 1 tablet (500 mg total) by mouth 2 (two) times a day, Disp: 60 tablet, Rfl: 1  •  Cyanocobalamin (VITAMIN B 12 PO), Take by mouth daily, Disp: , Rfl:   •  ferrous sulfate 324 (65 Fe) mg, Take 1 tablet (324 mg total) by mouth 2 (two) times a day before meals, Disp: 60 tablet, Rfl: 0  •  folic acid (FOLVITE) 1 mg tablet, Take 1 tablet (1 mg total) by mouth daily, Disp: 30 tablet, Rfl: 0  •  lisinopril (ZESTRIL) 20 mg tablet, TAKE 1 TABLET(20 MG) BY MOUTH DAILY (Patient taking differently: Take 20 mg by mouth every evening), Disp: 90 tablet, Rfl: 0  •  ondansetron (ZOFRAN-ODT) 4 mg disintegrating tablet, Take 1 tablet (4 mg total) by mouth every 8 (eight) hours as needed for nausea or vomiting (Patient not taking: Reported on 5/26/2023), Disp: 15 tablet, Rfl: 0  •  oxyCODONE (ROXICODONE) 5 immediate release tablet, Take 1 tablet (5 mg total) by mouth every 4 (four) hours as needed for moderate pain for up to 10 days Max Daily Amount: 30 mg, Disp: 60 tablet, Rfl: 0      Katlin Roland PA-C    Scribe Attestation    I,:   am acting as a scribe while in the presence of the attending physician :       I,:   personally performed the services described in this documentation    as scribed in my presence :

## 2023-05-30 ENCOUNTER — OFFICE VISIT (OUTPATIENT)
Dept: PHYSICAL THERAPY | Facility: REHABILITATION | Age: 58
End: 2023-05-30

## 2023-05-30 DIAGNOSIS — M25.562 ACUTE PAIN OF LEFT KNEE: Primary | ICD-10-CM

## 2023-05-30 DIAGNOSIS — Z96.652 STATUS POST TOTAL LEFT KNEE REPLACEMENT: ICD-10-CM

## 2023-05-30 NOTE — PROGRESS NOTES
"Daily Note     Today's date: 2023  Patient name: Leticia Chavez  : 1965  MRN: [id number]  Referring provider: Ramone Walton, *  Dx:   Encounter Diagnosis     ICD-10-CM    1  Acute pain of left knee  M25 562       2  Status post total left knee replacement  Z96 652                      Subjective: Pt had f/u with ortho last week and notes happy with progress other than extension restriction  Pt was issued extension brace to wear at night  Pt notes able to wear most of the night but does have diff sleeping  Objective: See treatment diary below    Precautions: migraine        Manual  4/10/23 5/19/23  5/23/23  5/25/23  5/30/23    PROM left knee flex/ext    10 mins   15 mins  15 mins   15 mins     RE   15 mins                                                          Neuro Re-Ed             Stand HR      3\"x10   3\"x20  3\"x20    Back to bar TR      5\"x10  5\"x20   5\"x20    Side stepping at bar        nv              mini squat       nv  3\"x10                                              Therex             QS 5\"x10 5\"x10   5\"x10 seated with visual feedback  5\"x10 seated with visual feedback  5\"x10 indep supine   LAQ   3\"x5  3\"x10   3\"x10   3\"x10     heel slides supine  5\"x10 5\"x10   5\"x10   5\"x10   hep    AAROM knee flex seated 5\"x10 5\"x10   5\"x10   5\"x10   hep             rec bike aarom        5 mins   6 mins     SAQ       3\"x10  3\"x10     SLR       x5  2x5                  Gait Training              SPC amb         200'x2          Modalities             CP   10 mins   10  mins   10 mins   10 mins                                       Assessment: After initial ROM on rec bike, pt was able to achieve full revolutions backwards with assist from PT  Focused on ambulation with use of SPC in right hand today with good balance and WB tolerance    Pt does need cues throughout ambulation to focus on heel strike with shorter stride on left, taking a long stride with right through midstance on left, and " knee flexion for swing through  Good progression at today's visit with independent quad contraction without visual biofeedback required and improved tolerance to quad contraction throughout activities  Plan: Continue per plan of care

## 2023-06-02 ENCOUNTER — OFFICE VISIT (OUTPATIENT)
Dept: PHYSICAL THERAPY | Facility: REHABILITATION | Age: 58
End: 2023-06-02

## 2023-06-02 DIAGNOSIS — M25.562 ACUTE PAIN OF LEFT KNEE: Primary | ICD-10-CM

## 2023-06-02 DIAGNOSIS — Z96.652 STATUS POST TOTAL LEFT KNEE REPLACEMENT: ICD-10-CM

## 2023-06-02 NOTE — PROGRESS NOTES
"Daily Note     Today's date: 2023  Patient name: Aurelia Waldron  : 1965  MRN: [id number]  Referring provider: Mi Horton, *  Dx:   Encounter Diagnosis     ICD-10-CM    1  Acute pain of left knee  M25 562       2  Status post total left knee replacement  Z96 652                      Subjective: Pt amb into facility with use of SPC with good balance, however cont to demo diminished knee flexion during swing phase especially with increase in bryce  Objective: See treatment diary below    Precautions: migraine        Manual  23    PROM left knee flex/ext  15 mins   10 mins   15 mins  15 mins   15 mins     RE   15 mins                                                          Neuro Re-Ed             Stand HR  3\"x20     3\"x10   3\"x20  3\"x20    Back to bar TR  5\"x20    5\"x10  5\"x20   5\"x20    Side stepping at bar  10'x1 ea      nv              mini squat  3\"x10      nv  3\"x10                                              Therex             QS 5\"x10 knee supported, 5\"x10 heel elevated 5\"x10   5\"x10 seated with visual feedback  5\"x10 seated with visual feedback  5\"x10 indep supine   LAQ  3\"x10  3\"x5  3\"x10   3\"x10   3\"x10                              rec bike aarom  6 mins       5 mins   6 mins     SAQ NV     3\"x10  3\"x10     SLR  3\"2x5      x5  2x5                  Gait Training              SPC amb         200'x2          Modalities             CP  deferred 10 mins   10  mins   10 mins   10 mins                                       Assessment: Pt is able to perform full revolutions on rec bike at end of aarom session  Pt does cont to have extension restrictions and diff with maintaining quad contraction when fatigued especially at the end of her session  Recommended pt focus on QS and contraction throughout the day with use of visual feedback to assist with appropriate technique    Pt also noted to perform to fatigue as she is compensating with glute " for knee extension once fatigue occurs  Pt encouraged to cont with use of extension brace at home and slow speed down during exercises to limit compensation  Plan: Continue per plan of care

## 2023-06-06 ENCOUNTER — OFFICE VISIT (OUTPATIENT)
Dept: PHYSICAL THERAPY | Facility: REHABILITATION | Age: 58
End: 2023-06-06
Payer: COMMERCIAL

## 2023-06-06 DIAGNOSIS — M25.562 ACUTE PAIN OF LEFT KNEE: Primary | ICD-10-CM

## 2023-06-06 DIAGNOSIS — Z96.652 STATUS POST TOTAL LEFT KNEE REPLACEMENT: ICD-10-CM

## 2023-06-06 PROCEDURE — 97110 THERAPEUTIC EXERCISES: CPT | Performed by: PHYSICAL THERAPIST

## 2023-06-06 PROCEDURE — 97140 MANUAL THERAPY 1/> REGIONS: CPT | Performed by: PHYSICAL THERAPIST

## 2023-06-06 NOTE — PROGRESS NOTES
"Daily Note     Today's date: 2023  Patient name: Danielle Chanel  : 1965  MRN: [id number]  Referring provider: Eusebia Sagastume, *  Dx:   Encounter Diagnosis     ICD-10-CM    1  Acute pain of left knee  M25 562       2  Status post total left knee replacement  Z96 652                      Subjective: Pt notes cont to struggle with pain and discomfort causing inability to sleep well  Pt is currently taking tylenol as needed and attempting to be as active as possible throughout the day  Pt does feel her stiffness gets in the way and has progressively become more stiff  Objective: See treatment diary below    Precautions: migraine        Manual  23    PROM left knee flex/ext  15 mins  17 mins   15 mins  15 mins   15 mins     RE                                                            Neuro Re-Ed             Stand HR  3\"x20  np  3\"x10   3\"x20  3\"x20    Back to bar TR  5\"x20 np  5\"x10  5\"x20   5\"x20    Side stepping at bar  10'x1 ea np    nv                    mini squat  3\"x10  np   nv  3\"x10                                              Therex             QS 5\"x10 knee supported, 5\"x10 heel elevated 5\"x10 knee supported, 5\"x10 heel elevated  5\"x10 seated with visual feedback  5\"x10 seated with visual feedback  5\"x10 indep supine   LAQ  3\"x10  np  3\"x10   3\"x10   3\"x10    Heel slides   10\"x10           seated QS   5\"x10                         rec bike aarom  6 mins  6 mins     5 mins   6 mins     SAQ NV  np   3\"x10  3\"x10     SLR  3\"2x5   np   x5  2x5                  Gait Training              SPC amb   200'x2      200'x2          Modalities             CP  deferred 10 mins   10  mins   10 mins   10 mins                                       Assessment: Pt has restrictions of knee flexion during rec bike aarom today, needs encouragement and guidance through total ROM    Discussed in length with pt today to perform her exercises throughout the day to " assist with stiffness and to focus on ROM when stiffness feeling occurs in the opposite directions  Pt cont to lock left LE during gait and added standing knee flexion to funct assist with knee flexion  Pt also educated to slow bryce with ambulation to allow for proper swing through  Pt cont to note gastroc sx's and added seated gastroc stretch with towel to assist   Pt cont to lack extension but able to perform active QS independently today while seated and supine well  Pt restricts her knee flexion during manuals with significant muscle guarding, improved with independent patient control during heel slides  Plan: Continue per plan of care

## 2023-06-09 ENCOUNTER — OFFICE VISIT (OUTPATIENT)
Dept: PHYSICAL THERAPY | Facility: REHABILITATION | Age: 58
End: 2023-06-09
Payer: COMMERCIAL

## 2023-06-09 ENCOUNTER — OFFICE VISIT (OUTPATIENT)
Dept: OBGYN CLINIC | Facility: MEDICAL CENTER | Age: 58
End: 2023-06-09

## 2023-06-09 VITALS
DIASTOLIC BLOOD PRESSURE: 93 MMHG | BODY MASS INDEX: 28.32 KG/M2 | SYSTOLIC BLOOD PRESSURE: 144 MMHG | WEIGHT: 150 LBS | HEIGHT: 61 IN | HEART RATE: 93 BPM

## 2023-06-09 DIAGNOSIS — Z96.652 STATUS POST TOTAL LEFT KNEE REPLACEMENT: ICD-10-CM

## 2023-06-09 DIAGNOSIS — M17.12 PRIMARY OSTEOARTHRITIS OF LEFT KNEE: Primary | ICD-10-CM

## 2023-06-09 DIAGNOSIS — M25.562 ACUTE PAIN OF LEFT KNEE: Primary | ICD-10-CM

## 2023-06-09 PROCEDURE — 97110 THERAPEUTIC EXERCISES: CPT | Performed by: PHYSICAL THERAPIST

## 2023-06-09 PROCEDURE — 97112 NEUROMUSCULAR REEDUCATION: CPT | Performed by: PHYSICAL THERAPIST

## 2023-06-09 PROCEDURE — 99024 POSTOP FOLLOW-UP VISIT: CPT | Performed by: ORTHOPAEDIC SURGERY

## 2023-06-09 NOTE — LETTER
June 9, 2023     Patient: Dangelo Calix  YOB: 1965  Date of Visit: 6/9/2023      To Whom it May Concern:    Hannah Salomon is under my professional care  Guy Kay was seen in my office on 6/9/2023  Guy Kay can return to work on 06/11/2023  If you have any questions or concerns, please don't hesitate to call           Sincerely,          Joellen Hilario MD        CC: No Recipients

## 2023-06-09 NOTE — PROGRESS NOTES
"Daily Note     Today's date: 2023  Patient name: Lexie Mora  : 1965  MRN: [id number]  Referring provider: Cris Wagner, *  Dx:   Encounter Diagnosis     ICD-10-CM    1  Acute pain of left knee  M25 562       2  Status post total left knee replacement  Z96 652           Start Time: 4379  Stop Time: 0830  Total time in clinic (min): 55 minutes    Subjective: Patient reports increased swelling since last visit after going out to eat with friends  Patient has been sleeping with her extension brace issued by her surgeon, but has needed to remove it after about 5-6 hours due to discomfort  Objective: See treatment diary below    Precautions: migraine        Manual  23    PROM left knee flex/ext  15 mins  17 mins   15 mins  15 mins   15 mins     RE                                                            Neuro Re-Ed             Stand HR  3\"x20  np 3\" 10x  3\"x20  3\"x20    Back to bar TR  5\"x20 np 5\" 20x  5\"x20   5\"x20    Side stepping at bar  10'x1 ea np  5 laps bar  nv                    mini squat  3\"x10  np   nv  3\"x10                                              Therex             QS 5\"x10 knee supported, 5\"x10 heel elevated 5\"x10 knee supported, 5\"x10 heel elevated   5\"x10 seated with visual feedback  5\"x10 indep supine   LAQ  3\"x10  np   3\"x10   3\"x10    Heel slides   10\"x10   10\" 10x        seated QS   5\"x10                         rec bike aarom  6 mins  6 mins   6 min  5 mins   6 mins     SAQ NV  np   3\"x10  3\"x10     SLR  3\"2x5   np  3\" 2x5 x5  2x5                  Gait Training              SPC amb   200'x2      200'x2          Modalities             CP  deferred 10 mins   10  mins   10 mins   10 mins                                       Assessment:  Patient tolerates treatment well this visit  She is unable to complete revolutions on recumbent bike this morning  Knee flexion measured to 112 degrees of PROM with PT    Patient lacks several " degrees of extension  No lag with SLR without available ROM  Continue to progress strengthening and ROM as able  Plan: Continue per plan of care

## 2023-06-09 NOTE — PROGRESS NOTES
"Orthopaedic Surgery - Office Note  Kyle Christina (14 y o  female)   : 1965   MRN: [id number]  Encounter Date: 2023    Chief Complaint   Patient presents with   • Left Knee - Pain       Assessment / Plan  S/p left TKA, with good progression     · Continue with outpatient PT  · Continue to focus on regaining full extension   · Reviewed PT notes  · Continue with locked TROM at night to help aid in extension, TROM should be locked at 0  · Ice, heat and anti-inflammatories prn   Return in about 1 month (around 2023) for follow up with Dr Percy Shoemaker  History of Present Illness  Kyle Christina is a 56 y o  female who presents for follow up s/p left TKA on 2023  She is progressing well in PT  She feels she is making progress but wishes she would be making progress quicker  She is ambulating with a cane  She gets some stiffness with prolonged sitting  She is using Tylenol once a day prn for pain  Review of Systems  Pertinent items are noted in HPI  All other systems were reviewed and are negative  Physical Exam  /93   Pulse 93   Ht 5' 1\" (1 549 m)   Wt 68 kg (150 lb)   LMP 2019 (Exact Date)   BMI 28 34 kg/m²   Cons: Appears well  No apparent distress  Psych: Alert  Oriented x3  Mood and affect normal   Eyes: PERRLA, EOMI  Resp: Normal effort  No audible wheezing or stridor  CV: Palpable pulse  No discernable arrhythmia  No LE edema  Lymph:  No palpable cervical, axillary, or inguinal lymphadenopathy  Skin: Warm  No palpable masses  No visible lesions  Neuro: Normal muscle tone  Normal and symmetric DTR's  Left Knee Exam  Alignment:  Normal knee alignment  Inspection:  mild knee swelling  Incision clean and dry  Palpation:  mild diffuse tenderness  No effusion  ROM:  Knee Extension 15  Knee Flexion 100  Strength:  Able to actively extend knee against gravity  Stability:  (-) Varus instability  (-) Valgus instability    In both flexion and extension " Tests:  not tested  Patella:  Not tested  Neurovascular:  Sensation intact in DP/SP/Tate/Sa/T nerve distributions  2+ DP & PT pulses  Gait:  Steady with cane  Studies Reviewed  I have personally reviewed pertinent films in PACS  XR of right knee - From 5/26/2023 showed position with no signs of loosening, no fracture or dislocation     Procedures  No procedures today  Medical, Surgical, Family, and Social History  The patient's medical history, family history, and social history, were reviewed and updated as appropriate      Past Medical History:   Diagnosis Date   • Allergic 02/01/2020   • Anemia    • Anesthesia complication     woke up during D/C   • Anxiety    • Arthritis    • BMI 30 0-30 9,adult    • Carpal tunnel syndrome on right    • Cervical spondylosis without myelopathy    • COVID 12/01/2021   • Depression    • Fibromyalgia    • Fibromyalgia, primary    • GERD (gastroesophageal reflux disease)    • History of fetal loss     Recurrent   • Hyperlipidemia    • Hypertension    • Jaw pain     and both ears with migraines   • Migraine    • Neck pain    • Osteoarthritis     lumbar spine   • Sicca syndrome (HCC)    • Sleep apnea     unable to use CPAP   • Syncopal episodes     with severe migraines   • Undifferentiated connective tissue disease ([address] )    • Wears contact lenses     or glasses       Past Surgical History:   Procedure Laterality Date   • CHOLECYSTECTOMY     • COLONOSCOPY     • DILATION AND CURETTAGE OF UTERUS     • LA ARTHRP KNE CONDYLE&PLATU MEDIAL&LAT COMPARTMENTS Left 5/16/2023    Procedure: ARTHROPLASTY KNEE TOTAL;  Surgeon: Anirudh Crawford MD;  Location: [facility];  Service: Orthopedics   • TUBAL LIGATION         Family History   Problem Relation Age of Onset   • MORGAN disease Mother    • Arthritis Mother    • Depression Mother    • Hypertension Mother    • Coronary artery disease Mother    • Heart attack Father 79   • Diabetes Father    • Prostate cancer Father    • Hypertension Father    • Arthritis Father    • Stroke Father    • Cancer Father         Prostate   • ADD / ADHD Father    • Lupus Sister    • Raynaud syndrome Sister    • Sjogren's syndrome Sister    • Breast cancer Maternal Grandmother    • Arthritis Maternal Grandmother    • Cancer Maternal Grandmother         Breast   • Glaucoma Maternal Grandmother    • Alcohol abuse Paternal Grandfather    • No Known Problems Daughter    • Prostate cancer Maternal Grandfather    • Arthritis Maternal Grandfather    • Cancer Maternal Grandfather         Prostate   • Colon cancer Paternal Grandmother    • Cancer Paternal Grandmother         Colon   • Depression Sister    • ADD / ADHD Sister    • Anxiety disorder Sister    • ADD / ADHD Brother    • OCD Brother    • No Known Problems Son    • No Known Problems Son    • No Known Problems Son    • No Known Problems Son    • No Known Problems Son    • No Known Problems Maternal Uncle    • No Known Problems Paternal Aunt    • No Known Problems Paternal Uncle        Social History     Occupational History   • Occupation: Administration   Tobacco Use   • Smoking status: Never   • Smokeless tobacco: Never   Vaping Use   • Vaping Use: Never used   Substance and Sexual Activity   • Alcohol use: Yes     Comment: rarely   • Drug use: Not Currently   • Sexual activity: Yes     Partners: Male     Birth control/protection: Female Sterilization       Allergies   Allergen Reactions   • Propranolol Other (See Comments)     Halluncinations   • Raspberry - Food Allergy Allergic Rhinitis, Itching and Nasal Congestion   • Gabapentin Rash   • Latex Rash         Current Outpatient Medications:   •  acetaminophen (TYLENOL) 500 mg tablet, Take 500-1,000 mg by mouth every 6 (six) hours as needed for mild pain, Disp: , Rfl:   •  aspirin (ECOTRIN LOW STRENGTH) 81 mg EC tablet, Take 1 tablet (81 mg total) by mouth 2 (two) times a day, Disp: 60 tablet, Rfl: 0  •  Aspirin-Acetaminophen-Caffeine (EXCEDRIN MIGRAINE PO), Take 1 tablet by mouth as needed (migraines), Disp: , Rfl:   •  buPROPion (Wellbutrin XL) 300 mg 24 hr tablet, Take 1 tablet (300 mg total) by mouth every morning, Disp: 90 tablet, Rfl: 3  •  Calcium Carb-Cholecalciferol (CALCIUM 500 + D3 PO), Take 2 capsules by mouth daily, Disp: , Rfl:   •  cetirizine (ZyrTEC) 10 mg tablet, Take 10 mg by mouth daily PRN, Disp: , Rfl:   •  Cyanocobalamin (VITAMIN B 12 PO), Take by mouth daily, Disp: , Rfl:   •  lisinopril (ZESTRIL) 20 mg tablet, TAKE 1 TABLET(20 MG) BY MOUTH DAILY (Patient taking differently: Take 20 mg by mouth every evening), Disp: 90 tablet, Rfl: 0  •  Magnesium 400 MG TABS, Take 2 tablets by mouth in the morning, Disp: , Rfl:   •  Multiple Vitamin (multivitamin) tablet, Take 1 tablet by mouth daily, Disp: 30 tablet, Rfl: 1  •  omeprazole (PriLOSEC) 20 mg delayed release capsule, Take 1 capsule (20 mg total) by mouth daily, Disp: 90 capsule, Rfl: 1  •  SUMAtriptan (IMITREX) 100 mg tablet, Take 1 tablet (100 mg total) by mouth once as needed for migraine May repeat x1 in 2 hours, max 2/24 hours, 9/month, Disp: 9 tablet, Rfl: 12  •  ascorbic acid (VITAMIN C) 500 MG tablet, Take 1 tablet (500 mg total) by mouth 2 (two) times a day, Disp: 60 tablet, Rfl: 1  •  ferrous sulfate 324 (65 Fe) mg, Take 1 tablet (324 mg total) by mouth 2 (two) times a day before meals, Disp: 60 tablet, Rfl: 0  •  folic acid (FOLVITE) 1 mg tablet, Take 1 tablet (1 mg total) by mouth daily, Disp: 30 tablet, Rfl: 0  •  HYDROcodone-acetaminophen (Norco) 5-325 mg per tablet, Take 1 tablet by mouth every 4 (four) hours as needed for pain Max Daily Amount: 6 tablets, Disp: 45 tablet, Rfl: 0  •  ondansetron (ZOFRAN-ODT) 4 mg disintegrating tablet, Take 1 tablet (4 mg total) by mouth every 8 (eight) hours as needed for nausea or vomiting (Patient not taking: Reported on 5/26/2023), Disp: 15 tablet, Rfl: 0      [employer]    I,:  Valentino Labrum am acting as a scribe while in the presence of the attending physician :       I,:  Naty Barclay MD personally performed the services described in this documentation    as scribed in my presence :

## 2023-06-12 ENCOUNTER — OFFICE VISIT (OUTPATIENT)
Dept: PHYSICAL THERAPY | Facility: REHABILITATION | Age: 58
End: 2023-06-12
Payer: COMMERCIAL

## 2023-06-12 DIAGNOSIS — Z96.652 STATUS POST TOTAL LEFT KNEE REPLACEMENT: ICD-10-CM

## 2023-06-12 DIAGNOSIS — M25.562 ACUTE PAIN OF LEFT KNEE: Primary | ICD-10-CM

## 2023-06-12 PROCEDURE — 97140 MANUAL THERAPY 1/> REGIONS: CPT

## 2023-06-12 PROCEDURE — 97112 NEUROMUSCULAR REEDUCATION: CPT

## 2023-06-12 PROCEDURE — 97110 THERAPEUTIC EXERCISES: CPT

## 2023-06-12 NOTE — PROGRESS NOTES
"Daily Note     Today's date: 2023  Patient name: Perri Quinn  : 1965  MRN: [id number]  Referring provider: Reddy March, *  Dx:   Encounter Diagnosis     ICD-10-CM    1  Acute pain of left knee  M25 562       2  Status post total left knee replacement  Z96 652                      Subjective: Sore following last session but states that last week was the worst week she's had so far  Main complaint is stiffness  Objective: See treatment diary below      Precautions: migraine        Manual  23     PROM left knee flex/ext  15 mins  17 mins   15 mins  CM  15 min     RE                                                            Neuro Re-Ed        23     Stand HR  3\"x20  np 3\" 10x 3\" 20x     Back to bar TR  5\"x20 np 5\" 20x 5\" 20x    Side stepping at bar  10'x1 ea np  5 laps bar 5 laps bar                    mini squat  3\"x10  np   3\" x 10                                              Therex        23     QS 5\"x10 knee supported, 5\"x10 heel elevated 5\"x10 knee supported, 5\"x10 heel elevated      LAQ  3\"x10  np      Heel slides   10\"x10   10\" 10x  10\"x10      seated QS   5\"x10                         rec bike aarom  6 mins  6 mins   6 min 6 min      SAQ NV  np        SLR  3\"2x5   np  3\" 2x5 3\" 1x10                  Gait Training        23      SPC amb   200'x2               Modalities        23     CP  deferred 10 mins   10  mins  Declined to home use                                       Assessment:  Patient tolerates treatment well this visit  Still unable to make full revolutions on the bike  Introduced lunge step stretch for HEP and encouraged sustained extension stretching throughout the day  Continue to progress strengthening and ROM as able  Plan: Continue per plan of care             "

## 2023-06-13 ENCOUNTER — APPOINTMENT (OUTPATIENT)
Dept: PHYSICAL THERAPY | Facility: REHABILITATION | Age: 58
End: 2023-06-13
Payer: COMMERCIAL

## 2023-06-16 ENCOUNTER — OFFICE VISIT (OUTPATIENT)
Dept: PHYSICAL THERAPY | Facility: REHABILITATION | Age: 58
End: 2023-06-16
Payer: COMMERCIAL

## 2023-06-16 DIAGNOSIS — Z96.652 STATUS POST TOTAL LEFT KNEE REPLACEMENT: ICD-10-CM

## 2023-06-16 DIAGNOSIS — M25.562 ACUTE PAIN OF LEFT KNEE: Primary | ICD-10-CM

## 2023-06-16 PROCEDURE — 97110 THERAPEUTIC EXERCISES: CPT | Performed by: PHYSICAL THERAPIST

## 2023-06-16 PROCEDURE — 97140 MANUAL THERAPY 1/> REGIONS: CPT | Performed by: PHYSICAL THERAPIST

## 2023-06-16 PROCEDURE — 97112 NEUROMUSCULAR REEDUCATION: CPT | Performed by: PHYSICAL THERAPIST

## 2023-06-16 NOTE — PROGRESS NOTES
"Daily Note     Today's date: 2023  Patient name: Job Santos  : 1965  MRN: [id number]  Referring provider: Jp Bhatia, *  Dx:   Encounter Diagnosis     ICD-10-CM    1  Acute pain of left knee  M25 562       2  Status post total left knee replacement  Z96 652                      Subjective: Pt notes that she is having more soreness this week with return to work, but does feel as though she is doing well with her exercises while at work  Objective: See treatment diary below      Precautions: migraine        Manual  23    PROM left knee flex/ext  15 mins  17 mins   15 mins  CM  15 min 15 mins     RE                                                            Neuro Re-Ed             Stand HR  3\"x20  np 3\" 10x 3\" 20x  hep   Back to bar TR  5\"x20 np 5\" 20x 5\" 20x hep   Side stepping at bar  10'x1 ea np  5 laps bar 5 laps bar 5 laps bar                   mini squat  3\"x10  np   3\" x 10 3\"x15                                              Therex             QS 5\"x10 knee supported, 5\"x10 heel elevated 5\"x10 knee supported, 5\"x10 heel elevated   Heel elevated 5\"x10    LAQ  3\"x10  np      Heel slides   10\"x10   10\" 10x  10\"x10  hep    seated QS   5\"x10                         rec bike aarom  6 mins  6 mins   6 min 6 min  7 mins     SAQ NV  np        SLR  3\"2x5   np  3\" 2x5 3\" 1x10 3\"x15                  Gait Training             SPC amb   200'x2      No 'x1         Modalities             CP  deferred 10 mins   10  mins  Declined to home use D/c                                      Assessment: Pt is progressing well towards ROM goals with improved extension with active quad contraction occurring  Pt even notes feeling her quad contract  Pt cont to maintain knee extension during gait, performed exaggerated marching with walk to assist with knee flexion  Pt able to perform with improved technique post exaggeration          Plan: Continue per plan of " care  RE next visit

## 2023-06-20 ENCOUNTER — EVALUATION (OUTPATIENT)
Dept: PHYSICAL THERAPY | Facility: REHABILITATION | Age: 58
End: 2023-06-20
Payer: COMMERCIAL

## 2023-06-20 DIAGNOSIS — Z96.652 STATUS POST TOTAL LEFT KNEE REPLACEMENT: ICD-10-CM

## 2023-06-20 DIAGNOSIS — M25.562 ACUTE PAIN OF LEFT KNEE: Primary | ICD-10-CM

## 2023-06-20 PROCEDURE — 97140 MANUAL THERAPY 1/> REGIONS: CPT | Performed by: PHYSICAL THERAPIST

## 2023-06-20 PROCEDURE — 97116 GAIT TRAINING THERAPY: CPT | Performed by: PHYSICAL THERAPIST

## 2023-06-20 NOTE — PROGRESS NOTES
PT Re-Evaluation     Today's date: 2023  Patient name: Brigette Cooley  : 1965  MRN: [id number]  Referring provider: Jo Ann Castle, *  Dx:   Encounter Diagnosis     ICD-10-CM    1  Acute pain of left knee  M25 562       2  Status post total left knee replacement  Z96 652                      Assessment  Assessment details: Pt is progressing well with PT with increase in knee ROM, improvement in LE strength, and increase in tolerance to ambulation and negotiation of stairs  Pt cont to be limited with pain at times, restrictions with bryce of activities and compensation during the higher speed, and positive ROM restrictions  Pt would benefit from cont skilled PT to address these limitations and max funct  Impairments: abnormal or restricted ROM, impaired physical strength, lacks appropriate home exercise program and pain with function    Goals  Funct 1  Improve amb with use of SPC 1/2 mile x4 weeks  -met  2  Negotiate stairs reciprocally with rail x4 weeks  -partially met  3  Improve sleep tolerance 4-5 hours x4 weeks  -partially met  Impairment 1  Increase ROM by 25% x4 weeks-partially met  2  Decrease pain by 25 % x4 weeks-partially met  3  Increase strength by 1/2 grade x4 weeks  -partially met      Plan  Patient would benefit from: skilled physical therapy  Planned therapy interventions: home exercise program, manual therapy, neuromuscular re-education, patient education, therapeutic exercise, stretching and strengthening  Frequency: 2x week  Duration in weeks: 4        Subjective Evaluation    History of Present Illness  Mechanism of injury: Pt is progressing well with PT with ability to amb in and out of PT facility without use of AD  Pt is using SPC in AM or after prolonged sitting and noting that around the house she is not using her AD  Pt has returned to work at [employer] and is doing well with increase in standing activities    Pt is using tylenol OTC as needed to assist with "sx's (approx 1x/day)  Pt cont to have sleep disturbances in which she has longest stretch 4 hours at a time, but notes typically a few hours  Pt is sleeping downstairs, but does perform stair negotiation frequently to get dressed and shower  Pt typically performs stairs non-reciprocally but does attempt at times reciprocally with some compensation  Pt cont to use ice as needed for pain and edema control  Pain  Current pain ratin  At best pain ratin  At worst pain ratin    Patient Goals  Patient goals for therapy: decreased pain, independence with ADLs/IADLs and increased motion  Patient goal: -partially met        Objective     Active Range of Motion   Left Knee   Flexion: 110 degrees   Extension: -10 degrees     Additional Active Range of Motion Details  Pt has good healing present at incision  Pt does have tenderness cont at med joint space and along ITB insertion  Strength/Myotome Testing     Left Hip   Planes of Motion   Flexion: 5  Extension: 4-  Abduction: 4+  Adduction: 5    Left Knee   Flexion: 4  Extension: 3+  Quadriceps contraction: good    Additional Strength Details  Pt is able to perform SLR without lag, does cont to be restricted into TKE due to ext ROM restrictions  Pt is able to negotiate stairs reciprocally when ascending, does perform circumduction left LE when leading with left, however able to step up through left leg with good motor control and min sx's  Pt struggles with pain through descending ROM when leading with right           Precautions: migraine        Manual  23    PROM left knee flex/ext  17 mins   15 mins  CM  15 min 15 mins     RE 30 mins                                                            Neuro Re-Ed                             Side stepping at bar nv np  5 laps bar 5 laps bar 5 laps bar                   mini squat nv np   3\" x 10 3\"x15                                              Therex             QS nv " "5\"x10 knee supported, 5\"x10 heel elevated     Heel elevated 5\"x10               Heel slides nv 10\"x10   10\" 10x  10\"x10  hep                             rec bike aarom 5 mins 6 mins   6 min 6 min  7 mins     SAQ NV  np          SLR nv  np  3\" 2x5 3\" 1x10 3\"x15                   Gait Training             Stair negotiation railing 1 flight x2 reciprocally ascending/non-reciprocally descending        SPC amb Focus on knee flexion throughout 200'x1  200'x2      No 'x1         Modalities             CP  deferred 10 mins   10  mins  Declined to home use D/c                                    "

## 2023-06-23 ENCOUNTER — OFFICE VISIT (OUTPATIENT)
Dept: PHYSICAL THERAPY | Facility: REHABILITATION | Age: 58
End: 2023-06-23
Payer: COMMERCIAL

## 2023-06-23 DIAGNOSIS — M25.562 ACUTE PAIN OF LEFT KNEE: Primary | ICD-10-CM

## 2023-06-23 DIAGNOSIS — Z96.652 STATUS POST TOTAL LEFT KNEE REPLACEMENT: ICD-10-CM

## 2023-06-23 PROCEDURE — 97110 THERAPEUTIC EXERCISES: CPT

## 2023-06-23 PROCEDURE — 97140 MANUAL THERAPY 1/> REGIONS: CPT

## 2023-06-23 NOTE — PROGRESS NOTES
"Daily Note     Today's date: 2023  Patient name: Chapin Chavez  : 1965  MRN: [id number]  Referring provider: Jairo Sheridan, *  Dx:   Encounter Diagnosis     ICD-10-CM    1  Acute pain of left knee  M25 562       2  Status post total left knee replacement  Z96 652           Start Time: 0800  Stop Time: 0845  Total time in clinic (min): 45 minutes    Subjective: Pt reports she was at work on Tuesday and took part in a team building game of \"musical chairs  \"  Experienced increased pain along top of L knee and after making a quick turn  Tuesday night was \"really bad,\" Wednesday there was no change in symptoms, and Thursday she finally start to feel a little better  Continued pain this AM       Decrease in symptoms noted post treatment  Objective: See treatment diary below  Educated patient on self-soft tissue massage to L mid-distal quad with tennis ball while at home  Encouraged to continue to ice as needed to help manage pain       Precautions: migraine        Manual  23    PROM left knee flex/ext  10 min  15 mins  CM  15 min 15 mins     RE 30 mins            Mid-distal L quad STM   8 min                                           Neuro Re-Ed                             Side stepping at bar nv nv   5 laps bar 5 laps bar        nv          mini squat nv    3\" x 10 3\"x15                                              Therex            QS nv Heel elevated 3\"x15     Heel elevated 5\"x10              Heel slides nv 10\"x10   10\"x10  hep                          rec bike aarom 5 mins Rocking  5 min  6 min  7 mins     SAQ NV 3\"x10         SLR nv 3\"  3x5  3\" 1x10 3\"x15                   Gait Training             Stair negotiation railing 1 flight x2 reciprocally ascending/non-reciprocally descending nv       SPC amb Focus on knee flexion throughout 200'x1  100'x1 normal gait  100'x1 exaggerated knee flex     No 'x1         Modalities             CP "  deferred def    Declined to home use D/c                                      Assessment: Tolerated treatment well  Program modified today d/t aggravated symptoms reported at start of treatment  Moderate TTP and increased tone present along mid-distal L quad, which did begin to release during manual STM  Focused program primarily on L knee mobility and strengthening of L quad  Limited knee flex present while ambulating around gym  Trialled ambulation with exaggerated L knee flexion to encourage more flexion while walking  Patient would benefit from continued PT to further improve strength, increase ROM, decrease pain, and maximize overall function  Plan: Continue per plan of care

## 2023-06-26 ENCOUNTER — OFFICE VISIT (OUTPATIENT)
Dept: PHYSICAL THERAPY | Facility: REHABILITATION | Age: 58
End: 2023-06-26
Payer: COMMERCIAL

## 2023-06-26 DIAGNOSIS — M25.562 ACUTE PAIN OF LEFT KNEE: Primary | ICD-10-CM

## 2023-06-26 DIAGNOSIS — Z96.652 STATUS POST TOTAL LEFT KNEE REPLACEMENT: ICD-10-CM

## 2023-06-26 PROCEDURE — 97110 THERAPEUTIC EXERCISES: CPT | Performed by: PHYSICAL THERAPIST

## 2023-06-26 PROCEDURE — 97140 MANUAL THERAPY 1/> REGIONS: CPT | Performed by: PHYSICAL THERAPIST

## 2023-06-26 PROCEDURE — 97112 NEUROMUSCULAR REEDUCATION: CPT | Performed by: PHYSICAL THERAPIST

## 2023-06-26 NOTE — PROGRESS NOTES
"Daily Note     Today's date: 2023  Patient name: Dulce Vasquez  : 1965  MRN: [id number]  Referring provider: Zulema Barboza, *  Dx:   Encounter Diagnosis     ICD-10-CM    1  Acute pain of left knee  M25 562       2  Status post total left knee replacement  Z96 652                      Subjective: Pt reports that she struggled last week with increase in pain with quick sudden stand and turn  Pt notes that her sx's have improved from onset, but cont to be frustrated with general pain  Objective: See treatment diary below  Precautions: migraine        Manual  23    PROM left knee flex/ext  10 min 15 mins  CM  15 min 15 mins     RE 30 mins            Mid-distal L quad STM   8 min                                           Neuro Re-Ed                             Side stepping at bar nv nv  x5 ea 5 laps bar 5 laps bar                  mini squat nv nv  3\"x10  3\" x 10 3\"x15     SLS     10\"x3                                   Therex            QS nv Heel elevated 3\"x15     Heel elevated 5\"x10              Heel slides nv 10\"x10 10\"x10   10\"x10  hep                          rec bike aarom 5 mins Rocking  5 min 6 mins  6 min  7 mins     SAQ NV 3\"x10 3\"x10        SLR nv 3\"  3x5 3\"x10  3\" 1x10 3\"x15                   Gait Training             Stair negotiation railing 1 flight x2 reciprocally ascending/non-reciprocally descending nv np      SPC amb Focus on knee flexion throughout 200'x1  100'x1 normal gait  100'x1 exaggerated knee flex  np   No 'x1         Modalities             CP  deferred def    Declined to home use D/c                                      Assessment: Pt cont to progress well with PT with increase in knee ROM but does cont to be limited with pain and muscle guarding during passive ROM  Pt is progressing with CKC and proprioception activities with no LOB and good quad and hip strengthening control            Plan: Continue per plan " of care

## 2023-06-30 ENCOUNTER — OFFICE VISIT (OUTPATIENT)
Dept: PHYSICAL THERAPY | Facility: REHABILITATION | Age: 58
End: 2023-06-30
Payer: COMMERCIAL

## 2023-06-30 DIAGNOSIS — Z96.652 STATUS POST TOTAL LEFT KNEE REPLACEMENT: ICD-10-CM

## 2023-06-30 DIAGNOSIS — M25.562 ACUTE PAIN OF LEFT KNEE: Primary | ICD-10-CM

## 2023-06-30 PROCEDURE — 97140 MANUAL THERAPY 1/> REGIONS: CPT | Performed by: PHYSICAL THERAPIST

## 2023-06-30 PROCEDURE — 97112 NEUROMUSCULAR REEDUCATION: CPT | Performed by: PHYSICAL THERAPIST

## 2023-06-30 PROCEDURE — 97110 THERAPEUTIC EXERCISES: CPT | Performed by: PHYSICAL THERAPIST

## 2023-06-30 NOTE — PROGRESS NOTES
"Daily Note     Today's date: 2023  Patient name: Faby Quijano  : 1965  MRN: [id number]  Referring provider: Hope Wren, *  Dx:   Encounter Diagnosis     ICD-10-CM    1  Acute pain of left knee  M25 562       2  Status post total left knee replacement  Z96 652                      Subjective: Pt notes that she is having more discomfort and edema after mowing the lawn since last visit  Pt is on a riding mower and notes that     Objective: See treatment diary below  Precautions: migraine        Manual  23    PROM left knee flex/ext  10 min 15 mins  15 mins  15 mins     RE 30 mins            Mid-distal L quad STM   8 min                                           Neuro Re-Ed             Alternating march with walking      20'x1 ea            Side stepping at bar nv nv  x5 ea 20'x1 ea 5 laps bar                  mini squat nv nv  3\"x10  3\"x10 3\"x15     SLS     10\"x3  d/c                                 Therex            Prone knee flexion    3\"2x10     Lunging knee flexion stretch stool    15\"x5      Heel slides nv 10\"x10 10\"x10   10\"x10  hep   Prone knee hangs    2 mins x2                   rec bike aarom 5 mins Rocking  5 min 6 mins  6 min  7 mins     SAQ NV 3\"x10 3\"x10        SLR nv 3\"  3x5 3\"x10  3\"x10  3\"x15                   Gait Training             Stair negotiation railing 1 flight x2 reciprocally ascending/non-reciprocally descending nv np      SPC amb Focus on knee flexion throughout 200'x1  100'x1 normal gait  100'x1 exaggerated knee flex  np   No 'x1         Modalities             CP  deferred def     D/c                                      Assessment: Pt brought in at home brace for extension stretch at night, currently locked in 0 degree position  Modified to allow for -10 to assist for true extension stretch     Added prone hang and HS curl to allow for improved knee ROM and issued prone hang as part of HEP for improved " extension carry over  Plan: Continue per plan of care

## 2023-07-03 ENCOUNTER — APPOINTMENT (OUTPATIENT)
Dept: PHYSICAL THERAPY | Facility: REHABILITATION | Age: 58
End: 2023-07-03
Payer: COMMERCIAL

## 2023-07-06 ENCOUNTER — APPOINTMENT (OUTPATIENT)
Dept: PHYSICAL THERAPY | Facility: REHABILITATION | Age: 58
End: 2023-07-06
Payer: COMMERCIAL

## 2023-07-07 ENCOUNTER — OFFICE VISIT (OUTPATIENT)
Dept: OBGYN CLINIC | Facility: MEDICAL CENTER | Age: 58
End: 2023-07-07

## 2023-07-07 ENCOUNTER — OFFICE VISIT (OUTPATIENT)
Dept: PHYSICAL THERAPY | Facility: REHABILITATION | Age: 58
End: 2023-07-07
Payer: COMMERCIAL

## 2023-07-07 VITALS
SYSTOLIC BLOOD PRESSURE: 124 MMHG | WEIGHT: 150 LBS | HEIGHT: 61 IN | DIASTOLIC BLOOD PRESSURE: 81 MMHG | HEART RATE: 82 BPM | BODY MASS INDEX: 28.32 KG/M2

## 2023-07-07 DIAGNOSIS — M25.562 ACUTE PAIN OF LEFT KNEE: Primary | ICD-10-CM

## 2023-07-07 DIAGNOSIS — Z96.652 STATUS POST TOTAL LEFT KNEE REPLACEMENT: ICD-10-CM

## 2023-07-07 DIAGNOSIS — M17.12 PRIMARY OSTEOARTHRITIS OF LEFT KNEE: Primary | ICD-10-CM

## 2023-07-07 PROCEDURE — 97140 MANUAL THERAPY 1/> REGIONS: CPT | Performed by: PHYSICAL THERAPIST

## 2023-07-07 PROCEDURE — 99024 POSTOP FOLLOW-UP VISIT: CPT | Performed by: ORTHOPAEDIC SURGERY

## 2023-07-07 PROCEDURE — 97110 THERAPEUTIC EXERCISES: CPT | Performed by: PHYSICAL THERAPIST

## 2023-07-07 RX ORDER — GABAPENTIN 300 MG/1
300 CAPSULE ORAL
Qty: 90 CAPSULE | Refills: 0 | Status: SHIPPED | OUTPATIENT
Start: 2023-07-07

## 2023-07-07 RX ORDER — TRAMADOL HYDROCHLORIDE 50 MG/1
50 TABLET ORAL EVERY 6 HOURS PRN
Qty: 30 TABLET | Refills: 0 | Status: SHIPPED | OUTPATIENT
Start: 2023-07-07

## 2023-07-07 NOTE — PROGRESS NOTES
Orthopaedic Surgery - Office Note  Dalila Blackmon (89 y.o. female)   : 1965   MRN: [id number]  Encounter Date: 2023    Chief Complaint   Patient presents with   • Left Knee - Post-op       Assessment / Plan  S/p left TKA, with stiffness in extension    · Continue with outpatient PT  · We discussed the importance of regaining the last 15 degrees of extension and the window to regain this is closing   · Dynasplint ordered today to aid in extension, she can wear this night and during the day when able. She understands she does not walk in this brace when she is home and resting to wear the dynasplint  · Patient has used gabapentin in the past for pain management, we will prescribed 100 mg tablets  · Tramadol sent to pharmacy   · Ice, heat and anti-inflammatories   Return in about 1 month (around 2023) for follow up with Dr. Diana Rosales. History of Present Illness  Dalila Blackmon is a 56 y.o. female who presents for follow up s/p left TKA on 2023. She is progressing well in PT. She feels that her pain has signifcantly increased, she does have a history of fibromyalgia which she feels is flared up. She is working on her HEP daily. She is wearing a locked TROM at night while sleeping to try and increase her extension. She does feel her pain is most limiting. She is ambulating unassisted. Review of Systems  Pertinent items are noted in HPI. All other systems were reviewed and are negative. Physical Exam  /81   Pulse 82   Ht 5' 1" (1.549 m)   Wt 68 kg (150 lb)   LMP 2019 (Exact Date)   BMI 28.34 kg/m²   Cons: Appears well. No apparent distress. Psych: Alert. Oriented x3. Mood and affect normal.  Eyes: PERRLA, EOMI  Resp: Normal effort. No audible wheezing or stridor. CV: Palpable pulse. No discernable arrhythmia. No LE edema. Lymph:  No palpable cervical, axillary, or inguinal lymphadenopathy. Skin: Warm. No palpable masses. No visible lesions.   Neuro: Normal muscle tone. Normal and symmetric DTR's. Left Knee Exam  Alignment:  Normal knee alignment. Inspection:  No swelling. No ecchymosis. Incision clean and dry. Palpation:  No tenderness. No effusion. ROM:  Knee Extension 15. Knee Flexion 105/110. Strength:  Able to actively extend knee against gravity. Stability:  (-) Varus instability. (-) Valgus instability. in both flexion and extension   Tests:  No pertinent positive or negative tests. Patella:  Normal patellar mobility. Neurovascular:  Sensation intact in DP/SP/Tate/Sa/T nerve distributions. 2+ DP & PT pulses. Gait:  Steady. Studies Reviewed  I have personally reviewed pertinent films in PACS. XR of right knee - From 5/26/2023 showed position with no signs of loosening, no fracture or dislocation     Procedures  No procedures today. Medical, Surgical, Family, and Social History  The patient's medical history, family history, and social history, were reviewed and updated as appropriate.     Past Medical History:   Diagnosis Date   • Allergic 02/01/2020   • Anemia    • Anesthesia complication     woke up during D/C   • Anxiety    • Arthritis    • BMI 30.0-30.9,adult    • Carpal tunnel syndrome on right    • Cervical spondylosis without myelopathy    • COVID 12/01/2021   • Depression    • Fibromyalgia    • Fibromyalgia, primary    • GERD (gastroesophageal reflux disease)    • History of fetal loss     Recurrent   • Hyperlipidemia    • Hypertension    • Jaw pain     and both ears with migraines   • Migraine    • Neck pain    • Osteoarthritis     lumbar spine   • Sicca syndrome (HCC)    • Sleep apnea     unable to use CPAP   • Syncopal episodes     with severe migraines   • Undifferentiated connective tissue disease ([address])    • Wears contact lenses     or glasses       Past Surgical History:   Procedure Laterality Date   • CHOLECYSTECTOMY     • COLONOSCOPY     • DILATION AND CURETTAGE OF UTERUS     • MN ARTHRP KNE CONDYLE&PLATU MEDIAL&LAT COMPARTMENTS Left 5/16/2023    Procedure: ARTHROPLASTY KNEE TOTAL;  Surgeon: Kati Salas MD;  Location: [facility];  Service: Orthopedics   • TUBAL LIGATION         Family History   Problem Relation Age of Onset   • MORGAN disease Mother    • Arthritis Mother    • Depression Mother    • Hypertension Mother    • Coronary artery disease Mother    • Heart attack Father 79   • Diabetes Father    • Prostate cancer Father    • Hypertension Father    • Arthritis Father    • Stroke Father    • Cancer Father         Prostate   • ADD / ADHD Father    • Lupus Sister    • Raynaud syndrome Sister    • Sjogren's syndrome Sister    • Breast cancer Maternal Grandmother    • Arthritis Maternal Grandmother    • Cancer Maternal Grandmother         Breast   • Glaucoma Maternal Grandmother    • Alcohol abuse Paternal Grandfather    • No Known Problems Daughter    • Prostate cancer Maternal Grandfather    • Arthritis Maternal Grandfather    • Cancer Maternal Grandfather         Prostate   • Colon cancer Paternal Grandmother    • Cancer Paternal Grandmother         Colon   • Depression Sister    • ADD / ADHD Sister    • Anxiety disorder Sister    • ADD / ADHD Brother    • OCD Brother    • No Known Problems Son    • No Known Problems Son    • No Known Problems Son    • No Known Problems Son    • No Known Problems Son    • No Known Problems Maternal Uncle    • No Known Problems Paternal Aunt    • No Known Problems Paternal Uncle        Social History     Occupational History   • Occupation: Administration   Tobacco Use   • Smoking status: Never   • Smokeless tobacco: Never   Vaping Use   • Vaping Use: Never used   Substance and Sexual Activity   • Alcohol use: Yes     Comment: rarely   • Drug use: Not Currently   • Sexual activity: Yes     Partners: Male     Birth control/protection: Female Sterilization       Allergies   Allergen Reactions   • Propranolol Other (See Comments)     Halluncinations   • Raspberry - Food Allergy Allergic Rhinitis, Itching and Nasal Congestion   • Gabapentin Rash   • Latex Rash         Current Outpatient Medications:   •  acetaminophen (TYLENOL) 500 mg tablet, Take 500-1,000 mg by mouth every 6 (six) hours as needed for mild pain, Disp: , Rfl:   •  Aspirin-Acetaminophen-Caffeine (EXCEDRIN MIGRAINE PO), Take 1 tablet by mouth as needed (migraines), Disp: , Rfl:   •  buPROPion (Wellbutrin XL) 300 mg 24 hr tablet, Take 1 tablet (300 mg total) by mouth every morning, Disp: 90 tablet, Rfl: 3  •  Calcium Carb-Cholecalciferol (CALCIUM 500 + D3 PO), Take 2 capsules by mouth daily, Disp: , Rfl:   •  cetirizine (ZyrTEC) 10 mg tablet, Take 10 mg by mouth daily PRN, Disp: , Rfl:   •  Cyanocobalamin (VITAMIN B 12 PO), Take by mouth daily, Disp: , Rfl:   •  lisinopril (ZESTRIL) 20 mg tablet, TAKE 1 TABLET(20 MG) BY MOUTH DAILY (Patient taking differently: Take 20 mg by mouth every evening), Disp: 90 tablet, Rfl: 0  •  Magnesium 400 MG TABS, Take 2 tablets by mouth in the morning, Disp: , Rfl:   •  Multiple Vitamin (multivitamin) tablet, Take 1 tablet by mouth daily, Disp: 30 tablet, Rfl: 1  •  omeprazole (PriLOSEC) 20 mg delayed release capsule, Take 1 capsule (20 mg total) by mouth daily, Disp: 90 capsule, Rfl: 1  •  SUMAtriptan (IMITREX) 100 mg tablet, Take 1 tablet (100 mg total) by mouth once as needed for migraine May repeat x1 in 2 hours, max 2/24 hours, 9/month, Disp: 9 tablet, Rfl: 12  •  ascorbic acid (VITAMIN C) 500 MG tablet, Take 1 tablet (500 mg total) by mouth 2 (two) times a day, Disp: 60 tablet, Rfl: 1  •  aspirin (ECOTRIN LOW STRENGTH) 81 mg EC tablet, Take 1 tablet (81 mg total) by mouth 2 (two) times a day, Disp: 60 tablet, Rfl: 0  •  ferrous sulfate 324 (65 Fe) mg, Take 1 tablet (324 mg total) by mouth 2 (two) times a day before meals, Disp: 60 tablet, Rfl: 0  •  folic acid (FOLVITE) 1 mg tablet, Take 1 tablet (1 mg total) by mouth daily, Disp: 30 tablet, Rfl: 0  •  HYDROcodone-acetaminophen (Norco) 5-325 mg per tablet, Take 1 tablet by mouth every 4 (four) hours as needed for pain Max Daily Amount: 6 tablets, Disp: 45 tablet, Rfl: 0  •  ondansetron (ZOFRAN-ODT) 4 mg disintegrating tablet, Take 1 tablet (4 mg total) by mouth every 8 (eight) hours as needed for nausea or vomiting (Patient not taking: Reported on 5/26/2023), Disp: 15 tablet, Rfl: 0      [employer]    I,:  Bry Krishna am acting as a scribe while in the presence of the attending physician.:       I,:  Janae Stallings MD personally performed the services described in this documentation    as scribed in my presence.:

## 2023-07-07 NOTE — PROGRESS NOTES
Daily Note     Today's date: 2023  Patient name: Lucina Amanda  : 1965  MRN: [id number]  Referring provider: Geovany Glass, *  Dx:   Encounter Diagnosis     ICD-10-CM    1. Acute pain of left knee  M25.562       2. Status post total left knee replacement  Z96.652                      Subjective: Pt was on trip to [place] this past week and does note "struggling" with drive and long duration away from home. Pt had f/u with ortho today and reports still struggling with extension. Objective: See treatment diary below. Precautions: migraine        Manual  23    PROM left knee flex/ext  10 min 15 mins  15 mins  30 mins focus on extension, +tibial glide grade III.    RE 30 mins            Mid-distal L quad STM   8 min                                           Neuro Re-Ed             Alternating march with walking      20'x1 ea            Side stepping at bar nv nv  x5 ea 20'x1 ea                  mini squat nv nv  3"x10  3"x10     SLS     10"x3  d/c                                 Therex            Prone knee flexion    3"2x10     Lunging knee flexion stretch stool    15"x5      Heel slides nv 10"x10 10"x10   10"x10  hep   Prone knee hangs    2 mins x2   2 mins x2 2#     TERT ext       4# 2'x2     rec bike aarom 5 mins Rocking  5 min 6 mins  6 min  7 mins     SAQ NV 3"x10 3"x10        SLR nv 3"  3x5 3"x10  3"x10                    Gait Training             Stair negotiation railing 1 flight x2 reciprocally ascending/non-reciprocally descending nv np      SPC amb Focus on knee flexion throughout 200'x1  100'x1 normal gait  100'x1 exaggerated knee flex  np            Modalities             CP  deferred def                                           Assessment: Pt has most restriction into extension, focused today's visit on ROM and mobility improvements into extension.   Also focused on exercises and HEP to assist with extension deficits and encouraged to perform frequently throughout her day. Pt achieved -10 degrees extension post mobilizations, stretching, and quad activation. Plan: Continue per plan of care.

## 2023-07-10 ENCOUNTER — OFFICE VISIT (OUTPATIENT)
Dept: PHYSICAL THERAPY | Facility: REHABILITATION | Age: 58
End: 2023-07-10
Payer: COMMERCIAL

## 2023-07-10 DIAGNOSIS — Z96.652 STATUS POST TOTAL LEFT KNEE REPLACEMENT: ICD-10-CM

## 2023-07-10 DIAGNOSIS — M25.562 ACUTE PAIN OF LEFT KNEE: Primary | ICD-10-CM

## 2023-07-10 PROCEDURE — 97140 MANUAL THERAPY 1/> REGIONS: CPT | Performed by: PHYSICAL THERAPIST

## 2023-07-10 PROCEDURE — 97110 THERAPEUTIC EXERCISES: CPT | Performed by: PHYSICAL THERAPIST

## 2023-07-10 NOTE — PROGRESS NOTES
Daily Note     Today's date: 7/10/2023  Patient name: López Winters  : 1965  MRN: [id number]  Referring provider: Roger Dejesus, *  Dx:   Encounter Diagnosis     ICD-10-CM    1. Acute pain of left knee  M25.562       2. Status post total left knee replacement  Z96.652                      Subjective: Pt reports doing "okay" after last visit with focus on ROM especially into extension. Pt does feel that new medication is not assisting with her sx's, but only started as of Friday. Objective: See treatment diary below. Precautions: migraine        Manual  7/10/23 6/23/23 6/26/23 6/30/23 7/7/23    PROM left knee flex/ext 30 mins focus on ext + tibial glide grade III 10 min 15 mins  15 mins  30 mins focus on extension, +tibial glide grade III.    RE            Mid-distal L quad STM   8 min                                           Neuro Re-Ed             Alternating march with walking      20'x1 ea            Side stepping at bar  nv  x5 ea 20'x1 ea                  mini squat  nv  3"x10  3"x10     SLS     10"x3  d/c                                 Therex            Prone knee flexion    3"2x10     Lunging knee flexion stretch stool    15"x5      Heel slides  10"x10 10"x10   10"x10  hep   Prone knee hangs 2 mins x2 2#   2 mins x2   2 mins x2 2#     TERT ext  4# 2'x2      4# 2'x2     rec bike aarom 8 mins  Rocking  5 min 6 mins  6 min  7 mins     SAQ  3"x10 3"x10        SLR  3"  3x5 3"x10  3"x10                    Gait Training                                   Modalities                                                          Assessment: Cont to focus on today's visit of knee ROM primarily into extension. Pt tolerated longer duration with stretching for appropriate TERT into extension. Pt educated to cont to focus on ROM at home. Plan: Continue per plan of care.

## 2023-07-11 DIAGNOSIS — M17.12 PRIMARY OSTEOARTHRITIS OF LEFT KNEE: Primary | ICD-10-CM

## 2023-07-11 DIAGNOSIS — Z96.652 S/P TOTAL KNEE ARTHROPLASTY, LEFT: ICD-10-CM

## 2023-07-14 ENCOUNTER — OFFICE VISIT (OUTPATIENT)
Dept: PHYSICAL THERAPY | Facility: REHABILITATION | Age: 58
End: 2023-07-14
Payer: COMMERCIAL

## 2023-07-14 DIAGNOSIS — Z96.652 STATUS POST TOTAL LEFT KNEE REPLACEMENT: ICD-10-CM

## 2023-07-14 DIAGNOSIS — M25.562 ACUTE PAIN OF LEFT KNEE: Primary | ICD-10-CM

## 2023-07-14 PROCEDURE — 97140 MANUAL THERAPY 1/> REGIONS: CPT | Performed by: PHYSICAL THERAPIST

## 2023-07-14 PROCEDURE — 97110 THERAPEUTIC EXERCISES: CPT | Performed by: PHYSICAL THERAPIST

## 2023-07-14 NOTE — PROGRESS NOTES
Daily Note     Today's date: 2023  Patient name: Luz Azar  : 1965  MRN: [id number]  Referring provider: Ness Chou, *  Dx:   Encounter Diagnosis     ICD-10-CM    1. Acute pain of left knee  M25.562       2. Status post total left knee replacement  Z96.652                      Subjective: Pt notes feeling more pain and soreness with focus on ROM primarily into extension. Pt now having discomfort with light pressure from her sheet when laying in bed, and feels this may be due to her fibro. Objective: See treatment diary below. Precautions: migraine        Manual  7/10/23 7/14/23 6/26/23 6/30/23 7/7/23    PROM left knee flex/ext 30 mins focus on ext + tibial glide grade III 30 mins focus on ext + tibial glides grade III 15 mins  15 mins  30 mins focus on extension, +tibial glide grade III.    RE            Mid-distal L quad STM                                              Neuro Re-Ed             Alternating march with walking      20'x1 ea            Side stepping at bar    x5 ea 20'x1 ea                  mini squat    3"x10  3"x10     SLS     10"x3  d/c                                 Therex            Prone knee flexion    3"2x10     Lunging knee flexion stretch stool    15"x5      Heel slides   10"x10   10"x10  hep   Prone knee hangs 2 mins x2 2# 2 mins x2 2#  2 mins x2   2 mins x2 2#     TERT ext  4# 2'x2  4# 2'x2     4# 2'x2     rec bike aarom 8 mins  10 mins ROM 6 mins  6 min  7 mins     SAQ   3"x10        SLR   3"x10  3"x10                    Gait Training                                   Modalities                                                          Assessment: Pt is able to achieve -7 degrees extension with manual tx and TERT extension stretching. Pt encouraged to cont extension based stretching at home, but to also cont to flex as able throughout her daily tasks. Pt is scheduled to have dynasplint fit for extension within the next week.       Plan: Continue per plan of care.

## 2023-07-17 ENCOUNTER — OFFICE VISIT (OUTPATIENT)
Dept: PHYSICAL THERAPY | Facility: REHABILITATION | Age: 58
End: 2023-07-17
Payer: COMMERCIAL

## 2023-07-17 DIAGNOSIS — Z96.652 STATUS POST TOTAL LEFT KNEE REPLACEMENT: ICD-10-CM

## 2023-07-17 DIAGNOSIS — M25.562 ACUTE PAIN OF LEFT KNEE: Primary | ICD-10-CM

## 2023-07-17 PROCEDURE — 97110 THERAPEUTIC EXERCISES: CPT | Performed by: PHYSICAL THERAPIST

## 2023-07-17 PROCEDURE — 97140 MANUAL THERAPY 1/> REGIONS: CPT | Performed by: PHYSICAL THERAPIST

## 2023-07-20 DIAGNOSIS — I10 BENIGN ESSENTIAL HYPERTENSION: ICD-10-CM

## 2023-07-20 RX ORDER — LISINOPRIL 20 MG/1
TABLET ORAL
Qty: 90 TABLET | Refills: 0 | Status: SHIPPED | OUTPATIENT
Start: 2023-07-20

## 2023-07-21 ENCOUNTER — OFFICE VISIT (OUTPATIENT)
Dept: PHYSICAL THERAPY | Facility: REHABILITATION | Age: 58
End: 2023-07-21
Payer: COMMERCIAL

## 2023-07-21 DIAGNOSIS — Z96.652 STATUS POST TOTAL LEFT KNEE REPLACEMENT: ICD-10-CM

## 2023-07-21 DIAGNOSIS — M25.562 ACUTE PAIN OF LEFT KNEE: Primary | ICD-10-CM

## 2023-07-21 PROCEDURE — 97116 GAIT TRAINING THERAPY: CPT | Performed by: PHYSICAL THERAPIST

## 2023-07-21 PROCEDURE — 97140 MANUAL THERAPY 1/> REGIONS: CPT | Performed by: PHYSICAL THERAPIST

## 2023-07-21 NOTE — PROGRESS NOTES
Daily Note     Today's date: 2023  Patient name: Melody Nava  : 1965  MRN: [id number]  Referring provider: Cinthya Gill, *  Dx:   Encounter Diagnosis     ICD-10-CM    1. Acute pain of left knee  M25.562       2. Status post total left knee replacement  Z96.652                      Subjective: Pt reports that she is "really stiff" this morning. Pt was very busy while working yesterday and then sat prolonged at a movie last night. Pt had diff sleeping last night and denies use of ice prior to bed. Objective: See treatment diary below. Precautions: migraine        Manual  7/10/23 7/14/23 7/17/23 7/21/23     PROM left knee flex/ext 30 mins focus on ext + tibial glide grade III 30 mins focus on ext + tibial glides grade III 30 mins focus on ext + tibial glides grade III 23 mins focus on ext + tibial glides grade III     RE            Mid-distal L quad STM                                              Neuro Re-Ed             Alternating march with walking      GT            Side stepping at bar                      mini squat         SLS                                       Therex            Prone knee flexion        Lunging knee flexion stretch stool         Heel slides        Prone knee hangs 2 mins x2 2# 2 mins x2 2# 2# 2'x2       TERT ext  4# 2'x2  4# 2'x2  4# 2'x2       rec bike aarom 8 mins  10 mins ROM 8 mins ROM 5 mins      SAQ           SLR                       Gait Training             GT    10 mins                   Modalities                                                          Assessment: Pt cont to focus on extension prolonged to assist with ROM and has been limiting her endrange flexion stretching as well. Pt demo's left knee extension during gait and ambulation and encouraged to focus on bending throughout swing phase.   Focused 10 mins on gait education to assist with funct knee flexion and issued updated HEP with focus on extension and flexion ROM, including educated on amount of time in endrange to assist with flexibility improvement. Pt cont to be restricted into endrange 10 degrees ext with manuals and stretching. Plan: Continue per plan of care.

## 2023-07-25 ENCOUNTER — OFFICE VISIT (OUTPATIENT)
Dept: PHYSICAL THERAPY | Facility: REHABILITATION | Age: 58
End: 2023-07-25
Payer: COMMERCIAL

## 2023-07-25 DIAGNOSIS — M25.562 ACUTE PAIN OF LEFT KNEE: Primary | ICD-10-CM

## 2023-07-25 DIAGNOSIS — Z96.652 STATUS POST TOTAL LEFT KNEE REPLACEMENT: ICD-10-CM

## 2023-07-25 PROCEDURE — 97140 MANUAL THERAPY 1/> REGIONS: CPT | Performed by: PHYSICAL THERAPIST

## 2023-07-25 PROCEDURE — 97110 THERAPEUTIC EXERCISES: CPT | Performed by: PHYSICAL THERAPIST

## 2023-07-28 ENCOUNTER — OFFICE VISIT (OUTPATIENT)
Dept: PHYSICAL THERAPY | Facility: REHABILITATION | Age: 58
End: 2023-07-28
Payer: COMMERCIAL

## 2023-07-28 DIAGNOSIS — M25.562 ACUTE PAIN OF LEFT KNEE: Primary | ICD-10-CM

## 2023-07-28 DIAGNOSIS — Z96.652 STATUS POST TOTAL LEFT KNEE REPLACEMENT: ICD-10-CM

## 2023-07-28 PROCEDURE — 97110 THERAPEUTIC EXERCISES: CPT | Performed by: PHYSICAL THERAPIST

## 2023-07-28 PROCEDURE — 97140 MANUAL THERAPY 1/> REGIONS: CPT | Performed by: PHYSICAL THERAPIST

## 2023-07-28 NOTE — PROGRESS NOTES
Daily Note     Today's date: 2023  Patient name: Amy Taveras  : 1965  MRN: [id number]  Referring provider: Roger Cortes, *  Dx:   Encounter Diagnosis     ICD-10-CM    1. Acute pain of left knee  M25.562       2. Status post total left knee replacement  Z96.652                      Subjective: Pt reports that she feels as though she's improving. Pt cont to have restrictions of knee ext ROM. Objective: See treatment diary below. Precautions: migraine        Manual  23    PROM left knee flex/ext 30 mins  30 mins focus on ext + tibial glides grade III 30 mins focus on ext + tibial glides grade III 23 mins focus on ext + tibial glides grade III 35 mins focus on ext + tibial glides grade III    RE            Mid-distal L quad STM                                              Neuro Re-Ed             Alternating march with walking      GT            Side stepping at bar                      mini squat         SLS                                       Therex            Prone knee flexion        Lunging knee flexion stretch stool      20"x4    Heel slides        Prone knee hangs 2 mins 2# x2 2 mins x2 2# 2# 2'x2       TERT ext 4# 2'x2  4# 2'x2  4# 2'x2       rec bike aarom 5 mins  10 mins ROM 8 mins ROM 5 mins  8 mins     SAQ           SLR                       Gait Training             GT    10 mins                   Modalities                                                          Assessment: Pt achieves -5 post manual tx and -3 with activation of quad into TKE. Pt demo's good funct knee flexion during ability to perform full revolutions on rec bike immediately attending PT and good sitting tolerance. Pt is progressing well functionally into funct ROM. Plan: Continue per plan of care.

## 2023-08-01 ENCOUNTER — OFFICE VISIT (OUTPATIENT)
Dept: PHYSICAL THERAPY | Facility: REHABILITATION | Age: 58
End: 2023-08-01
Payer: COMMERCIAL

## 2023-08-01 DIAGNOSIS — M25.562 ACUTE PAIN OF LEFT KNEE: Primary | ICD-10-CM

## 2023-08-01 DIAGNOSIS — Z96.652 STATUS POST TOTAL LEFT KNEE REPLACEMENT: ICD-10-CM

## 2023-08-01 PROCEDURE — 97140 MANUAL THERAPY 1/> REGIONS: CPT | Performed by: PHYSICAL THERAPIST

## 2023-08-01 PROCEDURE — 97110 THERAPEUTIC EXERCISES: CPT | Performed by: PHYSICAL THERAPIST

## 2023-08-01 NOTE — PROGRESS NOTES
Daily Note     Today's date: 2023  Patient name: Stephenie Stevenson  : 1965  MRN: [id number]  Referring provider: Latricia Cockayne, *  Dx:   Encounter Diagnosis     ICD-10-CM    1. Acute pain of left knee  M25.562       2. Status post total left knee replacement  Z96.652                      Subjective: Pt reports having a "rough" weekend with pain into entire left LE, not specific to left knee. Pt feels stiffness as well associated with pain, but sx's did improve. Objective: See treatment diary below. Precautions: migraine        Manual  23       PROM left knee flex/ext 30 mins post tibial glides into ext 30 mins post tibial glides into ext       RE            Mid-distal L quad STM                                              Neuro Re-Ed             Alternating march with walking                  Side stepping at bar                      mini squat         SLS                                       Therex                                    Prone knee hangs 2 mins 2# x2 2 mins 3# 2        TERT ext 4# 2'x2  5# 2'x2        rec bike aarom 5 mins  8 mins                                        Gait Training                                   Modalities                                                          Assessment: Pt achieves -7 after manual tx today. Pt does well with progression of extension and demo's close equality to TKE when sitting in long sit position. Pt does cont to have pain occurring and primarily into posterior knee during extension and demo's good funct knee flexion. Plan: Continue per plan of care.

## 2023-08-02 ENCOUNTER — PATIENT MESSAGE (OUTPATIENT)
Dept: FAMILY MEDICINE CLINIC | Facility: CLINIC | Age: 58
End: 2023-08-02

## 2023-08-03 ENCOUNTER — OFFICE VISIT (OUTPATIENT)
Dept: FAMILY MEDICINE CLINIC | Facility: CLINIC | Age: 58
End: 2023-08-03
Payer: COMMERCIAL

## 2023-08-03 VITALS
WEIGHT: 143 LBS | HEIGHT: 61 IN | TEMPERATURE: 97.9 F | RESPIRATION RATE: 14 BRPM | BODY MASS INDEX: 27 KG/M2 | SYSTOLIC BLOOD PRESSURE: 126 MMHG | DIASTOLIC BLOOD PRESSURE: 84 MMHG | HEART RATE: 101 BPM | OXYGEN SATURATION: 99 %

## 2023-08-03 DIAGNOSIS — M26.621 ARTHRALGIA OF RIGHT TEMPOROMANDIBULAR JOINT: Primary | ICD-10-CM

## 2023-08-03 DIAGNOSIS — K12.0 CANKER SORE: ICD-10-CM

## 2023-08-03 PROCEDURE — 99213 OFFICE O/P EST LOW 20 MIN: CPT | Performed by: FAMILY MEDICINE

## 2023-08-03 NOTE — PATIENT COMMUNICATION
Patient called the office states- Declined White spots in her mouth but dose have white parts in her mouth. Patient states that this has been going for a few weeks now. Declined Fevers  Patient states that she food does not right. No opening in the office today or tomorrow please advise on what pt should do.

## 2023-08-03 NOTE — PATIENT INSTRUCTIONS
Temporomandibular Disorder   WHAT YOU NEED TO KNOW:   Temporomandibular disorder is a condition that causes pain in your jaw. The disorder affects the joint between your temporal bone and your mandible (jawbone). The muscles and nerves around the joint are also affected. DISCHARGE INSTRUCTIONS:   Medicines:   Pain medicine: You may be given a prescription medicine to decrease pain. Do not wait until the pain is severe before you take this medicine. NSAIDs:  These medicines decrease swelling and pain. You can buy NSAIDs without a doctor's order. Ask your healthcare provider which medicine is right for you, and how much to take. Take as directed. NSAIDs can cause stomach bleeding or kidney problems if not taken correctly. Muscle relaxers  help decrease pain and muscle spasms. Take your medicine as directed. Contact your healthcare provider if you think your medicine is not helping or if you have side effects. Tell your provider if you are allergic to any medicine. Keep a list of the medicines, vitamins, and herbs you take. Include the amounts, and when and why you take them. Bring the list or the pill bottles to follow-up visits. Carry your medicine list with you in case of an emergency. Follow up with your doctor as directed:  Write down your questions so you remember to ask them during your visits. Manage your symptoms:   Eat soft foods: Your healthcare provider may suggest that you eat only soft foods for several days. A dietitian may work with you to find foods that are easier to bite, chew, or swallow. Examples are soup, applesauce, cottage cheese, pudding, yogurt, and soft fruits. Use jaw supporting devices:  Splints may be used to support your jaw or keep your jaw from moving. You may need to wear a mouth guard to keep you from clenching or grinding your teeth while you are sleeping. Use ice and heat:  Ice helps decrease swelling and pain. Ice may also help prevent tissue damage.  Use an ice pack, or put crushed ice in a plastic bag. Cover it with a towel and place it on your jaw for 15 to 20 minutes every hour or as directed. After the first 24 to 48 hours, use heat to decrease pain, swelling, and muscle spasms. Apply heat on the area for 20 to 30 minutes every 2 hours for as many days as directed. Use a heating pad, moist warm compress, or a hot water bottle. Go to physical therapy:  A physical therapist teaches you exercises to help improve movement and strength, and to decrease pain in your jaw. A speech therapist may help you with swallowing and speech exercises. Contact your healthcare provider if:   You have a fever. Your splint or mouth guard is loose. You have questions or concerns about your condition or care. Return to the emergency department if:   You have nausea, are vomiting, or cannot keep liquids down. You have pain that does not go away even after you take your pain medicine. You have problems breathing, talking, drinking, eating, or swallowing. Your splint or mouth guard gets damaged or broken. © Copyright Rashmi Lara 2022 Information is for End User's use only and may not be sold, redistributed or otherwise used for commercial purposes. The above information is an  only. It is not intended as medical advice for individual conditions or treatments. Talk to your doctor, nurse or pharmacist before following any medical regimen to see if it is safe and effective for you.

## 2023-08-03 NOTE — PROGRESS NOTES
Assessment/Plan:   1. Arthralgia of right temporomandibular joint  Ice. Rest. Tylenol as needed. Call if persists or worsens - can see dentist or refer to PT. 2. Canker sore  Use over the counter aides to help. Will resolve on it's own. Patient Instructions   Temporomandibular Disorder   WHAT YOU NEED TO KNOW:   Temporomandibular disorder is a condition that causes pain in your jaw. The disorder affects the joint between your temporal bone and your mandible (jawbone). The muscles and nerves around the joint are also affected. DISCHARGE INSTRUCTIONS:   Medicines:   Pain medicine: You may be given a prescription medicine to decrease pain. Do not wait until the pain is severe before you take this medicine. NSAIDs:  These medicines decrease swelling and pain. You can buy NSAIDs without a doctor's order. Ask your healthcare provider which medicine is right for you, and how much to take. Take as directed. NSAIDs can cause stomach bleeding or kidney problems if not taken correctly. Muscle relaxers  help decrease pain and muscle spasms. Take your medicine as directed. Contact your healthcare provider if you think your medicine is not helping or if you have side effects. Tell your provider if you are allergic to any medicine. Keep a list of the medicines, vitamins, and herbs you take. Include the amounts, and when and why you take them. Bring the list or the pill bottles to follow-up visits. Carry your medicine list with you in case of an emergency. Follow up with your doctor as directed:  Write down your questions so you remember to ask them during your visits. Manage your symptoms:   Eat soft foods: Your healthcare provider may suggest that you eat only soft foods for several days. A dietitian may work with you to find foods that are easier to bite, chew, or swallow. Examples are soup, applesauce, cottage cheese, pudding, yogurt, and soft fruits.     Use jaw supporting devices:  Splints may be used to support your jaw or keep your jaw from moving. You may need to wear a mouth guard to keep you from clenching or grinding your teeth while you are sleeping. Use ice and heat:  Ice helps decrease swelling and pain. Ice may also help prevent tissue damage. Use an ice pack, or put crushed ice in a plastic bag. Cover it with a towel and place it on your jaw for 15 to 20 minutes every hour or as directed. After the first 24 to 48 hours, use heat to decrease pain, swelling, and muscle spasms. Apply heat on the area for 20 to 30 minutes every 2 hours for as many days as directed. Use a heating pad, moist warm compress, or a hot water bottle. Go to physical therapy:  A physical therapist teaches you exercises to help improve movement and strength, and to decrease pain in your jaw. A speech therapist may help you with swallowing and speech exercises. Contact your healthcare provider if:   You have a fever. Your splint or mouth guard is loose. You have questions or concerns about your condition or care. Return to the emergency department if:   You have nausea, are vomiting, or cannot keep liquids down. You have pain that does not go away even after you take your pain medicine. You have problems breathing, talking, drinking, eating, or swallowing. Your splint or mouth guard gets damaged or broken. © Copyright Delene [employer] 2022 Information is for End User's use only and may not be sold, redistributed or otherwise used for commercial purposes. The above information is an  only. It is not intended as medical advice for individual conditions or treatments. Talk to your doctor, nurse or pharmacist before following any medical regimen to see if it is safe and effective for you. No follow-ups on file.   Subjective:    LUCIE Ardon is a 56 y.o. female who presents with:  Chief Complaint    Mouth Lesions       HPI     Mouth Lesions     Additional comments: IN side her mouths x a few weeks           Last edited by Sheree Mayes on 8/3/2023  1:46 PM.        ---Above per clinical staff & reviewed. ---        Today:  Knee surgery is not helping her pain at all   Mouth sores for a few weeks   In right ear pain   Sleeping in recliner   Burning in mouth but does not see anything   Tried tylenol for knee - not helping   Pain meds also not helping   Oxycodone, tramadol no help  Hydrocodone took edge off       The following portions of the patient's history were reviewed and updated as appropriate: allergies, current medications, past family history, past medical history, past social history, past surgical history and problem list.  Review of Systems  ROS:  all others negative - no chest pain, SOB, normal urine and bowels. no GERD. sleeping well. mood good. + left knee pain.    Objective:    /84   Pulse 101   Temp 97.9 °F (36.6 °C)   Resp 14   Ht 5' 1" (1.549 m)   Wt 64.9 kg (143 lb)   LMP 08/31/2019 (Exact Date)   SpO2 99%   BMI 27.02 kg/m²   Wt Readings from Last 3 Encounters:   08/03/23 64.9 kg (143 lb)   07/07/23 68 kg (150 lb)   06/09/23 68 kg (150 lb)     BP Readings from Last 3 Encounters:   08/03/23 126/84   07/07/23 124/81   06/09/23 144/93       Current Medications:  Current Outpatient Medications   Medication Sig Dispense Refill   • acetaminophen (TYLENOL) 500 mg tablet Take 500-1,000 mg by mouth every 6 (six) hours as needed for mild pain     • ascorbic acid (VITAMIN C) 500 MG tablet Take 1 tablet (500 mg total) by mouth 2 (two) times a day 60 tablet 1   • Aspirin-Acetaminophen-Caffeine (EXCEDRIN MIGRAINE PO) Take 1 tablet by mouth as needed (migraines)     • buPROPion (Wellbutrin XL) 300 mg 24 hr tablet Take 1 tablet (300 mg total) by mouth every morning 90 tablet 3   • Calcium Carb-Cholecalciferol (CALCIUM 500 + D3 PO) Take 2 capsules by mouth daily     • cetirizine (ZyrTEC) 10 mg tablet Take 10 mg by mouth daily PRN     • Cyanocobalamin (VITAMIN B 12 PO) Take by mouth daily     • ferrous sulfate 324 (65 Fe) mg Take 1 tablet (324 mg total) by mouth 2 (two) times a day before meals 60 tablet 0   • gabapentin (Neurontin) 300 mg capsule Take 1 capsule (300 mg total) by mouth daily at bedtime Can increase to twice daily after 7 days if needed and then to three times daily after 14 days if needed 90 capsule 0   • lisinopril (ZESTRIL) 20 mg tablet TAKE 1 TABLET(20 MG) BY MOUTH DAILY 90 tablet 0   • Magnesium 400 MG TABS Take 2 tablets by mouth in the morning     • Multiple Vitamin (multivitamin) tablet Take 1 tablet by mouth daily 30 tablet 1   • omeprazole (PriLOSEC) 20 mg delayed release capsule Take 1 capsule (20 mg total) by mouth daily 90 capsule 1   • SUMAtriptan (IMITREX) 100 mg tablet Take 1 tablet (100 mg total) by mouth once as needed for migraine May repeat x1 in 2 hours, max 2/24 hours, 9/month 9 tablet 12   • aspirin (ECOTRIN LOW STRENGTH) 81 mg EC tablet Take 1 tablet (81 mg total) by mouth 2 (two) times a day 60 tablet 0   • folic acid (FOLVITE) 1 mg tablet Take 1 tablet (1 mg total) by mouth daily 30 tablet 0   • ondansetron (ZOFRAN-ODT) 4 mg disintegrating tablet Take 1 tablet (4 mg total) by mouth every 8 (eight) hours as needed for nausea or vomiting 15 tablet 0   • traMADol (Ultram) 50 mg tablet Take 1 tablet (50 mg total) by mouth every 6 (six) hours as needed for moderate pain 30 tablet 0     No current facility-administered medications for this visit. Physical Exam   Constitutional: she appears well-developed and well-nourished. HENT: Head: Normocephalic. Right Ear: External ear normal. Tympanic membrane normal.   Left Ear: External ear normal. Tympanic membrane normal.   Nose: Nose normal. No mucosal edema, No rhinorrhea. Right sinus exhibits no maxillary sinus tenderness. Left sinus exhibits no maxillary sinus tenderness. Mouth/Throat: Oropharynx is clear and moist. + shallow ulcer on tip of tongue.  Otherwise normal. Click with tenderness over right TMJ. Eyes: Normal conjunctiva. No erythema. No discharge. Neck: No pain on exam. Neck supple. Cardiovascular: Normal rate, regular rhythm and normal heart sounds. Pulmonary/Chest: Effort normal and breath sounds normal.   Abdominal: Soft. Bowel sounds are normal. There is no tenderness. Lymphadenopathy: she has no cervical adenopathy. Neurological: she is alert and oriented to person, place, and time. Skin: Skin is warm and dry. Psychiatric: she has a normal mood and affect.  her behavior is normal.

## 2023-08-04 ENCOUNTER — OFFICE VISIT (OUTPATIENT)
Dept: PHYSICAL THERAPY | Facility: REHABILITATION | Age: 58
End: 2023-08-04
Payer: COMMERCIAL

## 2023-08-04 DIAGNOSIS — Z96.652 STATUS POST TOTAL LEFT KNEE REPLACEMENT: ICD-10-CM

## 2023-08-04 DIAGNOSIS — M25.562 ACUTE PAIN OF LEFT KNEE: Primary | ICD-10-CM

## 2023-08-04 PROCEDURE — 97110 THERAPEUTIC EXERCISES: CPT | Performed by: PHYSICAL THERAPIST

## 2023-08-04 PROCEDURE — 97140 MANUAL THERAPY 1/> REGIONS: CPT | Performed by: PHYSICAL THERAPIST

## 2023-08-04 NOTE — PROGRESS NOTES
Daily Note     Today's date: 2023  Patient name: Loyd Livingston  : 1965  MRN: [id number]  Referring provider: Latanya Fuentes, *  Dx:   Encounter Diagnosis     ICD-10-CM    1. Acute pain of left knee  M25.562       2. Status post total left knee replacement  Z96.652                      Subjective: Pt notes cont to have clicking in knee but overall feels an improvement in general funct. Objective: See treatment diary below. Precautions: migraine        Manual  23      PROM left knee flex/ext 30 mins post tibial glides into ext 30 mins post tibial glides into ext 30 mins post tibal glides into ext      RE            Mid-distal L quad STM                                              Neuro Re-Ed             Alternating march with walking                  Side stepping at bar                      mini squat         SLS                                       Therex                                    Prone knee hangs 2 mins 2# x2 2 mins 3# 2  2 min 4# x2      TERT ext 4# 2'x2  5# 2'x2  5# 2'x2       rec bike aarom 5 mins  8 mins  10 mins                                       Gait Training                                   Modalities                                                          Assessment: Pt achieves -5 extension with active quad set and into post mobs and -3 post TERT stretching. Pt encouraged to cont to focus on ROM and flexibility of left knee throughout her day. Pt funct notes that she has been able to return to work activities, ambulation, and negotiation of stairs well, only limited with pain at times or general fatigue. Pt is scheduled to f/u with ortho on Monday. Pt has funct progressed well with PT over the last 4 weeks. Plan: Will await further MD orders to cont/DC based on recommendation.

## 2023-08-07 ENCOUNTER — OFFICE VISIT (OUTPATIENT)
Dept: OBGYN CLINIC | Facility: MEDICAL CENTER | Age: 58
End: 2023-08-07
Payer: COMMERCIAL

## 2023-08-07 VITALS
SYSTOLIC BLOOD PRESSURE: 117 MMHG | HEIGHT: 61 IN | DIASTOLIC BLOOD PRESSURE: 78 MMHG | BODY MASS INDEX: 27 KG/M2 | HEART RATE: 80 BPM | WEIGHT: 143 LBS

## 2023-08-07 DIAGNOSIS — M17.12 PRIMARY OSTEOARTHRITIS OF LEFT KNEE: Primary | ICD-10-CM

## 2023-08-07 PROCEDURE — 99212 OFFICE O/P EST SF 10 MIN: CPT | Performed by: ORTHOPAEDIC SURGERY

## 2023-08-07 NOTE — PROGRESS NOTES
Orthopaedic Surgery - Office Note  Julianna Lopez (85 y.o. female)   : 1965   MRN: [id number]  Encounter Date: 2023    Chief Complaint   Patient presents with   • Left Knee - Follow-up       Assessment / Plan  S/p left TKA, with stiffness in extension    · She is still lacking about 10 degrees of extension, I strongly advised she continue to focus on regaining this motion. She can continue to use brace to aid in improvement. I also advised she begin patella mobs daily as well,  as this will also likely help improve motion. We discussed that she needs to work on this as much as possible   · Patient would like to d/c gabapentin, I advised she start taking it just at night then taper off over a few weeks as long as she continues to tolerate stretching    · Continue with outpatient PT   · Reviewed recent PT notes  · Ice, heat and anti-inflammatories prn   Return in about 6 weeks (around 2023) for follow up with Dr. Fabricio Ly. History of Present Illness  Julianna Lopez is a 56 y.o. female who presents for follow up s/p left TKA on 2023. She has continued to progressed well in PT. She has improved her ROM with the help of PT and the TROM. She does use a locked TROM at night to sleep. She just recently received the extension-nator brace. She notes she is still working on normalizing her gait as she is uses minimal knee flexion when walking. She states this is partially a habit from prior to surgery. She is able to get up and down steps with mild discomfort. She does use Tylenol prn. Overall, she feels she has made significant progress in the last month. Review of Systems  Pertinent items are noted in HPI. All other systems were reviewed and are negative. Physical Exam  /78   Pulse 80   Ht 5' 1" (1.549 m)   Wt 64.9 kg (143 lb)   LMP 2019 (Exact Date)   BMI 27.02 kg/m²   Cons: Appears well. No apparent distress. Psych: Alert. Oriented x3.   Mood and affect normal.  Eyes: PERRLA, EOMI  Resp: Normal effort. No audible wheezing or stridor. CV: Palpable pulse. No discernable arrhythmia. No LE edema. Lymph:  No palpable cervical, axillary, or inguinal lymphadenopathy. Skin: Warm. No palpable masses. No visible lesions. Neuro: Normal muscle tone. Normal and symmetric DTR's. Left Knee Exam  Alignment:  Normal knee alignment. Inspection:  No swelling. No ecchymosis. Incision healed. Palpation:  No tenderness. No effusion. ROM:  Knee Extension 10. Knee Flexion 115. Strength:  Able to SLR without lag. Stability:  (-) Varus instability. (-) Valgus instability. in both flexion and extension   Tests:  No pertinent positive or negative tests. Patella:  Normal patellar mobility. Neurovascular:  Sensation intact in DP/SP/Tate/Sa/T nerve distributions. 2+ DP & PT pulses. Gait:  Antalgic. Studies Reviewed  I have personally reviewed pertinent films in PACS. XR of right knee - From 5/26/2023 showed position with no signs of loosening, no fracture or dislocation     Procedures  No procedures today. Medical, Surgical, Family, and Social History  The patient's medical history, family history, and social history, were reviewed and updated as appropriate.     Past Medical History:   Diagnosis Date   • Allergic 02/01/2020   • Anemia    • Anesthesia complication     woke up during D/C   • Anxiety    • Arthritis    • BMI 30.0-30.9,adult    • Carpal tunnel syndrome on right    • Cervical spondylosis without myelopathy    • COVID 12/01/2021   • Depression    • Fibromyalgia    • Fibromyalgia, primary    • GERD (gastroesophageal reflux disease)    • History of fetal loss     Recurrent   • Hyperlipidemia    • Hypertension    • Jaw pain     and both ears with migraines   • Migraine    • Neck pain    • Osteoarthritis     lumbar spine   • Sicca syndrome (HCC)    • Sleep apnea     unable to use CPAP   • Syncopal episodes     with severe migraines   • Undifferentiated connective tissue disease ([address])    • Wears contact lenses     or glasses       Past Surgical History:   Procedure Laterality Date   • CHOLECYSTECTOMY     • COLONOSCOPY     • DILATION AND CURETTAGE OF UTERUS     • IL ARTHRP KNE CONDYLE&PLATU MEDIAL&LAT COMPARTMENTS Left 5/16/2023    Procedure: ARTHROPLASTY KNEE TOTAL;  Surgeon: Shelton Huizar MD;  Location: [facility];  Service: Orthopedics   • TUBAL LIGATION         Family History   Problem Relation Age of Onset   • MORGAN disease Mother    • Arthritis Mother    • Depression Mother    • Hypertension Mother    • Coronary artery disease Mother    • Heart attack Father 79   • Diabetes Father    • Prostate cancer Father    • Hypertension Father    • Arthritis Father    • Stroke Father    • Cancer Father         Prostate   • ADD / ADHD Father    • Lupus Sister    • Raynaud syndrome Sister    • Sjogren's syndrome Sister    • Breast cancer Maternal Grandmother    • Arthritis Maternal Grandmother    • Cancer Maternal Grandmother         Breast   • Glaucoma Maternal Grandmother    • Alcohol abuse Paternal Grandfather    • No Known Problems Daughter    • Prostate cancer Maternal Grandfather    • Arthritis Maternal Grandfather    • Cancer Maternal Grandfather         Prostate   • Colon cancer Paternal Grandmother    • Cancer Paternal Grandmother         Colon   • Depression Sister    • ADD / ADHD Sister    • Anxiety disorder Sister    • ADD / ADHD Brother    • OCD Brother    • No Known Problems Son    • No Known Problems Son    • No Known Problems Son    • No Known Problems Son    • No Known Problems Son    • No Known Problems Maternal Uncle    • No Known Problems Paternal Aunt    • No Known Problems Paternal Uncle        Social History     Occupational History   • Occupation: Administration   Tobacco Use   • Smoking status: Never   • Smokeless tobacco: Never   Vaping Use   • Vaping Use: Never used   Substance and Sexual Activity   • Alcohol use: Yes     Comment: rarely • Drug use: Not Currently   • Sexual activity: Yes     Partners: Male     Birth control/protection: Female Sterilization       Allergies   Allergen Reactions   • Propranolol Other (See Comments)     Halluncinations   • Raspberry - Food Allergy Allergic Rhinitis, Itching and Nasal Congestion   • Latex Rash         Current Outpatient Medications:   •  acetaminophen (TYLENOL) 500 mg tablet, Take 500-1,000 mg by mouth every 6 (six) hours as needed for mild pain, Disp: , Rfl:   •  Aspirin-Acetaminophen-Caffeine (EXCEDRIN MIGRAINE PO), Take 1 tablet by mouth as needed (migraines), Disp: , Rfl:   •  buPROPion (Wellbutrin XL) 300 mg 24 hr tablet, Take 1 tablet (300 mg total) by mouth every morning, Disp: 90 tablet, Rfl: 3  •  Calcium Carb-Cholecalciferol (CALCIUM 500 + D3 PO), Take 2 capsules by mouth daily, Disp: , Rfl:   •  cetirizine (ZyrTEC) 10 mg tablet, Take 10 mg by mouth daily PRN, Disp: , Rfl:   •  Cyanocobalamin (VITAMIN B 12 PO), Take by mouth daily, Disp: , Rfl:   •  gabapentin (Neurontin) 300 mg capsule, Take 1 capsule (300 mg total) by mouth daily at bedtime Can increase to twice daily after 7 days if needed and then to three times daily after 14 days if needed, Disp: 90 capsule, Rfl: 0  •  lisinopril (ZESTRIL) 20 mg tablet, TAKE 1 TABLET(20 MG) BY MOUTH DAILY, Disp: 90 tablet, Rfl: 0  •  Magnesium 400 MG TABS, Take 2 tablets by mouth in the morning, Disp: , Rfl:   •  Multiple Vitamin (multivitamin) tablet, Take 1 tablet by mouth daily, Disp: 30 tablet, Rfl: 1  •  omeprazole (PriLOSEC) 20 mg delayed release capsule, Take 1 capsule (20 mg total) by mouth daily, Disp: 90 capsule, Rfl: 1  •  SUMAtriptan (IMITREX) 100 mg tablet, Take 1 tablet (100 mg total) by mouth once as needed for migraine May repeat x1 in 2 hours, max 2/24 hours, 9/month, Disp: 9 tablet, Rfl: 12  •  ascorbic acid (VITAMIN C) 500 MG tablet, Take 1 tablet (500 mg total) by mouth 2 (two) times a day, Disp: 60 tablet, Rfl: 1  •  aspirin (ECOTRIN LOW STRENGTH) 81 mg EC tablet, Take 1 tablet (81 mg total) by mouth 2 (two) times a day, Disp: 60 tablet, Rfl: 0  •  ferrous sulfate 324 (65 Fe) mg, Take 1 tablet (324 mg total) by mouth 2 (two) times a day before meals, Disp: 60 tablet, Rfl: 0  •  folic acid (FOLVITE) 1 mg tablet, Take 1 tablet (1 mg total) by mouth daily, Disp: 30 tablet, Rfl: 0  •  ondansetron (ZOFRAN-ODT) 4 mg disintegrating tablet, Take 1 tablet (4 mg total) by mouth every 8 (eight) hours as needed for nausea or vomiting, Disp: 15 tablet, Rfl: 0  •  traMADol (Ultram) 50 mg tablet, Take 1 tablet (50 mg total) by mouth every 6 (six) hours as needed for moderate pain, Disp: 30 tablet, Rfl: 0      [employer]    I,:  Jory Monk am acting as a scribe while in the presence of the attending physician.:       I,:  Adelaida Luna MD personally performed the services described in this documentation    as scribed in my presence.:

## 2023-08-11 ENCOUNTER — APPOINTMENT (OUTPATIENT)
Dept: PHYSICAL THERAPY | Facility: REHABILITATION | Age: 58
End: 2023-08-11
Payer: COMMERCIAL

## 2023-08-14 ENCOUNTER — OFFICE VISIT (OUTPATIENT)
Dept: PHYSICAL THERAPY | Facility: REHABILITATION | Age: 58
End: 2023-08-14
Payer: COMMERCIAL

## 2023-08-14 DIAGNOSIS — Z96.652 STATUS POST TOTAL LEFT KNEE REPLACEMENT: ICD-10-CM

## 2023-08-14 DIAGNOSIS — M25.562 ACUTE PAIN OF LEFT KNEE: Primary | ICD-10-CM

## 2023-08-14 PROCEDURE — 97110 THERAPEUTIC EXERCISES: CPT | Performed by: PHYSICAL THERAPIST

## 2023-08-14 PROCEDURE — 97140 MANUAL THERAPY 1/> REGIONS: CPT | Performed by: PHYSICAL THERAPIST

## 2023-08-14 NOTE — PROGRESS NOTES
Daily Note     Today's date: 2023  Patient name: Holley Angelucci  : 1965  MRN: [id number]  Referring provider: Brennan Comer, *  Dx:   Encounter Diagnosis     ICD-10-CM    1. Acute pain of left knee  M25.562       2. Status post total left knee replacement  Z96.652                      Subjective: Patient reports the surgeon would like her to continue PT for 6 more weeks to really focus on range of motion. She tried to decrease her gabapentin from 600 to 300 mg but noticed within 5 days she noticed the difference and was having a lot pain with the steps so she increased her intake again to 600mg. Objective: See treatment diary below      Assessment: Tolerated treatment well. Patient was able to achieve -6 extension at the end of today's treatment session. Encouraged to continue to focus on range of motion and flexibility throughout the day to decrease stiffness and improve mobility. She demonstrates understanding. She would benefit from continuing physical therapy to improve range of motion and functional abilities. Plan: Continue per plan of care. Progress treatment as tolerated.           Precautions: migraine        Manual  23     PROM left knee flex/ext 30 mins post tibial glides into ext 30 mins post tibial glides into ext 30 mins post tibal glides into ext 30 mins post tibal glides into ext     RE            Mid-distal L quad STM                                              Neuro Re-Ed             Alternating march with walking                  Side stepping at bar                      mini squat         SLS                                       Therex                                    Prone knee hangs 2 mins 2# x2 2 mins 3# 2  2 min 4# x2 2 min 4# x2     TERT ext 4# 2'x2  5# 2'x2  5# 2'x2  5# 2'x2      rec bike aarom 5 mins  8 mins  10 mins  10 min                                     Gait Training

## 2023-08-17 ENCOUNTER — OFFICE VISIT (OUTPATIENT)
Dept: PHYSICAL THERAPY | Facility: REHABILITATION | Age: 58
End: 2023-08-17
Payer: COMMERCIAL

## 2023-08-17 DIAGNOSIS — Z96.652 STATUS POST TOTAL LEFT KNEE REPLACEMENT: ICD-10-CM

## 2023-08-17 DIAGNOSIS — M25.562 ACUTE PAIN OF LEFT KNEE: Primary | ICD-10-CM

## 2023-08-17 PROCEDURE — 97110 THERAPEUTIC EXERCISES: CPT | Performed by: PHYSICAL THERAPIST

## 2023-08-17 PROCEDURE — 97140 MANUAL THERAPY 1/> REGIONS: CPT | Performed by: PHYSICAL THERAPIST

## 2023-08-17 NOTE — PROGRESS NOTES
Daily Note     Today's date: 2023  Patient name: Fady Andres  : 1965  MRN: [id number]  Referring provider: Liv Houston, *  Dx:   Encounter Diagnosis     ICD-10-CM    1. Acute pain of left knee  M25.562       2. Status post total left knee replacement  Z96.652                      Subjective: patient reports since upping her medication she is starting to feel better again. She mentions she was able to sleep through the night which has been very beneficial. She is still challenged with the steps compared to where she was before she tried to lower her medication, but it is getting better each day. Objective: See treatment diary below      Assessment: Tolerated treatment well. Patient was able to achieve -5 extension at the end of today's treatment session. Encouraged to continue to focus on range of motion and flexibility throughout the day to decrease stiffness and improve mobility. She demonstrates understanding. She would benefit from continuing physical therapy to improve range of motion and functional abilities. Plan: Continue per plan of care. Progress treatment as tolerated.        Precautions: migraine        Manual  23    PROM left knee flex/ext 30 mins post tibial glides into ext 30 mins post tibial glides into ext 30 mins post tibal glides into ext 30 mins post tibal glides into ext 30 mins post tibal glides into ext     RE            Mid-distal L quad STM                                              Neuro Re-Ed             Alternating march with walking                  Side stepping at bar                      mini squat         SLS                                       Therex                                    Prone knee hangs 2 mins 2# x2 2 mins 3# 2  2 min 4# x2 2 min 4# x2 2 min 4# x2    TERT ext 4# 2'x2  5# 2'x2  5# 2'x2  5# 2'x2  5# 2'x2    rec bike aarom 5 mins  8 mins  10 mins  10 min 10 min                                  Gait Training

## 2023-08-22 ENCOUNTER — OFFICE VISIT (OUTPATIENT)
Dept: PHYSICAL THERAPY | Facility: REHABILITATION | Age: 58
End: 2023-08-22
Payer: COMMERCIAL

## 2023-08-22 DIAGNOSIS — M25.562 ACUTE PAIN OF LEFT KNEE: Primary | ICD-10-CM

## 2023-08-22 DIAGNOSIS — Z96.652 STATUS POST TOTAL LEFT KNEE REPLACEMENT: ICD-10-CM

## 2023-08-22 PROCEDURE — 97110 THERAPEUTIC EXERCISES: CPT | Performed by: PHYSICAL THERAPIST

## 2023-08-22 PROCEDURE — 97140 MANUAL THERAPY 1/> REGIONS: CPT | Performed by: PHYSICAL THERAPIST

## 2023-08-22 PROCEDURE — 97116 GAIT TRAINING THERAPY: CPT | Performed by: PHYSICAL THERAPIST

## 2023-08-22 NOTE — PROGRESS NOTES
Daily Note     Today's date: 2023  Patient name: Buck Calixto  : 1965  MRN: [id number]  Referring provider: Luz Pa, *  Dx:   Encounter Diagnosis     ICD-10-CM    1. Acute pain of left knee  M25.562       2. Status post total left knee replacement  Z96.652                      Subjective: Pt reports improvement in her sx's with resuming Gabapentin. Pt cont to note having diff with stairs but improved since resuming medication. Objective: See treatment diary below      Precautions: migraine        Manual  23    PROM left knee flex/ext 20 mins post tibial mobs into ext 30 mins post tibial glides into ext 30 mins post tibal glides into ext 30 mins post tibal glides into ext 30 mins post tibal glides into ext     RE            Mid-distal L quad STM                                              Neuro Re-Ed             Alternating march with walking                  Side stepping at bar                      mini squat         SLS                                       Therex                                    Prone knee hangs 2 mins 4# x2  2 mins 3# 2  2 min 4# x2 2 min 4# x2 2 min 4# x2    TERT ext  5# 2'x2  5# 2'x2  5# 2'x2  5# 2'x2    rec bike aarom 8 mins  8 mins  10 mins  10 min 10 min                                    Gait Training             Stair negotiation Reciprocal 1 flight x2 ea                      Assessment: Focused on today's visit for knee ROM into flex and ex, however did address stair negotiation restrictions. Pt encouraged to slow bryce with negotiation of stairs and focus on using railing for support without holding items. Pt also has positive sensitivity at incision and educated in using cloth or touch to assist with desensitization on a daily basis. Plan: Continue per plan of care. Progress treatment as tolerated.

## 2023-08-24 ENCOUNTER — OFFICE VISIT (OUTPATIENT)
Dept: PHYSICAL THERAPY | Facility: REHABILITATION | Age: 58
End: 2023-08-24
Payer: COMMERCIAL

## 2023-08-24 DIAGNOSIS — M25.562 ACUTE PAIN OF LEFT KNEE: Primary | ICD-10-CM

## 2023-08-24 DIAGNOSIS — Z96.652 STATUS POST TOTAL LEFT KNEE REPLACEMENT: ICD-10-CM

## 2023-08-24 PROCEDURE — 97140 MANUAL THERAPY 1/> REGIONS: CPT | Performed by: PHYSICAL THERAPIST

## 2023-08-24 PROCEDURE — 97110 THERAPEUTIC EXERCISES: CPT | Performed by: PHYSICAL THERAPIST

## 2023-08-24 NOTE — PROGRESS NOTES
Daily Note     Today's date: 2023  Patient name: Miguelito Lara  : 1965  MRN: [id number]  Referring provider: Rosa De La Fuente, *  Dx:   Encounter Diagnosis     ICD-10-CM    1. Acute pain of left knee  M25.562       2. Status post total left knee replacement  Z96.652                      Subjective: Pt reports that she is doing "well" with return to Gabapentin. Pt does note soreness and discomfort after last visit for a few hours, but dissipated after a few hours. Objective: See treatment diary below      Precautions: migraine        Manual  23    PROM left knee flex/ext 20 mins post tibial mobs into ext 23 mins posterior tibial mobs into ext 30 mins post tibal glides into ext 30 mins post tibal glides into ext 30 mins post tibal glides into ext     RE            Mid-distal L quad STM                                              Neuro Re-Ed                                                                                                Therex                                    Prone knee hangs 2 mins 4# x2  4# 2 mins x2 2 min 4# x2 2 min 4# x2 2 min 4# x2    TERT ext  5# 2 mins x2  5# 2'x2  5# 2'x2  5# 2'x2    rec bike aarom 8 mins  8 mins  10 mins  10 min 10 min                                    Gait Training             Stair negotiation Reciprocal 1 flight x2 ea reviewed                     Assessment: Pt cont to progress well towards extension and good funct. Performed mobs to assist with extension as pt cont to progress well towards functional flexion. Pt achieves -2 degrees extension post manuals and TERT stretching in supine and prone. Pt encouraged to cont to focus on ext based focus and negotiation of stairs reciprocally. Plan: Continue per plan of care. Progress treatment as tolerated.

## 2023-08-26 DIAGNOSIS — M17.12 PRIMARY OSTEOARTHRITIS OF LEFT KNEE: ICD-10-CM

## 2023-08-28 ENCOUNTER — OFFICE VISIT (OUTPATIENT)
Dept: PHYSICAL THERAPY | Facility: REHABILITATION | Age: 58
End: 2023-08-28
Payer: COMMERCIAL

## 2023-08-28 DIAGNOSIS — Z96.652 S/P TOTAL KNEE ARTHROPLASTY, LEFT: Primary | ICD-10-CM

## 2023-08-28 DIAGNOSIS — Z96.652 STATUS POST TOTAL LEFT KNEE REPLACEMENT: ICD-10-CM

## 2023-08-28 DIAGNOSIS — M25.562 ACUTE PAIN OF LEFT KNEE: Primary | ICD-10-CM

## 2023-08-28 PROCEDURE — 97110 THERAPEUTIC EXERCISES: CPT | Performed by: PHYSICAL THERAPIST

## 2023-08-28 PROCEDURE — 97140 MANUAL THERAPY 1/> REGIONS: CPT | Performed by: PHYSICAL THERAPIST

## 2023-08-28 PROCEDURE — 97116 GAIT TRAINING THERAPY: CPT | Performed by: PHYSICAL THERAPIST

## 2023-08-28 RX ORDER — GABAPENTIN 300 MG/1
300 CAPSULE ORAL
Qty: 90 CAPSULE | Refills: 0 | Status: SHIPPED | OUTPATIENT
Start: 2023-08-28

## 2023-08-28 RX ORDER — AMOXICILLIN 500 MG/1
1000 CAPSULE ORAL
Qty: 6 CAPSULE | Refills: 2 | Status: SHIPPED | OUTPATIENT
Start: 2023-08-28 | End: 2023-08-29

## 2023-08-28 NOTE — PROGRESS NOTES
Daily Note     Today's date: 2023  Patient name: Ryder Gupta  : 1965  MRN: [id number]  Referring provider: Marcel Yeh, *  Dx:   Encounter Diagnosis     ICD-10-CM    1. Acute pain of left knee  M25.562       2. Status post total left knee replacement  Z96.652                      Subjective: Pt reports that she did well since last visit and over the weekend with improved ROM. Objective: See treatment diary below      Precautions: migraine        Manual  23    PROM left knee flex/ext 20 mins post tibial mobs into ext 23 mins posterior tibial mobs into ext 15 mins posterior tibial mobs into ext 30 mins post tibal glides into ext 30 mins post tibal glides into ext     RE            Mid-distal L quad STM                                              Neuro Re-Ed                                                                                                Therex                                    Prone knee hangs 2 mins 4# x2  4# 2 mins x2  2 min 4# x2 2 min 4# x2    TERT ext  5# 2 mins x2   5# 2'x2  5# 2'x2    rec bike aarom 8 mins  8 mins  8 mins  10 min 10 min   Leg press   45# 2x12        SLR   3"2x10                     Gait Training             Stair negotiation Reciprocal 1 flight x2 ea reviewed 8 inch x10                     Assessment: Pt is demonstrating improved knee extension overall, increased independently and with manual assistance. Focused today's visit on strengthening with addition of leg press and SLR to assist with quad activation and control through left LE. Pt able to increase lateral step height with improved controlled and activation. Progress strengthening as able. Plan: Continue per plan of care. Progress treatment as tolerated.

## 2023-08-31 ENCOUNTER — OFFICE VISIT (OUTPATIENT)
Dept: PHYSICAL THERAPY | Facility: REHABILITATION | Age: 58
End: 2023-08-31
Payer: COMMERCIAL

## 2023-08-31 DIAGNOSIS — M25.562 ACUTE PAIN OF LEFT KNEE: Primary | ICD-10-CM

## 2023-08-31 DIAGNOSIS — Z96.652 STATUS POST TOTAL LEFT KNEE REPLACEMENT: ICD-10-CM

## 2023-08-31 PROCEDURE — 97140 MANUAL THERAPY 1/> REGIONS: CPT | Performed by: PHYSICAL THERAPIST

## 2023-08-31 PROCEDURE — 97110 THERAPEUTIC EXERCISES: CPT | Performed by: PHYSICAL THERAPIST

## 2023-08-31 PROCEDURE — 97116 GAIT TRAINING THERAPY: CPT | Performed by: PHYSICAL THERAPIST

## 2023-08-31 NOTE — PROGRESS NOTES
Daily Note     Today's date: 2023  Patient name: Dereck Vanessa  : 1965  MRN: [id number]  Referring provider: Gopi Yeager, *  Dx:   Encounter Diagnosis     ICD-10-CM    1. Acute pain of left knee  M25.562       2. Status post total left knee replacement  Z96.652                      Subjective: Pt had massage after last PT visit and notes "feels like being hit by a truck."        Objective: See treatment diary below      Precautions: migraine        Manual  23    PROM left knee flex/ext 20 mins post tibial mobs into ext 23 mins posterior tibial mobs into ext 15 mins posterior tibial mobs into ext 15 mins post tibial mobs into ext 30 mins post tibal glides into ext     RE            Mid-distal L quad STM                                              Neuro Re-Ed                                                                                                Therex                                    Prone knee hangs 2 mins 4# x2  4# 2 mins x2   2 min 4# x2    TERT ext  5# 2 mins x2    5# 2'x2    rec bike aarom 8 mins  8 mins  8 mins  5 mins 10 min   Leg press   45# 2x12   45# 2x15      SLR   3"2x10  3"2x10                    Gait Training             Stair negotiation Reciprocal 1 flight x2 ea reviewed 8 inch x10  Steps x15    Descending slowly    1 flight x2           Assessment: Pt notes feeling improvement post massage immediately, but sx's resumed within the day. Pt cont to have diff with sleeping finding a comfortable position. Pt cont to progress well with extension after manual tx and mobilizations into endrange. Pt cont to fatigue into left quad and requires frequent cues to maintain QS through SLR and control with leg press. Pt also performs valgus position during increase in speed with descending stairs and encouraged to slow bryce to assist with position and posture. Plan: Continue per plan of care. Progress treatment as tolerated.

## 2023-09-08 ENCOUNTER — OFFICE VISIT (OUTPATIENT)
Dept: PHYSICAL THERAPY | Facility: REHABILITATION | Age: 58
End: 2023-09-08
Payer: COMMERCIAL

## 2023-09-08 DIAGNOSIS — Z96.652 STATUS POST TOTAL LEFT KNEE REPLACEMENT: ICD-10-CM

## 2023-09-08 DIAGNOSIS — M25.562 ACUTE PAIN OF LEFT KNEE: Primary | ICD-10-CM

## 2023-09-08 PROCEDURE — 97116 GAIT TRAINING THERAPY: CPT | Performed by: PHYSICAL THERAPIST

## 2023-09-08 PROCEDURE — 97140 MANUAL THERAPY 1/> REGIONS: CPT | Performed by: PHYSICAL THERAPIST

## 2023-09-08 PROCEDURE — 97110 THERAPEUTIC EXERCISES: CPT | Performed by: PHYSICAL THERAPIST

## 2023-09-08 NOTE — PROGRESS NOTES
Daily Note     Today's date: 2023  Patient name: Chris Vogel  : 1965  MRN: [id number]  Referring provider: Joanne Betancur, *  Dx:   Encounter Diagnosis     ICD-10-CM    1. Acute pain of left knee  M25.562       2. Status post total left knee replacement  Z96.652                      Subjective: Pt reports that she had a busy week prior, but notes "doing ok."      Objective: See treatment diary below      Precautions: migraine        Manual  23    PROM left knee flex/ext 20 mins post tibial mobs into ext 23 mins posterior tibial mobs into ext 15 mins posterior tibial mobs into ext 15 mins post tibial mobs into ext 15 mins post tibial mobs into ext    RE            Mid-distal L quad STM                                              Neuro Re-Ed                                                                                                Therex                                    Prone knee hangs 2 mins 4# x2  4# 2 mins x2       TERT ext  5# 2 mins x2        rec bike aarom 8 mins  8 mins  8 mins  5 mins 8 mins    Leg press   45# 2x12   45# 2x15   49# x10/45# x10    SLR   3"2x10  3"2x10  3" 2x10 ea                  Gait Training             Stair negotiation Reciprocal 1 flight x2 ea reviewed 8 inch x10  Steps x15 8 inch x15    Descending slowly    1 flight x2 8 inch x10           Assessment: Pt measures -2 to 123 a today's visit with min discomfort. Pt also demo's improvement in quad contraction and knee extension tolerance during SLR. Pt is having less compensation with negotiation of stairs and able to perform to 8 inch steps up descending and ascending well. Plan: Continue per plan of care. Progress treatment as tolerated.

## 2023-09-11 ENCOUNTER — OFFICE VISIT (OUTPATIENT)
Dept: PHYSICAL THERAPY | Facility: REHABILITATION | Age: 58
End: 2023-09-11
Payer: COMMERCIAL

## 2023-09-11 DIAGNOSIS — M25.562 ACUTE PAIN OF LEFT KNEE: Primary | ICD-10-CM

## 2023-09-11 DIAGNOSIS — Z96.652 STATUS POST TOTAL LEFT KNEE REPLACEMENT: ICD-10-CM

## 2023-09-11 PROCEDURE — 97110 THERAPEUTIC EXERCISES: CPT | Performed by: PHYSICAL THERAPIST

## 2023-09-11 PROCEDURE — 97116 GAIT TRAINING THERAPY: CPT | Performed by: PHYSICAL THERAPIST

## 2023-09-11 PROCEDURE — 97140 MANUAL THERAPY 1/> REGIONS: CPT | Performed by: PHYSICAL THERAPIST

## 2023-09-11 NOTE — PROGRESS NOTES
Daily Note     Today's date: 2023  Patient name: Miguelito Lara  : 1965  MRN: [id number]  Referring provider: Rosa De La Fuente, *  Dx:   Encounter Diagnosis     ICD-10-CM    1. Acute pain of left knee  M25.562       2. Status post total left knee replacement  Z96.652                      Subjective: Pt notes "doing pretty good."        Objective: See treatment diary below      Precautions: migraine        Manual  23    PROM left knee flex/ext 15 mins post tibial mobs into ext 23 mins posterior tibial mobs into ext 15 mins posterior tibial mobs into ext 15 mins post tibial mobs into ext 15 mins post tibial mobs into ext    RE            Mid-distal L quad STM                                              Neuro Re-Ed                                                                                                Therex                                    Prone knee hangs np 4# 2 mins x2       TERT ext np 5# 2 mins x2        rec bike aarom 8 mins  8 mins  8 mins  5 mins 8 mins    Leg press 49#x12 45# x10   45# 2x12   45# 2x15   49# x10/45# x10    SLR 3"3x10   3"2x10  3"2x10  3" 2x10 ea                  Gait Training             Stair negotiation 8 inch 2x10  reviewed 8 inch x10  Steps x15 8 inch x15    Descending slowly Stairs 1 flight x4    1 flight x2 8 inch x10           Assessment: Pt does well with progression of ROM and less pain with OP into endrange extension. Pt cont to progress well with left LE control during strengthening exercises, does perform slight IR of hip when descending stairs upon bringing left foot down from flexed position and encouraged to drive off step from her big toe-which assisted with her compensation. Plan: Continue per plan of care. Progress treatment as tolerated. RE next visit.

## 2023-09-15 ENCOUNTER — EVALUATION (OUTPATIENT)
Dept: PHYSICAL THERAPY | Facility: REHABILITATION | Age: 58
End: 2023-09-15
Payer: COMMERCIAL

## 2023-09-15 DIAGNOSIS — M25.562 ACUTE PAIN OF LEFT KNEE: Primary | ICD-10-CM

## 2023-09-15 DIAGNOSIS — Z96.652 STATUS POST TOTAL LEFT KNEE REPLACEMENT: ICD-10-CM

## 2023-09-15 PROCEDURE — 97140 MANUAL THERAPY 1/> REGIONS: CPT | Performed by: PHYSICAL THERAPIST

## 2023-09-15 NOTE — PROGRESS NOTES
Daily Note/Discharge     Today's date: 9/15/2023  Patient name: Chris Vogel  : 1965  MRN: [id number]  Referring provider: Joanne Betancur, *  Dx:   Encounter Diagnosis     ICD-10-CM    1. Acute pain of left knee  M25.562       2. Status post total left knee replacement  Z96.652                      Subjective: Pt notes that she has returned to mowing and funct tasks around her home. Pt has improved tolerance to negotiation of stairs. Pt does note slight diff with changing positions when in prolonged knee flexion or prolonged knee extension but dissipates with movement and walking. Pt also notes improvement in sleep tolerance secondary to knee. Objective: See treatment diary below    Precautions: migraine        Manual  9/11/23 9/15/23 8/28/23 8/31/23 9/8/23    PROM left knee flex/ext 15 mins post tibial mobs into ext  15 mins posterior tibial mobs into ext 15 mins post tibial mobs into ext 15 mins post tibial mobs into ext    RE    25 mins                                                    Neuro Re-Ed                                                                                                                             Therex                                                    Prone knee hangs np           TERT ext np           rec bike aarom 8 mins   8 mins  5 mins 8 mins    Leg press 49#x12 45# x10   45# 2x12   45# 2x15   49# x10/45# x10    SLR 3"3x10   3"2x10  3"2x10  3" 2x10 ea                 Gait Training            Stair negotiation 8 inch 2x10   8 inch x10  Steps x15 8 inch x15    Descending slowly Stairs 1 flight x4      1 flight x2 8 inch x10             Assessment:   Pain at worst 2-3/10. Pt notes sx's are "annoying" when they occur. Knee ROM Flex:  Ext:    Strength Knee flex: 5/5  Knee Ext: 5/5  Hip flex:4+/5  Hip abd: 4+/5  Hip ext:4+/5  SLR:No lag     Goals  Funct 1. Improve amb with use of SPC 1/2 mile x4 weeks. -met  2.  Negotiate stairs reciprocally with rail x4 weeks.-met  3. Improve sleep tolerance 4-5 hours x4 weeks. -met  Impairment 1. Increase ROM by 25% x4 weeks-met  2. Decrease pain by 25 % x4 weeks-met  3. Increase strength by 1/2 grade x4 weeks. -met      Plan: Pt has met all funct and impairment goals with PT. Pt is DC'd from PT to cont with HEP.

## 2023-09-18 ENCOUNTER — OFFICE VISIT (OUTPATIENT)
Dept: OBGYN CLINIC | Facility: MEDICAL CENTER | Age: 58
End: 2023-09-18
Payer: COMMERCIAL

## 2023-09-18 VITALS
HEIGHT: 61 IN | HEART RATE: 76 BPM | SYSTOLIC BLOOD PRESSURE: 115 MMHG | BODY MASS INDEX: 27 KG/M2 | DIASTOLIC BLOOD PRESSURE: 78 MMHG | WEIGHT: 143 LBS

## 2023-09-18 DIAGNOSIS — Z96.652 S/P TOTAL KNEE ARTHROPLASTY, LEFT: Primary | ICD-10-CM

## 2023-09-18 PROCEDURE — 99213 OFFICE O/P EST LOW 20 MIN: CPT | Performed by: ORTHOPAEDIC SURGERY

## 2023-09-18 NOTE — PROGRESS NOTES
Orthopaedic Surgery - Office Note  Andrea Tai (36 y.o. female)   : 1965   MRN: [id number]  Encounter Date: 2023    Chief Complaint   Patient presents with   • Left Knee - Follow-up       Assessment / Plan  4 month S/p left TKA     · Patient is progressing well s/p left TKA and has improved her extension ROM  · Continue to wean off the Gabapentin and work with pain management to decrease discomforts with activities of daily living  · Continue with outpatient PT and maintain ROM. · Reviewed recent PT notes  · Ice, heat and anti-inflammatories prn   Return in about 2 months (around 2023) for follow up with Dr. Quincy Barragan . History of Present Illness  Andrea Tai is a 56 y.o. female who presents for 4 month follow up s/p left TKA on 2023. She has continued to progressed well in PT and states she has finished PT. She still complains of knee pain along the posterior aspect of the left knee and has appointment scheduled with Dr. Bob Luz with pain management on 2023. She reports she has improved her ROM and patella mobility with the help of PT and weaned out of the TROM. She is able to get up and down steps with mild discomfort. She does use Tylenol prn and she still takes gabapentin in the morning and at night. Overall, she feels she has made progress. Review of Systems  Pertinent items are noted in HPI. All other systems were reviewed and are negative. Physical Exam  /78   Pulse 76   Ht 5' 1" (1.549 m)   Wt 64.9 kg (143 lb)   LMP 2019 (Exact Date)   BMI 27.02 kg/m²   Cons: Appears well. No apparent distress. Psych: Alert. Oriented x3. Mood and affect normal.  Eyes: PERRLA, EOMI  Resp: Normal effort. No audible wheezing or stridor. CV: Palpable pulse. No discernable arrhythmia. No LE edema. Lymph:  No palpable cervical, axillary, or inguinal lymphadenopathy. Skin: Warm. No palpable masses. No visible lesions. Neuro: Normal muscle tone. Normal and symmetric DTR's. Left Knee Exam  Alignment:  Normal knee alignment. Inspection:  No swelling. No ecchymosis. Incision healed. Palpation:  No tenderness. No effusion. ROM:  Knee Extension 0. Knee Flexion 120. Strength:  Able to SLR without lag. Stability:  (-) Varus instability. (-) Valgus instability. in both flexion and extension   Tests:  No pertinent positive or negative tests. Patella:  Normal patellar mobility. Neurovascular:  Sensation intact in DP/SP/Tate/Sa/T nerve distributions. 2+ DP & PT pulses. Gait:  Antalgic. Studies Reviewed  I have personally reviewed pertinent films in PACS. XR of right knee - From 5/26/2023 showed position with no signs of loosening, no fracture or dislocation     Procedures  No procedures today. Medical, Surgical, Family, and Social History  The patient's medical history, family history, and social history, were reviewed and updated as appropriate.     Past Medical History:   Diagnosis Date   • Allergic 02/01/2020   • Anemia    • Anesthesia complication     woke up during D/C   • Anxiety    • Arthritis    • BMI 30.0-30.9,adult    • Carpal tunnel syndrome on right    • Cervical spondylosis without myelopathy    • COVID 12/01/2021   • Depression    • Fibromyalgia    • Fibromyalgia, primary    • GERD (gastroesophageal reflux disease)    • History of fetal loss     Recurrent   • Hyperlipidemia    • Hypertension    • Jaw pain     and both ears with migraines   • Migraine    • Neck pain    • Osteoarthritis     lumbar spine   • Sicca syndrome (HCC)    • Sleep apnea     unable to use CPAP   • Syncopal episodes     with severe migraines   • Undifferentiated connective tissue disease ([address])    • Wears contact lenses     or glasses       Past Surgical History:   Procedure Laterality Date   • CHOLECYSTECTOMY     • COLONOSCOPY     • DILATION AND CURETTAGE OF UTERUS     • WY ARTHRP KNE CONDYLE&PLATU MEDIAL&LAT COMPARTMENTS Left 5/16/2023    Procedure: ARTHROPLASTY KNEE TOTAL;  Surgeon: Meseret Willett MD;  Location: [facility];  Service: Orthopedics   • TUBAL LIGATION         Family History   Problem Relation Age of Onset   • MORGAN disease Mother    • Arthritis Mother    • Depression Mother    • Hypertension Mother    • Coronary artery disease Mother    • Heart attack Father 79   • Diabetes Father    • Prostate cancer Father    • Hypertension Father    • Arthritis Father    • Stroke Father    • Cancer Father         Prostate   • ADD / ADHD Father    • Lupus Sister    • Raynaud syndrome Sister    • Sjogren's syndrome Sister    • Breast cancer Maternal Grandmother    • Arthritis Maternal Grandmother    • Cancer Maternal Grandmother         Breast   • Glaucoma Maternal Grandmother    • Alcohol abuse Paternal Grandfather    • No Known Problems Daughter    • Prostate cancer Maternal Grandfather    • Arthritis Maternal Grandfather    • Cancer Maternal Grandfather         Prostate   • Colon cancer Paternal Grandmother    • Cancer Paternal Grandmother         Colon   • Depression Sister    • ADD / ADHD Sister    • Anxiety disorder Sister    • ADD / ADHD Brother    • OCD Brother    • No Known Problems Son    • No Known Problems Son    • No Known Problems Son    • No Known Problems Son    • No Known Problems Son    • No Known Problems Maternal Uncle    • No Known Problems Paternal Aunt    • No Known Problems Paternal Uncle        Social History     Occupational History   • Occupation: Administration   Tobacco Use   • Smoking status: Never   • Smokeless tobacco: Never   Vaping Use   • Vaping Use: Never used   Substance and Sexual Activity   • Alcohol use: Yes     Comment: rarely   • Drug use: Not Currently   • Sexual activity: Yes     Partners: Male     Birth control/protection: Female Sterilization       Allergies   Allergen Reactions   • Propranolol Other (See Comments)     Halluncinations   • Raspberry - Food Allergy Allergic Rhinitis, Itching and Nasal Congestion   • Latex Rash Current Outpatient Medications:   •  acetaminophen (TYLENOL) 500 mg tablet, Take 500-1,000 mg by mouth every 6 (six) hours as needed for mild pain, Disp: , Rfl:   •  Aspirin-Acetaminophen-Caffeine (EXCEDRIN MIGRAINE PO), Take 1 tablet by mouth as needed (migraines), Disp: , Rfl:   •  buPROPion (Wellbutrin XL) 300 mg 24 hr tablet, Take 1 tablet (300 mg total) by mouth every morning, Disp: 90 tablet, Rfl: 3  •  Calcium Carb-Cholecalciferol (CALCIUM 500 + D3 PO), Take 2 capsules by mouth daily, Disp: , Rfl:   •  cetirizine (ZyrTEC) 10 mg tablet, Take 10 mg by mouth daily PRN, Disp: , Rfl:   •  Cyanocobalamin (VITAMIN B 12 PO), Take by mouth daily, Disp: , Rfl:   •  gabapentin (Neurontin) 300 mg capsule, Take 1 capsule (300 mg total) by mouth daily at bedtime Can increase to twice daily after 7 days if needed and then to three times daily after 14 days if needed, Disp: 90 capsule, Rfl: 0  •  lisinopril (ZESTRIL) 20 mg tablet, TAKE 1 TABLET(20 MG) BY MOUTH DAILY, Disp: 90 tablet, Rfl: 0  •  Magnesium 400 MG TABS, Take 2 tablets by mouth in the morning, Disp: , Rfl:   •  Multiple Vitamin (multivitamin) tablet, Take 1 tablet by mouth daily, Disp: 30 tablet, Rfl: 1  •  omeprazole (PriLOSEC) 20 mg delayed release capsule, Take 1 capsule (20 mg total) by mouth daily, Disp: 90 capsule, Rfl: 1  •  SUMAtriptan (IMITREX) 100 mg tablet, Take 1 tablet (100 mg total) by mouth once as needed for migraine May repeat x1 in 2 hours, max 2/24 hours, 9/month, Disp: 9 tablet, Rfl: 12  •  ascorbic acid (VITAMIN C) 500 MG tablet, Take 1 tablet (500 mg total) by mouth 2 (two) times a day, Disp: 60 tablet, Rfl: 1  •  aspirin (ECOTRIN LOW STRENGTH) 81 mg EC tablet, Take 1 tablet (81 mg total) by mouth 2 (two) times a day, Disp: 60 tablet, Rfl: 0  •  ferrous sulfate 324 (65 Fe) mg, Take 1 tablet (324 mg total) by mouth 2 (two) times a day before meals, Disp: 60 tablet, Rfl: 0  •  folic acid (FOLVITE) 1 mg tablet, Take 1 tablet (1 mg total) by mouth daily, Disp: 30 tablet, Rfl: 0  •  ondansetron (ZOFRAN-ODT) 4 mg disintegrating tablet, Take 1 tablet (4 mg total) by mouth every 8 (eight) hours as needed for nausea or vomiting, Disp: 15 tablet, Rfl: 0  •  traMADol (Ultram) 50 mg tablet, Take 1 tablet (50 mg total) by mouth every 6 (six) hours as needed for moderate pain, Disp: 30 tablet, Rfl: 0          Scribe Attestation    I,:  Jenniffer Connelly am acting as a scribe while in the presence of the attending physician.:       I,:  Anabelle Cortez MD personally performed the services described in this documentation    as scribed in my presence.:

## 2023-09-21 ENCOUNTER — TELEPHONE (OUTPATIENT)
Age: 58
End: 2023-09-21

## 2023-09-21 NOTE — TELEPHONE ENCOUNTER
Caller: Edwin     Doctor: Eloise Chu    Reason for call: calling for fax # to office.  Provided fax    Call back#: n/a

## 2023-09-22 ENCOUNTER — HOSPITAL ENCOUNTER (OUTPATIENT)
Dept: MAMMOGRAPHY | Facility: MEDICAL CENTER | Age: 58
Discharge: HOME | End: 2023-09-22
Payer: COMMERCIAL

## 2023-09-22 VITALS — BODY MASS INDEX: 26.64 KG/M2 | WEIGHT: 141.09 LBS | HEIGHT: 61 IN

## 2023-09-22 DIAGNOSIS — Z12.31 VISIT FOR SCREENING MAMMOGRAM: ICD-10-CM

## 2023-09-22 PROCEDURE — 77063 BREAST TOMOSYNTHESIS BI: CPT

## 2023-09-22 PROCEDURE — 77067 SCR MAMMO BI INCL CAD: CPT

## 2023-09-25 ENCOUNTER — CONSULT (OUTPATIENT)
Dept: PAIN MEDICINE | Facility: MEDICAL CENTER | Age: 58
End: 2023-09-25
Payer: COMMERCIAL

## 2023-09-25 VITALS
WEIGHT: 143 LBS | HEIGHT: 61 IN | HEART RATE: 84 BPM | BODY MASS INDEX: 27 KG/M2 | SYSTOLIC BLOOD PRESSURE: 125 MMHG | DIASTOLIC BLOOD PRESSURE: 78 MMHG | OXYGEN SATURATION: 97 %

## 2023-09-25 DIAGNOSIS — M25.562 CHRONIC PAIN OF LEFT KNEE: ICD-10-CM

## 2023-09-25 DIAGNOSIS — G89.29 CHRONIC PAIN OF LEFT KNEE: ICD-10-CM

## 2023-09-25 DIAGNOSIS — Z96.652 S/P TOTAL KNEE ARTHROPLASTY, LEFT: Primary | ICD-10-CM

## 2023-09-25 PROCEDURE — 99244 OFF/OP CNSLTJ NEW/EST MOD 40: CPT | Performed by: PHYSICAL MEDICINE & REHABILITATION

## 2023-09-25 NOTE — PROGRESS NOTES
Assessment  1. S/P total knee arthroplasty, left    2. Chronic pain of left knee        Plan  1. At this time we will look into genicular nerve blocks and radiofrequency ablation for the patient if she has appropriate response to diagnostic injection. 2.  If she continues with posterior knee pain could consider hamstring tendon injection under ultrasound guidance. My impressions and treatment recommendations were discussed in detail with the patient who verbalized understanding and had no further questions. Discharge instructions were provided. I personally saw and examined the patient and I agree with the above discussed plan of care. Orders Placed This Encounter   Procedures   • FL spine and pain procedure     Standing Status:   Future     Standing Expiration Date:   9/25/2024     Order Specific Question:   Reason for Exam:     Answer:   (L) genicular nerve blocks     Order Specific Question:   Anticoagulant hold needed? Answer:   no     No orders of the defined types were placed in this encounter. History of Present Illness    Dilcia Huang is a 56 y.o. female seen in consultation at the request of Dr. Gertrude Jones regarding chronic knee pain status post total knee arthroplasty in May of this year. She is continuing with anterior and posterior knee pain but has participated appropriately in physical therapy and has been discharged after completing functional goals. She does continue with moderate pain rated as a 3-4/10 which is constant without any typical pattern and characterized as throbbing dull aching and numbness. She does not require an assistive device for ambulation at this point. Aggravating factors include exercise. Alleviating factors are unknown. Follow-up imaging of the knee has been obtained and does not reveal any concerning findings about the total knee arthroplasty. She has noted some moderate relief from physical therapy exercise and a TENS unit.     Social history negative for tobacco and marijuana use positive for rare alcohol consumption. Currently not using any prescription pain medications. I have personally reviewed and/or updated the patient's past medical history, past surgical history, family history, social history, current medications, allergies, and vital signs today. Review of Systems   Constitutional: Negative for chills, fever and unexpected weight change. HENT: Positive for hearing loss. Negative for ear pain, nosebleeds and sinus pain. Eyes: Negative for pain and visual disturbance. Respiratory: Negative for cough and wheezing. Cardiovascular: Negative for palpitations and leg swelling. Gastrointestinal: Negative for abdominal pain and constipation. Endocrine: Negative for polydipsia and polyuria. Genitourinary: Negative for difficulty urinating and frequency. Musculoskeletal: Positive for myalgias. Negative for gait problem and joint swelling. Skin: Negative for rash. Neurological: Positive for numbness and headaches. Negative for weakness. Hematological: Does not bruise/bleed easily. Psychiatric/Behavioral: Positive for decreased concentration and dysphoric mood. The patient is nervous/anxious.         Patient Active Problem List   Diagnosis   • Fibromyalgia   • Hypercholesteremia   • Migraine without aura and without status migrainosus, not intractable   • Persistent insomnia   • Vitamin D deficiency   • Low ferritin   • Benign essential hypertension   • Carpal tunnel syndrome, bilateral   • Syncopal episodes   • Sleep disturbance   • COVID-19   • Overweight with body mass index (BMI) of 28 to 28.9 in adult   • Primary generalized (osteo)arthritis   • Cervical spondylosis without myelopathy   • Gastroesophageal reflux disease without esophagitis   • History of fetal loss   • Undifferentiated connective tissue disease (HCC)   • Obstructive sleep apnea-hypopnea syndrome   • Excessive daytime sleepiness   • Primary osteoarthritis of left knee   • Tendinitis of right rotator cuff   • Seronegative arthropathy of multiple sites [facility])   • History of colon polyps   • MDD (major depressive disorder), recurrent episode, mild ([address])   • Anxiety and depression   • Left knee pain       Past Medical History:   Diagnosis Date   • Allergic 02/01/2020   • Anemia    • Anesthesia complication     woke up during D/C   • Anxiety    • Arthritis    • BMI 30.0-30.9,adult    • Carpal tunnel syndrome on right    • Cervical spondylosis without myelopathy    • COVID 12/01/2021   • Depression    • Fibromyalgia    • Fibromyalgia, primary    • GERD (gastroesophageal reflux disease)    • Headache(784.0) 1982   • History of fetal loss     Recurrent   • Hyperlipidemia    • Hypertension    • Jaw pain     and both ears with migraines   • Migraine    • Neck pain    • Osteoarthritis     lumbar spine   • Sicca syndrome (HCC)    • Sleep apnea     unable to use CPAP   • Syncopal episodes     with severe migraines   • Undifferentiated connective tissue disease ([address])    • Wears contact lenses     or glasses       Past Surgical History:   Procedure Laterality Date   • CHOLECYSTECTOMY     • COLONOSCOPY     • DILATION AND CURETTAGE OF UTERUS     • ORTHOPEDIC SURGERY  5/16/2023    Left knee replacement   • ME ARTHRP KNE CONDYLE&PLATU MEDIAL&LAT COMPARTMENTS Left 05/16/2023    Procedure: ARTHROPLASTY KNEE TOTAL;  Surgeon: Efrain Decker MD;  Location: [facility];  Service: Orthopedics   • TUBAL LIGATION         Family History   Problem Relation Age of Onset   • MORGAN disease Mother    • Arthritis Mother    • Depression Mother    • Hypertension Mother    • Coronary artery disease Mother    • Migraines Mother    • Osteoporosis Mother    • Heart attack Father 79   • Diabetes Father    • Prostate cancer Father    • Hypertension Father    • Arthritis Father    • Stroke Father    • Cancer Father         Prostate   • ADD / ADHD Father    • Coronary artery disease Father • Lupus Sister    • Raynaud syndrome Sister    • Sjogren's syndrome Sister    • Depression Sister    • ADD / ADHD Sister    • Anxiety disorder Sister    • No Known Problems Daughter    • Breast cancer Maternal Grandmother 79   • Arthritis Maternal Grandmother    • Cancer Maternal Grandmother         Breast   • Glaucoma Maternal Grandmother    • Prostate cancer Maternal Grandfather 76   • Arthritis Maternal Grandfather    • Cancer Maternal Grandfather         Prostate   • Colon cancer Paternal Grandmother 80   • Cancer Paternal Grandmother         Colon   • Alcohol abuse Paternal Grandfather    • ADD / ADHD Brother    • OCD Brother    • No Known Problems Son    • No Known Problems Son    • No Known Problems Son    • No Known Problems Son    • No Known Problems Son    • No Known Problems Maternal Uncle    • No Known Problems Paternal Aunt    • No Known Problems Paternal Uncle        Social History     Occupational History   • Occupation: Administration   Tobacco Use   • Smoking status: Never   • Smokeless tobacco: Never   Vaping Use   • Vaping Use: Never used   Substance and Sexual Activity   • Alcohol use: Not Currently     Comment: rarely   • Drug use: Never   • Sexual activity: Yes     Partners: Male     Birth control/protection: Female Sterilization       Current Outpatient Medications on File Prior to Visit   Medication Sig   • acetaminophen (TYLENOL) 500 mg tablet Take 500-1,000 mg by mouth every 6 (six) hours as needed for mild pain   • Aspirin-Acetaminophen-Caffeine (EXCEDRIN MIGRAINE PO) Take 1 tablet by mouth as needed (migraines)   • buPROPion (Wellbutrin XL) 300 mg 24 hr tablet Take 1 tablet (300 mg total) by mouth every morning   • Calcium Carb-Cholecalciferol (CALCIUM 500 + D3 PO) Take 2 capsules by mouth daily   • cetirizine (ZyrTEC) 10 mg tablet Take 10 mg by mouth daily PRN   • Cyanocobalamin (VITAMIN B 12 PO) Take by mouth daily   • gabapentin (Neurontin) 300 mg capsule Take 1 capsule (300 mg total) by mouth daily at bedtime Can increase to twice daily after 7 days if needed and then to three times daily after 14 days if needed   • lisinopril (ZESTRIL) 20 mg tablet TAKE 1 TABLET(20 MG) BY MOUTH DAILY   • Magnesium 400 MG TABS Take 2 tablets by mouth in the morning   • Multiple Vitamin (multivitamin) tablet Take 1 tablet by mouth daily   • omeprazole (PriLOSEC) 20 mg delayed release capsule Take 1 capsule (20 mg total) by mouth daily   • SUMAtriptan (IMITREX) 100 mg tablet Take 1 tablet (100 mg total) by mouth once as needed for migraine May repeat x1 in 2 hours, max 2/24 hours, 9/month   • ascorbic acid (VITAMIN C) 500 MG tablet Take 1 tablet (500 mg total) by mouth 2 (two) times a day   • aspirin (ECOTRIN LOW STRENGTH) 81 mg EC tablet Take 1 tablet (81 mg total) by mouth 2 (two) times a day   • ferrous sulfate 324 (65 Fe) mg Take 1 tablet (324 mg total) by mouth 2 (two) times a day before meals   • folic acid (FOLVITE) 1 mg tablet Take 1 tablet (1 mg total) by mouth daily   • ondansetron (ZOFRAN-ODT) 4 mg disintegrating tablet Take 1 tablet (4 mg total) by mouth every 8 (eight) hours as needed for nausea or vomiting   • traMADol (Ultram) 50 mg tablet Take 1 tablet (50 mg total) by mouth every 6 (six) hours as needed for moderate pain     No current facility-administered medications on file prior to visit. Allergies   Allergen Reactions   • Propranolol Other (See Comments)     Halluncinations   • Raspberry - Food Allergy Allergic Rhinitis, Itching and Nasal Congestion   • Latex Rash       Physical Exam    /78   Pulse 84   Ht 5' 1" (1.549 m)   Wt 64.9 kg (143 lb)   LMP 08/31/2019 (Exact Date)   SpO2 97%   BMI 27.02 kg/m²     Constitutional: normal, well developed, well nourished, alert, in no distress and non-toxic and no overt pain behavior.   Eyes: anicteric  HEENT: grossly intact  Neck:  symmetric, trachea midline and no masses   Pulmonary:even and unlabored  Cardiovascular:No edema or pitting edema present  Psychiatric:Mood and affect appropriate  Neurologic:Cranial Nerves II-XII grossly intact  Musculoskeletal:normal, except for tenderness to palpation along the hamstring tendons over the posterior aspect of the left knee, she also has pain anteriorly and along the lateral and medial aspects of the knee, essentially full active range of motion of the knee is noted without instability    Imaging

## 2023-09-26 ENCOUNTER — TELEPHONE (OUTPATIENT)
Dept: RADIOLOGY | Facility: MEDICAL CENTER | Age: 58
End: 2023-09-26

## 2023-09-26 NOTE — TELEPHONE ENCOUNTER
Patient called. I reviewed with her the medical policy. She understand and will call LIDIA Dickens to inquire both CPT codes as a self-pay cost and call to let me know her decision.

## 2023-09-26 NOTE — RESULT ENCOUNTER NOTE
Jaden Parrish,  Your mammogram is normal. We recommend you schedule your next mammogram in one year.    Have a good day,   Dr. Bonner Mon

## 2023-09-26 NOTE — TELEPHONE ENCOUNTER
Per [facility], active medical policy # [id number], geniculate RFA is considered investigational and is a non-covered benefit. Reviewed and confirmed policy with JE. Left message for patient to call me back DIRECTLY to review. Left my DIRECT phone # [phone] on message. Can schedule hamstring tendon injection, USGI as discussed as second option at recent [address] and can offer [employer] information as well, cpt [phone] and [phone].

## 2023-10-03 DIAGNOSIS — M17.12 PRIMARY OSTEOARTHRITIS OF LEFT KNEE: ICD-10-CM

## 2023-10-03 RX ORDER — GABAPENTIN 300 MG/1
CAPSULE ORAL
Qty: 90 CAPSULE | Refills: 0 | Status: SHIPPED | OUTPATIENT
Start: 2023-10-03

## 2023-10-18 DIAGNOSIS — R10.13 EPIGASTRIC PAIN: ICD-10-CM

## 2023-10-18 DIAGNOSIS — I10 BENIGN ESSENTIAL HYPERTENSION: ICD-10-CM

## 2023-10-18 RX ORDER — LISINOPRIL 20 MG/1
TABLET ORAL
Qty: 90 TABLET | Refills: 1 | Status: SHIPPED | OUTPATIENT
Start: 2023-10-18

## 2023-10-18 RX ORDER — OMEPRAZOLE 20 MG/1
20 CAPSULE, DELAYED RELEASE ORAL DAILY
Qty: 90 CAPSULE | Refills: 1 | Status: SHIPPED | OUTPATIENT
Start: 2023-10-18

## 2023-10-23 ENCOUNTER — EVALUATION (OUTPATIENT)
Dept: PHYSICAL THERAPY | Facility: REHABILITATION | Age: 58
End: 2023-10-23
Payer: COMMERCIAL

## 2023-10-23 DIAGNOSIS — M25.562 CHRONIC PAIN OF LEFT KNEE: Primary | ICD-10-CM

## 2023-10-23 DIAGNOSIS — Z96.652 S/P TOTAL KNEE ARTHROPLASTY, LEFT: ICD-10-CM

## 2023-10-23 DIAGNOSIS — G89.29 CHRONIC PAIN OF LEFT KNEE: Primary | ICD-10-CM

## 2023-10-23 PROCEDURE — 97140 MANUAL THERAPY 1/> REGIONS: CPT | Performed by: PHYSICAL THERAPIST

## 2023-10-23 NOTE — PROGRESS NOTES
Daily Note/Discharge     Today's date: 10/23/2023  Patient name: Miguel Barakat  : 1965  MRN: [id number]  Referring provider: Maru Snider, *  Dx:   Encounter Diagnosis     ICD-10-CM    1. Chronic pain of left knee  M25.562     G89.29       2. S/P total knee arthroplasty, left  D15.417 Ambulatory Referral to Physical Therapy                     Subjective: Pt came to PT for f/u regarding ROM and pain. Pt notes that she has been having very min pain and sx's throughout her day 2/10 and increases 3-4/10 with descending. Pt is using medication very infrequently at this point. Pt notes that she is adjusting at times at night with use of pillow, but improved. Objective: See treatment diary below      Assessment:     Knee ROM Flex: 130  Ext: -2 at rest 0 with active quad set    Strength Knee flex: 5/5  Knee Ext: 5/5  Hip flex:5/5  Hip abd: 4+/5  Hip ext:4+/5  SLR:No lag 5/5  Pt ambulates without use of AD without antalgic gait and natural heel strike to push off on left. Goals  Funct 1. Improve amb with use of SPC 1/2 mile x4 weeks. -met  2. Negotiate stairs reciprocally with rail x4 weeks. -met  3. Improve sleep tolerance 4-5 hours x4 weeks. -met  Impairment 1. Increase ROM by 25% x4 weeks-met  2. Decrease pain by 25 % x4 weeks-met  3. Increase strength by 1/2 grade x4 weeks. -met      Plan: Pt has no regression of ROM/strength over the last month with independent activities. Pt issued HEP for cont strengthening and funct improvement.   Pt is Dc'd from PT.

## 2023-10-24 ENCOUNTER — OFFICE VISIT (OUTPATIENT)
Dept: URGENT CARE | Facility: MEDICAL CENTER | Age: 58
End: 2023-10-24
Payer: COMMERCIAL

## 2023-10-24 VITALS
TEMPERATURE: 98.8 F | RESPIRATION RATE: 18 BRPM | OXYGEN SATURATION: 99 % | SYSTOLIC BLOOD PRESSURE: 136 MMHG | DIASTOLIC BLOOD PRESSURE: 80 MMHG | HEART RATE: 79 BPM

## 2023-10-24 DIAGNOSIS — T23.272A BURN, WRIST, SECOND DEGREE, LEFT, INITIAL ENCOUNTER: Primary | ICD-10-CM

## 2023-10-24 PROCEDURE — 99213 OFFICE O/P EST LOW 20 MIN: CPT

## 2023-10-24 NOTE — PATIENT INSTRUCTIONS
Prescribed Silvadene, use as directed. Change dressing daily. Wash area gently with soap and water. Referral to wound care provided. Follow up with PCP in 3-5 days. Proceed to  ER if symptoms worsen. Second-Degree Burn   AMBULATORY CARE:   A second-degree burn  is also called a partial-thickness burn. A second-degree burn occurs when the first layer and some of the second layer of skin are burned. A superficial second-degree burn usually heals within 2 to 3 weeks with some scarring. A deep second-degree burn can take longer to heal. A second-degree burn can also get worse after a few days and become a third-degree burn. Common signs and symptoms of a second-degree burn:   A superficial second-degree burn  includes the first layer and some of the second layer. The deeper layers, sweat glands, and oil glands are not damaged. The skin is red, moist, very painful to the touch, and has blisters. Areas of redness turn white when pressure is applied. The area returns to red quickly when the pressure is removed. A deep second-degree burn  includes damage in the middle layer, and in the sweat glands and oil glands. The skin is mixed red or waxy white, and wet or moist. Some areas of redness may turn white when pressure is applied. The area may return to red slowly or not all when the pressure is removed. Treatment  may include any of the following:  Medicines  may be used to decrease pain, prevent infection, or help your burn heal. They may be given as a pill or as an ointment applied to your skin. Surgery  may remove damaged tissue, replace or cover lost skin, or relieve pressure and improve blood flow. Surgery can help prevent infection, decrease inflammation, and improve healing. Surgery can also improve the appearance of your skin and reduce scarring. Burn care:   Wash your hands with soap and water. Dry your hands with a clean towel or paper towel. Remove old bandages.   You may need to soak the bandage in water before you remove it so it will not stick to your wound. Gently clean the burned area daily with mild soap and water. Pat the area dry. Look for any swelling or redness around the burn. Do not break closed blisters. You may cause a skin infection. Apply cream or ointment to the burn with a cotton swab. Place a nonstick bandage over your burn. Wrap a layer of gauze around the bandage to hold it in place. The wrap should be snug but not tight. It is too tight if you feel tingling or lose feeling in that area. Apply gentle pressure for a few minutes if bleeding occurs. Elevate your burned arm or leg above the level of your heart as often as you can. This will help decrease swelling and pain. Prop your burned arm or leg on pillows or blankets to keep it elevated comfortably. Self-care:   Drink liquids as directed. You may need to drink extra liquid to help prevent dehydration. Ask how much liquid to drink each day and which liquids are best for you. Go to physical therapy, if directed. Your muscles and joints may not work well after a second-degree burn. A physical therapist teaches you exercises to help improve movement and strength, and to decrease pain. Prevent second-degree burns:   Do not leave cups, mugs, or bowls containing hot liquids at the edge of a table. Keep pot handles turned away from the stove front. Do not leave a lit cigarette. Make sure it is no longer lit. Then dispose of it safely. Store dangerous items out of the reach of children. Store cigarette lighters, matches, and chemicals where children cannot reach them. Use child safety latches on the door of the safe storage area. Keep your water heater setting to low or medium  (90°F to 120°F, or 32°C to 48°C). Wear sunscreen that has a sun protectant factor (SPF) of 15 or higher. The sunscreen should also have ultraviolet A (UVA) and ultraviolet B (UVB) protection.  Follow the directions on the label when you use sunscreen. Put on more sunscreen if you are in the sun for more than an hour. Reapply sunscreen often if you go swimming or are sweating. Follow up with your doctor or burn specialist as directed: You may need to return to have your wound checked and your bandage changed. Write down your questions so you remember to ask them during your visits. © Copyright Lauraletta Gosselin 2023 Information is for End User's use only and may not be sold, redistributed or otherwise used for commercial purposes. The above information is an  only. It is not intended as medical advice for individual conditions or treatments. Talk to your doctor, nurse or pharmacist before following any medical regimen to see if it is safe and effective for you.

## 2023-10-24 NOTE — PROGRESS NOTES
[facility]        NAME: Myron Buck is a 66 y.o. female  : 1965    MRN: [id number]  DATE: 2023  TIME: 7:45 PM    Assessment and Plan   Burn, wrist, second degree, left, initial encounter [T23.272A]  1. Burn, wrist, second degree, left, initial encounter  Ambulatory Referral to Wound Care    silver sulfadiazine (SILVADENE,SSD) 1 % cream        Second degree burn to left wrist. Prescribed Silvadene. Discussed proper wound care. Referral to wound care provided. Patient Instructions     Prescribed Silvadene, use as directed. Change dressing daily. Wash area gently with soap and water. Referral to wound care provided. Follow up with PCP in 3-5 days. Proceed to  ER if symptoms worsen. Chief Complaint     Chief Complaint   Patient presents with    Wrist Injury     Patient c/o a burn on her left wrist that happen when she was cooking earlier today. History of Present Illness       Patient states while pulling something out of the oven the had been cooking all day the steam hit her left wrist, causing pain to the area. She ran it under cold water as soon as it happened and then later applied a cool compress to the area. Currently, she would rate her pain as a 4/10. She notes that the area is red, swollen, and starting to blister. Review of Systems   Review of Systems   Musculoskeletal:  Positive for joint swelling. Skin:  Positive for color change.          Current Medications       Current Outpatient Medications:     silver sulfadiazine (SILVADENE,SSD) 1 % cream, Apply topically daily, Disp: 25 g, Rfl: 0    acetaminophen (TYLENOL) 500 mg tablet, Take 500-1,000 mg by mouth every 6 (six) hours as needed for mild pain, Disp: , Rfl:     ascorbic acid (VITAMIN C) 500 MG tablet, Take 1 tablet (500 mg total) by mouth 2 (two) times a day, Disp: 60 tablet, Rfl: 1    aspirin (ECOTRIN LOW STRENGTH) 81 mg EC tablet, Take 1 tablet (81 mg total) by mouth 2 (two) times a day, Disp: 60 tablet, Rfl: 0    Aspirin-Acetaminophen-Caffeine (EXCEDRIN MIGRAINE PO), Take 1 tablet by mouth as needed (migraines), Disp: , Rfl:     buPROPion (Wellbutrin XL) 300 mg 24 hr tablet, Take 1 tablet (300 mg total) by mouth every morning, Disp: 90 tablet, Rfl: 3    Calcium Carb-Cholecalciferol (CALCIUM 500 + D3 PO), Take 2 capsules by mouth daily, Disp: , Rfl:     cetirizine (ZyrTEC) 10 mg tablet, Take 10 mg by mouth daily PRN, Disp: , Rfl:     Cyanocobalamin (VITAMIN B 12 PO), Take by mouth daily, Disp: , Rfl:     ferrous sulfate 324 (65 Fe) mg, Take 1 tablet (324 mg total) by mouth 2 (two) times a day before meals, Disp: 60 tablet, Rfl: 0    folic acid (FOLVITE) 1 mg tablet, Take 1 tablet (1 mg total) by mouth daily, Disp: 30 tablet, Rfl: 0    gabapentin (NEURONTIN) 300 mg capsule, TAKE 1CAPSULE BY MOUTH DAILY AT BEDTIME, CAN INCREASE TO TWICE DAILY AFTER 7DAYS IF NEEDED AND THEN TO THREE TIMES DAILY AFTER 14 DAYS, Disp: 90 capsule, Rfl: 0    lisinopril (ZESTRIL) 20 mg tablet, TAKE 1 TABLET(20 MG) BY MOUTH DAILY, Disp: 90 tablet, Rfl: 1    Magnesium 400 MG TABS, Take 2 tablets by mouth in the morning, Disp: , Rfl:     Multiple Vitamin (multivitamin) tablet, Take 1 tablet by mouth daily, Disp: 30 tablet, Rfl: 1    omeprazole (PriLOSEC) 20 mg delayed release capsule, TAKE 1 CAPSULE(20 MG) BY MOUTH DAILY, Disp: 90 capsule, Rfl: 1    ondansetron (ZOFRAN-ODT) 4 mg disintegrating tablet, Take 1 tablet (4 mg total) by mouth every 8 (eight) hours as needed for nausea or vomiting, Disp: 15 tablet, Rfl: 0    SUMAtriptan (IMITREX) 100 mg tablet, Take 1 tablet (100 mg total) by mouth once as needed for migraine May repeat x1 in 2 hours, max 2/24 hours, 9/month, Disp: 9 tablet, Rfl: 12    traMADol (Ultram) 50 mg tablet, Take 1 tablet (50 mg total) by mouth every 6 (six) hours as needed for moderate pain, Disp: 30 tablet, Rfl: 0    Current Allergies     Allergies as of 10/24/2023 - Reviewed 10/24/2023   Allergen Reaction Noted    Propranolol Other (See Comments) 08/22/2019    Raspberry - food allergy Allergic Rhinitis, Itching, and Nasal Congestion 11/05/2020    Latex Rash 11/03/2022            The following portions of the patient's history were reviewed and updated as appropriate: allergies, current medications, past family history, past medical history, past social history, past surgical history and problem list.     Past Medical History:   Diagnosis Date    Allergic 02/01/2020    Anemia     Anesthesia complication     woke up during D/C    Anxiety     Arthritis     BMI 30.0-30.9,adult     Carpal tunnel syndrome on right     Cervical spondylosis without myelopathy     COVID 12/01/2021    Depression     Fibromyalgia     Fibromyalgia, primary     GERD (gastroesophageal reflux disease)     Headache(784.0) 1982    History of fetal loss     Recurrent    Hyperlipidemia     Hypertension     Jaw pain     and both ears with migraines    Migraine     Neck pain     Osteoarthritis     lumbar spine    Sicca syndrome ([address])     Sleep apnea     unable to use CPAP    Syncopal episodes     with severe migraines    Undifferentiated connective tissue disease ([address])     Wears contact lenses     or glasses       Past Surgical History:   Procedure Laterality Date    CHOLECYSTECTOMY      COLONOSCOPY      DILATION AND CURETTAGE OF UTERUS      ORTHOPEDIC SURGERY  5/16/2023    Left knee replacement    [place] ARTHRP KNE CONDYLE&PLATU MEDIAL&LAT COMPARTMENTS Left 05/16/2023    Procedure: ARTHROPLASTY KNEE TOTAL;  Surgeon: Bozena Marlow MD;  Location: [facility];  Service: Orthopedics    TUBAL LIGATION         Family History   Problem Relation Age of Onset    MORGAN disease Mother     Arthritis Mother     Depression Mother     Hypertension Mother     Coronary artery disease Mother     Migraines Mother     Osteoporosis Mother     Heart attack Father 79    Diabetes Father     Prostate cancer Father     Hypertension Father     Arthritis Father Stroke Father     Cancer Father         Prostate    ADD / ADHD Father     Coronary artery disease Father     Lupus Sister     Raynaud syndrome Sister     Sjogren's syndrome Sister     Depression Sister     ADD / ADHD Sister     Anxiety disorder Sister     No Known Problems Daughter     Breast cancer Maternal Grandmother 79    Arthritis Maternal Grandmother     Cancer Maternal Grandmother         Breast    Glaucoma Maternal Grandmother     Prostate cancer Maternal Grandfather 76    Arthritis Maternal Grandfather     Cancer Maternal Grandfather         Prostate    Colon cancer Paternal Grandmother 80    Cancer Paternal Grandmother         Colon    Alcohol abuse Paternal Grandfather     ADD / ADHD Brother     OCD Brother     No Known Problems Son     No Known Problems Son     No Known Problems Son     No Known Problems Son     No Known Problems Son     No Known Problems Maternal Uncle     No Known Problems Paternal Aunt     No Known Problems Paternal Uncle          Medications have been verified. Objective   /80   Pulse 79   Temp 98.8 °F (37.1 °C)   Resp 18   LMP 08/31/2019 (Exact Date)   SpO2 99%        Physical Exam     Physical Exam  Vitals and nursing note reviewed. Constitutional:       General: She is not in acute distress. Appearance: Normal appearance. She is not ill-appearing. Cardiovascular:      Rate and Rhythm: Normal rate and regular rhythm. Pulses: Normal pulses. Heart sounds: Normal heart sounds. Pulmonary:      Effort: Pulmonary effort is normal.      Breath sounds: Normal breath sounds. Skin:     Findings: Burn (left wrist volar aspect; second degree - erythema and redness, tender to touch, swelling) present. Neurological:      Mental Status: She is alert.

## 2023-10-30 ENCOUNTER — HOSPITAL ENCOUNTER (OUTPATIENT)
Dept: BONE DENSITY | Facility: MEDICAL CENTER | Age: 58
Discharge: HOME | End: 2023-10-30
Payer: COMMERCIAL

## 2023-10-30 DIAGNOSIS — Z82.62 FAMILY HISTORY OF OSTEOPOROSIS: ICD-10-CM

## 2023-10-30 PROCEDURE — 77080 DXA BONE DENSITY AXIAL: CPT

## 2023-11-03 PROBLEM — M85.89 OSTEOPENIA OF MULTIPLE SITES: Status: ACTIVE | Noted: 2023-11-03

## 2023-11-03 NOTE — RESULT ENCOUNTER NOTE
Torsten Barraza,  Your DEXA scan results show osteopenia, or low bone density. This does not require treatment with medication, but it is recommended that you keep up in adequate amount of calcium and vitamin-D. Your intake of calcium should be at least 1500 mg daily. This is the best to come from diet, or from supplements if you are unable to get enough from your diet. You should also have enough vitamin-D. You should be getting at least 1000 units of vitamin-D daily, unless we have already discussed you taking a greater amount due to a vitamin-D deficiency. We will likely repeat your DEXA scan test in about 2 years. Let me know if you have any questions.   Take care,   Yoel Crane, DO

## 2023-11-14 ENCOUNTER — OFFICE VISIT (OUTPATIENT)
Dept: FAMILY MEDICINE CLINIC | Facility: CLINIC | Age: 58
End: 2023-11-14
Payer: COMMERCIAL

## 2023-11-14 VITALS
HEART RATE: 84 BPM | OXYGEN SATURATION: 98 % | RESPIRATION RATE: 16 BRPM | WEIGHT: 139.6 LBS | TEMPERATURE: 95.9 F | HEIGHT: 61 IN | BODY MASS INDEX: 26.36 KG/M2

## 2023-11-14 DIAGNOSIS — M65.342 TRIGGER FINGER OF ALL DIGITS OF BOTH HANDS: ICD-10-CM

## 2023-11-14 DIAGNOSIS — M65.311 TRIGGER FINGER OF ALL DIGITS OF BOTH HANDS: ICD-10-CM

## 2023-11-14 DIAGNOSIS — M65.331 TRIGGER FINGER OF ALL DIGITS OF BOTH HANDS: ICD-10-CM

## 2023-11-14 DIAGNOSIS — E78.00 HYPERCHOLESTEREMIA: ICD-10-CM

## 2023-11-14 DIAGNOSIS — M65.332 TRIGGER FINGER OF ALL DIGITS OF BOTH HANDS: ICD-10-CM

## 2023-11-14 DIAGNOSIS — F33.0 MDD (MAJOR DEPRESSIVE DISORDER), RECURRENT EPISODE, MILD (HCC): ICD-10-CM

## 2023-11-14 DIAGNOSIS — G47.00 PERSISTENT INSOMNIA: ICD-10-CM

## 2023-11-14 DIAGNOSIS — M65.352 TRIGGER FINGER OF ALL DIGITS OF BOTH HANDS: ICD-10-CM

## 2023-11-14 DIAGNOSIS — M65.321 TRIGGER FINGER OF ALL DIGITS OF BOTH HANDS: ICD-10-CM

## 2023-11-14 DIAGNOSIS — M65.351 TRIGGER FINGER OF ALL DIGITS OF BOTH HANDS: ICD-10-CM

## 2023-11-14 DIAGNOSIS — M79.7 FIBROMYALGIA: ICD-10-CM

## 2023-11-14 DIAGNOSIS — M65.322 TRIGGER FINGER OF ALL DIGITS OF BOTH HANDS: ICD-10-CM

## 2023-11-14 DIAGNOSIS — F41.9 ANXIETY: Primary | ICD-10-CM

## 2023-11-14 DIAGNOSIS — M67.80: ICD-10-CM

## 2023-11-14 DIAGNOSIS — M65.312 TRIGGER FINGER OF ALL DIGITS OF BOTH HANDS: ICD-10-CM

## 2023-11-14 DIAGNOSIS — M65.341 TRIGGER FINGER OF ALL DIGITS OF BOTH HANDS: ICD-10-CM

## 2023-11-14 PROCEDURE — 99214 OFFICE O/P EST MOD 30 MIN: CPT | Performed by: FAMILY MEDICINE

## 2023-11-14 RX ORDER — BUPROPION HYDROCHLORIDE 150 MG/1
150 TABLET ORAL EVERY MORNING
Qty: 30 TABLET | Refills: 2 | Status: SHIPPED | OUTPATIENT
Start: 2023-11-14 | End: 2024-05-12

## 2023-11-14 NOTE — PROGRESS NOTES
1. Anxiety  -     buPROPion (WELLBUTRIN XL) 150 mg 24 hr tablet; Take 1 tablet (150 mg total) by mouth every morning    2. Persistent insomnia  Assessment & Plan:  Stable. 3. Fibromyalgia  Assessment & Plan:  Weaned herself off of neurontin. Pain stable       4. MDD (major depressive disorder), recurrent episode, mild ([address])  Assessment & Plan:  Doing well. She wishes to wean off of wellbutrin because she feels she no longer needs it. 5. Hypercholesteremia  Assessment & Plan:  Update fasting blood work prior to next visit. Orders:  -     Comprehensive metabolic panel; Future  -     Lipid panel; Future  -     CBC and differential; Future; Expected date: 11/14/2023    6. Tendon mass  Comments:  Reassured benign. this is causing her pain. will refer to hand surgeon. Orders:  -     Ambulatory referral to Hand Surgery; Future    7. Trigger finger of all digits of both hands  Comments:  she reports stiffness in all fingers, losing strength, and fingers locking up. again, refer to hand surgeon. Orders:  -     Ambulatory referral to Hand Surgery;  Future         Return in about 3 months (around 2/14/2024) for physical .    Subjective:   Denise Leija is a 66 y.o. female here today for a follow-up on her current medical conditions:    Patient Active Problem List   Diagnosis   • Fibromyalgia   • Hypercholesteremia   • Migraine without aura and without status migrainosus, not intractable   • Persistent insomnia   • Vitamin D deficiency   • Low ferritin   • Benign essential hypertension   • Carpal tunnel syndrome, bilateral   • Syncopal episodes   • Sleep disturbance   • COVID-19   • Overweight with body mass index (BMI) of 28 to 28.9 in adult   • Primary generalized (osteo)arthritis   • Cervical spondylosis without myelopathy   • Gastroesophageal reflux disease without esophagitis   • History of fetal loss   • Undifferentiated connective tissue disease (HCC)   • Obstructive sleep apnea-hypopnea syndrome   • Excessive daytime sleepiness   • Primary osteoarthritis of left knee   • Tendinitis of right rotator cuff   • Seronegative arthropathy of multiple sites ([address])   • History of colon polyps   • MDD (major depressive disorder), recurrent episode, mild (HCC)   • Anxiety and depression   • Left knee pain   • Osteopenia of multiple sites       Patient Care Team:  Jese Bhatia DO as PCP - General ([address] [place] [address])  Marie Jasso DO (Cardiology)  Mylene Saucedo MD (Rheumatology)  Estiven Mathews MD (Neurology)  Edson Phillips MD (Dermatology)    Current Medications:  Current Outpatient Medications   Medication Sig Dispense Refill   • acetaminophen (TYLENOL) 500 mg tablet Take 500-1,000 mg by mouth every 6 (six) hours as needed for mild pain     • Aspirin-Acetaminophen-Caffeine (EXCEDRIN MIGRAINE PO) Take 1 tablet by mouth as needed (migraines)     • buPROPion (WELLBUTRIN XL) 150 mg 24 hr tablet Take 1 tablet (150 mg total) by mouth every morning 30 tablet 2   • Calcium Carb-Cholecalciferol (CALCIUM 500 + D3 PO) Take 2 capsules by mouth daily     • cetirizine (ZyrTEC) 10 mg tablet Take 10 mg by mouth daily PRN     • Cyanocobalamin (VITAMIN B 12 PO) Take by mouth daily     • ferrous sulfate 324 (65 Fe) mg Take 1 tablet (324 mg total) by mouth 2 (two) times a day before meals 60 tablet 0   • Magnesium 400 MG TABS Take 2 tablets by mouth in the morning     • Multiple Vitamin (multivitamin) tablet Take 1 tablet by mouth daily 30 tablet 1   • omeprazole (PriLOSEC) 20 mg delayed release capsule TAKE 1 CAPSULE(20 MG) BY MOUTH DAILY 90 capsule 1   • SUMAtriptan (IMITREX) 100 mg tablet Take 1 tablet (100 mg total) by mouth once as needed for migraine May repeat x1 in 2 hours, max 2/24 hours, 9/month 9 tablet 12   • ascorbic acid (VITAMIN C) 500 MG tablet Take 1 tablet (500 mg total) by mouth 2 (two) times a day 60 tablet 1     No current facility-administered medications for this visit.        HPI:  Chief Complaint   Patient presents with   • Follow-up     Patient is here to discuss decreasing medication and she is not interested in getting the influenza vaccine     -- Above per clinical staff and reviewed. --    PHQ-2/9 Depression Screening         ? Physical due 12/9/23 - cmp, cbc, lipids   last visit on wellbutrin, may be helping. She was concerned about ADD. consider Gene SIght, mood stablizer   watch VIKAS, considering topamax   Feb 2023 labs per rheum chol 246, tg 121, HDL 58,  to 164 (ASCV  D 3.1%)  glu 1010, A1C 5.2dec appt started wellbutrin to help with psychcological symptoms from pain, trauma. ?SSRI   review april 2022 labs - hihg calcium resolved, vit D 69   chol 224, TG 88, HDL 58,  (stable) -ASCVD risk low 2.2%   parotid tumo  r  CT scan 10/2021 stable compared to 6 months prior   home BP not accurate. lipids cmp HbA1C   Today:  Pain is different   Going down the steps is bearable   Exceeded expectations   Has lost weight     Wants to go off buspar     Mass on left hand - getting bigger and painful. About a month. No injury. Fingers getting locked   Hard to hold things or put pressure       The following portions of the patient's history were reviewed and updated as appropriate: allergies, current medications, past family history, past medical history, past social history, past surgical history and problem list.    Objective:  Vitals:  Pulse 84   Temp (!) 95.9 °F (35.5 °C)   Resp 16   Ht 5' 1" (1.549 m)   Wt 63.3 kg (139 lb 9.6 oz)   LMP 08/31/2019 (Exact Date)   SpO2 98%   BMI 26.38 kg/m²    Wt Readings from Last 3 Encounters:   11/14/23 63.3 kg (139 lb 9.6 oz)   09/25/23 64.9 kg (143 lb)   09/22/23 64 kg (141 lb 1.5 oz)      BP Readings from Last 3 Encounters:   10/24/23 136/80   09/25/23 125/78   09/18/23 115/78        Review of Systems   She has no other concerns. No unexpected weight changes. No chest pain, SOB, or palpitations. No GERD. No changes in bowels or bladder. Sleeping well.  No mood changes. Physical Exam   Constitutional:  she appears well-developed and well-nourished. HENT: Head: Normocephalic. Neck: Neck supple. Cardiovascular: Normal rate, regular rhythm and normal heart sounds. Pulmonary/Chest: Effort normal and breath sounds normal. No wheezes, rales, or rhonchi. Abdominal: Soft. Bowel sounds are normal. There is no tenderness. No hepatosplenomegaly. Musculoskeletal: she exhibits no edema. Left hand dorsal side over 3rd extensor tendon small palpable abnormality. Mildly tender. No increased warmth or swelling, no induration. Fingers with some pain with gripping, strength symmetrical. No cysts or nodules appreciated in palm. No locking appreciated. No arthritic changes appreciated in joints. Lymphadenopathy: she has no cervical adenopathy. Neurological: she is alert and oriented to person, place, and time. Skin: Skin is warm and dry. Psychiatric: she has a normal mood and affect. her behavior is normal. Thought content normal.     BMI Counseling: Body mass index is 26.38 kg/m². The BMI is above normal. Nutrition recommendations include decreasing portion sizes. Exercise recommendations include exercising 3-5 times per week. Rationale for BMI follow-up plan is due to patient being overweight or obese.

## 2023-11-15 NOTE — PATIENT INSTRUCTIONS
Tumeric   Glucosamine   Wellbutrin 300 mg every other day alternating with 150 mg for 2 weeks  Then wellbutrin 150 mg for 2 weeks   Then 150 mg every other day for 2 weeks   Then stop

## 2023-11-20 ENCOUNTER — OFFICE VISIT (OUTPATIENT)
Dept: OBGYN CLINIC | Facility: MEDICAL CENTER | Age: 58
End: 2023-11-20
Payer: COMMERCIAL

## 2023-11-20 ENCOUNTER — APPOINTMENT (OUTPATIENT)
Dept: RADIOLOGY | Facility: MEDICAL CENTER | Age: 58
End: 2023-11-20
Payer: COMMERCIAL

## 2023-11-20 VITALS
SYSTOLIC BLOOD PRESSURE: 125 MMHG | DIASTOLIC BLOOD PRESSURE: 81 MMHG | BODY MASS INDEX: 26.88 KG/M2 | WEIGHT: 142.4 LBS | HEART RATE: 79 BPM | HEIGHT: 61 IN

## 2023-11-20 DIAGNOSIS — Z96.652 S/P TOTAL KNEE ARTHROPLASTY, LEFT: Primary | ICD-10-CM

## 2023-11-20 DIAGNOSIS — Z96.652 S/P TOTAL KNEE ARTHROPLASTY, LEFT: ICD-10-CM

## 2023-11-20 DIAGNOSIS — M17.12 PRIMARY OSTEOARTHRITIS OF LEFT KNEE: ICD-10-CM

## 2023-11-20 PROCEDURE — 99213 OFFICE O/P EST LOW 20 MIN: CPT | Performed by: ORTHOPAEDIC SURGERY

## 2023-11-20 PROCEDURE — 73562 X-RAY EXAM OF KNEE 3: CPT

## 2023-11-20 NOTE — PROGRESS NOTES
Orthopaedic Surgery - Office Note  Genevieve Zimmerman (28 y.o. female)   : 1965   MRN: [id number]  Encounter Date: 2023    Chief Complaint   Patient presents with    Left Knee - Follow-up       Assessment / Plan  S/p left TKA, good progress but with persistent lateral knee pain due to IT band vs referred pain    Patient's pain at this time seems very specific to the IT band for her worst pain and more generalized soreness in the remainder of the knee. Was provided with a prescription for physical therapy with focus on IT band stretching, strengthening modalities such as ultrasound. Did have a prescription sent for Voltaren gel to her pharmacy. We did suggest using this in her IT band massage along with ice. Ice, heat and anti-inflammatories prn   Next visit new x-rays left knee. Return in about 6 months (around 2024) for follow up with Dr. Otoniel Stallings. History of Present Illness  Genevieve Zimmerman is a 66 y.o. female following up s/p left TKA on 2023. Overall patient feels that she has continued to improve and has some tightness in her knee with deep flexion compared to the other side but in therapy got up to 130 degrees. She still complains about pain in the lateral aspect of the knee that worsens with extended periods of time standing or walking. And the remainder of the knee she has more of a achy mild soreness which she feels is better than prior to surgery. She did see Dr. Evangelina Jacobson who recommended genicular nerve blocks and radiofrequency ablation but this was not covered under her insurance so she deferred this at this time due to cost.  She is continued with her home exercises. She is able to get up and down steps with mild discomfort. She does use Tylenol prn and she has completely weaned off the gabapentin at this time. Review of Systems  Pertinent items are noted in HPI. All other systems were reviewed and are negative.     Physical Exam  /81   Pulse 79   Ht 5' 1" (1.549 m)   Wt 64.6 kg (142 lb 6.4 oz)   LMP 08/31/2019 (Exact Date)   BMI 26.91 kg/m²   Cons: Appears well. No apparent distress. Psych: Alert. Oriented x3. Mood and affect normal.  Eyes: PERRLA, EOMI  Resp: Normal effort. No audible wheezing or stridor. CV: Palpable pulse. No discernable arrhythmia. No LE edema. Lymph:  No palpable cervical, axillary, or inguinal lymphadenopathy. Skin: Warm. No palpable masses. No visible lesions. Neuro: Normal muscle tone. Normal and symmetric DTR's. Left Knee Exam  Alignment:  Normal knee alignment. Inspection:  No swelling. No ecchymosis. Incision healed. Palpation:   Moderate tenderness at distal aspect of the IT band with more mild tenderness proximally. Very mild tenderness on the medial joint line of the knee. . No effusion. ROM:  Knee Extension 0. Knee Flexion 120. Strength:  Able to SLR without lag. Stability:  (-) Varus instability. (-) Valgus instability. in both flexion and extension   Tests:  No pertinent positive or negative tests. Patella:  Normal patellar mobility. Neurovascular:  Sensation intact in DP/SP/Tate/Sa/T nerve distributions. 2+ DP & PT pulses. Gait:  Normal.    Studies Reviewed  I have personally reviewed pertinent films in PACS. XR of right knee - From 11/20/2023 showed prosthesis in good position with no signs of loosening, no fracture or dislocation. Procedures  No procedures today. Medical, Surgical, Family, and Social History  The patient's medical history, family history, and social history, were reviewed and updated as appropriate.     Past Medical History:   Diagnosis Date    Allergic 02/01/2020    Anemia     Anesthesia complication     woke up during D/C    Anxiety     Arthritis     BMI 30.0-30.9,adult     Carpal tunnel syndrome on right     Cervical spondylosis without myelopathy     COVID 12/01/2021    Depression     Fibromyalgia     Fibromyalgia, primary     GERD (gastroesophageal reflux disease) Headache(784.0) 1982    History of fetal loss     Recurrent    Hyperlipidemia     Hypertension     Jaw pain     and both ears with migraines    Migraine     Neck pain     Osteoarthritis     lumbar spine    Sicca syndrome (HCC)     Sleep apnea     unable to use CPAP    Syncopal episodes     with severe migraines    Undifferentiated connective tissue disease ([address])     Wears contact lenses     or glasses       Past Surgical History:   Procedure Laterality Date    CHOLECYSTECTOMY      COLONOSCOPY      DILATION AND CURETTAGE OF UTERUS      ORTHOPEDIC SURGERY  5/16/2023    Left knee replacement    [place] ARTHRP KNE CONDYLE&PLATU MEDIAL&LAT COMPARTMENTS Left 05/16/2023    Procedure: ARTHROPLASTY KNEE TOTAL;  Surgeon: Merlin May, MD;  Location: [facility];  Service: Orthopedics    TUBAL LIGATION         Family History   Problem Relation Age of Onset    MORGAN disease Mother     Arthritis Mother     Depression Mother     Hypertension Mother     Coronary artery disease Mother     Migraines Mother     Osteoporosis Mother     Heart attack Father 79    Diabetes Father     Prostate cancer Father     Hypertension Father     Arthritis Father     Stroke Father     Cancer Father         Prostate    ADD / ADHD Father     Coronary artery disease Father     Lupus Sister     Raynaud syndrome Sister     Sjogren's syndrome Sister     Depression Sister     ADD / ADHD Sister     Anxiety disorder Sister     No Known Problems Daughter     Breast cancer Maternal Grandmother 79    Arthritis Maternal Grandmother     Cancer Maternal Grandmother         Breast    Glaucoma Maternal Grandmother     Prostate cancer Maternal Grandfather 76    Arthritis Maternal Grandfather     Cancer Maternal Grandfather         Prostate    Colon cancer Paternal Grandmother 80    Cancer Paternal Grandmother         Colon    Alcohol abuse Paternal Grandfather     ADD / ADHD Brother     OCD Brother     No Known Problems Son     No Known Problems Son     No Known Problems Son     No Known Problems Son     No Known Problems Son     No Known Problems Maternal Uncle     No Known Problems Paternal Aunt     No Known Problems Paternal Uncle        Social History     Occupational History    Occupation: Administration   Tobacco Use    Smoking status: Never    Smokeless tobacco: Never   Vaping Use    Vaping Use: Never used   Substance and Sexual Activity    Alcohol use: Not Currently     Comment: rarely    Drug use: Never    Sexual activity: Yes     Partners: Male     Birth control/protection: Female Sterilization       Allergies   Allergen Reactions    Propranolol Other (See Comments)     Halluncinations    Raspberry - Food Allergy Allergic Rhinitis, Itching and Nasal Congestion    Latex Rash         Current Outpatient Medications:     acetaminophen (TYLENOL) 500 mg tablet, Take 500-1,000 mg by mouth every 6 (six) hours as needed for mild pain, Disp: , Rfl:     Aspirin-Acetaminophen-Caffeine (EXCEDRIN MIGRAINE PO), Take 1 tablet by mouth as needed (migraines), Disp: , Rfl:     Calcium Carb-Cholecalciferol (CALCIUM 500 + D3 PO), Take 2 capsules by mouth daily, Disp: , Rfl:     cetirizine (ZyrTEC) 10 mg tablet, Take 10 mg by mouth daily PRN, Disp: , Rfl:     Cyanocobalamin (VITAMIN B 12 PO), Take by mouth daily, Disp: , Rfl:     Diclofenac Sodium (VOLTAREN) 1 %, Apply 2 g topically 4 (four) times a day, Disp: 350 g, Rfl: 2    Magnesium 400 MG TABS, Take 2 tablets by mouth in the morning, Disp: , Rfl:     Multiple Vitamin (multivitamin) tablet, Take 1 tablet by mouth daily, Disp: 30 tablet, Rfl: 1    omeprazole (PriLOSEC) 20 mg delayed release capsule, TAKE 1 CAPSULE(20 MG) BY MOUTH DAILY, Disp: 90 capsule, Rfl: 1    SUMAtriptan (IMITREX) 100 mg tablet, Take 1 tablet (100 mg total) by mouth once as needed for migraine May repeat x1 in 2 hours, max 2/24 hours, 9/month, Disp: 9 tablet, Rfl: 12    ascorbic acid (VITAMIN C) 500 MG tablet, Take 1 tablet (500 mg total) by mouth 2 (two) times a day, Disp: 60 tablet, Rfl: 1    buPROPion (WELLBUTRIN XL) 150 mg 24 hr tablet, Take 1 tablet (150 mg total) by mouth every morning (Patient not taking: Reported on 11/20/2023), Disp: 30 tablet, Rfl: 2    ferrous sulfate 324 (65 Fe) mg, Take 1 tablet (324 mg total) by mouth 2 (two) times a day before meals, Disp: 60 tablet, Rfl: 0          Scribe Attestation      I,:   am acting as a scribe while in the presence of the attending physician.:       I,:   personally performed the services described in this documentation    as scribed in my presence.:

## 2023-11-25 NOTE — PATIENT INSTRUCTIONS
Generally we do not recommend estrogen in the setting of possible migraine aura, due to stroke risk     Headache/migraine treatment:   Abortive medications (for immediate treatment of a headache): It is ok to take ibuprofen, acetaminophen or naproxen (Advil, Tylenol,  Aleve, Excedrin) if they help your headaches you should limit these to No more than 3 times a week to avoid medication overuse/rebound headaches  For your more moderate to severe migraines take this medication early   Sumatriptan/imitrex 100mg tabs - take one at the onset of headache  May repeat one time after 1-2 hours if pain has not resolved  (Max 2 a day and 9 a month)       Over the counter preventive supplements for headaches/migraines (if you try, try for 3 months straight)  (to take every day to help prevent headaches - not to take at the time of headache): There are combo pills online of these - none of which regulated by FDA and double check dosing - take appropriate dose only once a day- preventa migraine, migravent, mind ease, migrelief   [] Magnesium 400mg daily (If any diarrhea or upset stomach, decrease dose  as tolerated)  [] Riboflavin (Vitamin B2) 400mg daily - try online   (FYI B2 may make your urine bright/neon yellow)  AND/OR  [] Herbal medication: Petasites/Butterbur 150 mg daily - try online  (When choosing your Butterbur online or in the store, beware that there are some poor preps containing pyrrolizidine alkaloids (PAs) that can be harmful to the liver   Therefore, do not use butterbur products that are not labeled as PA-free )      Prescription preventive medications for headaches/migraines   (to take every day to help prevent headaches - not to take at the time of headache):  [x] we have options if needed     *Typically these types of medications take time untill you see the benefit, although some may see improvement in days, often it may take weeks, especially if the medication is being titrated up to a beneficial level  Please contact us if there are any concerns or questions regarding the medication  Lifestyle Recommendations:  [x] SLEEP - Maintain a regular sleep schedule: Adults need at least 7-8 hours of uninterrupted a night  Maintain good sleep hygiene:  Going to bed and waking up at consistent times, avoiding excessive daytime naps, avoiding caffeinated beverages in the evening, avoid excessive stimulation in the evening and generally using bed primarily for sleeping  One hour before bedtime would recommend turning lights down lower, decreasing your activity (may read quietly, listen to music at a low volume)  When you get into bed, should eliminate all technology (no texting, emailing, playing with your phone, iPad or tablet in bed)  [x] HYDRATION - Maintain good hydration  Drink  2L of fluid a day (4 typical small water bottles)  [x] DIET - Maintain good nutrition  In particular don't skip meals and try and eat healthy balanced meals regularly  [x] TRIGGERS - Look for other triggers and avoid them: Limit caffeine to 1-2 cups a day or less  Avoid dietary triggers that you have noticed bring on your headaches (this could include aged cheese, peanuts, MSG, aspartame and nitrates)  [x] EXERCISE - physical exercise as we all know is good for you in many ways, and not only is good for your heart, but also is beneficial for your mental health, cognitive health and  chronic pain/headaches  I would encourage at the least 5 days of physical exercise weekly for at least 30 minutes  Education and Follow-up  [x] Please call with any questions or concerns  Of course if any new concerning symptoms go to the emergency department    [x] Follow up 6 months, sooner if needed There are no Wet Read(s) to document.

## 2023-12-04 ENCOUNTER — OFFICE VISIT (OUTPATIENT)
Dept: OBGYN CLINIC | Facility: MEDICAL CENTER | Age: 58
End: 2023-12-04
Payer: COMMERCIAL

## 2023-12-04 ENCOUNTER — APPOINTMENT (OUTPATIENT)
Dept: RADIOLOGY | Facility: MEDICAL CENTER | Age: 58
End: 2023-12-04
Payer: COMMERCIAL

## 2023-12-04 VITALS
HEART RATE: 99 BPM | HEIGHT: 61 IN | BODY MASS INDEX: 26.43 KG/M2 | DIASTOLIC BLOOD PRESSURE: 87 MMHG | WEIGHT: 140 LBS | SYSTOLIC BLOOD PRESSURE: 156 MMHG

## 2023-12-04 DIAGNOSIS — M65.331 TRIGGER FINGER OF ALL DIGITS OF BOTH HANDS: ICD-10-CM

## 2023-12-04 DIAGNOSIS — M65.322 TRIGGER FINGER OF ALL DIGITS OF BOTH HANDS: ICD-10-CM

## 2023-12-04 DIAGNOSIS — R20.0 BILATERAL HAND NUMBNESS: ICD-10-CM

## 2023-12-04 DIAGNOSIS — M18.12 PRIMARY OSTEOARTHRITIS OF FIRST CARPOMETACARPAL JOINT OF LEFT HAND: ICD-10-CM

## 2023-12-04 DIAGNOSIS — M65.351 TRIGGER FINGER OF ALL DIGITS OF BOTH HANDS: ICD-10-CM

## 2023-12-04 DIAGNOSIS — M65.321 TRIGGER FINGER OF ALL DIGITS OF BOTH HANDS: ICD-10-CM

## 2023-12-04 DIAGNOSIS — M65.341 TRIGGER FINGER OF ALL DIGITS OF BOTH HANDS: ICD-10-CM

## 2023-12-04 DIAGNOSIS — M77.12 LATERAL EPICONDYLITIS OF LEFT ELBOW: ICD-10-CM

## 2023-12-04 DIAGNOSIS — M65.352 TRIGGER FINGER OF ALL DIGITS OF BOTH HANDS: ICD-10-CM

## 2023-12-04 DIAGNOSIS — M79.641 BILATERAL HAND PAIN: Primary | ICD-10-CM

## 2023-12-04 DIAGNOSIS — M65.311 TRIGGER FINGER OF ALL DIGITS OF BOTH HANDS: ICD-10-CM

## 2023-12-04 DIAGNOSIS — M79.642 BILATERAL HAND PAIN: Primary | ICD-10-CM

## 2023-12-04 DIAGNOSIS — M65.342 TRIGGER FINGER OF ALL DIGITS OF BOTH HANDS: ICD-10-CM

## 2023-12-04 DIAGNOSIS — M67.80: ICD-10-CM

## 2023-12-04 DIAGNOSIS — M65.312 TRIGGER FINGER OF ALL DIGITS OF BOTH HANDS: ICD-10-CM

## 2023-12-04 DIAGNOSIS — M79.641 BILATERAL HAND PAIN: ICD-10-CM

## 2023-12-04 DIAGNOSIS — M79.642 BILATERAL HAND PAIN: ICD-10-CM

## 2023-12-04 DIAGNOSIS — M65.332 TRIGGER FINGER OF ALL DIGITS OF BOTH HANDS: ICD-10-CM

## 2023-12-04 PROCEDURE — 20600 DRAIN/INJ JOINT/BURSA W/O US: CPT | Performed by: ORTHOPAEDIC SURGERY

## 2023-12-04 PROCEDURE — 99214 OFFICE O/P EST MOD 30 MIN: CPT | Performed by: ORTHOPAEDIC SURGERY

## 2023-12-04 PROCEDURE — 73130 X-RAY EXAM OF HAND: CPT

## 2023-12-04 RX ORDER — LIDOCAINE HYDROCHLORIDE 10 MG/ML
0.5 INJECTION, SOLUTION INFILTRATION; PERINEURAL
Status: COMPLETED | OUTPATIENT
Start: 2023-12-04 | End: 2023-12-04

## 2023-12-04 RX ORDER — BETAMETHASONE SODIUM PHOSPHATE AND BETAMETHASONE ACETATE 3; 3 MG/ML; MG/ML
3 INJECTION, SUSPENSION INTRA-ARTICULAR; INTRALESIONAL; INTRAMUSCULAR; SOFT TISSUE
Status: COMPLETED | OUTPATIENT
Start: 2023-12-04 | End: 2023-12-04

## 2023-12-04 RX ADMIN — BETAMETHASONE SODIUM PHOSPHATE AND BETAMETHASONE ACETATE 3 MG: 3; 3 INJECTION, SUSPENSION INTRA-ARTICULAR; INTRALESIONAL; INTRAMUSCULAR; SOFT TISSUE at 11:00

## 2023-12-04 RX ADMIN — LIDOCAINE HYDROCHLORIDE 0.5 ML: 10 INJECTION, SOLUTION INFILTRATION; PERINEURAL at 11:00

## 2023-12-04 NOTE — PROGRESS NOTES
The HAND & UPPER EXTREMITY OFFICE VISIT   Referred By:  Raudel Jordan, Do  [address] [facility]. [facility] [address],  [address] [facility]      Chief Complaint:     Left hand pain    History of Present Illness:   66 y.o., Right hand dominant female presents for evaluation of Bilateral hand pain and numbness Left > Right  Patient states her pain started 6 years ago with no known injury. She has complaints of bilateral hand pain but Left > Right  She reports her fingers will spasm and the index will cross over the middle finger and the thumb will adductor into the palm at night and she needs assistance to open them in the AM. She also has complaints of dorsal 2nd and 3rd Metacarpal pain. She has a noted hard mass at her mid Left 2nd metacarpal. She was told this could be from a vein or the tendon. She does report repeated trauma to this area from hitting it on objects    She has noted 4th and 5th digit numbness with activities like holding a phone.  Her hands are weak and she has trouble at times with ADL's    She also has lateral left elbow pain and left greater than right thumb base pain  She denies any treatments for any issue  She denies neck pain        ADLs: Community ambulator    Smoke: No   ETOH: social   Drugs:  no  Job: Hospitality coordiantor       Past Medical History:  Past Medical History:   Diagnosis Date    Allergic 02/01/2020    Anemia     Anesthesia complication     woke up during D/C    Anxiety     Arthritis     BMI 30.0-30.9,adult     Carpal tunnel syndrome on right     Cervical spondylosis without myelopathy     COVID 12/01/2021    Depression     Fibromyalgia     Fibromyalgia, primary     GERD (gastroesophageal reflux disease)     Headache(784.0) 1982    History of fetal loss     Recurrent    Hyperlipidemia     Hypertension     Jaw pain     and both ears with migraines    Migraine     Neck pain     Osteoarthritis     lumbar spine    Sicca syndrome (HCC)     Sleep apnea     unable to use CPAP    Syncopal episodes     with severe migraines    Undifferentiated connective tissue disease ([address])     Wears contact lenses     or glasses     Past Surgical History:   Procedure Laterality Date    CHOLECYSTECTOMY      COLONOSCOPY      DILATION AND CURETTAGE OF UTERUS      ORTHOPEDIC SURGERY  5/16/2023    Left knee replacement    VA ARTHRP KNE CONDYLE&PLATU MEDIAL&LAT COMPARTMENTS Left 05/16/2023    Procedure: ARTHROPLASTY KNEE TOTAL;  Surgeon: Michelle London MD;  Location: [facility];  Service: Orthopedics    TUBAL LIGATION       Family History   Problem Relation Age of Onset    MORGAN disease Mother     Arthritis Mother     Depression Mother     Hypertension Mother     Coronary artery disease Mother     Migraines Mother     Osteoporosis Mother     Heart attack Father 79    Diabetes Father     Prostate cancer Father     Hypertension Father     Arthritis Father     Stroke Father     Cancer Father         Prostate    ADD / ADHD Father     Coronary artery disease Father     Lupus Sister     Raynaud syndrome Sister     Sjogren's syndrome Sister     Depression Sister     ADD / ADHD Sister     Anxiety disorder Sister     No Known Problems Daughter     Breast cancer Maternal Grandmother 79    Arthritis Maternal Grandmother     Cancer Maternal Grandmother         Breast    Glaucoma Maternal Grandmother     Prostate cancer Maternal Grandfather 76    Arthritis Maternal Grandfather     Cancer Maternal Grandfather         Prostate    Colon cancer Paternal Grandmother 80    Cancer Paternal Grandmother         Colon    Alcohol abuse Paternal Grandfather     ADD / ADHD Brother     OCD Brother     No Known Problems Son     No Known Problems Son     No Known Problems Son     No Known Problems Son     No Known Problems Son     No Known Problems Maternal Uncle     No Known Problems Paternal Aunt     No Known Problems Paternal Uncle      Social History     Socioeconomic History    Marital status: /Civil Union     Spouse name: Not on file    Number of children: 6    Years of education: Not on file    Highest education level: Not on file   Occupational History    Occupation: Administration   Tobacco Use    Smoking status: Never    Smokeless tobacco: Never   Vaping Use    Vaping Use: Never used   Substance and Sexual Activity    Alcohol use: Not Currently     Comment: rarely    Drug use: Never    Sexual activity: Yes     Partners: Male     Birth control/protection: Female Sterilization   Other Topics Concern    Not on file   Social History Narrative    6 kids 25, 25, 23, 16, 15, 8     Moved from [place] for husbands job - [employer] for  at [employer] with spouse     Social Determinants of Health     Financial Resource Strain: Not on file   Food Insecurity: Not on file   Transportation Needs: Not on file   Physical Activity: Not on file   Stress: Not on file   Social Connections: Not on file   Intimate Partner Violence: Not on file   Housing Stability: Not on file     Scheduled Meds:  Continuous Infusions:No current facility-administered medications for this visit. PRN Meds:.   Allergies   Allergen Reactions    Propranolol Other (See Comments)     Halluncinations    Raspberry - Food Allergy Allergic Rhinitis, Itching and Nasal Congestion    Latex Rash           Physical Examination:    /87   Pulse 99   Ht 5' 1" (1.549 m)   Wt 63.5 kg (140 lb)   LMP 08/31/2019 (Exact Date)   BMI 26.45 kg/m²     Gen: A&Ox3, NAD  Cardiac: regular rate  Chest: non labored breathing  Abdomen: Non-distended    Cervcial Spine: Negative Spurling's    Right Upper Extremity:  Skin CDI  No obvious deformity of the shoulder, arm, elbow, forearm, wrist, hand  Diminished sensation in the ulnar nerve distribution  Sensation intact to light touch in the axillary median,and radial nerve distributions  5/5 motor interosseous, thumb opposition, thumb abduction, thumb extension  2+RP  Composite fist  Carpal Compression- Positive  Tinel wrist- Positive  Cubital compression test-  Positive      Left Upper Extremity:  Skin CDI  No obvious deformity of the shoulder, arm, elbow, forearm, wrist, hand  Diminished sensation in the ulnar nerve distribution  Sensation intact to light touch in the axillary median, and radial nerve distributions  5/5 motor   2+RP  Composite fist  Carpal Compression test- Positive  Tinels wrist- Positive  Positive grind test, tender at the thumb [facility] joint  Cubital Compression test - Positive  Tender at the lateral epicondyle with mild pain with resisted wrist extension with elbow extended, no pain over the medial condyle      Studies:  Radiographs: I personally reviewed and independently interpreted the available radiographs. 12/4/2023: Radiographs of the Bilateral hands, multiple views, demonstrate mild early arthritic changes to the bilateral thumb CMC joints with radial subluxation of the thumb metacarpal and the trapezium. No osteophyte formation, maintained joint space. Assessment and Plan:  1. Bilateral hand pain  XR hand 3+ vw left    XR hand 3+ vw right    EMG 2 Limb Upper Extremity      2. Tendon mass  Ambulatory referral to Hand Surgery    Reassured benign. this is causing her pain. will refer to hand surgeon. 3. Trigger finger of all digits of both hands  Ambulatory referral to Hand Surgery    she reports stiffness in all fingers, losing strength, and fingers locking up. again, refer to hand surgeon. 4. Bilateral hand numbness  EMG 2 Limb Upper Extremity      5. Primary osteoarthritis of first carpometacarpal joint of left hand  Small joint arthrocentesis: L thumb [facility]    Ambulatory Referral to Physical Therapy      6. Lateral epicondylitis of left elbow  Ambulatory Referral to Physical Therapy          66 y.o. female presents with signs and symptoms consistent with the above diagnosis. We discussed the natural history of this condition and its pathogenesis.   We discussed operative and nonoperative treatment options. Findings are consistent with CMC arthritis of her Left as well as Carpal and Cubital tunnel syndrome of bilateral UE's  We discussed obtaining a EMG for bilateral Upper extremity numbness and positive results noted on exam. She may return after her EMG's to discuss the results and any further treatment options. EMG ordered    We had a discussion about bracing for her thumb pain as well as her numbness. Push meta  was discussed for her thumb CMC. We also discussed steroid injections which can be provided today in the office   Pt was offered, accepted, performed and steroid injection Left CMC for symptomatic relief. Pt tolerated injections well. Ice and post injection protocol advised. Weigh bearing activities as tolerated. She may use Voltaren gel at her [facility]  Discussed arthoplasty surgery if she fails conservative treatments for the [facility] pain    As for the mass on her dorsal 1st metacarpal. Mass is firm and  approx 3 cm length by 5 mm. It is tender to palpation and moves with with tendon. It overlies the extensor tendon. Discussed this can be a clot from the veins or calcification from repeat traumas as she states she has had repeatative injuries to this area  Discussed would get imaging before any surgical intervention. She is not interested in a surgical intervention on this mass at this time. So we will hold off on imaging. She also has lateral epicondylitis- Script provided for PT    she expressed understanding of the plan and agreed. We encouraged them to contact our office with any questions or concerns. Elli Lane MD  Hand and Upper Extremity Surgery        *This note was dictated using Dragon voice recognition software. Please excuse any word substitutions or errors. *    Small joint arthrocentesis: L thumb CMC  [place] Protocol:  Consent: Verbal consent obtained.   Risks and benefits: risks, benefits and alternatives were discussed  Consent given by: patient  Timeout called at: 12/4/2023 12:38 PM.  Patient understanding: patient states understanding of the procedure being performed  Patient identity confirmed: verbally with patient  Supporting Documentation  Indications: pain   Procedure Details  Location: thumb - L thumb CMC  Needle size: 22 G  Ultrasound guidance: no  Approach: volar  Medications administered: 0.5 mL lidocaine 1 %; 3 mg betamethasone acetate-betamethasone sodium phosphate 6 (3-3) mg/mL    Patient tolerance: patient tolerated the procedure well with no immediate complications  Dressing:  Sterile dressing applied

## 2023-12-11 ENCOUNTER — EVALUATION (OUTPATIENT)
Dept: PHYSICAL THERAPY | Facility: REHABILITATION | Age: 58
End: 2023-12-11
Payer: COMMERCIAL

## 2023-12-11 DIAGNOSIS — M25.562 CHRONIC PAIN OF LEFT KNEE: Primary | ICD-10-CM

## 2023-12-11 DIAGNOSIS — Z96.652 S/P TOTAL KNEE ARTHROPLASTY, LEFT: ICD-10-CM

## 2023-12-11 DIAGNOSIS — G89.29 CHRONIC PAIN OF LEFT KNEE: Primary | ICD-10-CM

## 2023-12-11 PROCEDURE — 97161 PT EVAL LOW COMPLEX 20 MIN: CPT | Performed by: PHYSICAL THERAPIST

## 2023-12-11 NOTE — PROGRESS NOTES
PT Evaluation     Today's date: 2023  Patient name: Kenisha Tellez  : 1965  MRN: [id number]  Referring provider: Dmitriy Lay PA-C  Dx:   Encounter Diagnosis     ICD-10-CM    1. Chronic pain of left knee  M25.562     G89.29       2. S/P total knee arthroplasty, left  D84.306 Ambulatory Referral to Physical Therapy                     Assessment  Assessment details: Kenisha Tellez is a 66 y.o. female referred to outpt PT with dx of left ITB syndrome s/p left TKA. Pt presents with decrease in left LE strength and tenderness along prox/distal ITB. Pt funct is limited with pain during ADL's, negotiation of stairs, and sx's progressively through a more active day with min rest.  Pt would benefit from skilled PT to address these limitations and max funct. Impairments: abnormal gait, abnormal or restricted ROM, impaired physical strength, lacks appropriate home exercise program and pain with function    Goals  Funct 1. Improve descending stairs with less pain x4 weeks. 2. Improve tolerance to daily tasks with less pain in the evening x4 weeks. Impairment 1. Increase strength by 1/2 grade x4 weeks. 2. Decrease pain by 25 % x4 weeks      Plan  Planned therapy interventions: manual therapy, joint mobilization, neuromuscular re-education, therapeutic exercise, stretching, strengthening, patient education and home exercise program  Frequency: 2x week  Duration in weeks: 4        Subjective Evaluation    History of Present Illness  Mechanism of injury: Pt reports that she cont to have on and off pain into left lateral leg since having her knee replaced 23. Pt notes that sx's can be aggravated when she is walking and standing more frequently, but reports haven't gotten worse since surgery, remaining the same. Pt does have some discomfort that wakes her at night, but overall worse with increase in activity but doesn't necessarily have pain until resting in the evening.   Pt did have consult with pain management but insurance denied procedure and hasn't attempted at this point. Pt is negotiating stairs well in which she is able to perform ascending reciprocally, but is more cautious with descending stairs to perform appropriately. Patient Goals  Patient goals for therapy: decreased pain    Pain  Current pain ratin  At worst pain ratin        Objective     Active Range of Motion   Left Knee   Flexion: 125 degrees   Extension: 0 degrees     Additional Active Range of Motion Details  Pt does demo slight IR and valgus position of knee when performing endrange knee flexion actively with c/o pain along lateral joint space.       Strength/Myotome Testing     Left Hip   Planes of Motion   Flexion: 5  Extension: 3+  Abduction: 4-  Adduction: 5  External rotation: 5  Internal rotation: 3+    Right Hip   Planes of Motion   Flexion: 5  Extension: 4+  Abduction: 4+  Adduction: 5  External rotation: 5  Internal rotation: 4+    Left Knee   Flexion: 5  Extension: 4+           Precautions: FMS        Manual  23                                                                                   Neuro Re-Ed             SL hip abd          Prone hip ext           bridges           clamshells           SLS foam           Side stepping                               Therex            Rec bike                                                                                                                            Gait Training                                 Modalities

## 2023-12-14 ENCOUNTER — OFFICE VISIT (OUTPATIENT)
Dept: PHYSICAL THERAPY | Facility: REHABILITATION | Age: 58
End: 2023-12-14
Payer: COMMERCIAL

## 2023-12-14 DIAGNOSIS — M77.12 LATERAL EPICONDYLITIS OF LEFT ELBOW: ICD-10-CM

## 2023-12-14 DIAGNOSIS — Z96.652 S/P TOTAL KNEE ARTHROPLASTY, LEFT: ICD-10-CM

## 2023-12-14 DIAGNOSIS — M25.562 CHRONIC PAIN OF LEFT KNEE: Primary | ICD-10-CM

## 2023-12-14 DIAGNOSIS — G89.29 CHRONIC PAIN OF LEFT KNEE: Primary | ICD-10-CM

## 2023-12-14 PROCEDURE — 97110 THERAPEUTIC EXERCISES: CPT | Performed by: PHYSICAL THERAPIST

## 2023-12-14 PROCEDURE — 97112 NEUROMUSCULAR REEDUCATION: CPT | Performed by: PHYSICAL THERAPIST

## 2023-12-14 NOTE — PROGRESS NOTES
Daily Note     Today's date: 2023  Patient name: Harsha Higgins  : 1965  MRN: [id number]  Referring provider: Meaghan Macario PA-C  Dx:   Encounter Diagnosis     ICD-10-CM    1. Chronic pain of left knee  M25.562     G89.29       2. S/P total knee arthroplasty, left  Z96.652       3. Lateral epicondylitis of left elbow  M77.12 Ambulatory Referral to Physical Therapy          Start Time: 905  Stop Time: 945  Total time in clinic (min): 40 minutes    Subjective: Pt reports having left lateral hip discomfort overall but "not bad."        Objective: See treatment diary below     Precautions: FMS        Manual  23                                                                                     Neuro Re-Ed             SL hip abd    3"x10          Prone hip ext    5"x10 ea         bridges    5"x20          clamshells    gtb 3"x10          SLS foam    15"x3 ea         Side stepping   Slight squat 20'x1 ea                                     Therex             Rec bike    L1 5 mins           knee/hip position                                                                                                                Gait Training                                 Modalities                                                                 Assessment: Pt encouraged to focus on hip and knee position through introduction of exercise program.  Pt does perform lateral shift with SLS on left and valgus posture of left knee in mat exercises. Pt reports "jello" with fatigue of left LE, but denies pain. Pt encouraged to cont to focus on posturing with home and funct exercises. Plan: Continue per plan of care.

## 2023-12-21 ENCOUNTER — OFFICE VISIT (OUTPATIENT)
Dept: PHYSICAL THERAPY | Facility: REHABILITATION | Age: 58
End: 2023-12-21
Payer: COMMERCIAL

## 2023-12-21 DIAGNOSIS — G89.29 CHRONIC PAIN OF LEFT KNEE: Primary | ICD-10-CM

## 2023-12-21 DIAGNOSIS — M25.562 CHRONIC PAIN OF LEFT KNEE: Primary | ICD-10-CM

## 2023-12-21 DIAGNOSIS — Z96.652 S/P TOTAL KNEE ARTHROPLASTY, LEFT: ICD-10-CM

## 2023-12-21 PROCEDURE — 97110 THERAPEUTIC EXERCISES: CPT | Performed by: PHYSICAL THERAPIST

## 2023-12-21 PROCEDURE — 97112 NEUROMUSCULAR REEDUCATION: CPT | Performed by: PHYSICAL THERAPIST

## 2023-12-21 NOTE — PROGRESS NOTES
"Daily Note     Today's date: 2023  Patient name: Chanel Grande  : 1965  MRN: [id number]  Referring provider: Izaiah Lorenz PA-C  Dx:   Encounter Diagnosis     ICD-10-CM    1. Chronic pain of left knee  M25.562     G89.29       2. S/P total knee arthroplasty, left  Z96.652                      Subjective: Pt reports that she's doing \"okay.\" Pt denies pain after last visit but unsure if she was sore.        Objective: See treatment diary below     Precautions: FMS        Manual  23                                                                                   Neuro Re-Ed             SL hip abd    3\"x10   3\"x10        Prone hip ext    5\"x10 ea  alt 5\"x10 ea       bridges    5\"x20  Gtb 3\"x10        clamshells    gtb 3\"x10   gtb 3\"2x10 ea       SLS foam    15\"x3 ea  15\"x3       Side stepping   Slight squat 20'x1 ea          glute squeeze     5\"x10         SLR      3\"x10        Therex             Rec bike    L1 5 mins   L4 5 mins upright        knee/hip position                                                                                                                Gait Training                                 Modalities                                                                 Assessment: Pt does note fatigue and soreness with prone hip extension on left.  Focused on glute squeeze prior to assist with glute activation and improved post isometric ex. Pt demo's pronation on left with SLS when compared with right and added towel assist to maintain talar neutral during SLS with improved tolerance and less pain.  Pt encouraged to use insert (which she already has) for ankle support over the next few days.  Pt educated in focus on neutral knee and hip position coming from the lower portion of the chain as well as strengthening hip. Pt issued HEP.       Plan: Continue per plan of care.      "

## 2023-12-28 ENCOUNTER — OFFICE VISIT (OUTPATIENT)
Dept: PHYSICAL THERAPY | Facility: REHABILITATION | Age: 58
End: 2023-12-28
Payer: COMMERCIAL

## 2023-12-28 DIAGNOSIS — M25.562 CHRONIC PAIN OF LEFT KNEE: Primary | ICD-10-CM

## 2023-12-28 DIAGNOSIS — Z96.652 S/P TOTAL KNEE ARTHROPLASTY, LEFT: ICD-10-CM

## 2023-12-28 DIAGNOSIS — G89.29 CHRONIC PAIN OF LEFT KNEE: Primary | ICD-10-CM

## 2023-12-28 PROCEDURE — 97110 THERAPEUTIC EXERCISES: CPT | Performed by: PHYSICAL THERAPIST

## 2023-12-28 PROCEDURE — 97112 NEUROMUSCULAR REEDUCATION: CPT | Performed by: PHYSICAL THERAPIST

## 2023-12-28 NOTE — PROGRESS NOTES
"Daily Note     Today's date: 2023  Patient name: Chanel Grande  : 1965  MRN: [id number]  Referring provider: Izaiah Lorenz PA-C  Dx:   Encounter Diagnosis     ICD-10-CM    1. Chronic pain of left knee  M25.562     G89.29       2. S/P total knee arthroplasty, left  Z96.652                      Subjective: Pt notes increase in pain as of today and unsure why.  Pt does note more sx's earlier in the week due to working and being on her feet.  Pt obtained orthotic inserts at local [employer] which have been \"okay.'        Objective: See treatment diary below     Precautions: FMS        Manual  23                                                                                 Neuro Re-Ed             SL hip abd    3\"x10   3\"x10   3\"x15     Prone hip ext    5\"x10 ea  alt 5\"x10 ea  alt 5\"x15 ea     bridges    5\"x20  Gtb 3\"x10   gtb 3\"x20      clamshells    gtb 3\"x10   gtb 3\"2x10 ea  gtb 3\"2x10      SLS foam    15\"x3 ea  15\"x3       Side stepping   Slight squat 20'x1 ea    slight squat 20\"x2 ea      glute squeeze     5\"x10   5\"x15       SLR      3\"x10   3\"x15      Therex             Rec bike    L1 5 mins   L4 5 mins upright  L4 6 mins       knee/hip position                                                                                                                Gait Training                                 Modalities                                                                 Assessment: Pt notes that she isn't feeling well, so she was unsure about attending PT appt.  Focused on exercises as issued previous at today's visit to assist with strengthening.  Will progress at NV as pt feels she is able to perform strengthening progressions.        Plan: Continue per plan of care.      "

## 2024-01-01 ENCOUNTER — HOSPITAL ENCOUNTER (EMERGENCY)
Facility: HOSPITAL | Age: 59
Discharge: HOME | End: 2024-01-01
Attending: EMERGENCY MEDICINE
Payer: COMMERCIAL

## 2024-01-01 ENCOUNTER — APPOINTMENT (EMERGENCY)
Dept: CT IMAGING | Facility: HOSPITAL | Age: 59
End: 2024-01-01
Payer: COMMERCIAL

## 2024-01-01 VITALS
DIASTOLIC BLOOD PRESSURE: 80 MMHG | TEMPERATURE: 98.8 F | BODY MASS INDEX: 26.37 KG/M2 | OXYGEN SATURATION: 99 % | WEIGHT: 139.55 LBS | HEART RATE: 94 BPM | SYSTOLIC BLOOD PRESSURE: 141 MMHG | RESPIRATION RATE: 22 BRPM

## 2024-01-01 DIAGNOSIS — G43.909 MIGRAINE: Primary | ICD-10-CM

## 2024-01-01 LAB
ATRIAL RATE: 90 BPM
P AXIS: 68 DEGREES
PR INTERVAL: 132 MS
QRS AXIS: 76 DEGREES
QRSD INTERVAL: 78 MS
QT INTERVAL: 366 MS
QTC INTERVAL: 447 MS
T WAVE AXIS: 61 DEGREES
VENTRICULAR RATE: 90 BPM

## 2024-01-01 PROCEDURE — G1004 CDSM NDSC: HCPCS

## 2024-01-01 PROCEDURE — 70450 CT HEAD/BRAIN W/O DYE: CPT

## 2024-01-01 PROCEDURE — 99284 EMERGENCY DEPT VISIT MOD MDM: CPT | Performed by: EMERGENCY MEDICINE

## 2024-01-01 PROCEDURE — 99284 EMERGENCY DEPT VISIT MOD MDM: CPT

## 2024-01-01 PROCEDURE — 96375 TX/PRO/DX INJ NEW DRUG ADDON: CPT

## 2024-01-01 PROCEDURE — 96365 THER/PROPH/DIAG IV INF INIT: CPT

## 2024-01-01 PROCEDURE — 93005 ELECTROCARDIOGRAM TRACING: CPT

## 2024-01-01 PROCEDURE — 96367 TX/PROPH/DG ADDL SEQ IV INF: CPT

## 2024-01-01 RX ORDER — METOCLOPRAMIDE HYDROCHLORIDE 5 MG/ML
10 INJECTION INTRAMUSCULAR; INTRAVENOUS ONCE
Status: COMPLETED | OUTPATIENT
Start: 2024-01-01 | End: 2024-01-01

## 2024-01-01 RX ORDER — MAGNESIUM SULFATE HEPTAHYDRATE 40 MG/ML
2 INJECTION, SOLUTION INTRAVENOUS ONCE
Status: COMPLETED | OUTPATIENT
Start: 2024-01-01 | End: 2024-01-01

## 2024-01-01 RX ORDER — DROPERIDOL 2.5 MG/ML
0.62 INJECTION, SOLUTION INTRAMUSCULAR; INTRAVENOUS ONCE
Status: COMPLETED | OUTPATIENT
Start: 2024-01-01 | End: 2024-01-01

## 2024-01-01 RX ORDER — DIPHENHYDRAMINE HYDROCHLORIDE 50 MG/ML
25 INJECTION INTRAMUSCULAR; INTRAVENOUS ONCE
Status: COMPLETED | OUTPATIENT
Start: 2024-01-01 | End: 2024-01-01

## 2024-01-01 RX ORDER — KETOROLAC TROMETHAMINE 30 MG/ML
15 INJECTION, SOLUTION INTRAMUSCULAR; INTRAVENOUS ONCE
Status: COMPLETED | OUTPATIENT
Start: 2024-01-01 | End: 2024-01-01

## 2024-01-01 RX ADMIN — METOCLOPRAMIDE 10 MG: 5 INJECTION, SOLUTION INTRAMUSCULAR; INTRAVENOUS at 15:44

## 2024-01-01 RX ADMIN — SODIUM CHLORIDE 1000 ML: 0.9 INJECTION, SOLUTION INTRAVENOUS at 15:39

## 2024-01-01 RX ADMIN — KETOROLAC TROMETHAMINE 15 MG: 30 INJECTION, SOLUTION INTRAMUSCULAR; INTRAVENOUS at 18:13

## 2024-01-01 RX ADMIN — VALPROATE SODIUM 1000 MG: 100 INJECTION, SOLUTION INTRAVENOUS at 18:30

## 2024-01-01 RX ADMIN — MAGNESIUM SULFATE HEPTAHYDRATE 2 G: 40 INJECTION, SOLUTION INTRAVENOUS at 15:51

## 2024-01-01 RX ADMIN — DROPERIDOL 0.62 MG: 2.5 INJECTION, SOLUTION INTRAMUSCULAR; INTRAVENOUS at 19:57

## 2024-01-01 RX ADMIN — DIPHENHYDRAMINE HYDROCHLORIDE 25 MG: 50 INJECTION, SOLUTION INTRAMUSCULAR; INTRAVENOUS at 15:41

## 2024-01-01 NOTE — ED PROVIDER NOTES
History  Chief Complaint   Patient presents with    Migraine     Pt reports migraine x 3 weeks. Taking migraines meds with no relief. C/o nausea, and vomiting. Also reports R sided jaw pain     HPI  Chanel Grande is a 58 y.o. female with PMH migraines who presents to the emergency department with headache.  Patient reports a longstanding history of migraines since being a teenager, she states she gets frequent migraines approximately 15 days/month.  She states her migraines sometimes last over a week, however the current episode has been ongoing for approximately 1 month so she presented to the ER.  She states she has had more intense headaches in the past.  She has had some interval improvement with medications but the headache does not fully go away.  She has been taking sumatriptan, and took Excedrin and Tylenol around 10:30 AM today.  She had nausea and vomiting earlier today.  She denies fever, blurry vision, double vision, weakness, numbness, or vertigo.  She follows with a neurologist for her migraines.  She reports quitting her job last month and feeling more stressed out, she states stress typically triggers her migraines.  She reports the headache is across the bilateral head, and also feels some soreness in the right jaw, she is able to talk and open her jaw without pain or difficulty.  She was recently at a dentist without any identified dental problems.    Prior to Admission Medications   Prescriptions Last Dose Informant Patient Reported? Taking?   Aspirin-Acetaminophen-Caffeine (EXCEDRIN MIGRAINE PO)   Yes No   Sig: Take 1 tablet by mouth as needed (migraines)   Calcium Carb-Cholecalciferol (CALCIUM 500 + D3 PO)   Yes No   Sig: Take 2 capsules by mouth daily   Cyanocobalamin (VITAMIN B 12 PO)   Yes No   Sig: Take by mouth daily   Diclofenac Sodium (VOLTAREN) 1 %   No No   Sig: Apply 2 g topically 4 (four) times a day   Magnesium 400 MG TABS   Yes No   Sig: Take 2 tablets by mouth in the morning    Multiple Vitamin (multivitamin) tablet   No No   Sig: Take 1 tablet by mouth daily   SUMAtriptan (IMITREX) 100 mg tablet   No No   Sig: Take 1 tablet (100 mg total) by mouth once as needed for migraine May repeat x1 in 2 hours, max 2/24 hours, 9/month   acetaminophen (TYLENOL) 500 mg tablet   Yes No   Sig: Take 500-1,000 mg by mouth every 6 (six) hours as needed for mild pain   ascorbic acid (VITAMIN C) 500 MG tablet   No No   Sig: Take 1 tablet (500 mg total) by mouth 2 (two) times a day   buPROPion (WELLBUTRIN XL) 150 mg 24 hr tablet   No No   Sig: Take 1 tablet (150 mg total) by mouth every morning   Patient not taking: Reported on 11/20/2023   cetirizine (ZyrTEC) 10 mg tablet   Yes No   Sig: Take 10 mg by mouth daily PRN   ferrous sulfate 324 (65 Fe) mg   No No   Sig: Take 1 tablet (324 mg total) by mouth 2 (two) times a day before meals   omeprazole (PriLOSEC) 20 mg delayed release capsule   No No   Sig: TAKE 1 CAPSULE(20 MG) BY MOUTH DAILY      Facility-Administered Medications: None       Past Medical History:   Diagnosis Date    Allergic 02/01/2020    Anemia     Anesthesia complication     woke up during D/C    Anxiety     Arthritis     BMI 30.0-30.9,adult     Carpal tunnel syndrome on right     Cervical spondylosis without myelopathy     COVID 12/01/2021    Depression     Fibromyalgia     Fibromyalgia, primary     GERD (gastroesophageal reflux disease)     Headache(784.0) 1982    History of fetal loss     Recurrent    Hyperlipidemia     Hypertension     Jaw pain     and both ears with migraines    Migraine     Neck pain     Osteoarthritis     lumbar spine    Sicca syndrome (HCC)     Sleep apnea     unable to use CPAP    Syncopal episodes     with severe migraines    Undifferentiated connective tissue disease (HCC)     Wears contact lenses     or glasses       Past Surgical History:   Procedure Laterality Date    CHOLECYSTECTOMY      COLONOSCOPY      DILATION AND CURETTAGE OF UTERUS      ORTHOPEDIC SURGERY   5/16/2023    Left knee replacement    MI ARTHRP KNE CONDYLE&PLATU MEDIAL&LAT COMPARTMENTS Left 05/16/2023    Procedure: ARTHROPLASTY KNEE TOTAL;  Surgeon: Alex Cesar MD;  Location: [facility];  Service: Orthopedics    TUBAL LIGATION         Family History   Problem Relation Age of Onset    MORGAN disease Mother     Arthritis Mother     Depression Mother     Hypertension Mother     Coronary artery disease Mother     Migraines Mother     Osteoporosis Mother     Heart attack Father 70    Diabetes Father     Prostate cancer Father 67    Hypertension Father     Arthritis Father     Stroke Father     Cancer Father         Prostate    ADD / ADHD Father     Coronary artery disease Father     Lupus Sister     Raynaud syndrome Sister     Sjogren's syndrome Sister     Depression Sister     ADD / ADHD Sister     Anxiety disorder Sister     No Known Problems Daughter     Breast cancer Maternal Grandmother 70    Arthritis Maternal Grandmother     Cancer Maternal Grandmother         Breast    Glaucoma Maternal Grandmother     Prostate cancer Maternal Grandfather 75    Arthritis Maternal Grandfather     Cancer Maternal Grandfather         Prostate    Colon cancer Paternal Grandmother 80    Cancer Paternal Grandmother         Colon    Alcohol abuse Paternal Grandfather     ADD / ADHD Brother     OCD Brother     No Known Problems Son     No Known Problems Son     No Known Problems Son     No Known Problems Son     No Known Problems Son     No Known Problems Maternal Uncle     No Known Problems Paternal Aunt     No Known Problems Paternal Uncle      I have reviewed and agree with the history as documented.    E-Cigarette/Vaping    E-Cigarette Use Never User      E-Cigarette/Vaping Substances    Nicotine No     THC No     CBD No     Flavoring No     Other No     Unknown No      Social History     Tobacco Use    Smoking status: Never    Smokeless tobacco: Never   Vaping Use    Vaping status: Never Used   Substance Use Topics     Alcohol use: Not Currently     Comment: rarely    Drug use: Never       Home medications:  Prior to Admission Medications   Prescriptions Last Dose Informant Patient Reported? Taking?   Aspirin-Acetaminophen-Caffeine (EXCEDRIN MIGRAINE PO)   Yes No   Sig: Take 1 tablet by mouth as needed (migraines)   Calcium Carb-Cholecalciferol (CALCIUM 500 + D3 PO)   Yes No   Sig: Take 2 capsules by mouth daily   Cyanocobalamin (VITAMIN B 12 PO)   Yes No   Sig: Take by mouth daily   Diclofenac Sodium (VOLTAREN) 1 %   No No   Sig: Apply 2 g topically 4 (four) times a day   Magnesium 400 MG TABS   Yes No   Sig: Take 2 tablets by mouth in the morning   Multiple Vitamin (multivitamin) tablet   No No   Sig: Take 1 tablet by mouth daily   SUMAtriptan (IMITREX) 100 mg tablet   No No   Sig: Take 1 tablet (100 mg total) by mouth once as needed for migraine May repeat x1 in 2 hours, max 2/24 hours, 9/month   acetaminophen (TYLENOL) 500 mg tablet   Yes No   Sig: Take 500-1,000 mg by mouth every 6 (six) hours as needed for mild pain   ascorbic acid (VITAMIN C) 500 MG tablet   No No   Sig: Take 1 tablet (500 mg total) by mouth 2 (two) times a day   buPROPion (WELLBUTRIN XL) 150 mg 24 hr tablet   No No   Sig: Take 1 tablet (150 mg total) by mouth every morning   Patient not taking: Reported on 11/20/2023   cetirizine (ZyrTEC) 10 mg tablet   Yes No   Sig: Take 10 mg by mouth daily PRN   ferrous sulfate 324 (65 Fe) mg   No No   Sig: Take 1 tablet (324 mg total) by mouth 2 (two) times a day before meals   omeprazole (PriLOSEC) 20 mg delayed release capsule   No No   Sig: TAKE 1 CAPSULE(20 MG) BY MOUTH DAILY      Facility-Administered Medications: None     Allergies:  Allergies   Allergen Reactions    Propranolol Other (See Comments)     Halluncinations    Raspberry - Food Allergy Allergic Rhinitis, Itching and Nasal Congestion    Latex Rash        Review of Systems   Constitutional:  Negative for fever.   HENT:  Negative for sore throat.    Eyes:   Positive for photophobia. Negative for visual disturbance.   Respiratory:  Negative for cough and shortness of breath.    Cardiovascular:  Negative for chest pain.   Gastrointestinal:  Positive for nausea and vomiting. Negative for abdominal pain.   Neurological:  Positive for headaches. Negative for dizziness, weakness and numbness.   All other systems reviewed and are negative.      Physical Exam  ED Triage Vitals [01/01/24 1438]   Temperature Pulse Respirations Blood Pressure SpO2   98.8 °F (37.1 °C) 102 19 (!) 200/103 98 %      Temp Source Heart Rate Source Patient Position - Orthostatic VS BP Location FiO2 (%)   Oral Monitor Sitting Right arm --      Pain Score       7             Orthostatic Vital Signs  Vitals:    01/01/24 1700 01/01/24 1922 01/01/24 2000 01/01/24 2030   BP: 161/81 149/74 129/79 141/80   Pulse: 82 78 74 94   Patient Position - Orthostatic VS: Lying Lying Lying Lying       Physical Exam  Vitals and nursing note reviewed.   Constitutional:       General: She is not in acute distress.     Appearance: She is not toxic-appearing or diaphoretic.   HENT:      Head: Normocephalic.      Comments: No tenderness along outside of jaw or face, no temporal tenderness.  No pain with opening jaw.     Mouth/Throat:      Mouth: Mucous membranes are moist.      Pharynx: Oropharynx is clear.      Comments: No focal fluctuance or swelling along gums or inside mouth  Eyes:      Extraocular Movements: Extraocular movements intact.      Pupils: Pupils are equal, round, and reactive to light.   Cardiovascular:      Rate and Rhythm: Normal rate and regular rhythm.      Heart sounds: No murmur heard.  Pulmonary:      Effort: Pulmonary effort is normal. No respiratory distress.      Breath sounds: Normal breath sounds. No wheezing, rhonchi or rales.   Abdominal:      General: Abdomen is flat. There is no distension.      Palpations: Abdomen is soft.      Tenderness: There is no abdominal tenderness. There is no  guarding or rebound.   Musculoskeletal:      Cervical back: Normal range of motion. No rigidity or tenderness.   Skin:     General: Skin is warm and dry.   Neurological:      Mental Status: She is alert.      Cranial Nerves: No cranial nerve deficit.      Sensory: No sensory deficit (Light touch sensation intact and symmetric throughout bilateral upper and lower extremities and face).      Motor: No weakness (Motor strength 5/5 intact and symmetric throughout bilateral upper and lower extremities).      Coordination: Finger-Nose-Finger Test and Heel to Shin Test normal.         ED Medications  Medications   sodium chloride 0.9 % bolus 1,000 mL (0 mL Intravenous Stopped 1/1/24 1715)   metoclopramide (REGLAN) injection 10 mg (10 mg Intravenous Given 1/1/24 1544)   magnesium sulfate 2 g/50 mL IVPB (premix) 2 g (0 g Intravenous Stopped 1/1/24 1715)   diphenhydrAMINE (BENADRYL) injection 25 mg (25 mg Intravenous Given 1/1/24 1541)   ketorolac (TORADOL) injection 15 mg (15 mg Intravenous Given 1/1/24 1813)   valproate (DEPACON) 1,000 mg in sodium chloride 0.9 % 100 mL BOLUS (0 mg Intravenous Stopped 1/1/24 1956)   droperidol (INAPSINE) injection 0.625 mg (0.625 mg Intravenous Given 1/1/24 1957)       Diagnostic Studies  Results Reviewed       None                   CT head without contrast   Final Result by Pallav N Shah, MD (01/01 1722)      No acute intracranial abnormality.                  Workstation performed: [id number]               Procedures  Procedures      ED Course                             SBIRT 22yo+      Flowsheet Row Most Recent Value   Initial Alcohol Screen: US AUDIT-C     1. How often do you have a drink containing alcohol? 0 Filed at: 01/01/2024 1438   2. How many drinks containing alcohol do you have on a typical day you are drinking?  0 Filed at: 01/01/2024 1438   3a. Male UNDER 65: How often do you have five or more drinks on one occasion? 0 Filed at: 01/01/2024 1438   3b. FEMALE Any Age, or MALE  65+: How often do you have 4 or more drinks on one occassion? 0 Filed at: 01/01/2024 1438   Audit-C Score 0 Filed at: 01/01/2024 1438   BROWN: How many times in the past year have you...    Used an illegal drug or used a prescription medication for non-medical reasons? Never Filed at: 01/01/2024 1438                  [facility]  Medical Decision Making  Amount and/or Complexity of Data Reviewed  Radiology: ordered.    Risk  Prescription drug management.      Chanel Grande is a 58 y.o. female with PMH migraines who presents to the emergency department with persistent headache. Workup including vital signs, physical exam, EKG and CT. EKG without acute ischemic changes and CT without acute abnormalities .  Neurologic exam without focal deficits.  Most likely diagnosis migraine. Treatment including multiple migraine medications with some improvement in symptoms. Stable for discharge home with neurology follow up, discharge instructions and return precautions given.       Disposition  Final diagnoses:   Migraine     Time reflects when diagnosis was documented in both MDM as applicable and the Disposition within this note       Time User Action Codes Description Comment    1/1/2024  8:42 PM Riley Fletcher Add [G43.909] Migraine           ED Disposition       ED Disposition   Discharge    Condition   Stable    Date/Time   Mon Jan 1, 2024 2042    Comment   Chanel Grande discharge to home/self care.                   Follow-up Information    None         Discharge Medication List as of 1/1/2024  8:42 PM        CONTINUE these medications which have NOT CHANGED    Details   acetaminophen (TYLENOL) 500 mg tablet Take 500-1,000 mg by mouth every 6 (six) hours as needed for mild pain, Historical Med      ascorbic acid (VITAMIN C) 500 MG tablet Take 1 tablet (500 mg total) by mouth 2 (two) times a day, Starting Mon 4/3/2023, Until Thu 8/3/2023, Normal      Aspirin-Acetaminophen-Caffeine (EXCEDRIN MIGRAINE PO) Take 1 tablet by  "mouth as needed (migraines), Historical Med      buPROPion (WELLBUTRIN XL) 150 mg 24 hr tablet Take 1 tablet (150 mg total) by mouth every morning, Starting Tue 11/14/2023, Until Sun 5/12/2024, Normal      Calcium Carb-Cholecalciferol (CALCIUM 500 + D3 PO) Take 2 capsules by mouth daily, Historical Med      cetirizine (ZyrTEC) 10 mg tablet Take 10 mg by mouth daily PRN, Historical Med      Cyanocobalamin (VITAMIN B 12 PO) Take by mouth daily, Historical Med      Diclofenac Sodium (VOLTAREN) 1 % Apply 2 g topically 4 (four) times a day, Starting Mon 11/20/2023, Normal      ferrous sulfate 324 (65 Fe) mg Take 1 tablet (324 mg total) by mouth 2 (two) times a day before meals, Starting Mon 4/3/2023, Until Tue 11/14/2023, Normal      Magnesium 400 MG TABS Take 2 tablets by mouth in the morning, Historical Med      Multiple Vitamin (multivitamin) tablet Take 1 tablet by mouth daily, Starting Mon 4/3/2023, Normal      omeprazole (PriLOSEC) 20 mg delayed release capsule TAKE 1 CAPSULE(20 MG) BY MOUTH DAILY, Starting Wed 10/18/2023, Normal      SUMAtriptan (IMITREX) 100 mg tablet Take 1 tablet (100 mg total) by mouth once as needed for migraine May repeat x1 in 2 hours, max 2/24 hours, 9/month, Starting Fri 12/23/2022, Normal             No discharge procedures on file.    PDMP Review         Value Time User    PDMP Reviewed  Yes 7/7/2023  9:30 AM Izaiah Lorenz PA-C             ED Provider  Attending physically available and evaluated Chanel Grande. I managed the patient along with the ED Attending.    Electronically Signed by    Portions of the record may have been created with voice recognition software.  Occasional wrong word or \"sound a like\" substitutions may have occurred due to the inherent limitations of voice recognition software.  Read the chart carefully and recognize, using context, where substitutions have occurred       Riley Fletcher MD  01/01/24 [id number]    "

## 2024-01-01 NOTE — ED ATTENDING ATTESTATION
1/1/2024  I, Lianne Kaplan DO, saw and evaluated the patient. I have discussed the patient with the resident/non-physician practitioner and agree with the resident's/non-physician practitioner's findings, Plan of Care, and MDM as documented in the resident's/non-physician practitioner's note, except where noted. All available labs and Radiology studies were reviewed.  I was present for key portions of any procedure(s) performed by the resident/non-physician practitioner and I was immediately available to provide assistance.       At this point I agree with the current assessment done in the Emergency Department.  I have conducted an independent evaluation of this patient a history and physical is as follows:    ED Course     58 y.o. F w/h/o migraine p/w headache x 1 month.  Reports she gets migraines 15 days out of the month and triggered by stress.  Quit her job about a month ago.  Feels like her typical migraine but just last longer.  Took her migraines medications.  Also having right jaw pain which she's had in the past with her migraines.  Associated with N/V which began today.  Denies changes in vision, CP, SOB, focal deficits.  Nonfocal on exam.  Plan: Migraine - CT head to r/o mass lesion, symptomatic tx.    Critical Care Time  Procedures

## 2024-01-02 ENCOUNTER — OFFICE VISIT (OUTPATIENT)
Dept: PHYSICAL THERAPY | Facility: REHABILITATION | Age: 59
End: 2024-01-02
Payer: COMMERCIAL

## 2024-01-02 DIAGNOSIS — Z96.652 S/P TOTAL KNEE ARTHROPLASTY, LEFT: ICD-10-CM

## 2024-01-02 DIAGNOSIS — M25.562 CHRONIC PAIN OF LEFT KNEE: Primary | ICD-10-CM

## 2024-01-02 DIAGNOSIS — G89.29 CHRONIC PAIN OF LEFT KNEE: Primary | ICD-10-CM

## 2024-01-02 PROCEDURE — 97110 THERAPEUTIC EXERCISES: CPT | Performed by: PHYSICAL THERAPIST

## 2024-01-02 PROCEDURE — 97112 NEUROMUSCULAR REEDUCATION: CPT | Performed by: PHYSICAL THERAPIST

## 2024-01-02 NOTE — PROGRESS NOTES
"Daily Note     Today's date: 2024  Patient name: Chanel Grande  : 1965  MRN: [id number]  Referring provider: Izaiah Lorenz PA-C  Dx:   Encounter Diagnosis     ICD-10-CM    1. Chronic pain of left knee  M25.562     G89.29       2. S/P total knee arthroplasty, left  Z96.652                      Subjective: Pt reports \"a little sore\" after last visit.        Objective: See treatment diary below     Precautions: FMS        Manual  23                                                                               Neuro Re-Ed             SL hip abd    3\"x10   3\"x10   3\"x15  3\"x15    Prone hip ext    5\"x10 ea  alt 5\"x10 ea  alt 5\"x15 ea  alt 5\"x15 ea   bridges    5\"x20  Gtb 3\"x10   gtb 3\"x20   gtb 3\"x20    clamshells    gtb 3\"x10   gtb 3\"2x10 ea  gtb 3\"2x10  Gtb 3\"2x10    SLS foam    15\"x3 ea  15\"x3    20\"x3    Side stepping   Slight squat 20'x1 ea    slight squat 20\"x2 ea     Hip drop out DLS     Gtb x10 L     SLR      3\"x10   3\"x15   3\"2x10    Therex             Rec bike    L1 5 mins   L4 5 mins upright  L4 6 mins   L4 6 mins     knee/hip position              leg press single upright         45# 2x10   Leg press bilat lying         65# x20                                                                           Gait Training                                 Modalities                                                                 Assessment: Focused on exercises throughout session with increase in weight shift towards lateral portion of left foot in which she is able to maintain better knee and hip posture.  Pt does note lateral hip fatigue and muscle burning occurring with focus however demo's improved sx's in knee.  Pt encouraged to cont with slight weight shift to lateral foot to assist with posture of left LE during funct tasks.       Plan: Continue per plan of care.      "

## 2024-01-02 NOTE — DISCHARGE INSTRUCTIONS
Follow-up with your neurologist.    Return to the ER if symptoms worsen or if you have any other concerns.

## 2024-01-04 ENCOUNTER — OFFICE VISIT (OUTPATIENT)
Dept: PHYSICAL THERAPY | Facility: REHABILITATION | Age: 59
End: 2024-01-04
Payer: COMMERCIAL

## 2024-01-04 DIAGNOSIS — G89.29 CHRONIC PAIN OF LEFT KNEE: Primary | ICD-10-CM

## 2024-01-04 DIAGNOSIS — M25.562 CHRONIC PAIN OF LEFT KNEE: Primary | ICD-10-CM

## 2024-01-04 DIAGNOSIS — Z96.652 S/P TOTAL KNEE ARTHROPLASTY, LEFT: ICD-10-CM

## 2024-01-04 PROCEDURE — 97110 THERAPEUTIC EXERCISES: CPT | Performed by: PHYSICAL THERAPIST

## 2024-01-04 PROCEDURE — 97112 NEUROMUSCULAR REEDUCATION: CPT | Performed by: PHYSICAL THERAPIST

## 2024-01-04 NOTE — PROGRESS NOTES
"Daily Note     Today's date: 2024  Patient name: Chanel Grande  : 1965  MRN: [id number]  Referring provider: Iziaah Lorenz PA-C  Dx:   Encounter Diagnosis     ICD-10-CM    1. Chronic pain of left knee  M25.562     G89.29       2. S/P total knee arthroplasty, left  Z96.652                      Subjective: Pt reports sore after last visit, notes that she had soreness for approx 24 hours and then returned to baseline.  Pt denies pain this AM \"different than usual.\"        Objective: See treatment diary below     Precautions: FMS        Manual  23                                                                               Neuro Re-Ed             SL hip abd    3\"x10   3\"x10   3\"x15  3\"x15    Prone hip ext    5\"x10 ea  alt 5\"x10 ea  alt 5\"x15 ea  alt 5\"x15 ea   bridges  gtb 3\"2x15   5\"x20  Gtb 3\"x10   gtb 3\"x20   gtb 3\"x20    clamshells  gtb 3\"2x15   gtb 3\"x10   gtb 3\"2x10 ea  gtb 3\"2x10  Gtb 3\"2x10    SLS foam  R taps 20\"x2   15\"x3 ea  15\"x3    20\"x3    Side stepping   Slight squat 20'x1 ea    slight squat 20\"x2 ea     Hip drop out DLS Gtb 3\"2x15     Gtb x10 L     SLR      3\"x10   3\"x15   3\"2x10    Therex             Rec bike  5 mins   L1 5 mins   L4 5 mins upright  L4 6 mins   L4 6 mins     knee/hip position              leg press single upright  45# 2x10        45# 2x10   Leg press bilat lying  65# x20        65# x20                                                                           Gait Training                                 Modalities                                                                 Assessment: Pt does well with integration of neutral knee position with less pain, does need cues throughout full ROM of exercises to maintain neutral compared with valgus or pronation of left foot.        Plan: Continue per plan of care. RE next visit.      "

## 2024-01-07 DIAGNOSIS — G43.709 CHRONIC MIGRAINE WITHOUT AURA WITHOUT STATUS MIGRAINOSUS, NOT INTRACTABLE: ICD-10-CM

## 2024-01-07 RX ORDER — SUMATRIPTAN 100 MG/1
TABLET, FILM COATED ORAL
Qty: 9 TABLET | Refills: 12 | Status: SHIPPED | OUTPATIENT
Start: 2024-01-07

## 2024-01-08 ENCOUNTER — EVALUATION (OUTPATIENT)
Dept: PHYSICAL THERAPY | Facility: REHABILITATION | Age: 59
End: 2024-01-08
Payer: COMMERCIAL

## 2024-01-08 DIAGNOSIS — Z96.652 S/P TOTAL KNEE ARTHROPLASTY, LEFT: ICD-10-CM

## 2024-01-08 DIAGNOSIS — M25.562 CHRONIC PAIN OF LEFT KNEE: Primary | ICD-10-CM

## 2024-01-08 DIAGNOSIS — G89.29 CHRONIC PAIN OF LEFT KNEE: Primary | ICD-10-CM

## 2024-01-08 PROCEDURE — 97140 MANUAL THERAPY 1/> REGIONS: CPT | Performed by: PHYSICAL THERAPIST

## 2024-01-08 PROCEDURE — 97110 THERAPEUTIC EXERCISES: CPT | Performed by: PHYSICAL THERAPIST

## 2024-01-08 NOTE — PROGRESS NOTES
"PT Re-Evaluation     Today's date: 2024  Patient name: Chanel Grande  : 1965  MRN: [id number]  Referring provider: Izaiah Lorenz PA-C  Dx:   Encounter Diagnosis     ICD-10-CM    1. Chronic pain of left knee  M25.562     G89.29       2. S/P total knee arthroplasty, left  Z96.652                      Assessment  Assessment details: Pt is progressing well with PT with increase in LE strength, decrease in frequency and intensity of pain, and improvement in sleep tolerance.  Pt cont to have increase in pain towards the end of her day with prolonged funct activities and left LE weakness.  Focused on PNE performed today discussing \"overflowing cup\" scenario with activities and needing to limit her to do list throughout her week.  Pt would benefit from cont skilled PT to address these limitations and max funct.     Impairments: abnormal gait, abnormal or restricted ROM, impaired physical strength, lacks appropriate home exercise program and pain with function    Goals  Funct 1. Improve descending stairs with less pain x4 weeks.-partially met  2. Improve tolerance to daily tasks with less pain in the evening x4 weeks.-partially met  Impairment 1. Increase strength by 1/2 grade x4 weeks.-partially met  2. Decrease pain by 25 % x4 weeks-partially met      Plan  Patient would benefit from: skilled physical therapy  Planned therapy interventions: manual therapy, joint mobilization, neuromuscular re-education, therapeutic exercise, stretching, strengthening, patient education and home exercise program  Frequency: 2x week  Duration in weeks: 4        Subjective Evaluation    History of Present Illness  Mechanism of injury: Pt notes that she feels an improvement in her intensity and frequency of pain into left LE.  Pt has improved tolerance to negotiation of stairs reciprocally without conscious thought, but does feels she cont to perform non-reciprocally when carrying items.  Pt denies sleep disturbances and " "waking with less pain in AM.  Pt notes that she cont to funct throughout her day with min discomfort, but cont to have most pain and discomfort occurring in the evening upon resting after a busy day.  Pt recently has stopped working and feels she has been slightly less busy and feels this has also improved her sx's.    Patient Goals  Patient goals for therapy: decreased pain  Patient goal: -partially met  Pain  Current pain ratin  At worst pain ratin          Objective     Active Range of Motion   Left Knee   Flexion: 125 degrees   Extension: 0 degrees     Additional Active Range of Motion Details  Pt does demo slight IR and valgus position of knee when performing endrange knee flexion actively with c/o pain along lateral joint space.      Strength/Myotome Testing     Left Hip   Planes of Motion   Flexion: 5  Extension: 4-  Abduction: 4  Adduction: 5  External rotation: 5  Internal rotation: 4-    Right Hip   Planes of Motion   Flexion: 5  Extension: 4+  Abduction: 4+  Adduction: 5  External rotation: 5  Internal rotation: 4+    Left Knee   Flexion: 5  Extension: 5    Additional Strength Details  SLR 4/5            Precautions: FMS        Manual  23    re   35 mins                                                                       Neuro Re-Ed             SL hip abd    3\"x10   3\"x10   3\"x15  3\"x15    Prone hip ext    5\"x10 ea  alt 5\"x10 ea  alt 5\"x15 ea  alt 5\"x15 ea   bridges  gtb 3\"2x15   5\"x20  Gtb 3\"x10   gtb 3\"x20   gtb 3\"x20    clamshells  gtb 3\"2x15   gtb 3\"x10   gtb 3\"2x10 ea  gtb 3\"2x10  Gtb 3\"2x10    SLS foam  R taps 20\"x2   15\"x3 ea  15\"x3    20\"x3    Side stepping   Slight squat 20'x1 ea    slight squat 20\"x2 ea     Hip drop out DLS Gtb 3\"2x15        Gtb x10 L    Single leg squat        Therex             Rec bike  5 mins   L1 5 mins   L4 5 mins upright  L4 6 mins   L4 6 mins     knee/hip position              leg press single upright  45# 2x10        45# " 2x10   Leg press bilat lying  65# x20        65# x20                                                                           Gait Training                                 Modalities

## 2024-01-12 ENCOUNTER — OFFICE VISIT (OUTPATIENT)
Dept: PHYSICAL THERAPY | Facility: REHABILITATION | Age: 59
End: 2024-01-12
Payer: COMMERCIAL

## 2024-01-12 DIAGNOSIS — M25.562 CHRONIC PAIN OF LEFT KNEE: Primary | ICD-10-CM

## 2024-01-12 DIAGNOSIS — G89.29 CHRONIC PAIN OF LEFT KNEE: Primary | ICD-10-CM

## 2024-01-12 DIAGNOSIS — Z96.652 S/P TOTAL KNEE ARTHROPLASTY, LEFT: ICD-10-CM

## 2024-01-12 PROCEDURE — 97112 NEUROMUSCULAR REEDUCATION: CPT | Performed by: PHYSICAL THERAPIST

## 2024-01-12 PROCEDURE — 97110 THERAPEUTIC EXERCISES: CPT | Performed by: PHYSICAL THERAPIST

## 2024-01-12 NOTE — PROGRESS NOTES
"Daily Note     Today's date: 2024  Patient name: Chanel Grande  : 1965  MRN: [id number]  Referring provider: Izaiah Lorenz PA-C  Dx:   Encounter Diagnosis     ICD-10-CM    1. Chronic pain of left knee  M25.562     G89.29       2. S/P total knee arthroplasty, left  Z96.652                      Subjective: Pt notes some soreness after last visit but \"not too bad.\"        Objective: See treatment diary below      Precautions: FMS        Manual  23    re   35 mins                                                                       Neuro Re-Ed           SL hip abd      3\"x15  3\"x15    Prone hip ext      alt 5\"x15 ea  alt 5\"x15 ea   bridges  gtb 3\"2x15   Btb 3\"2x10   gtb 3\"x20   gtb 3\"x20    clamshells  gtb 3\"2x15   Btb 3\"2x10   gtb 3\"2x10  Gtb 3\"2x10    SLS foam  R taps 20\"x2   Foam R taps x20     20\"x3    Side stepping      slight squat 20\"x2 ea     Hip drop out DLS Gtb 3\"2x15       Gtb x10 L    Single leg squat    X10        Therex            Rec bike  5 mins   L1 5 mins  8 mins   L4 6 mins   L4 6 mins             leg press single upright  45# 2x10    45# x20   45# 2x10   Leg press bilat lying  65# x20   65# x20    65# x20                                                                           Gait Training                                 Modalities                                                              Assessment: Initiated progression of strengthening exercises per RE at last visit.  Pt notes challenge and fatigue to left LE greater than pain.  Pt encouraged to ice as needed for pain control.        Plan: Continue per plan of care.      "

## 2024-01-15 ENCOUNTER — OFFICE VISIT (OUTPATIENT)
Dept: PHYSICAL THERAPY | Facility: REHABILITATION | Age: 59
End: 2024-01-15
Payer: COMMERCIAL

## 2024-01-15 DIAGNOSIS — Z96.652 S/P TOTAL KNEE ARTHROPLASTY, LEFT: ICD-10-CM

## 2024-01-15 DIAGNOSIS — M25.562 CHRONIC PAIN OF LEFT KNEE: Primary | ICD-10-CM

## 2024-01-15 DIAGNOSIS — G89.29 CHRONIC PAIN OF LEFT KNEE: Primary | ICD-10-CM

## 2024-01-15 PROCEDURE — 97110 THERAPEUTIC EXERCISES: CPT | Performed by: PHYSICAL THERAPIST

## 2024-01-15 PROCEDURE — 97112 NEUROMUSCULAR REEDUCATION: CPT | Performed by: PHYSICAL THERAPIST

## 2024-01-15 NOTE — PROGRESS NOTES
"Daily Note     Today's date: 1/15/2024  Patient name: Chanel Grande  : 1965  MRN: [id number]  Referring provider: Izaiah Lorenz PA-C  Dx:   Encounter Diagnosis     ICD-10-CM    1. Chronic pain of left knee  M25.562     G89.29       2. S/P total knee arthroplasty, left  Z96.652                      Subjective: Pt cont to do well with progression of ex.  Pt does have migraine today and was about to cancel.       Objective: See treatment diary below      Precautions: FMS        Manual  1/4/24 1/8/24 1/12/24 1/15/24  1/2/24    re   35 mins                                                                       Neuro Re-Ed          SL hip abd         Prone hip ext         bridges  gtb 3\"2x15   Btb 3\"2x10  Btb 3\"2x10     clamshells  gtb 3\"2x15   Btb 3\"2x10  Btb 3\"2x10     SLS foam  R taps 20\"x2   Foam R taps x20  Foam R taps x20     Side stepping          Hip drop out DLS Gtb 3\"2x15         Single leg squat    X10   x10     Therex           Rec bike  5 mins   L1 5 mins  8 mins   5 mins              leg press single upright  45# 2x10    45# x20  45# x20     Leg press bilat lying  65# x20   65# x20   65# x20                                                                            Gait Training                                 Modalities                                                              Assessment: Pt was able to complete program despite HA.  Pt cont to require cues to maintain technique throughout balance exercises and encouraged about challenge with SLS/foam secondary to improving her strength as pt was becoming frustrated.       Plan: Continue per plan of care.      "

## 2024-01-19 ENCOUNTER — OFFICE VISIT (OUTPATIENT)
Dept: PHYSICAL THERAPY | Facility: REHABILITATION | Age: 59
End: 2024-01-19
Payer: COMMERCIAL

## 2024-01-19 DIAGNOSIS — Z96.652 S/P TOTAL KNEE ARTHROPLASTY, LEFT: ICD-10-CM

## 2024-01-19 DIAGNOSIS — G89.29 CHRONIC PAIN OF LEFT KNEE: Primary | ICD-10-CM

## 2024-01-19 DIAGNOSIS — M25.562 CHRONIC PAIN OF LEFT KNEE: Primary | ICD-10-CM

## 2024-01-19 PROCEDURE — 97112 NEUROMUSCULAR REEDUCATION: CPT | Performed by: PHYSICAL THERAPIST

## 2024-01-19 PROCEDURE — 97110 THERAPEUTIC EXERCISES: CPT | Performed by: PHYSICAL THERAPIST

## 2024-01-19 NOTE — PROGRESS NOTES
"Daily Note     Today's date: 2024  Patient name: Chanel Grande  : 1965  MRN: [id number]  Referring provider: Izaiah Lorenz PA-C  Dx:   Encounter Diagnosis     ICD-10-CM    1. Chronic pain of left knee  M25.562     G89.29       2. S/P total knee arthroplasty, left  Z96.652                      Subjective: Pt reports soreness after last visit, but tolerated funct activities post PT.        Objective: See treatment diary below      Precautions: FMS        Manual  1/4/24 1/8/24 1/12/24 1/15/24 1/19/24    re   35 mins                                                                       Neuro Re-Ed          SL hip abd         Prone hip ext         bridges  gtb 3\"2x15   Btb 3\"2x10  Btb 3\"2x10  Btb 3\"2x15   clamshells  gtb 3\"2x15   Btb 3\"2x10  Btb 3\"2x10  Btb 3\"2x15    SLS foam  R taps 20\"x2   Foam R taps x20  Foam R taps x20  Foam R taps x20    Side stepping          Hip drop out DLS Gtb 3\"2x15         Single leg squat    X10   x10  X10    Therex           Rec bike  5 mins   L1 5 mins  8 mins   5 mins  8 mins             leg press single upright  45# 2x10    45# x20  45# x20  45# x20    Leg press bilat lying  65# x20   65# x20   65# x20  65# x20                                                                           Gait Training                                 Modalities                                                              Assessment: Pt does well with progression of strengthening and less pain reports overall.  Pt cont to need cues throughout tx to maintain knee neutral, however does demo improved repeated correction of alignment independently.        Plan: Continue per plan of care.      "

## 2024-01-23 ENCOUNTER — OFFICE VISIT (OUTPATIENT)
Dept: PHYSICAL THERAPY | Facility: REHABILITATION | Age: 59
End: 2024-01-23
Payer: COMMERCIAL

## 2024-01-23 DIAGNOSIS — M25.562 CHRONIC PAIN OF LEFT KNEE: Primary | ICD-10-CM

## 2024-01-23 DIAGNOSIS — Z96.652 S/P TOTAL KNEE ARTHROPLASTY, LEFT: ICD-10-CM

## 2024-01-23 DIAGNOSIS — G89.29 CHRONIC PAIN OF LEFT KNEE: Primary | ICD-10-CM

## 2024-01-23 PROCEDURE — 97112 NEUROMUSCULAR REEDUCATION: CPT | Performed by: PHYSICAL THERAPIST

## 2024-01-23 PROCEDURE — 97110 THERAPEUTIC EXERCISES: CPT | Performed by: PHYSICAL THERAPIST

## 2024-01-23 NOTE — PROGRESS NOTES
"Daily Note     Today's date: 2024  Patient name: Chanel Grande  : 1965  MRN: [id number]  Referring provider: Izaiah Lorenz PA-C  Dx:   Encounter Diagnosis     ICD-10-CM    1. Chronic pain of left knee  M25.562     G89.29       2. S/P total knee arthroplasty, left  Z96.652                      Subjective: Pt notes having HA, but overall min knee sx's.        Objective: See treatment diary below      Precautions: FMS        Manual  1/23/24 1/8/24 1/12/24 1/15/24 1/19/24    RE   35 mins                                                                       Neuro Re-Ed          SL hip abd         Prone hip ext        bridges Btb 3\"2x15  Btb 3\"2x10  Btb 3\"2x10  Btb 3\"2x15   clamshells Btb 3\"2x15   Btb 3\"2x10  Btb 3\"2x10  Btb 3\"2x15    SLS foam X20 foam R taps  Foam R taps x20  Foam R taps x20  Foam R taps x20    Squat foam 2x10                Single leg squat   X10   x10  X10    Therex          Rec bike  5 mins   L1 5 mins  8 mins   5 mins  8 mins             leg press single upright 45# 2x15    45# x20  45# x20  45# x20    Leg press bilat lying 65# 2x10   65# x20   65# x20  65# x20                                                                          Gait Training                                 Modalities                                                              Assessment: Pt cont to progress well with strengthening.  Pt does well with changes in stability during CKC exercises and maintaining knee neutral, however does fatigue quickly and compensate towards using right LE.        Plan: Continue per plan of care.      "

## 2024-01-26 ENCOUNTER — OFFICE VISIT (OUTPATIENT)
Dept: PHYSICAL THERAPY | Facility: REHABILITATION | Age: 59
End: 2024-01-26
Payer: COMMERCIAL

## 2024-01-26 DIAGNOSIS — Z96.652 S/P TOTAL KNEE ARTHROPLASTY, LEFT: ICD-10-CM

## 2024-01-26 DIAGNOSIS — G89.29 CHRONIC PAIN OF LEFT KNEE: Primary | ICD-10-CM

## 2024-01-26 DIAGNOSIS — M25.562 CHRONIC PAIN OF LEFT KNEE: Primary | ICD-10-CM

## 2024-01-26 PROCEDURE — 97112 NEUROMUSCULAR REEDUCATION: CPT | Performed by: PHYSICAL THERAPIST

## 2024-01-26 PROCEDURE — 97116 GAIT TRAINING THERAPY: CPT | Performed by: PHYSICAL THERAPIST

## 2024-01-26 PROCEDURE — 97110 THERAPEUTIC EXERCISES: CPT | Performed by: PHYSICAL THERAPIST

## 2024-01-26 NOTE — PROGRESS NOTES
"Daily Note     Today's date: 2024  Patient name: Chanel Grande  : 1965  MRN: [id number]  Referring provider: Izaiah Lorenz PA-C  Dx:   Encounter Diagnosis     ICD-10-CM    1. Chronic pain of left knee  M25.562     G89.29       2. S/P total knee arthroplasty, left  Z96.652                      Subjective: Pt notes struggling this week due to migraine and HA sx's.        Objective: See treatment diary below      Precautions: FMS        Manual  1/23/24 1/26/24 1/12/24 1/15/24 1/19/24    RE                                                                          Neuro Re-Ed          SL hip abd         Prone hip ext        bridges Btb 3\"2x15 Btb 3\"2x20 Btb 3\"2x10  Btb 3\"2x10  Btb 3\"2x15   clamshells Btb 3\"2x15  Btb 3\"2x20 Btb 3\"2x10  Btb 3\"2x10  Btb 3\"2x15    SLS foam X20 foam R taps X20 R taps Foam R taps x20  Foam R taps x20  Foam R taps x20    Squat foam 2x10                Single leg squat   X10   x10  X10    Therex         Rec bike  5 mins  8 mins  8 mins   5 mins  8 mins             leg press single upright 45# 2x15  65# x10   45# x20  45# x20  45# x20    Leg press bilat] lying 65# 2x10  75# x10  65# x20   65# x20  65# x20                                                                          Gait Training              step down    6 inch height 2x10                Modalities                                                              Assessment: Pt demo's good control when descending with left LE compared with previously including neutral knee posture.  Pt does need cues to slow exercise down especially as pt fatigues and higher reps are performed.  Pt is progressing well towards strengthening goals.       Plan: Continue per plan of care.      "

## 2024-01-29 ENCOUNTER — OFFICE VISIT (OUTPATIENT)
Dept: PHYSICAL THERAPY | Facility: REHABILITATION | Age: 59
End: 2024-01-29
Payer: COMMERCIAL

## 2024-01-29 DIAGNOSIS — M25.562 CHRONIC PAIN OF LEFT KNEE: Primary | ICD-10-CM

## 2024-01-29 DIAGNOSIS — Z96.652 S/P TOTAL KNEE ARTHROPLASTY, LEFT: ICD-10-CM

## 2024-01-29 DIAGNOSIS — G89.29 CHRONIC PAIN OF LEFT KNEE: Primary | ICD-10-CM

## 2024-01-29 PROCEDURE — 97112 NEUROMUSCULAR REEDUCATION: CPT | Performed by: PHYSICAL THERAPIST

## 2024-01-29 PROCEDURE — 97116 GAIT TRAINING THERAPY: CPT | Performed by: PHYSICAL THERAPIST

## 2024-01-29 PROCEDURE — 97110 THERAPEUTIC EXERCISES: CPT | Performed by: PHYSICAL THERAPIST

## 2024-01-29 NOTE — PROGRESS NOTES
"Daily Note     Today's date: 2024  Patient name: Chanel Grande  : 1965  MRN: [id number]  Referring provider: Izaiah Lorenz PA-C  Dx:   Encounter Diagnosis     ICD-10-CM    1. Chronic pain of left knee  M25.562     G89.29       2. S/P total knee arthroplasty, left  Z96.652                      Subjective: Pt reports that she is doing well with less soreness and pain, contributes any discomfort to the rain and change in weather.      Objective: See treatment diary below      Precautions: FMS        Manual  1/23/24 1/26/24 1/29/24 1/15/24 1/19/24    RE                                                                          Neuro Re-Ed          SL hip abd         Prone hip ext        bridges Btb 3\"2x15 Btb 3\"2x20 Black 3\"2x10  Btb 3\"2x10  Btb 3\"2x15   clamshells Btb 3\"2x15  Btb 3\"2x20 Black 3\"2x10  Btb 3\"2x10  Btb 3\"2x15    SLS foam X20 foam R taps X20 R taps X20 foam taps Foam R taps x20  Foam R taps x20    Squat foam 2x10   2x10             Single leg squat     x10  X10    Therex         Rec bike  5 mins  8 mins  8 mins   5 mins  8 mins             leg press single upright 45# 2x15  65# x10  65# 2x10   45# x20  45# x20    Leg press bilat] lying 65# 2x10  75# x10  75# 2x10   65# x20  65# x20                                                                          Gait Training              step down    6 inch height 2x10   6 inch height 2x10              Modalities                                                              Assessment: Pt does well with progression of ex and spent 5 minutes discussing progression of weights and repetitions of exercises when returning to the gym.  Was able to use visual aid to assist with knee position during step down to assist with pt's understanding of strengthening left LE.        Plan: Continue per plan of care. RE next visit.      " none

## 2024-01-31 ENCOUNTER — EVALUATION (OUTPATIENT)
Dept: PHYSICAL THERAPY | Facility: MEDICAL CENTER | Age: 59
End: 2024-01-31
Payer: COMMERCIAL

## 2024-01-31 ENCOUNTER — APPOINTMENT (OUTPATIENT)
Dept: LAB | Facility: MEDICAL CENTER | Age: 59
End: 2024-01-31
Payer: COMMERCIAL

## 2024-01-31 DIAGNOSIS — E78.00 HYPERCHOLESTEREMIA: ICD-10-CM

## 2024-01-31 DIAGNOSIS — M54.12 CERVICAL RADICULOPATHY, CHRONIC: ICD-10-CM

## 2024-01-31 DIAGNOSIS — M77.12 LATERAL EPICONDYLITIS OF LEFT ELBOW: Primary | ICD-10-CM

## 2024-01-31 DIAGNOSIS — M18.12 OSTEOARTHRITIS OF CARPOMETACARPAL (CMC) JOINT OF LEFT THUMB, UNSPECIFIED OSTEOARTHRITIS TYPE: ICD-10-CM

## 2024-01-31 LAB
ALBUMIN SERPL BCP-MCNC: 4.2 G/DL (ref 3.5–5)
ALP SERPL-CCNC: 61 U/L (ref 34–104)
ALT SERPL W P-5'-P-CCNC: 10 U/L (ref 7–52)
ANION GAP SERPL CALCULATED.3IONS-SCNC: 7 MMOL/L
AST SERPL W P-5'-P-CCNC: 14 U/L (ref 13–39)
BASOPHILS # BLD AUTO: 0.04 THOUSANDS/ÂΜL (ref 0–0.1)
BASOPHILS NFR BLD AUTO: 1 % (ref 0–1)
BILIRUB SERPL-MCNC: 0.32 MG/DL (ref 0.2–1)
BUN SERPL-MCNC: 14 MG/DL (ref 5–25)
CALCIUM SERPL-MCNC: 9.9 MG/DL (ref 8.4–10.2)
CHLORIDE SERPL-SCNC: 105 MMOL/L (ref 96–108)
CHOLEST SERPL-MCNC: 222 MG/DL
CO2 SERPL-SCNC: 30 MMOL/L (ref 21–32)
CREAT SERPL-MCNC: 0.78 MG/DL (ref 0.6–1.3)
EOSINOPHIL # BLD AUTO: 0.19 THOUSAND/ÂΜL (ref 0–0.61)
EOSINOPHIL NFR BLD AUTO: 3 % (ref 0–6)
ERYTHROCYTE [DISTWIDTH] IN BLOOD BY AUTOMATED COUNT: 12 % (ref 11.6–15.1)
GFR SERPL CREATININE-BSD FRML MDRD: 85 ML/MIN/1.73SQ M
GLUCOSE P FAST SERPL-MCNC: 94 MG/DL (ref 65–99)
HCT VFR BLD AUTO: 44.6 % (ref 34.8–46.1)
HDLC SERPL-MCNC: 54 MG/DL
HGB BLD-MCNC: 14.6 G/DL (ref 11.5–15.4)
IMM GRANULOCYTES # BLD AUTO: 0.03 THOUSAND/UL (ref 0–0.2)
IMM GRANULOCYTES NFR BLD AUTO: 1 % (ref 0–2)
LDLC SERPL CALC-MCNC: 152 MG/DL (ref 0–100)
LYMPHOCYTES # BLD AUTO: 1.95 THOUSANDS/ÂΜL (ref 0.6–4.47)
LYMPHOCYTES NFR BLD AUTO: 32 % (ref 14–44)
MCH RBC QN AUTO: 28.7 PG (ref 26.8–34.3)
MCHC RBC AUTO-ENTMCNC: 32.7 G/DL (ref 31.4–37.4)
MCV RBC AUTO: 88 FL (ref 82–98)
MONOCYTES # BLD AUTO: 0.51 THOUSAND/ÂΜL (ref 0.17–1.22)
MONOCYTES NFR BLD AUTO: 9 % (ref 4–12)
NEUTROPHILS # BLD AUTO: 3.29 THOUSANDS/ÂΜL (ref 1.85–7.62)
NEUTS SEG NFR BLD AUTO: 54 % (ref 43–75)
NONHDLC SERPL-MCNC: 168 MG/DL
NRBC BLD AUTO-RTO: 0 /100 WBCS
PLATELET # BLD AUTO: 250 THOUSANDS/UL (ref 149–390)
PMV BLD AUTO: 10.1 FL (ref 8.9–12.7)
POTASSIUM SERPL-SCNC: 4.4 MMOL/L (ref 3.5–5.3)
PROT SERPL-MCNC: 6.7 G/DL (ref 6.4–8.4)
RBC # BLD AUTO: 5.08 MILLION/UL (ref 3.81–5.12)
SODIUM SERPL-SCNC: 142 MMOL/L (ref 135–147)
TRIGL SERPL-MCNC: 80 MG/DL
WBC # BLD AUTO: 6.01 THOUSAND/UL (ref 4.31–10.16)

## 2024-01-31 PROCEDURE — 85025 COMPLETE CBC W/AUTO DIFF WBC: CPT

## 2024-01-31 PROCEDURE — 80053 COMPREHEN METABOLIC PANEL: CPT

## 2024-01-31 PROCEDURE — 97162 PT EVAL MOD COMPLEX 30 MIN: CPT | Performed by: PHYSICAL THERAPIST

## 2024-01-31 PROCEDURE — 80061 LIPID PANEL: CPT

## 2024-01-31 PROCEDURE — 36415 COLL VENOUS BLD VENIPUNCTURE: CPT

## 2024-01-31 NOTE — PROGRESS NOTES
PT Evaluation     Today's date: 2024  Patient name: Chanel Grande  : 1965  MRN: [id number]  Referring provider: Izaiah Stinson,*  Dx:   Encounter Diagnosis     ICD-10-CM    1. Lateral epicondylitis of left elbow  M77.12       2. Osteoarthritis of carpometacarpal (CMC) joint of left thumb, unspecified osteoarthritis type  M18.12       3. Cervical radiculopathy, chronic  M54.12                      Assessment  Assessment details: Pt is a 58 year old RHD female who presents to PT with chronic BL hand pain and parasthesias L worse than R. Pt also reports a hx of fibromyalgia and chronic migraines. Pt presented with mild limitation in cervical ROM with + Spurling's on L. - TOS tests with exception of + spring test to BL 1st ribs. Pt's symptoms are easily provoked with percussion to common neural compression sites BL but no neural tension observed. Further assessment into cervical and thoracic spine revealed moderate hypomobility to lower cervical and mid thoracic BL, moderate tension to BL scalenes and SCM musculature, and hypomobility to BL 1st ribs. For therapy we plan on targeting her C/S and T/S to improve her vertebral mobility, improve soft tissue mobility, improve posture, reduce peripheral symptoms, increase strength once symptoms lessen, and better her overall functioning. Thank you kindly for your referral.   Impairments: abnormal or restricted ROM, activity intolerance, impaired physical strength, lacks appropriate home exercise program, pain with function and poor posture   Understanding of Dx/Px/POC: good   Prognosis: good    Goals  STG (2-3 weeks)  1: Pt independent in HEP  2: reduce pain 2 grades on VAS  3: reduce parasthesias by 25%  4: improve posture by 25%    LTG (4-6 weeks)  1: Improve FOTO 10-15 pts  2: improve intervertebral mobility by 50%  3: improve posture by 50%  4: UE strength 4 to 4+/5 throughout  5: reduce sleep disturbance by 50%      Plan  Plan details: Initiate PT  as per POC  Patient would benefit from: skilled physical therapy  Planned modality interventions: thermotherapy: hydrocollator packs  Planned therapy interventions: joint mobilization, manual therapy, massage, nerve gliding, neuromuscular re-education, postural training, strengthening, stretching, therapeutic activities, therapeutic exercise, flexibility, functional ROM exercises and home exercise program  Frequency: 2x week  Duration in visits: 12  Duration in weeks: 6  Plan of Care beginning date: 2024  Plan of Care expiration date: 3/13/2024  Treatment plan discussed with: patient      Subjective Evaluation    History of Present Illness  Mechanism of injury: Pain has been going on for awhile  Not sure where the pain initially started but recently feeling more pain into L > R MCP joints and into palm  BL tingling/numbness, sometimes R shoulder down to hand and L elbow down into hand  + fibromyalgia  + sleep disturbance  Hand tends to cramp and get stuck in flexion, difficult to free            Recurrent probem    Quality of life: good    Patient Goals  Patient goals for therapy: decreased pain, increased strength, independence with ADLs/IADLs and return to sport/leisure activities    Pain  Current pain ratin  At best pain ratin  At worst pain ratin  Location: pain to MT common extensors, R shoulder, MCP jts and into palm  Quality: sharp and radiating  Relieving factors: rest and relaxation  Exacerbated by: driving, holding objects.  Progression: worsening    Social Support  Lives with: spouse    Employment status: not working  Hand dominance: right  Life stress: gardening, cooking        Objective     Postural Observations  Seated posture: fair  Standing posture: fair  Correction of posture: has no consistent effect      Tenderness     Left Elbow   Tenderness in the cubital tunnel. No tenderness in the lateral epicondyle, medial epicondyle and radial head.     Right Elbow   Tenderness in the  cubital tunnel. No tenderness in the lateral epicondyle, medial epicondyle and radial head.     Left Wrist/Hand   No tenderness in the lateral epicondyle and medial epicondyle.     Right Wrist/Hand   No tenderness in the lateral epicondyle and medial epicondyle.     Additional Tenderness Details  + radial tunnel on L    Active Range of Motion   Cervical/Thoracic Spine       Cervical    Flexion:  WFL  Extension:  WFL  Left lateral flexion:  Restriction level: minimal  Right lateral flexion:  Restriction level minimal  Left rotation:  WFL  Right rotation:  WFL    Left Elbow   Normal active range of motion    Right Elbow   Normal active range of motion    Left Wrist   Normal active range of motion    Right Wrist   Normal active range of motion    Joint Play     Hypomobile: C4, C5, C6, C7, T2, T3, T4, T5 and 1st rib     Tests   Cervical   Negative vertical compression.     Left   Negative Spurling's Test A and Spurling's Test B.     Right   Positive Spurling's Test B.   Negative Spurling's Test A.     Left Shoulder   Positive cervical rotation lateral flexion.   Negative Archana, ULTT1, ULTT2, ULTT3 and ULTT4.     Right Shoulder   Positive cervical rotation lateral flexion.   Negative Archana, ULTT1, ULTT2, ULTT3 and ULTT4.     Left Elbow   Positive Tinel's sign (cubital tunnel).     Right Elbow   Positive Tinel's sign (cubital tunnel).     Left Wrist/Hand   Positive Tinel's sign (medial nerve) and Tinel's sign (radial tunnel).     Right Wrist/Hand   Negative Tinel's sign (radial tunnel).     Lumbar   Negative vertical compression.     Additional Tests Details  - palafox's             Precautions: chronic, fibromyalgia, chronic migraines  HEP: cervical retraction, scalene stretching, 1st rib mobs, cervical ext snags      Manuals             MH cervical             Prone C/S- T/S mobs             1st rib mobs                          Neuro Re-Ed             Cervical retraction into pillow                          Thoracic  stretch with 1/2 foam roll             Thoracic extension over 1/2 foam roll                                                    Ther Ex                          Standing row              Standing robberies                                                                              Ther Activity                                       Gait Training                                       Modalities

## 2024-02-01 ENCOUNTER — APPOINTMENT (OUTPATIENT)
Dept: PHYSICAL THERAPY | Facility: REHABILITATION | Age: 59
End: 2024-02-01
Payer: COMMERCIAL

## 2024-02-07 ENCOUNTER — OFFICE VISIT (OUTPATIENT)
Dept: PHYSICAL THERAPY | Facility: MEDICAL CENTER | Age: 59
End: 2024-02-07
Payer: COMMERCIAL

## 2024-02-07 DIAGNOSIS — M54.12 CERVICAL RADICULOPATHY, CHRONIC: ICD-10-CM

## 2024-02-07 DIAGNOSIS — M18.12 OSTEOARTHRITIS OF CARPOMETACARPAL (CMC) JOINT OF LEFT THUMB, UNSPECIFIED OSTEOARTHRITIS TYPE: ICD-10-CM

## 2024-02-07 DIAGNOSIS — M77.12 LATERAL EPICONDYLITIS OF LEFT ELBOW: Primary | ICD-10-CM

## 2024-02-07 PROCEDURE — 97140 MANUAL THERAPY 1/> REGIONS: CPT | Performed by: PHYSICAL THERAPIST

## 2024-02-07 PROCEDURE — 97110 THERAPEUTIC EXERCISES: CPT | Performed by: PHYSICAL THERAPIST

## 2024-02-07 NOTE — PROGRESS NOTES
"Daily Note     Today's date: 2024  Patient name: Chanel Grande  : 1965  MRN: [id number]  Referring provider: Izaiah Stinson,*  Dx:   Encounter Diagnosis     ICD-10-CM    1. Lateral epicondylitis of left elbow  M77.12       2. Osteoarthritis of carpometacarpal (CMC) joint of left thumb, unspecified osteoarthritis type  M18.12       3. Cervical radiculopathy, chronic  M54.12                      Subjective: Pt reports she had severe migraine after IE. Felt that the manuals weren't too mch but she was throwing up for 24 hours after.       Objective: See treatment diary below      Assessment: Tolerated treatment well. Patient exhibited good technique with therapeutic exercises and would benefit from continued PT  Pt tolerated manuals well, kept pt on incline, pt reported no exacerbations with manuals  Once Pt started performing cervical retraction she required VCs to relax her anterior chest wall. Pt noted pain radiating to L forehead down to anterior border of her L ear. Ceased exercise  Pt tolerated scapular ex's well, pt appeared to have mild fatigue after in postural muscles  Advised her to hold from cervical retraction at home. Pt understood    Plan: Continue per plan of care.      Precautions: chronic, fibromyalgia, chronic migraines  HEP: cervical retraction, scalene stretching, 1st rib mobs, cervical ext snags      Manuals 2/7            MH cervical 10 min            Scalene stretch 10 + STM            C/S mobs Supine 10 grade II             30            Neuro Re-Ed             Cervical retraction into pillow 10x5\"                         Thoracic stretch with 1/2 foam roll             Thoracic extension over 1/2 foam roll                                                    Ther Ex                          Standing row 20x5\"             Standing robberies 20x5\"                                                                             Ther Activity                                     "   Gait Training                                       Modalities

## 2024-02-09 ENCOUNTER — OFFICE VISIT (OUTPATIENT)
Dept: PHYSICAL THERAPY | Facility: MEDICAL CENTER | Age: 59
End: 2024-02-09
Payer: COMMERCIAL

## 2024-02-09 DIAGNOSIS — M77.12 LATERAL EPICONDYLITIS OF LEFT ELBOW: Primary | ICD-10-CM

## 2024-02-09 DIAGNOSIS — M18.12 OSTEOARTHRITIS OF CARPOMETACARPAL (CMC) JOINT OF LEFT THUMB, UNSPECIFIED OSTEOARTHRITIS TYPE: ICD-10-CM

## 2024-02-09 PROCEDURE — 97140 MANUAL THERAPY 1/> REGIONS: CPT | Performed by: PHYSICAL THERAPIST

## 2024-02-09 NOTE — PROGRESS NOTES
"Daily Note     Today's date: 2024  Patient name: Chanel Grande  : 1965  MRN: [id number]  Referring provider: Izaiah Stinson,*  Dx:   Encounter Diagnosis     ICD-10-CM    1. Lateral epicondylitis of left elbow  M77.12       2. Osteoarthritis of carpometacarpal (CMC) joint of left thumb, unspecified osteoarthritis type  M18.12                      Subjective: Pt reports she felt good after last visit, no headache after and felt looser in her neck and shoulders. Today she has aching along her dorsal forearm and lateral shoulder.       Objective: See treatment diary below      Assessment: Tolerated treatment well. Patient exhibited good technique with therapeutic exercises and would benefit from continued PT  Performed joint mobs to Cs in supine, pt tolerated well  Added post capsule stretch and radial nerve glides- both shoulder started out having some tension moving into IR but then able to reach full ranges after some gliding.   Pt noted feeling looser in her body but still had some soreness BL to her middle deltoids and lateral forearms.   No issues reported post session.    Plan: Continue per plan of care.      Precautions: chronic, fibromyalgia, chronic migraines  HEP: cervical retraction, scalene stretching, 1st rib mobs, cervical ext snags      Manuals            MH cervical 10 min 10 min           Scalene stretch 10 + STM 5           C/S mobs Supine 10 grade II 10 + PNG's            30 30           Neuro Re-Ed             Cervical retraction into pillow 10x5\" NP                        Thoracic stretch with 1/2 foam roll             Thoracic extension over 1/2 foam roll                                                    Ther Ex                          Standing row 20x5\" 20x5\"            Standing robberies 20x5\" 20x5\"                                                                            Ther Activity                                       Gait Training                           "             Modalities

## 2024-02-13 ENCOUNTER — APPOINTMENT (OUTPATIENT)
Dept: PHYSICAL THERAPY | Facility: MEDICAL CENTER | Age: 59
End: 2024-02-13
Payer: COMMERCIAL

## 2024-02-15 ENCOUNTER — OFFICE VISIT (OUTPATIENT)
Dept: PHYSICAL THERAPY | Facility: MEDICAL CENTER | Age: 59
End: 2024-02-15
Payer: COMMERCIAL

## 2024-02-15 DIAGNOSIS — M54.12 CERVICAL RADICULOPATHY, CHRONIC: ICD-10-CM

## 2024-02-15 DIAGNOSIS — M77.12 LATERAL EPICONDYLITIS OF LEFT ELBOW: Primary | ICD-10-CM

## 2024-02-15 DIAGNOSIS — M18.12 OSTEOARTHRITIS OF CARPOMETACARPAL (CMC) JOINT OF LEFT THUMB, UNSPECIFIED OSTEOARTHRITIS TYPE: ICD-10-CM

## 2024-02-15 PROCEDURE — 97140 MANUAL THERAPY 1/> REGIONS: CPT | Performed by: PHYSICAL THERAPIST

## 2024-02-15 PROCEDURE — 97110 THERAPEUTIC EXERCISES: CPT | Performed by: PHYSICAL THERAPIST

## 2024-02-15 NOTE — PROGRESS NOTES
"Daily Note     Today's date: 2/15/2024  Patient name: Chanel Grande  : 1965  MRN: [id number]  Referring provider: Izaiah Stinson,*  Dx:   Encounter Diagnosis     ICD-10-CM    1. Lateral epicondylitis of left elbow  M77.12       2. Osteoarthritis of carpometacarpal (CMC) joint of left thumb, unspecified osteoarthritis type  M18.12       3. Cervical radiculopathy, chronic  M54.12                      Subjective: Pt reports she is sore all over from helping her son shovel the other day. No issues after last appointment but feeling stiff again today.       Objective: See treatment diary below      Assessment: Tolerated treatment well. Patient exhibited good technique with therapeutic exercises and would benefit from continued PT  Pt had increased tension to her R UT- lois to lessen  Pt noted improved symptoms post manuals  Added light resistance to scap exercises, pt reported burning down each arm  Attempted wall slides with lift off, pt only able to perform 4 reps before becoming fatigued  Added SL clocks to work on stretching her chest wall without stressing her peripheral nerves, pt able to demonstrate  Pt noted she felt some soreness post session but looser.    Plan: Continue per plan of care.      Precautions: chronic, fibromyalgia, chronic migraines  HEP: cervical retraction, scalene stretching, 1st rib mobs, cervical ext snags      Manuals 2/7 2/9 2/15          MH cervical 10 min 10 min 10          Scalene stretch 10 + STM 5 10          C/S mobs Supine 10 grade II 10 + PNG's 10           30 30 30          Neuro Re-Ed             Cervical retraction into pillow 10x5\" NP                        Thoracic stretch with 1/2 foam roll             Thoracic extension over 1/2 foam roll             SL clocks   8x each          Wall slides with liftoff   4x                       Ther Ex   10                       Standing row 20x5\" 20x5\" YTB 2x10           Standing robberies 20x5\" 20x5\" YTB 2x10           "                                                                 Ther Activity                                       Gait Training                                       Modalities

## 2024-02-18 NOTE — PROGRESS NOTES
Assessment/Plan:     1. Well adult exam    2. Hypercholesteremia  Assessment & Plan:  Well controlled.   The 10-year ASCVD risk score (Maria L MESSINA, et al., 2019) is: 2.7%    Values used to calculate the score:      Age: 58 years      Sex: Female      Is Non- : No      Diabetic: No      Tobacco smoker: No      Systolic Blood Pressure: 134 mmHg      Is BP treated: No      HDL Cholesterol: 54 mg/dL      Total Cholesterol: 222 mg/dL      Orders:  -     CBC and differential; Future; Expected date: 05/19/2024  -     Ferritin; Future; Expected date: 05/19/2024  -     Vitamin D 25 hydroxy; Future; Expected date: 05/19/2024  -     Vitamin B12; Future; Expected date: 05/19/2024  -     Hemoglobin A1C; Future; Expected date: 05/19/2024  -     Comprehensive metabolic panel; Future; Expected date: 05/19/2024  -     Iron Panel (Includes Ferritin, Iron Sat%, Iron, and TIBC); Future; Expected date: 05/19/2024  -     Magnesium; Future    3. Fibromyalgia  Assessment & Plan:  Pt no longer taking any medication for this. Doing well most of the time. Has started PT for her hands and neck. Prescription muscle relaxer as needed.     Orders:  -     CBC and differential; Future; Expected date: 05/19/2024  -     Ferritin; Future; Expected date: 05/19/2024  -     Vitamin D 25 hydroxy; Future; Expected date: 05/19/2024  -     Vitamin B12; Future; Expected date: 05/19/2024  -     Hemoglobin A1C; Future; Expected date: 05/19/2024  -     Comprehensive metabolic panel; Future; Expected date: 05/19/2024  -     Iron Panel (Includes Ferritin, Iron Sat%, Iron, and TIBC); Future; Expected date: 05/19/2024  -     Magnesium; Future  -     methocarbamol (ROBAXIN) 500 mg tablet; Take 1 tablet (500 mg total) by mouth 3 (three) times a day as needed for muscle spasms    4. Migraine without aura and without status migrainosus, not intractable  Assessment & Plan:  Not well controlled. Prescription imitrex nasal spray to see if this works  better. No help with Ubrelvy. Consider botox if appropriate.     Orders:  -     SUMAtriptan (IMITREX) 20 MG/ACT nasal spray; 1 spray (20 mg total) into each nostril once as needed for migraine for up to 1 dose ; if headache returns/persists, may repeat dose once after 2 hours; MAX 40 mg/24 hours  -     CBC and differential; Future; Expected date: 05/19/2024  -     Ferritin; Future; Expected date: 05/19/2024  -     Vitamin D 25 hydroxy; Future; Expected date: 05/19/2024  -     Vitamin B12; Future; Expected date: 05/19/2024  -     Hemoglobin A1C; Future; Expected date: 05/19/2024  -     Comprehensive metabolic panel; Future; Expected date: 05/19/2024  -     Iron Panel (Includes Ferritin, Iron Sat%, Iron, and TIBC); Future; Expected date: 05/19/2024  -     Magnesium; Future    5. Benign essential hypertension  Assessment & Plan:  Stable. She has made diet changes and plans to exercise. This should get blood pressure to goal.     Orders:  -     CBC and differential; Future; Expected date: 05/19/2024  -     Ferritin; Future; Expected date: 05/19/2024  -     Vitamin D 25 hydroxy; Future; Expected date: 05/19/2024  -     Vitamin B12; Future; Expected date: 05/19/2024  -     Hemoglobin A1C; Future; Expected date: 05/19/2024  -     Comprehensive metabolic panel; Future; Expected date: 05/19/2024  -     Iron Panel (Includes Ferritin, Iron Sat%, Iron, and TIBC); Future; Expected date: 05/19/2024  -     Magnesium; Future    6. MDD (major depressive disorder), recurrent episode, mild (HCC)  Assessment & Plan:  Doing well with no meds       7. Seronegative arthropathy of multiple sites (HCC)  Assessment & Plan:  No changes.       8. Vitamin D deficiency  Assessment & Plan:  Recommend restart vitamin D, she thinks 1000 mg and update labs before next visit       9. Overweight with body mass index (BMI) of 26 to 26.9 in adult  Assessment & Plan:  Pt has lost 30 pounds with diet changes - Keep up the great work!       10. Cervical  spondylosis without myelopathy  Assessment & Plan:  Numbness in hands felt to be coming from neck. Continue PT and if not helping, or worsens, update MRI cervical spine.       11. Osteopenia of multiple sites  Assessment & Plan:  Dexa 11/23 with osteopenia. Restart calcium and vitamin D       12. S/P total knee arthroplasty, left  Assessment & Plan:  Doing well since surgery.            Well adult exam  ·         Continue healthy diet   ·         Encourage exercise 4 times a week or more for minimum 30 minutes.   ·         Continue to see dentist, wear seatbelt.  ·         Health maintenance reviewed - declines flu and COVID.   Reviewed age appropriate health maintenance screenings and immunizations that are due, risks and benefits of these.   Health Maintenance   Topic Date Due    COVID-19 Vaccine (1 - 2023-24 season) 05/19/2024 (Originally 9/1/2023)    Influenza Vaccine (1) 06/30/2024 (Originally 9/1/2023)    PT PLAN OF CARE  03/01/2024    Breast Cancer Screening: Mammogram  09/22/2024    Depression Screening  02/19/2025    Annual Physical  02/19/2025    DXA SCAN  10/30/2025    Cervical Cancer Screening  07/14/2027    DTaP,Tdap,and Td Vaccines (2 - Td or Tdap) 09/29/2027    Colorectal Cancer Screening  12/17/2029    HIV Screening  Completed    Hepatitis C Screening  Completed    Zoster Vaccine  Completed    Pneumococcal Vaccine: Pediatrics (0 to 5 Years) and At-Risk Patients (6 to 64 Years)  Aged Out    HIB Vaccine  Aged Out    IPV Vaccine  Aged Out    Hepatitis A Vaccine  Aged Out    Meningococcal ACWY Vaccine  Aged Out    HPV Vaccine  Aged Out     Return in about 3 months (around 5/19/2024) for lab review.  virtual or in person. .    Subjective:    LUCIE Buchanan is a 58 y.o. female who presents today for a physical.     Chief Complaint   Patient presents with    Physical Exam     Annual and go over meds. No new problems or concerns.          Patient Care Team:  Amanda Clarke DO as PCP - General (Family  Medicine)  Morro Severino DO (Cardiology)  Alka Freeman MD (Rheumatology)  Beverly Romano MD (Neurology)  Shashi Barrow MD (Dermatology)    PHQ-2/9 Depression Screening    Little interest or pleasure in doing things: 0 - not at all  Feeling down, depressed, or hopeless: 0 - not at all  Trouble falling or staying asleep, or sleeping too much: 1 - several days  Feeling tired or having little energy: 0 - not at all  Poor appetite or overeatin - not at all  Feeling bad about yourself - or that you are a failure or have let yourself or your family down: 0 - not at all  Trouble concentrating on things, such as reading the newspaper or watching television: 0 - not at all  Moving or speaking so slowly that other people could have noticed. Or the opposite - being so fidgety or restless that you have been moving around a lot more than usual: 1 - several days  Thoughts that you would be better off dead, or of hurting yourself in some way: 0 - not at all  PHQ-9 Score: 2  PHQ-9 Interpretation: No or Minimal depression        MIGDALIA-7 Flowsheet Screening      Flowsheet Row Most Recent Value   Over the last 2 weeks, how often have you been bothered by any of the following problems?    Feeling nervous, anxious, or on edge 0   Not being able to stop or control worrying 0   Worrying too much about different things 0   Trouble relaxing 0   Being so restless that it is hard to sit still 0   Becoming easily annoyed or irritable 0   Feeling afraid as if something awful might happen 0   MIGDALIA-7 Total Score 0            ---Above per clinical staff & reviewed. ---  Patient here today for a physical:    Diet: healthy diet   Exercise:  joined Y, has not used it yet, going to PT   Dental visits:  yes   Sleep: varies 6 - 8 hours, not great quality, mind going a lot     Concerns today:  Her job was reorganized and it got very stressful, micromanaging   [place] [address] [place]   35 pounds weight loss.   Getting off meds and changing  diet   Quit soda, cut portions, drinking more water  Migraines not yet better  In neck and shoulders   Hand specialist for PT - cannot get EMG in may   Coming from her neck and shoulder   Massages every 3 weeks   To elbows   C1, C5 and C6   Dr. Smith   Last MRI pre-COVID   Claustrophobic     Stopped everything for 3 months   Very excited and energy   Few good days in [place] 2 -3 good days   3 -4 times severe migraines in last 3 - 4 weeks   Preventive injection too expensive   Nurtec did not work   Imitrex takes the edge off so she can function           The following portions of the patient's history were reviewed and updated as appropriate: allergies, current medications, past family history, past medical history, past social history, past surgical history and problem list.     Current Medications:  Current Outpatient Medications   Medication Sig Dispense Refill    acetaminophen (TYLENOL) 500 mg tablet Take 500-1,000 mg by mouth every 6 (six) hours as needed for mild pain      Aspirin-Acetaminophen-Caffeine (EXCEDRIN MIGRAINE PO) Take 1 tablet by mouth as needed (migraines)      Diclofenac Sodium (VOLTAREN) 1 % Apply 2 g topically 4 (four) times a day 350 g 2    methocarbamol (ROBAXIN) 500 mg tablet Take 1 tablet (500 mg total) by mouth 3 (three) times a day as needed for muscle spasms 30 tablet 1    SUMAtriptan (IMITREX) 100 mg tablet TAKE 1 TABLET(100 MG) BY MOUTH 1 TIME AS NEEDED FOR MIGRAINE. MAY REPEAT 1 TIME IN 2 HOURS. MAX 2/24 HOURS, 9 PER MONTH 9 tablet 12    SUMAtriptan (IMITREX) 20 MG/ACT nasal spray 1 spray (20 mg total) into each nostril once as needed for migraine for up to 1 dose ; if headache returns/persists, may repeat dose once after 2 hours; MAX 40 mg/24 hours 10 each 5    ascorbic acid (VITAMIN C) 500 MG tablet Take 1 tablet (500 mg total) by mouth 2 (two) times a day 60 tablet 1    Cyanocobalamin (VITAMIN B 12 PO) Take by mouth daily (Patient not taking: Reported on 2/19/2024)      ferrous  "sulfate 324 (65 Fe) mg Take 1 tablet (324 mg total) by mouth 2 (two) times a day before meals 60 tablet 0    omeprazole (PriLOSEC) 20 mg delayed release capsule TAKE 1 CAPSULE(20 MG) BY MOUTH DAILY 90 capsule 1     No current facility-administered medications for this visit.        Objective:      /88   Pulse 82   Temp 97.5 °F (36.4 °C)   Resp 16   Ht 5' 1\" (1.549 m)   Wt 63.1 kg (139 lb 3.2 oz)   LMP 08/31/2019 (Exact Date)   SpO2 98%   BMI 26.30 kg/m²   BP Readings from Last 3 Encounters:   02/19/24 134/88   01/01/24 141/80   12/04/23 156/87     Wt Readings from Last 3 Encounters:   02/19/24 63.1 kg (139 lb 3.2 oz)   01/01/24 63.3 kg (139 lb 8.8 oz)   12/04/23 63.5 kg (140 lb)       Review of Systems  ROS:  all others negative - no chest pain, SOB, normal urine and bowels. no GERD. sleeping well. mood good. + aches and pains, kylah in neck     Physical Exam   Constitutional: she appears well-developed and well-nourished.   HENT: Head: Normocephalic.   Right Ear: External ear normal. Tympanic membrane normal.   Left Ear: External ear normal. Tympanic membrane normal.   Nose: Nose normal. No mucosal edema, No rhinorrhea.   Right sinus exhibits no maxillary sinus tenderness.   Left sinus exhibits no maxillary sinus tenderness.   Mouth/Throat: Oropharynx is clear and moist.   Eyes: Normal conjunctiva.  No erythema. No discharge.  Neck: + pain on exam. + decreased range of motion due to pain, kylah with left rotation.   Cardiovascular: Normal rate, regular rhythm and normal heart sounds.    Pulmonary/Chest: Effort normal and breath sounds normal. No wheezes. No rales. No rhonchi.   Abdominal: Soft. Bowel sounds are normal. There is no tenderness.   Musculoskeletal: she exhibits no edema.   Lymphadenopathy: she has no cervical adenopathy.   Neurological: she  is alert and oriented to person, place, and time.   Skin: Skin is warm and dry. No rashes.  Psychiatric: she  has a normal mood and affect. her behavior " is normal. Thought content normal.   Vitals reviewed.          Depression Screening and Follow-up Plan: Patient was screened for depression during today's encounter. They screened negative with a PHQ-9 score of 2.

## 2024-02-19 ENCOUNTER — OFFICE VISIT (OUTPATIENT)
Dept: FAMILY MEDICINE CLINIC | Facility: CLINIC | Age: 59
End: 2024-02-19
Payer: COMMERCIAL

## 2024-02-19 VITALS
SYSTOLIC BLOOD PRESSURE: 134 MMHG | RESPIRATION RATE: 16 BRPM | BODY MASS INDEX: 26.28 KG/M2 | HEIGHT: 61 IN | DIASTOLIC BLOOD PRESSURE: 88 MMHG | HEART RATE: 82 BPM | OXYGEN SATURATION: 98 % | WEIGHT: 139.2 LBS | TEMPERATURE: 97.5 F

## 2024-02-19 DIAGNOSIS — E78.00 HYPERCHOLESTEREMIA: ICD-10-CM

## 2024-02-19 DIAGNOSIS — M06.09 SERONEGATIVE ARTHROPATHY OF MULTIPLE SITES (HCC): ICD-10-CM

## 2024-02-19 DIAGNOSIS — E66.3 OVERWEIGHT WITH BODY MASS INDEX (BMI) OF 26 TO 26.9 IN ADULT: ICD-10-CM

## 2024-02-19 DIAGNOSIS — M85.89 OSTEOPENIA OF MULTIPLE SITES: ICD-10-CM

## 2024-02-19 DIAGNOSIS — E55.9 VITAMIN D DEFICIENCY: ICD-10-CM

## 2024-02-19 DIAGNOSIS — I10 BENIGN ESSENTIAL HYPERTENSION: ICD-10-CM

## 2024-02-19 DIAGNOSIS — M79.7 FIBROMYALGIA: ICD-10-CM

## 2024-02-19 DIAGNOSIS — F33.0 MDD (MAJOR DEPRESSIVE DISORDER), RECURRENT EPISODE, MILD (HCC): ICD-10-CM

## 2024-02-19 DIAGNOSIS — Z00.00 WELL ADULT EXAM: Primary | ICD-10-CM

## 2024-02-19 DIAGNOSIS — Z96.652 S/P TOTAL KNEE ARTHROPLASTY, LEFT: ICD-10-CM

## 2024-02-19 DIAGNOSIS — G43.009 MIGRAINE WITHOUT AURA AND WITHOUT STATUS MIGRAINOSUS, NOT INTRACTABLE: ICD-10-CM

## 2024-02-19 DIAGNOSIS — M47.812 CERVICAL SPONDYLOSIS WITHOUT MYELOPATHY: ICD-10-CM

## 2024-02-19 PROBLEM — U07.1 COVID-19: Status: RESOLVED | Noted: 2021-12-03 | Resolved: 2024-02-19

## 2024-02-19 PROCEDURE — 99396 PREV VISIT EST AGE 40-64: CPT | Performed by: FAMILY MEDICINE

## 2024-02-19 PROCEDURE — 99214 OFFICE O/P EST MOD 30 MIN: CPT | Performed by: FAMILY MEDICINE

## 2024-02-19 RX ORDER — METHOCARBAMOL 500 MG/1
500 TABLET, FILM COATED ORAL 3 TIMES DAILY PRN
Qty: 30 TABLET | Refills: 1 | Status: SHIPPED | OUTPATIENT
Start: 2024-02-19

## 2024-02-19 RX ORDER — SUMATRIPTAN 20 MG/1
1 SPRAY NASAL ONCE AS NEEDED
Qty: 10 EACH | Refills: 5 | Status: SHIPPED | OUTPATIENT
Start: 2024-02-19

## 2024-02-19 NOTE — ASSESSMENT & PLAN NOTE
Well controlled.   The 10-year ASCVD risk score (Maria L MESSINA, et al., 2019) is: 2.7%    Values used to calculate the score:      Age: 58 years      Sex: Female      Is Non- : No      Diabetic: No      Tobacco smoker: No      Systolic Blood Pressure: 134 mmHg      Is BP treated: No      HDL Cholesterol: 54 mg/dL      Total Cholesterol: 222 mg/dL

## 2024-02-19 NOTE — ASSESSMENT & PLAN NOTE
Numbness in hands felt to be coming from neck. Continue PT and if not helping, or worsens, update MRI cervical spine.

## 2024-02-19 NOTE — ASSESSMENT & PLAN NOTE
Not well controlled. Prescription imitrex nasal spray to see if this works better. No help with Ubrelvy. Consider botox if appropriate.

## 2024-02-19 NOTE — PATIENT INSTRUCTIONS
Restart vitamin D     How much calcium should you have?  Children 1 - 3 yrs: 500 mg - 1 milk serving per day   Children 4 - 8 yrs: 800 mg - 2 milk serving per day   Children 9 - 18 yrs: 1,300 mg - 3 milk serving per day   Adults 19 - 50: 1,000 mg   Adults over 50:  1, 200 - 1,500 mg      Some calcium rich foods:  ·         Dairy - low fat or fat free milk, soy milk, cheese, cottage cheese, yogurt, ice cream, frozen yogurt  ·         Green leafy vegetables like nick greens kale or mustard greens, [place]  ·         Calcium fortified citrus juices  ·         Sardines and salmon with bones  ·         Castelan beans, red beans, chickpeas  ·         Tofu  ·         Molasses  ·         Corn tortillas  ·         Seaweed, dry  ·         Almonds     ·         Taking a vitamin D supplement with calcium will help with absorption.  ·         Having a lot of caffeine oriented and antiacids can decrease your absorption of calcium.

## 2024-02-19 NOTE — ASSESSMENT & PLAN NOTE
Pt no longer taking any medication for this. Doing well most of the time. Has started PT for her hands and neck. Prescription muscle relaxer as needed.

## 2024-02-20 ENCOUNTER — OFFICE VISIT (OUTPATIENT)
Dept: PHYSICAL THERAPY | Facility: MEDICAL CENTER | Age: 59
End: 2024-02-20
Payer: COMMERCIAL

## 2024-02-20 DIAGNOSIS — M77.12 LATERAL EPICONDYLITIS OF LEFT ELBOW: Primary | ICD-10-CM

## 2024-02-20 DIAGNOSIS — M18.12 OSTEOARTHRITIS OF CARPOMETACARPAL (CMC) JOINT OF LEFT THUMB, UNSPECIFIED OSTEOARTHRITIS TYPE: ICD-10-CM

## 2024-02-20 DIAGNOSIS — M54.12 CERVICAL RADICULOPATHY, CHRONIC: ICD-10-CM

## 2024-02-20 PROCEDURE — 97112 NEUROMUSCULAR REEDUCATION: CPT | Performed by: PHYSICAL THERAPIST

## 2024-02-20 PROCEDURE — 97140 MANUAL THERAPY 1/> REGIONS: CPT | Performed by: PHYSICAL THERAPIST

## 2024-02-20 NOTE — PROGRESS NOTES
"Daily Note     Today's date: 2024  Patient name: Chanel Grande  : 1965  MRN: [id number]  Referring provider: Izaiah Stinson,*  Dx:   Encounter Diagnosis     ICD-10-CM    1. Lateral epicondylitis of left elbow  M77.12       2. Osteoarthritis of carpometacarpal (CMC) joint of left thumb, unspecified osteoarthritis type  M18.12       3. Cervical radiculopathy, chronic  M54.12                      Subjective: Pt reports she woke up with a migraine and feeling stiff and tight all over. Did not do anything strenuous over the weekend. Currently has pain along dorsal L hand, no burning/tingling sensation      Objective: See treatment diary below      Assessment: Tolerated treatment well. Patient exhibited good technique with therapeutic exercises and would benefit from continued PT  Performed gentle PROM and PA mobs to C/S  Performed 2nd rib mobs  Posterior capsule stretch to BL shoulders, irritated R hand in ulnar distribution, no change with gliding   Added thoracic extension with 1/2 foam roll, pt reported feeling looser at the end of session      Plan: Continue per plan of care.      Precautions: chronic, fibromyalgia, chronic migraines  HEP: cervical retraction, scalene stretching, 1st rib mobs, cervical ext snags      Manuals 2/7 2/9 2/15 2         MH cervical 10 min 10 min 10 10         Scalene stretch 10 + STM 5 10 10 + 2nd rib mobs         C/S mobs Supine 10 grade II 10 + PNG's 10 10 sh post           30 30 30 30         Neuro Re-Ed             Cervical retraction into pillow 10x5\" NP                        Thoracic stretch with 1/2 foam roll             Thoracic extension over 1/2 foam roll    10x2         SL clocks   8x each 10x each         Wall slides with liftoff   4x 5x                      Ther Ex   10 10                      Standing row 20x5\" 20x5\" YTB 2x10           Standing robberies 20x5\" 20x5\" YTB 2x10                                                                         "   Ther Activity                                       Gait Training                                       Modalities

## 2024-02-22 ENCOUNTER — OFFICE VISIT (OUTPATIENT)
Dept: PHYSICAL THERAPY | Facility: MEDICAL CENTER | Age: 59
End: 2024-02-22
Payer: COMMERCIAL

## 2024-02-22 DIAGNOSIS — M18.12 OSTEOARTHRITIS OF CARPOMETACARPAL (CMC) JOINT OF LEFT THUMB, UNSPECIFIED OSTEOARTHRITIS TYPE: ICD-10-CM

## 2024-02-22 DIAGNOSIS — M54.12 CERVICAL RADICULOPATHY, CHRONIC: ICD-10-CM

## 2024-02-22 DIAGNOSIS — M77.12 LATERAL EPICONDYLITIS OF LEFT ELBOW: Primary | ICD-10-CM

## 2024-02-22 PROCEDURE — 97140 MANUAL THERAPY 1/> REGIONS: CPT | Performed by: PHYSICAL THERAPIST

## 2024-02-22 PROCEDURE — 97112 NEUROMUSCULAR REEDUCATION: CPT | Performed by: PHYSICAL THERAPIST

## 2024-02-22 NOTE — PROGRESS NOTES
"Daily Note     Today's date: 2024  Patient name: Chanel Grande  : 1965  MRN: [id number]  Referring provider: Izaiah Sitnson,*  Dx:   Encounter Diagnosis     ICD-10-CM    1. Lateral epicondylitis of left elbow  M77.12       2. Osteoarthritis of carpometacarpal (CMC) joint of left thumb, unspecified osteoarthritis type  M18.12       3. Cervical radiculopathy, chronic  M54.12                      Subjective: Pt reports she felt good when she left the other day but then she tightened up a lot in her neck the day after. Today her mobility is a little better.       Objective: See treatment diary below      Assessment: Tolerated treatment well. Patient exhibited good technique with therapeutic exercises and would benefit from continued PT  Performed lateral glides in supine to C/S; performed P-A to C/S and T/s Pt had hypomobility to lower cervical and T6; L T2-3  Added chest way stretch using foam roller longitudinally. Pt enjoyed stretch, willing to try at home sing towel roll  Pt had assured she felt okay at the end of visit but moved with some caution at the end of session      Plan: Continue per plan of care.      Precautions: chronic, fibromyalgia, chronic migraines  HEP: cervical retraction, scalene stretching, 1st rib mobs, cervical ext snags      Manuals 2/7 2/9 2/15 2/20 2/22        MH cervical 10 min 10 min 10 10 10        Scalene stretch 10 + STM 5 10 10 + 2nd rib mobs Lateral glides and STM 10        C/S mobs Supine 10 grade II 10 + PNG's 10 10 sh post  C/S, T/S prone 15         30 30 30 30 35        Neuro Re-Ed             Cervical retraction into pillow 10x5\" NP                        Thoracic stretch with 1/2 foam roll     1 min arms at side        Thoracic extension over 1/2 foam roll    10x2 10x        SL clocks   8x each 10x each 10x each        Wall slides with liftoff   4x 5x                      Ther Ex   10 10 10                     Standing row 20x5\" 20x5\" YTB 2x10  YTB 2x10  " "       Standing robberies 20x5\" 20x5\" YTB 2x10  YTB 2x10                                                                         Ther Activity                                       Gait Training                                       Modalities                                                    "

## 2024-02-27 ENCOUNTER — OFFICE VISIT (OUTPATIENT)
Dept: PHYSICAL THERAPY | Facility: MEDICAL CENTER | Age: 59
End: 2024-02-27
Payer: COMMERCIAL

## 2024-02-27 DIAGNOSIS — M77.12 LATERAL EPICONDYLITIS OF LEFT ELBOW: Primary | ICD-10-CM

## 2024-02-27 DIAGNOSIS — G89.29 CHRONIC PAIN OF LEFT KNEE: ICD-10-CM

## 2024-02-27 DIAGNOSIS — M18.12 OSTEOARTHRITIS OF CARPOMETACARPAL (CMC) JOINT OF LEFT THUMB, UNSPECIFIED OSTEOARTHRITIS TYPE: ICD-10-CM

## 2024-02-27 DIAGNOSIS — M54.12 CERVICAL RADICULOPATHY, CHRONIC: ICD-10-CM

## 2024-02-27 DIAGNOSIS — M25.562 CHRONIC PAIN OF LEFT KNEE: ICD-10-CM

## 2024-02-27 PROCEDURE — 97112 NEUROMUSCULAR REEDUCATION: CPT

## 2024-02-27 PROCEDURE — 97140 MANUAL THERAPY 1/> REGIONS: CPT

## 2024-02-27 NOTE — PROGRESS NOTES
"Daily Note     Today's date: 2024  Patient name: Chanel Grande  : 1965  MRN: [id number]  Referring provider: Izaiah Stinson,*  Dx:   Encounter Diagnosis     ICD-10-CM    1. Lateral epicondylitis of left elbow  M77.12       2. Osteoarthritis of carpometacarpal (CMC) joint of left thumb, unspecified osteoarthritis type  M18.12       3. Cervical radiculopathy, chronic  M54.12       4. Chronic pain of left knee  M25.562     G89.29                      Subjective: Pt reports that she is experiencing more tingling/numbness in her arms today - L>R.      Objective: See treatment diary below      Assessment: Tolerated treatment well. Patient demonstrated fatigue post treatment, exhibited good technique with therapeutic exercises, and would benefit from continued PT  Pt responded well to manual therapy with decreased mm tightness and improved mobility noted in her C/S.        Plan: Continue per plan of care.      Precautions: chronic, fibromyalgia, chronic migraines  HEP: cervical retraction, scalene stretching, 1st rib mobs, cervical ext snags      Manuals 2/7 2/9 2/15 2/20 2/22 2/27       MH cervical 10 min 10 min 10 10 10 10       Scalene stretch 10 + STM 5 10 10 + 2nd rib mobs Lateral glides and STM 10 Lateral  Glides   And  STM  10       C/S mobs Supine 10 grade II 10 + PNG's 10 10 sh post  C/S, T/S prone 15 C/S,  T/S  Prone  STM  15        30 30 30 30 35 35       Neuro Re-Ed             Cervical retraction into pillow 10x5\" NP                        Thoracic stretch with 1/2 foam roll     1 min arms at side 1 min  Arms  At side       Thoracic extension over 1/2 foam roll    10x2 10x 10x       SL clocks   8x each 10x each 10x each 10x  each       Wall slides with liftoff   4x 5x                      Ther Ex   10 10 10 10                    Standing row 20x5\" 20x5\" YTB 2x10  YTB 2x10 YTB  2x10        Standing robberies 20x5\" 20x5\" YTB 2x10  YTB 2x10 YTB  2x10                                        "                                 Ther Activity                                       Gait Training                                       Modalities

## 2024-02-29 ENCOUNTER — OFFICE VISIT (OUTPATIENT)
Dept: PHYSICAL THERAPY | Facility: MEDICAL CENTER | Age: 59
End: 2024-02-29
Payer: COMMERCIAL

## 2024-02-29 DIAGNOSIS — M18.12 OSTEOARTHRITIS OF CARPOMETACARPAL (CMC) JOINT OF LEFT THUMB, UNSPECIFIED OSTEOARTHRITIS TYPE: ICD-10-CM

## 2024-02-29 DIAGNOSIS — M77.12 LATERAL EPICONDYLITIS OF LEFT ELBOW: Primary | ICD-10-CM

## 2024-02-29 DIAGNOSIS — M54.12 CERVICAL RADICULOPATHY, CHRONIC: ICD-10-CM

## 2024-02-29 PROCEDURE — 97140 MANUAL THERAPY 1/> REGIONS: CPT | Performed by: PHYSICAL THERAPIST

## 2024-02-29 PROCEDURE — 97112 NEUROMUSCULAR REEDUCATION: CPT | Performed by: PHYSICAL THERAPIST

## 2024-02-29 NOTE — PROGRESS NOTES
"Daily Note     Today's date: 2024  Patient name: Chanel Grande  : 1965  MRN: [id number]  Referring provider: Izaiah Stinson,*  Dx:   Encounter Diagnosis     ICD-10-CM    1. Lateral epicondylitis of left elbow  M77.12       2. Osteoarthritis of carpometacarpal (CMC) joint of left thumb, unspecified osteoarthritis type  M18.12       3. Cervical radiculopathy, chronic  M54.12                      Subjective: Pt reports she had significant tension in her neck and shoulders when she came back from [place]. Had chanel work on her last visit a lot of soft tissue work and massage. Had moderate pain in her hand over weekend. Today its not too bad concentrated to dorsal radial hand L worse than R      Objective: See treatment diary below      Assessment: Tolerated treatment well. Patient exhibited good technique with therapeutic exercises and would benefit from continued PT  Pt had improved soft tissue mobility, ranges to C/S  Mild hypomobility to lower cervical and upper thoracic  Pt had some pain after thoracic extension exercises  Increased resistance to TB, pt able to complete  Pt reported fatigue post session    Plan: Continue per plan of care.      Precautions: chronic, fibromyalgia, chronic migraines  HEP: cervical retraction, scalene stretching, 1st rib mobs, cervical ext snags      Manuals 2/7 2/9 2/15 2/20 2/22 2/27 2/29      MH cervical 10 min 10 min 10 10 10 10 10      Scalene stretch 10 + STM 5 10 10 + 2nd rib mobs Lateral glides and STM 10 Lateral  Glides   And  STM  10 10      C/S mobs Supine 10 grade II 10 + PNG's 10 10 sh post  C/S, T/S prone 15 C/S,  T/S  Prone  STM  15 10       30 30 30 30 35 35 30      Neuro Re-Ed             Cervical retraction into pillow 10x5\" NP                        Thoracic stretch with 1/2 foam roll     1 min arms at side 1 min  Arms  At side 1 min      Thoracic extension over 1/2 foam roll    10x2 10x 10x 15x      SL clocks   8x each 10x each 10x each 10x  each " "NP      Wall slides with liftoff   4x 5x                      Ther Ex   10 10 10 10 15                   Standing row 20x5\" 20x5\" YTB 2x10  YTB 2x10 YTB  2x10 RTB 2x10       Standing robberies 20x5\" 20x5\" YTB 2x10  YTB 2x10 YTB  2x10 RTB 2x10      TB sh ext       RTB 2x10                                                          Ther Activity                                       Gait Training                                       Modalities                                                        "

## 2024-03-05 ENCOUNTER — OFFICE VISIT (OUTPATIENT)
Dept: OBGYN CLINIC | Facility: MEDICAL CENTER | Age: 59
End: 2024-03-05
Payer: COMMERCIAL

## 2024-03-05 VITALS
BODY MASS INDEX: 26.06 KG/M2 | DIASTOLIC BLOOD PRESSURE: 89 MMHG | SYSTOLIC BLOOD PRESSURE: 132 MMHG | HEART RATE: 78 BPM | HEIGHT: 61 IN | WEIGHT: 138 LBS

## 2024-03-05 DIAGNOSIS — M18.0 ARTHRITIS OF CARPOMETACARPAL (CMC) JOINT OF BOTH THUMBS: ICD-10-CM

## 2024-03-05 DIAGNOSIS — M77.12 LATERAL EPICONDYLITIS OF LEFT ELBOW: ICD-10-CM

## 2024-03-05 DIAGNOSIS — R20.0 BILATERAL HAND NUMBNESS: ICD-10-CM

## 2024-03-05 DIAGNOSIS — M18.12 PRIMARY OSTEOARTHRITIS OF FIRST CARPOMETACARPAL JOINT OF LEFT HAND: Primary | ICD-10-CM

## 2024-03-05 PROCEDURE — 99213 OFFICE O/P EST LOW 20 MIN: CPT | Performed by: ORTHOPAEDIC SURGERY

## 2024-03-05 NOTE — PATIENT INSTRUCTIONS
Detail Level: Detailed
Push Metagrip Brace  
Add 47090 Cpt? (Important Note: In 2017 The Use Of 90970 Is Being Tracked By Cms To Determine Future Global Period Reimbursement For Global Periods): no

## 2024-03-05 NOTE — PROGRESS NOTES
HAND & UPPER EXTREMITY OFFICE VISIT   Referred By:  No referring provider defined for this encounter.      Chief Complaint:     Left hand pain    Previous History:   Previously seen on 12/4/23 for left thumb CMC arthritis, b/l hand numbness/tingling, 1st MC dorsal mass, and lateral epicondylitis. At that point EMG was ordered to evaluate her numbness/tingling.     Interval History:  Since the last visit she reports some brief improvement in her symptoms after her thumb injection but overall they are relatively unchanged.  She has not started bracing and she did not purchase the push MetaGrip brace as we discussed last time.  There is some confusion about whether or not therapy is going to give her the brace.  She states that overall she is having difficulty with function with gripping pinching and holding objects or extended periods of time.  She also has some intermittent tingling in her fingers.  She states that her therapist does think it is related to her neck as opposed to her hands but she has been unable to get the EMG yet.  Is scheduled for May.    Past Medical History:  Past Medical History:   Diagnosis Date    Allergic 02/01/2020    Anemia     Anesthesia complication     woke up during D/C    Anxiety     Arthritis     BMI 30.0-30.9,adult     Carpal tunnel syndrome on right     Cervical spondylosis without myelopathy     COVID 12/01/2021    COVID-19     Depression     Fibromyalgia     Fibromyalgia, primary     GERD (gastroesophageal reflux disease)     Headache(784.0) 1982    History of fetal loss     Recurrent    Hyperlipidemia     Hypertension     Jaw pain     and both ears with migraines    Migraine     Neck pain     Osteoarthritis     lumbar spine    Sicca syndrome (HCC)     Sleep apnea     unable to use CPAP    Syncopal episodes     with severe migraines    Undifferentiated connective tissue disease (HCC)     Wears contact lenses     or glasses     Past Surgical History:   Procedure Laterality Date     CHOLECYSTECTOMY      COLONOSCOPY      DILATION AND CURETTAGE OF UTERUS      ORTHOPEDIC SURGERY  5/16/2023    Left knee replacement    NH ARTHRP KNE CONDYLE&PLATU MEDIAL&LAT COMPARTMENTS Left 05/16/2023    Procedure: ARTHROPLASTY KNEE TOTAL;  Surgeon: Alex Cesar MD;  Location: [facility];  Service: Orthopedics    TUBAL LIGATION       Family History   Problem Relation Age of Onset    MORGAN disease Mother     Arthritis Mother     Depression Mother     Hypertension Mother     Coronary artery disease Mother     Migraines Mother     Osteoporosis Mother     Heart attack Father 70    Diabetes Father     Prostate cancer Father 67    Hypertension Father     Arthritis Father     Stroke Father     Cancer Father         Prostate    ADD / ADHD Father     Coronary artery disease Father     Lupus Sister     Raynaud syndrome Sister     Sjogren's syndrome Sister     Depression Sister     ADD / ADHD Sister     Anxiety disorder Sister     No Known Problems Daughter     Breast cancer Maternal Grandmother 70    Arthritis Maternal Grandmother     Cancer Maternal Grandmother         Breast    Glaucoma Maternal Grandmother     Prostate cancer Maternal Grandfather 75    Arthritis Maternal Grandfather     Cancer Maternal Grandfather         Prostate    Colon cancer Paternal Grandmother 80    Cancer Paternal Grandmother         Colon    Alcohol abuse Paternal Grandfather     ADD / ADHD Brother     OCD Brother     No Known Problems Son     No Known Problems Son     No Known Problems Son     No Known Problems Son     No Known Problems Son     No Known Problems Maternal Uncle     No Known Problems Paternal Aunt     No Known Problems Paternal Uncle      Social History     Socioeconomic History    Marital status: /Civil Union     Spouse name: Not on file    Number of children: 6    Years of education: Not on file    Highest education level: Not on file   Occupational History    Occupation: Administration   Tobacco Use    Smoking  "status: Never    Smokeless tobacco: Never   Vaping Use    Vaping status: Never Used   Substance and Sexual Activity    Alcohol use: Not Currently     Comment: rarely    Drug use: Never    Sexual activity: Yes     Partners: Male     Birth control/protection: Female Sterilization   Other Topics Concern    Not on file   Social History Narrative    6 kids 24, 22, 19, 17, 14, 10     Moved from [place] for husbands [place]ich[place] - Jan     Admin for  at [employer]     Lives with spouse     Social Determinants of Health     Financial Resource Strain: Not on file   Food Insecurity: Not on file   Transportation Needs: Not on file   Physical Activity: Not on file   Stress: Not on file   Social Connections: Not on file   Intimate Partner Violence: Not on file   Housing Stability: Not on file     Scheduled Meds:  Continuous Infusions:No current facility-administered medications for this visit.    PRN Meds:.  Allergies   Allergen Reactions    Propranolol Other (See Comments)     Halluncinations    Raspberry - Food Allergy Allergic Rhinitis, Itching and Nasal Congestion    Latex Rash       Physical Examination:    /89   Pulse 78   Ht 5' 1\" (1.549 m)   Wt 62.6 kg (138 lb)   LMP 08/31/2019 (Exact Date)   BMI 26.07 kg/m²     Gen: A&Ox3, NAD  Cardiac: regular rate  Chest: non labored breathing  Abdomen: Non-distended    Cervcial Spine: Negative Spurling's     Right Upper Extremity:  Skin CDI  No obvious deformity of the shoulder, arm, elbow, forearm, wrist, hand  Diminished sensation in the ulnar nerve distribution  Sensation intact to light touch in the axillary median,and radial nerve distributions  5/5 motor interosseous, thumb opposition, thumb abduction, thumb extension  2+RP  Composite fist  Durkans- Positive  Tinel wrist- Positive  Cubital compression test-  Positive        Left Upper Extremity:  Skin CDI  No obvious deformity of the shoulder, arm, elbow, forearm, wrist, hand  Diminished sensation in the ulnar nerve " distribution  Sensation intact to light touch in the axillary median, and radial nerve distributions  5/5 motor   2+RP  Composite fist  Durkans- Positive  Tinels wrist- Positive  Positive grind test, tender at the thumb CMC joint  Cubital Compression test - Positive  Tender at the lateral epicondyle with mild pain with resisted wrist extension with elbow extended, no pain over the medial condyle      Studies:  No new imaging      Assessment and Plan:  1. Primary osteoarthritis of first carpometacarpal joint of left hand  Thumb Cude comf/Cool      2. Arthritis of carpometacarpal (CMC) joint of both thumbs  Durable Medical Equipment    Thumb Cude comf/Cool          58 y.o. female presents in follow up for the above diagnosis.  Overall she is slightly improving with continued therapy.  I think that bracing her thumbs would help with a lot of her functional difficulties because she is having mostly sharp pain in the base of the thumb when she tries to do certain gripping activities which is limiting her.  Will administer some Comfort Cool braces today but we also discussed she could also purchase a push MetaGrip brace on her own or if those are not working for her we could be to consider having therapy make her a thermoplastic brace in the future although this can be more functional limiting.  We discussed trying a repeat injection today into the thumb CMC joint but she would like to hold off at this time.  She like to continue the therapy and try the bracing to see how it progresses.     Her intermittent hand numbness and tingling she does still have some symptoms of peripheral nerve compression.  I am not convinced that this is coming from her spine but she is having some continued cervical complaints and migraines as well.  She will follow-up after her EMG nerve conduction studies to discuss those findings and treatment options.    It is recommended she return to the office after her EMG in May.    she expressed  understanding of the plan and agreed. We encouraged them to contact our office with any questions or concerns.       Izaiah Stinson MD  Hand and Upper Extremity Surgery      *This note was dictated using Dragon voice recognition software. Please excuse any word substitutions or errors.*

## 2024-03-07 ENCOUNTER — APPOINTMENT (OUTPATIENT)
Dept: PHYSICAL THERAPY | Facility: REHABILITATION | Age: 59
End: 2024-03-07
Payer: COMMERCIAL

## 2024-03-07 ENCOUNTER — APPOINTMENT (OUTPATIENT)
Dept: PHYSICAL THERAPY | Facility: MEDICAL CENTER | Age: 59
End: 2024-03-07
Payer: COMMERCIAL

## 2024-03-11 ENCOUNTER — EVALUATION (OUTPATIENT)
Dept: PHYSICAL THERAPY | Facility: REHABILITATION | Age: 59
End: 2024-03-11
Payer: COMMERCIAL

## 2024-03-11 DIAGNOSIS — G89.29 CHRONIC PAIN OF LEFT KNEE: Primary | ICD-10-CM

## 2024-03-11 DIAGNOSIS — M25.562 CHRONIC PAIN OF LEFT KNEE: Primary | ICD-10-CM

## 2024-03-11 PROCEDURE — 97140 MANUAL THERAPY 1/> REGIONS: CPT | Performed by: PHYSICAL THERAPIST

## 2024-03-11 NOTE — PROGRESS NOTES
"PT Discharge    Today's date: 3/11/2024  Patient name: Chanel Grande  : 1965  MRN: [id number]  Referring provider: Izaiah Lorenz PA-C  Dx:   Encounter Diagnosis     ICD-10-CM    1. Chronic pain of left knee  M25.562     G89.29                      Assessment  Assessment details: Pt has met all funct and impairment goals with PT.  Pt is Dc'd from PT to cont with HEP.       Goals  Funct 1. Improve descending stairs with less pain x4 weeks.-met  2. Improve tolerance to daily tasks with less pain in the evening x4 weeks.-met  Impairment 1. Increase strength by 1/2 grade x4 weeks.-met  2. Decrease pain by 25 % x4 weeks-met          Subjective Evaluation    History of Present Illness  Mechanism of injury: Pt reports that she is functioning \"much better\" in regards to left knee.  Pt is able to negotiate stairs and denies sleep disturbances, does note some increase in sx's when weather is rainy, but dissipates after change in weather.  Pt also uses tylenol as needed for pain control.  Pt is able to hold her grandchild, able to squat for funct activities and cleaning around her home, and able to take care of her dogs all without restrictions.  Pt has scheduled f/u with ortho for her 1 year f/u in May.   Patient Goals  Patient goals for therapy: decreased pain  Patient goal: -met  Pain  Current pain ratin  At worst pain ratin        Objective     Active Range of Motion   Left Knee   Flexion: 125 degrees   Extension: 0 degrees     Strength/Myotome Testing     Left Hip   Planes of Motion   Flexion: 5  Extension: 4+  Abduction: 4+  Adduction: 5  External rotation: 5  Internal rotation: 4+    Right Hip   Planes of Motion   Flexion: 5  Extension: 4+  Abduction: 4+  Adduction: 5  External rotation: 5  Internal rotation: 4+    Left Knee   Flexion: 5  Extension: 5    Additional Strength Details  SLR 5/5              Precautions: FMS        Manual  1/23/24 1/26/24 1/29/24 3/11/24 1/19/24    RE        38 mins    " "                                                                Neuro Re-Ed             SL hip abd             Prone hip ext            bridges Btb 3\"2x15 Btb 3\"2x20 Black 3\"2x10   Btb 3\"2x15   clamshells Btb 3\"2x15  Btb 3\"2x20 Black 3\"2x10   Btb 3\"2x15    SLS foam X20 foam R taps X20 R taps X20 foam taps  Foam R taps x20    Squat foam 2x10    2x10                   Single leg squat        X10    Therex            Rec bike  5 mins  8 mins  8 mins   8 mins                  leg press single upright 45# 2x15  65# x10  65# 2x10   45# x20    Leg press bilat] lying 65# 2x10  75# x10  75# 2x10   65# x20                                                                        Gait Training              step down    6 inch height 2x10   6 inch height 2x10              Modalities                                                             "

## 2024-03-13 ENCOUNTER — APPOINTMENT (OUTPATIENT)
Dept: PHYSICAL THERAPY | Facility: MEDICAL CENTER | Age: 59
End: 2024-03-13
Payer: COMMERCIAL

## 2024-03-15 ENCOUNTER — APPOINTMENT (OUTPATIENT)
Dept: PHYSICAL THERAPY | Facility: MEDICAL CENTER | Age: 59
End: 2024-03-15
Payer: COMMERCIAL

## 2024-03-19 ENCOUNTER — APPOINTMENT (OUTPATIENT)
Dept: PHYSICAL THERAPY | Facility: MEDICAL CENTER | Age: 59
End: 2024-03-19
Payer: COMMERCIAL

## 2024-03-20 NOTE — TELEPHONE ENCOUNTER
----- Message from Jose Sawyer, [address] Jac[address] Xiao sent at 6/3/2022  9:49 AM EDT -----  Moderate obstructive sleep apnea was demonstrated on the night of the home sleep study  We will plan treatment with AutoPAP and follow up with nocturnal pulse oximetry  A prescription for equipment has been provided  Patient to be scheduled for set up of equipment, followed by compliance follow up 31-91 days after set up 
Left message for the patient that sleep study is resulted and the CRNP ordered APAP for her  Left message with details of up coming appointment for DME set up       RX and clinicals for DME set up sent to [facility] via [facility]
No

## 2024-03-21 ENCOUNTER — OFFICE VISIT (OUTPATIENT)
Dept: PHYSICAL THERAPY | Facility: MEDICAL CENTER | Age: 59
End: 2024-03-21
Payer: COMMERCIAL

## 2024-03-21 DIAGNOSIS — M18.12 OSTEOARTHRITIS OF CARPOMETACARPAL (CMC) JOINT OF LEFT THUMB, UNSPECIFIED OSTEOARTHRITIS TYPE: ICD-10-CM

## 2024-03-21 DIAGNOSIS — M77.12 LATERAL EPICONDYLITIS OF LEFT ELBOW: Primary | ICD-10-CM

## 2024-03-21 DIAGNOSIS — M54.12 CERVICAL RADICULOPATHY, CHRONIC: ICD-10-CM

## 2024-03-21 PROCEDURE — 97140 MANUAL THERAPY 1/> REGIONS: CPT | Performed by: PHYSICAL THERAPIST

## 2024-03-21 NOTE — PROGRESS NOTES
"Daily Note     Today's date: 3/21/2024  Patient name: Chanel Grande  : 1965  MRN: [id number]  Referring provider: Izaiah Stinson,*  Dx:   Encounter Diagnosis     ICD-10-CM    1. Lateral epicondylitis of left elbow  M77.12       2. Osteoarthritis of carpometacarpal (CMC) joint of left thumb, unspecified osteoarthritis type  M18.12       3. Cervical radiculopathy, chronic  M54.12                      Subjective: Pt reports she has been having some good and some bad days. Over the weekend with the nicer weather she found herself doing more especially inh er garden. Bailey soreness all over afterwards. Noticed pain more along her L dorsal thumb and radial digits.       Objective: See treatment diary below      Assessment: Tolerated treatment well. Patient exhibited good technique with therapeutic exercises and would benefit from continued PT  Performed manuals for BL thumb, traction, gentle mobilizations, PROM, and PNGs  Had her perform light strengthening/stabilization ex's  Returned to prior exercises to work on scapular strengthening, stretching  Finished session with prone P-A mobs to C/S and T/S, increased stiffness on R lateral column.   Pt reported feeling looser in her C/S and chest wall, some relief in her BL CMC joints, some pain into her dorsal L IF and MF    Manuals 2/7 2/9 2/15 2/20 2/22 2/27 2/29 3/21     MH cervical 10 min 10 min 10 10 10 10 10 10 BL hands     Scalene stretch 10 + STM 5 10 10 + 2nd rib mobs Lateral glides and STM 10 Lateral  Glides   And  STM  10 10 Thenar mobs, PNG's     C/S mobs Supine 10 grade II 10 + PNG's 10 10 sh post  C/S, T/S prone 15 C/S,  T/S  Prone  STM  15 10 10      30 30 30 30 35 35 30 30     Neuro Re-Ed             Cervical retraction into pillow 10x5\" NP                   Thumb rad/palm abd 20x each     Thoracic stretch with 1/2 foam roll     1 min arms at side 1 min  Arms  At side 1 min 1 min     Thoracic extension over 1/2 foam roll    10x2 10x 10x 15x  " "    SL clocks   8x each 10x each 10x each 10x  each NP 10x each     Wall slides with liftoff   4x 5x                      Ther Ex   10 10 10 10 15                   Standing row 20x5\" 20x5\" YTB 2x10  YTB 2x10 YTB  2x10 RTB 2x10 RTB 2x10      Standing robberies 20x5\" 20x5\" YTB 2x10  YTB 2x10 YTB  2x10 RTB 2x10 RTB 2x10     TB sh ext       RTB 2x10                   Digiball puppet hand        Red 20x each     Digiweb finger extension        Light green 20x each                  Ther Activity                                       Gait Training                                       Modalities                                                        Plan: Continue per plan of care.         Precautions: FMS        " Adequate: hears normal conversation without difficulty

## 2024-03-26 ENCOUNTER — OFFICE VISIT (OUTPATIENT)
Dept: PHYSICAL THERAPY | Facility: MEDICAL CENTER | Age: 59
End: 2024-03-26
Payer: COMMERCIAL

## 2024-03-26 DIAGNOSIS — M18.12 OSTEOARTHRITIS OF CARPOMETACARPAL (CMC) JOINT OF LEFT THUMB, UNSPECIFIED OSTEOARTHRITIS TYPE: ICD-10-CM

## 2024-03-26 DIAGNOSIS — M54.12 CERVICAL RADICULOPATHY, CHRONIC: ICD-10-CM

## 2024-03-26 DIAGNOSIS — G89.29 CHRONIC PAIN OF LEFT KNEE: ICD-10-CM

## 2024-03-26 DIAGNOSIS — M77.12 LATERAL EPICONDYLITIS OF LEFT ELBOW: Primary | ICD-10-CM

## 2024-03-26 DIAGNOSIS — M25.562 CHRONIC PAIN OF LEFT KNEE: ICD-10-CM

## 2024-03-26 PROCEDURE — 97110 THERAPEUTIC EXERCISES: CPT | Performed by: PHYSICAL THERAPIST

## 2024-03-26 PROCEDURE — 97140 MANUAL THERAPY 1/> REGIONS: CPT | Performed by: PHYSICAL THERAPIST

## 2024-03-26 NOTE — PROGRESS NOTES
"Daily Note     Today's date: 3/26/2024  Patient name: Chanel Grande  : 1965  MRN: [id number]  Referring provider: Izaiah Stinson,*  Dx:   Encounter Diagnosis     ICD-10-CM    1. Lateral epicondylitis of left elbow  M77.12       2. Osteoarthritis of carpometacarpal (CMC) joint of left thumb, unspecified osteoarthritis type  M18.12       3. Chronic pain of left knee  M25.562     G89.29       4. Cervical radiculopathy, chronic  M54.12                      Subjective: Pt reports no changes with her hand pain after last treatment. No exacerbation after cervical and thoracic mobs. Pt reports her pain is along dorsal IF, MF and thumb.       Objective: See treatment diary below      Assessment: Tolerated treatment well. Patient exhibited good technique with therapeutic exercises and would benefit from continued PT  Returned to treatment of cervical and shoulder complex,   Pt had mild tension to cervical musculature; less elevation to 1st ribs  pt had increased tension moving into sh IR, mild tension to radial nerve, pt reported symptom along radial nerve pathway with shoulder ROM  No complaints with exercises today.  Ran out of time to perform prone P-A mobs, will perform NV    Plan: Continue per plan of care.         Precautions: FMS    Manuals 2/7 2/9 2/15 2/20 2/22 2/27 2/29 3/21 3/26    MH cervical 10 min 10 min 10 10 10 10 10 10 BL hands 10 neck    Scalene stretch 10 + STM 5 10 10 + 2nd rib mobs Lateral glides and STM 10 Lateral  Glides   And  STM  10 10 Thenar mobs, PNG's Cervical mobs, PROM    C/S mobs Supine 10 grade II 10 + PNG's 10 10 sh post  C/S, T/S prone 15 C/S,  T/S  Prone  STM  15 10 10 PNGS'     30 30 30 30 35 35 30 30 30    Neuro Re-Ed             Cervical retraction into pillow 10x5\" NP                   Thumb rad/palm abd 20x each     Thoracic stretch with 1/2 foam roll     1 min arms at side 1 min  Arms  At side 1 min 1 min 1 min    Thoracic extension over 1/2 foam roll    10x2 10x " "10x 15x  15x    SL clocks   8x each 10x each 10x each 10x  each NP 10x each 10x each    Wall slides with liftoff   4x 5x                      Ther Ex   10 10 10 10 15  15                 Standing row 20x5\" 20x5\" YTB 2x10  YTB 2x10 YTB  2x10 RTB 2x10 RTB 2x10 RTB 2x10     Standing robberies 20x5\" 20x5\" YTB 2x10  YTB 2x10 YTB  2x10 RTB 2x10 RTB 2x10 RTB 2x10    TB sh ext       RTB 2x10                   Digiball puppet hand        Red 20x each     Digiweb finger extension        Light green 20x each                  Ther Activity                                       Gait Training                                       Modalities                                                                "

## 2024-03-28 ENCOUNTER — OFFICE VISIT (OUTPATIENT)
Dept: PHYSICAL THERAPY | Facility: MEDICAL CENTER | Age: 59
End: 2024-03-28
Payer: COMMERCIAL

## 2024-03-28 DIAGNOSIS — M18.12 OSTEOARTHRITIS OF CARPOMETACARPAL (CMC) JOINT OF LEFT THUMB, UNSPECIFIED OSTEOARTHRITIS TYPE: ICD-10-CM

## 2024-03-28 DIAGNOSIS — M54.12 CERVICAL RADICULOPATHY, CHRONIC: ICD-10-CM

## 2024-03-28 DIAGNOSIS — M77.12 LATERAL EPICONDYLITIS OF LEFT ELBOW: Primary | ICD-10-CM

## 2024-03-28 PROCEDURE — 97112 NEUROMUSCULAR REEDUCATION: CPT | Performed by: PHYSICAL THERAPIST

## 2024-03-28 PROCEDURE — 97140 MANUAL THERAPY 1/> REGIONS: CPT | Performed by: PHYSICAL THERAPIST

## 2024-03-28 NOTE — PROGRESS NOTES
"Daily Note     Today's date: 3/28/2024  Patient name: Chanel Grande  : 1965  MRN: [id number]  Referring provider: Izaiah Stinson,*  Dx:   Encounter Diagnosis     ICD-10-CM    1. Lateral epicondylitis of left elbow  M77.12       2. Osteoarthritis of carpometacarpal (CMC) joint of left thumb, unspecified osteoarthritis type  M18.12       3. Cervical radiculopathy, chronic  M54.12                      Subjective: Pt reports she had increased numbness down her R arm after last visit. Pain is still the same down L dorsal hand and radial digits.       Objective: See treatment diary below      Assessment: Tolerated treatment well. Patient exhibited good technique with therapeutic exercises and would benefit from continued PT  Able to perform P-A mobs to C/S and T/S, pt had significant stiffness at her CTJ  in mid thoracic levels L > R  Pt noted increased tingling on both UE after thoracic extension over bolster  + relief with cyriax maneuver, discussed possible taping to help provide relief, pt going to massage later today, will apply NV.  Advised her to try elevating her shoulder passively at home using pillows in her lap, pt willing to try  Advised her to do her stretches avoiding overhead motions, roes, robberies, pec stretch below shoulder level, pt understood    Plan: Continue per plan of care.         Precautions: FMS    Manuals 2/7 2/9 2/15 2/20 2/22 2/27 2/29 3/21 3/26 3/28   MH cervical 10 min 10 min 10 10 10 10 10 10 BL hands 10 neck 10   Scalene stretch 10 + STM 5 10 10 + 2nd rib mobs Lateral glides and STM 10 Lateral  Glides   And  STM  10 10 Thenar mobs, PNG's Cervical mobs, PROM 10   C/S mobs Supine 10 grade II 10 + PNG's 10 10 sh post  C/S, T/S prone 15 C/S,  T/S  Prone  STM  15 10 10 PNGS' 15    30 30 30 30 35 35 30 30 30 35   Neuro Re-Ed             Cervical retraction into pillow 10x5\" NP                   Thumb rad/palm abd 20x each     Thoracic stretch with 1/2 foam roll     1 min " "arms at side 1 min  Arms  At side 1 min 1 min 1 min 1 min   Thoracic extension over 1/2 foam roll    10x2 10x 10x 15x  15x 15x   SL clocks   8x each 10x each 10x each 10x  each NP 10x each 10x each 10x   Wall slides with liftoff   4x 5x                      Ther Ex   10 10 10 10 15  15 10                Standing row 20x5\" 20x5\" YTB 2x10  YTB 2x10 YTB  2x10 RTB 2x10 RTB 2x10 RTB 2x10     Standing robberies 20x5\" 20x5\" YTB 2x10  YTB 2x10 YTB  2x10 RTB 2x10 RTB 2x10 RTB 2x10    TB sh ext       RTB 2x10                   Digiball puppet hand        Red 20x each     Digiweb finger extension        Light green 20x each                  Ther Activity                                       Gait Training                                       Modalities                                                                  "

## 2024-04-02 ENCOUNTER — OFFICE VISIT (OUTPATIENT)
Dept: PHYSICAL THERAPY | Facility: MEDICAL CENTER | Age: 59
End: 2024-04-02
Payer: COMMERCIAL

## 2024-04-02 DIAGNOSIS — M18.12 OSTEOARTHRITIS OF CARPOMETACARPAL (CMC) JOINT OF LEFT THUMB, UNSPECIFIED OSTEOARTHRITIS TYPE: ICD-10-CM

## 2024-04-02 DIAGNOSIS — M77.12 LATERAL EPICONDYLITIS OF LEFT ELBOW: Primary | ICD-10-CM

## 2024-04-02 DIAGNOSIS — M54.12 CERVICAL RADICULOPATHY, CHRONIC: ICD-10-CM

## 2024-04-02 PROCEDURE — 97140 MANUAL THERAPY 1/> REGIONS: CPT | Performed by: PHYSICAL THERAPIST

## 2024-04-02 PROCEDURE — 97112 NEUROMUSCULAR REEDUCATION: CPT | Performed by: PHYSICAL THERAPIST

## 2024-04-02 NOTE — PROGRESS NOTES
Daily Note     Today's date: 2024  Patient name: Chanel Grande  : 1965  MRN: [id number]  Referring provider: Izaiah Stinson,*  Dx:   Encounter Diagnosis     ICD-10-CM    1. Lateral epicondylitis of left elbow  M77.12       2. Osteoarthritis of carpometacarpal (CMC) joint of left thumb, unspecified osteoarthritis type  M18.12       3. Cervical radiculopathy, chronic  M54.12                      Subjective: Pt reports her R thumb was bothering her over the weekend, had trouble carrying her coffee cup. Nerve pain still the same. Got her massage schedule messed up so she'll have it today.       Objective: See treatment diary below      Assessment: Tolerated treatment well. Patient exhibited good technique with therapeutic exercises and would benefit from continued PT  Performed C/S mobs, pt had increased stiffness to her R lateral column.   No significant neural tension today  Pt noted pulling into her biceps with clocks  Attempted scap lift off's at wall, pt noted she felt increased pain along her L mandible and ear, felt pulling pain.  Ceased exercises, attempted to perform in supine with scap protraction in 120 elevation, pt noted she still felt same pain  Will attempt to tape her scapulae in elevation NV to help relieve her symptoms. May consult with spine specialist on how to best treat her cervical symptoms.   Will continue to work on performing exercises with arms at her side.     Plan: Continue per plan of care.         Precautions: FMS    Manuals 4/2            MH cervical 10            1st rib mobs, PNG's 10            C/S,T/S mobs in prone 10             30            Neuro Re-Ed             Cervical retraction into pillow                          Thoracic stretch with 1/2 foam roll             Thoracic extension over 1/2 foam roll             SL clocks 10x each            Wall slides with liftoff 10 x                         Ther Ex                          Standing row               Standing naun             TB sh ext                                                                 Ther Activity                                       Gait Training                                       Modalities

## 2024-04-04 ENCOUNTER — APPOINTMENT (OUTPATIENT)
Dept: PHYSICAL THERAPY | Facility: MEDICAL CENTER | Age: 59
End: 2024-04-04
Payer: COMMERCIAL

## 2024-04-05 ENCOUNTER — TELEPHONE (OUTPATIENT)
Dept: FAMILY MEDICINE CLINIC | Facility: CLINIC | Age: 59
End: 2024-04-05

## 2024-04-08 ENCOUNTER — APPOINTMENT (OUTPATIENT)
Dept: PHYSICAL THERAPY | Facility: MEDICAL CENTER | Age: 59
End: 2024-04-08
Payer: COMMERCIAL

## 2024-04-10 ENCOUNTER — OFFICE VISIT (OUTPATIENT)
Dept: PHYSICAL THERAPY | Facility: MEDICAL CENTER | Age: 59
End: 2024-04-10
Payer: COMMERCIAL

## 2024-04-10 DIAGNOSIS — M18.12 OSTEOARTHRITIS OF CARPOMETACARPAL (CMC) JOINT OF LEFT THUMB, UNSPECIFIED OSTEOARTHRITIS TYPE: ICD-10-CM

## 2024-04-10 DIAGNOSIS — M77.12 LATERAL EPICONDYLITIS OF LEFT ELBOW: Primary | ICD-10-CM

## 2024-04-10 DIAGNOSIS — G89.29 CHRONIC PAIN OF LEFT KNEE: ICD-10-CM

## 2024-04-10 DIAGNOSIS — M25.562 CHRONIC PAIN OF LEFT KNEE: ICD-10-CM

## 2024-04-10 DIAGNOSIS — M54.12 CERVICAL RADICULOPATHY, CHRONIC: ICD-10-CM

## 2024-04-10 PROCEDURE — 97140 MANUAL THERAPY 1/> REGIONS: CPT | Performed by: PHYSICAL THERAPIST

## 2024-04-10 NOTE — PROGRESS NOTES
Daily Note     Today's date: 4/10/2024  Patient name: Chanel Grande  : 1965  MRN: [id number]  Referring provider: Izaiah Stinson,*  Dx:   Encounter Diagnosis     ICD-10-CM    1. Lateral epicondylitis of left elbow  M77.12       2. Osteoarthritis of carpometacarpal (CMC) joint of left thumb, unspecified osteoarthritis type  M18.12       3. Cervical radiculopathy, chronic  M54.12       4. Chronic pain of left knee  M25.562     G89.29                      Subjective: Pt reports she woke up one of the night with her entire L arm numb. She feels her neck is really stiff as well      Objective: See treatment diary below      Assessment: Tolerated treatment well. Patient exhibited good technique with therapeutic exercises and would benefit from continued PT  Pt had moderate limitation in lower cervical and upper thoracic, able to reduce hypomobility to about 50%; able to improve cervical rotation and lateral flexion to about 75% mobility  A discussed before, we decided to try scapular taping, will assess any improvements with it. Advised Pt to remove if any irritation or discomfort.  Pt to be reassessed by our spine specialist next week.       Plan: Continue per plan of care.         Precautions: FMS    Manuals  410           MH cervical 10 10           1st rib mobs, PNG's 10 10           C/S,T/S mobs in prone 10 10 - leukotape            30 40           Neuro Re-Ed             Cervical retraction into pillow                          Thoracic stretch with 1/2 foam roll             Thoracic extension over 1/2 foam roll             SL clocks 10x each            Wall slides with liftoff 10 x                         Ther Ex                          Standing row              Standing robberies             TB sh ext                                                                 Ther Activity                                       Gait Training                                       Modalities

## 2024-04-15 ENCOUNTER — APPOINTMENT (OUTPATIENT)
Dept: PHYSICAL THERAPY | Facility: MEDICAL CENTER | Age: 59
End: 2024-04-15
Payer: COMMERCIAL

## 2024-04-17 ENCOUNTER — EVALUATION (OUTPATIENT)
Dept: PHYSICAL THERAPY | Facility: MEDICAL CENTER | Age: 59
End: 2024-04-17
Payer: COMMERCIAL

## 2024-04-17 ENCOUNTER — APPOINTMENT (OUTPATIENT)
Dept: PHYSICAL THERAPY | Facility: MEDICAL CENTER | Age: 59
End: 2024-04-17
Payer: COMMERCIAL

## 2024-04-17 DIAGNOSIS — G89.29 CHRONIC NECK PAIN: ICD-10-CM

## 2024-04-17 DIAGNOSIS — M54.2 CHRONIC NECK PAIN: ICD-10-CM

## 2024-04-17 DIAGNOSIS — M54.12 CERVICAL RADICULOPATHY, CHRONIC: Primary | ICD-10-CM

## 2024-04-17 PROCEDURE — 97164 PT RE-EVAL EST PLAN CARE: CPT | Performed by: PHYSICAL THERAPIST

## 2024-04-17 PROCEDURE — 97140 MANUAL THERAPY 1/> REGIONS: CPT | Performed by: PHYSICAL THERAPIST

## 2024-04-17 NOTE — PROGRESS NOTES
PT Re-Evaluation     Today's date: 2024  Patient name: Chanel Grande  : 1965  MRN: [id number]  Referring provider: Izaiah Stinson,*  Dx:   Encounter Diagnosis     ICD-10-CM    1. Cervical radiculopathy, chronic  M54.12       2. Chronic neck pain  M54.2     G89.29                      Assessment/Plan  Patient is a very pleasant 59 yo female who presents with a long history of neck pain and symptoms of numbing in her arms B.  Patient has been going through physical therapy for the past several weeks but her symptoms persist in the arms and neck/headaches.  Patient has been transferred to my care for second opinion.  Patient's symptoms are consistent with cervicogenic headaches with concurrent ERSR somatic dysfunction of C1-3.  Patient is having muscle reaction and headaches causing loss of function and severe pain.  There are also noted limitations in lower cervical movement however this did not seem to recreate comparable signs.  Limitations in motion in all planes however most noted with left sided movements and recreation of neck pain.  Symptoms are all amplified with the diagnosis of fibromyalgia.  Patient will benefit from physical therapy services with a focus on manual therapy.  2x a week for 4-6 weeks.     Subjective  Patient states that she has been going back and forth with Physical therapy helping but not holding.  She has been through pain management for her neck and an MRI has been ordered however patient is claustrophobic and she is fearful to get imaging.  Patient notes that her pain is mostly in her upper back and head with headaches daily that are debilitating.  She also is having numbing in her arms and hands B that start at her shoulders and go to her hands.  Patient would like to address her headaches and arm numbing and return to higher level of function.      Objective  Red Flags: none  Pain: 5-8/10  Posture: forward head with rounded shoulders  AROM: limited in all  plains of cervical movement most noted in rotation left with comparable sign and limited by 80%  PAROM: diminished mobility throughout upper cervical and lower cervical spine with comparable sign with UPA of C1-3 left  Palpation: TTP throughout cervical and thoracic musculature  Special Tests: see prior eval  Coordination of Movement/strengthe: Patient demonstrates poor activation of Longus Capitus and Longus Coli with loss of feed forward mechanism with reaching tasks.   Patient was able to perform activation with cueing.    Continue with current goals            Precautions: fibromyalgia

## 2024-04-23 ENCOUNTER — APPOINTMENT (OUTPATIENT)
Dept: PHYSICAL THERAPY | Facility: MEDICAL CENTER | Age: 59
End: 2024-04-23
Payer: COMMERCIAL

## 2024-04-23 ENCOUNTER — OFFICE VISIT (OUTPATIENT)
Dept: PHYSICAL THERAPY | Facility: MEDICAL CENTER | Age: 59
End: 2024-04-23
Payer: COMMERCIAL

## 2024-04-23 DIAGNOSIS — G89.29 CHRONIC NECK PAIN: ICD-10-CM

## 2024-04-23 DIAGNOSIS — M54.12 CERVICAL RADICULOPATHY, CHRONIC: ICD-10-CM

## 2024-04-23 DIAGNOSIS — G44.86 CERVICOGENIC HEADACHE: Primary | ICD-10-CM

## 2024-04-23 DIAGNOSIS — M54.2 CHRONIC NECK PAIN: ICD-10-CM

## 2024-04-23 PROCEDURE — 97140 MANUAL THERAPY 1/> REGIONS: CPT | Performed by: PHYSICAL THERAPIST

## 2024-04-23 PROCEDURE — 97110 THERAPEUTIC EXERCISES: CPT | Performed by: PHYSICAL THERAPIST

## 2024-04-23 PROCEDURE — 97112 NEUROMUSCULAR REEDUCATION: CPT | Performed by: PHYSICAL THERAPIST

## 2024-04-23 NOTE — PROGRESS NOTES
Daily Note     Today's date: 2024  Patient name: Chanel Grande  : 1965  MRN: [id number]  Referring provider: Izaiah Stinson,*  Dx:   Encounter Diagnosis     ICD-10-CM    1. Cervicogenic headache  G44.86       2. Chronic neck pain  M54.2     G89.29       3. Cervical radiculopathy, chronic  M54.12                      Subjective: patient states that she is having a fairly bad headache today that is wrapping around from the temples to the sinus.  Pain is sitting at 8/10 currently.       Objective: See treatment diary below      Assessment: Tolerated treatment well. Patient exhibited good technique with therapeutic exercises and would benefit from continued PT. Focus today on manual therapy and gentle movement. Mobility diminished with comparable sign at C1-3 left and C1-2 right.  Patient improved with treatment today and reduced headache to 3/10.  Patient will be working on correcting her posture with reading in bed and daily function.       Plan: Continue per plan of care.      Precautions: fibromyalgia   Daily Treatment Diary     Manual              UPA of C1-3 left  Gr. Iv- 10sec x5            UPA of C1-2 right Gr. Iv- 10sec x5            Thoracic mobility 10 min                                          Exercise Diary              UBE             pec stretch             No monnies             Shoulder extension             Scapular retractions             Horizontal abduction             Chin tucks             Thoracic extension over foam roller 10sec x5            Foam roller on wall                                                                                                                                                                Modalities

## 2024-04-25 ENCOUNTER — OFFICE VISIT (OUTPATIENT)
Dept: PHYSICAL THERAPY | Facility: MEDICAL CENTER | Age: 59
End: 2024-04-25
Payer: COMMERCIAL

## 2024-04-25 ENCOUNTER — APPOINTMENT (OUTPATIENT)
Dept: PHYSICAL THERAPY | Facility: MEDICAL CENTER | Age: 59
End: 2024-04-25
Payer: COMMERCIAL

## 2024-04-25 DIAGNOSIS — M54.12 CERVICAL RADICULOPATHY, CHRONIC: ICD-10-CM

## 2024-04-25 DIAGNOSIS — M54.2 CHRONIC NECK PAIN: Primary | ICD-10-CM

## 2024-04-25 DIAGNOSIS — G44.86 CERVICOGENIC HEADACHE: ICD-10-CM

## 2024-04-25 DIAGNOSIS — G89.29 CHRONIC NECK PAIN: Primary | ICD-10-CM

## 2024-04-25 PROCEDURE — 97110 THERAPEUTIC EXERCISES: CPT | Performed by: PHYSICAL THERAPIST

## 2024-04-25 PROCEDURE — 97140 MANUAL THERAPY 1/> REGIONS: CPT | Performed by: PHYSICAL THERAPIST

## 2024-04-25 NOTE — PROGRESS NOTES
Daily Note     Today's date: 2024  Patient name: Chanel Grande  : 1965  MRN: [id number]  Referring provider: Izaiah Stinson,*  Dx:   Encounter Diagnosis     ICD-10-CM    1. Chronic neck pain  M54.2     G89.29       2. Cervical radiculopathy, chronic  M54.12       3. Cervicogenic headache  G44.86                      Subjective: patient states that she is feeling much better than last time but continues to have headache.       Objective: See treatment diary below      Assessment: Tolerated treatment well. Patient exhibited good technique with therapeutic exercises and would benefit from continued PT. Focus today on manual therapy and gentle movement. Mobility diminished with comparable sign at C1-3 left and C1-2 right.  Patient improved with treatment today and reduced headache to 3/10.  Patient will be working on correcting her posture with reading in bed and daily function.       Plan: Continue per plan of care.      Precautions: fibromyalgia   Daily Treatment Diary     Manual              UPA of C1-3 left  Gr. Iv- 10sec x5            UPA of C1-2 right Gr. Iv- 10sec x5            Thoracic mobility 10 min                                          Exercise Diary              UBE             pec stretch             No monnies             Shoulder extension             Scapular retractions             Horizontal abduction             Chin tucks             Thoracic extension over foam roller 10sec x5            Foam roller on wall                                                                                                                                                                Modalities

## 2024-04-30 ENCOUNTER — OFFICE VISIT (OUTPATIENT)
Dept: PHYSICAL THERAPY | Facility: MEDICAL CENTER | Age: 59
End: 2024-04-30
Payer: COMMERCIAL

## 2024-04-30 DIAGNOSIS — M54.12 CERVICAL RADICULOPATHY, CHRONIC: ICD-10-CM

## 2024-04-30 DIAGNOSIS — G44.86 CERVICOGENIC HEADACHE: ICD-10-CM

## 2024-04-30 DIAGNOSIS — M54.2 CHRONIC NECK PAIN: Primary | ICD-10-CM

## 2024-04-30 DIAGNOSIS — G89.29 CHRONIC NECK PAIN: Primary | ICD-10-CM

## 2024-04-30 PROCEDURE — 97140 MANUAL THERAPY 1/> REGIONS: CPT | Performed by: PHYSICAL THERAPIST

## 2024-04-30 PROCEDURE — 97110 THERAPEUTIC EXERCISES: CPT | Performed by: PHYSICAL THERAPIST

## 2024-04-30 NOTE — PROGRESS NOTES
Daily Note     Today's date: 2024  Patient name: Chanel Grande  : 1965  MRN: [id number]  Referring provider: Izaiah Stinson,*  Dx:   Encounter Diagnosis     ICD-10-CM    1. Chronic neck pain  M54.2     G89.29       2. Cervical radiculopathy, chronic  M54.12       3. Cervicogenic headache  G44.86                      Subjective: patient states that she is having a headache and had a rough weekend.     Objective: See treatment diary below      Assessment: Tolerated treatment well. Patient exhibited good technique with therapeutic exercises and would benefit from continued PT. Focus today on manual therapy and gentle movement. Mobility diminished with comparable sign at C1-3 left and C1-2 right.  Focus today on upper thoracic rotations which seemed to help with AROMPatient improved with treatment today and reduced headache to 3/10.  Patient will be working on correcting her posture with reading in bed and daily function.       Plan: Continue per plan of care.      Precautions: fibromyalgia   Daily Treatment Diary     Manual              UPA of C1-3 left  Gr. Iv- 10sec x5            UPA of C1-2 right Gr. Iv- 10sec x5            Thoracic mobility 10 min                                          Exercise Diary              UBE             pec stretch             No monnies             Shoulder extension             Scapular retractions             Horizontal abduction             Chin tucks             Thoracic extension over foam roller 10sec x5            Foam roller on wall                                                                                                                                                                Modalities

## 2024-05-02 ENCOUNTER — OFFICE VISIT (OUTPATIENT)
Dept: PHYSICAL THERAPY | Facility: MEDICAL CENTER | Age: 59
End: 2024-05-02
Payer: COMMERCIAL

## 2024-05-02 DIAGNOSIS — G44.86 CERVICOGENIC HEADACHE: ICD-10-CM

## 2024-05-02 DIAGNOSIS — M54.2 CHRONIC NECK PAIN: Primary | ICD-10-CM

## 2024-05-02 DIAGNOSIS — G89.29 CHRONIC NECK PAIN: Primary | ICD-10-CM

## 2024-05-02 DIAGNOSIS — M54.12 CERVICAL RADICULOPATHY, CHRONIC: ICD-10-CM

## 2024-05-02 PROCEDURE — 97110 THERAPEUTIC EXERCISES: CPT | Performed by: PHYSICAL THERAPIST

## 2024-05-02 PROCEDURE — 97140 MANUAL THERAPY 1/> REGIONS: CPT | Performed by: PHYSICAL THERAPIST

## 2024-05-02 NOTE — PROGRESS NOTES
"Daily Note     Today's date: 2024  Patient name: Chanel Grande  : 1965  MRN: [id number]  Referring provider: Izaiah Stinson,*  Dx:   Encounter Diagnosis     ICD-10-CM    1. Chronic neck pain  M54.2     G89.29       2. Cervical radiculopathy, chronic  M54.12       3. Cervicogenic headache  G44.86                      Subjective: \"I am having a good day\" feels some relief from previous manuals.     Objective: See treatment diary below      Assessment: Tolerated treatment well. Patient exhibited good technique with therapeutic exercises and would benefit from continued PT. Focus today on manual therapy and gentle movement. Mobility diminished with comparable sign at C1-3 left and C1-2 right.  Focus today on upper thoracic rotations which seemed to help with AROMPatient improved with treatment today and reduced headache to 3/10.  Patient will be working on correcting her posture with reading in bed and daily function.       Plan: Continue per plan of care.      Precautions: fibromyalgia   Daily Treatment Diary     Manual              UPA of C1-3 left  Gr. Iv- 10sec x5            UPA of C1-2 right Gr. Iv- 10sec x5            Thoracic mobility                                           Exercise Diary              UBE             pec stretch             No monnies             Shoulder extension             Scapular retractions             Horizontal abduction             Chin tucks             Thoracic extension over foam roller 10sec x5            Foam roller on wall             Cervical SNAGs Rot 10x, 3 sec                                                                                                                                                  Modalities                                                            "

## 2024-05-08 ENCOUNTER — OFFICE VISIT (OUTPATIENT)
Dept: PHYSICAL THERAPY | Facility: MEDICAL CENTER | Age: 59
End: 2024-05-08
Payer: COMMERCIAL

## 2024-05-08 DIAGNOSIS — G44.86 CERVICOGENIC HEADACHE: ICD-10-CM

## 2024-05-08 DIAGNOSIS — G89.29 CHRONIC NECK PAIN: Primary | ICD-10-CM

## 2024-05-08 DIAGNOSIS — M54.12 CERVICAL RADICULOPATHY, CHRONIC: ICD-10-CM

## 2024-05-08 DIAGNOSIS — M54.2 CHRONIC NECK PAIN: Primary | ICD-10-CM

## 2024-05-08 PROCEDURE — 97140 MANUAL THERAPY 1/> REGIONS: CPT | Performed by: PHYSICAL THERAPIST

## 2024-05-08 PROCEDURE — 97110 THERAPEUTIC EXERCISES: CPT | Performed by: PHYSICAL THERAPIST

## 2024-05-08 PROCEDURE — 97010 HOT OR COLD PACKS THERAPY: CPT | Performed by: PHYSICAL THERAPIST

## 2024-05-08 NOTE — PROGRESS NOTES
Daily Note     Today's date: 2024  Patient name: Chanel Grande  : 1965  MRN: [id number]  Referring provider: Izaiah Stinson,*  Dx:   Encounter Diagnosis     ICD-10-CM    1. Chronic neck pain  M54.2     G89.29       2. Cervicogenic headache  G44.86       3. Cervical radiculopathy, chronic  M54.12                      Subjective: waking with more jaw pain than anything else..  no longer having symptoms in her arms.     Objective: See treatment diary below      Assessment: Tolerated treatment well. Patient exhibited good technique with therapeutic exercises and would benefit from continued PT. Focus today on manual therapy and gentle movement. Mobility diminished with comparable sign at C1-3 left and C1-2 right.  Pain levels are down however headache and jaw pain may be more TMJD.        Plan: Continue per plan of care.      Precautions: fibromyalgia   Daily Treatment Diary     Manual              UPA of C1-3 left  Gr. Iv- 10sec x5            UPA of C1-2 right Gr. Iv- 10sec x5            Thoracic mobility                                           Exercise Diary              UBE             pec stretch             No monnies             Shoulder extension             Scapular retractions             Horizontal abduction             Chin tucks             Thoracic extension over foam roller 10sec x5            Foam roller on wall             Cervical SNAGs Rot 10x, 3 sec                                                                                                                                                  Modalities

## 2024-05-10 ENCOUNTER — OFFICE VISIT (OUTPATIENT)
Dept: PHYSICAL THERAPY | Facility: MEDICAL CENTER | Age: 59
End: 2024-05-10
Payer: COMMERCIAL

## 2024-05-10 DIAGNOSIS — G44.86 CERVICOGENIC HEADACHE: ICD-10-CM

## 2024-05-10 DIAGNOSIS — M54.12 CERVICAL RADICULOPATHY, CHRONIC: ICD-10-CM

## 2024-05-10 DIAGNOSIS — G89.29 CHRONIC NECK PAIN: Primary | ICD-10-CM

## 2024-05-10 DIAGNOSIS — M54.2 CHRONIC NECK PAIN: Primary | ICD-10-CM

## 2024-05-10 PROCEDURE — 97110 THERAPEUTIC EXERCISES: CPT | Performed by: PHYSICAL THERAPIST

## 2024-05-10 PROCEDURE — 97140 MANUAL THERAPY 1/> REGIONS: CPT | Performed by: PHYSICAL THERAPIST

## 2024-05-10 NOTE — PROGRESS NOTES
Daily Note     Today's date: 5/10/2024  Patient name: Chanel Grande  : 1965  MRN: [id number]  Referring provider: Izaiha Stinson,*  Dx:   Encounter Diagnosis     ICD-10-CM    1. Chronic neck pain  M54.2     G89.29       2. Cervical radiculopathy, chronic  M54.12       3. Cervicogenic headache  G44.86                      Subjective: waking with more jaw pain than anything else..  no longer having symptoms in her arms.     Objective: See treatment diary below      Assessment: Tolerated treatment well. Patient exhibited good technique with therapeutic exercises and would benefit from continued PT. Focus today on manual therapy and gentle movement. Mobility diminished with comparable sign at C1-3 left and C1-2 right.  Pain levels are down however headache and jaw pain may be more TMJD.        Plan: Continue per plan of care.      Precautions: fibromyalgia   Daily Treatment Diary     Manual              UPA of C1-3 left  Gr. Iv- 10sec x5            UPA of C1-2 right Gr. Iv- 10sec x5            Thoracic mobility                                           Exercise Diary              UBE             pec stretch             No monnies             Shoulder extension             Scapular retractions             Horizontal abduction             Chin tucks             Thoracic extension over foam roller 10sec x5            Foam roller on wall             Cervical SNAGs Rot 10x, 3 sec                                                                                                                                                  Modalities

## 2024-05-13 ENCOUNTER — APPOINTMENT (OUTPATIENT)
Dept: PHYSICAL THERAPY | Facility: MEDICAL CENTER | Age: 59
End: 2024-05-13
Payer: COMMERCIAL

## 2024-05-14 ENCOUNTER — OFFICE VISIT (OUTPATIENT)
Dept: PHYSICAL THERAPY | Facility: MEDICAL CENTER | Age: 59
End: 2024-05-14
Payer: COMMERCIAL

## 2024-05-14 DIAGNOSIS — G51.8 CRANIOFACIAL PAIN SYNDROME: ICD-10-CM

## 2024-05-14 DIAGNOSIS — G89.29 CHRONIC NECK PAIN: Primary | ICD-10-CM

## 2024-05-14 DIAGNOSIS — G44.86 CERVICOGENIC HEADACHE: ICD-10-CM

## 2024-05-14 DIAGNOSIS — R51.9 CRANIOFACIAL PAIN: ICD-10-CM

## 2024-05-14 DIAGNOSIS — M54.2 CHRONIC NECK PAIN: Primary | ICD-10-CM

## 2024-05-14 DIAGNOSIS — M54.12 CERVICAL RADICULOPATHY, CHRONIC: ICD-10-CM

## 2024-05-14 PROCEDURE — 97164 PT RE-EVAL EST PLAN CARE: CPT | Performed by: PHYSICAL THERAPIST

## 2024-05-14 PROCEDURE — 97112 NEUROMUSCULAR REEDUCATION: CPT | Performed by: PHYSICAL THERAPIST

## 2024-05-14 NOTE — PROGRESS NOTES
PT Re-Evaluation     Today's date: 2024  Patient name: Chanel Grande  : 1965  MRN: [id number]  Referring provider: Izaiah Stinson,*  Dx:   Encounter Diagnosis     ICD-10-CM    1. Chronic neck pain  M54.2     G89.29       2. Cervicogenic headache  G44.86       3. 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 Cervical radiculopathy, chronic  M54.12       5. Craniofacial pain  R51.9                      Assessment  Assessment details: Problem List:  1) B masticatory hypertonicity - addressing with neuromotor retraining   2) cervical hypertonicity - addressing with neuromotor retraining   3) chronic apical breathing - addressing with neuromotor retraining   4) cervical hypomobility - addressing with mobs and mobility exercises     Chanel Grande is a pleasant 58 y.o. female who presents with chronic daily headaches and B UE paresthesia.  She has nociplastic dominant craniofacial pain and headaches with masticatory and cervical hypertonicity.  She would like to decrease her headaches and avoid medications.  No further referral appears necessary at this time based upon examination results.  I expect she will improve slowly with full adherence to home program and 8 visits over the next 6 months.        Comparable signs:  1) daily headaches    Goals  Patient will be independent with home exercise program.   Patient will be able to manage symptoms independently.   Patient will have 3 or fewer headaches per week.  Patient will be able to self-manage her headaches without medication.    Plan  Duration in visits: 8      Subjective Evaluation    History of Present Illness  Mechanism of injury: Patient reports she has been having debilitating headaches and neck pain for years.  She also has been having radiating pain down bilateral arms.  Initially the chronic bilateral arm paresthesia was resolving with Dr. Rodriguez's intervention, but she reports over the past week, it returned, indicating that continued  intervention is needed.  She reports massage feels great for a few minutes, but then she develops a severe migraine soon afterward.  She has not completed a headache diary for 30+ years.  While her arm symptoms were changing, she was not having improvement in headaches and thus was referred to me by Dr. Rodriguez for further exam and co-management.    Quality of life: good    Pain  At worst pain rating: 10        Objective     Static Posture     Head  Forward.    Shoulders  Rounded.    Postural Observations  Seated posture: poor  Standing posture: fair  Correction of posture: has no consistent effect      Palpation   Left   Hypertonic in the scalenes, sternocleidomastoid, suboccipitals, upper trapezius, masseter, temporalis and medial pterygoid.   Tenderness of the scalenes, sternocleidomastoid, suboccipitals, upper trapezius, masseter, temporalis and medial pterygoid.     Right   Hypertonic in the scalenes, sternocleidomastoid, suboccipitals, upper trapezius, masseter, temporalis and medial pterygoid.   Tenderness of the scalenes, sternocleidomastoid, suboccipitals, upper trapezius, masseter, temporalis and medial pterygoid.     Neurological Testing     Sensation   Cervical/Thoracic   Left   Intact: light touch    Right   Intact: light touch    Reflexes   Left   Biceps (C5/C6): normal (2+)  Hoover's reflex: negative    Right   Biceps (C5/C6): normal (2+)  Hoover's reflex: negative    Active Range of Motion   Cervical/Thoracic Spine       Cervical    Flexion:  WFL  Extension:  WFL  Left lateral flexion:  Restriction level: moderate  Right lateral flexion:  Restriction level minimal  Left rotation:  with pain Restriction level: moderate  Right rotation:  Restriction level: minimal  Left Shoulder   External rotation BTH: T1   Internal rotation BTB: mid thoracic   Horizontal adduction: 40 degrees     Right Shoulder   External rotation BTH: C7 with pain  Internal rotation BTB: mid thoracic with pain  Horizontal  adduction: 30 degrees with pain    Left Elbow   Normal active range of motion    Right Elbow   Normal active range of motion    Left Wrist   Normal active range of motion    Right Wrist   Normal active range of motion    Joint Play     Hypomobile: C1, C2, C3, C4, C5, C6, C7 and T1   Mechanical Assessment    Cervical    Seated retraction: repeated movements   Pain location: no change    Thoracic      Lumbar      Strength/Myotome Testing   Cervical Spine     Left   Normal strength    Right   Normal strength    Tests   Cervical   Positive craniocervical flexion test.  Negative vertical compression, cervical distraction, alar ligament test, transverse ligament test and VBI.     Left   Negative Spurling's Test A and cervical flexion-rotation test.     Right   Positive cervical flexion-rotation test.   Negative Spurling's Test A.     Left Shoulder   Positive ULTT1, ULTT3 and cervical rotation lateral flexion.   Negative Adson maneuver.     Right Shoulder   Positive ULTT1, ULTT3 and cervical rotation lateral flexion.   Negative Adson maneuver.     Lumbar   Negative vertical compression.     Ambulation   Weight-Bearing Status   Weight-Bearing Status (Left): full weight bearing   Weight-Bearing Status (Right): full weight-bearing      Observational Gait   Gait: within functional limits   TMJ   Jaw observations: facial symmetry within normal limits  Occlusion class: class I (normal)  Patient does not have a  cleft palate  Scalloping of tongue: no  Cusp wear: yes  Jaw trauma: no  Prognathia: yes  Mursicatio buccarum: yes  Lateral bite test, Left: no pain  Lateral bite test, right: no pain  ROM: pain with movement  Opening (mm): 42 and right deviation   Lateral excursion, left (mm): 10  Lateral excursion, right (mm)t: 6     Patient educated on the concept of neuroplasticity, and how factors such as temperature, stress, movement, immunity and blood flow affect pain via ion channel expression. Instruction provided regarding  homeostasis/ion channel balance disruption may occur based on what your brain thinks is needed for survival.     Patient encouraged to review/diagram nerve sensitivity topics discussed today to promote deep learning.          Precautions: High symptom irritability, anxiety, and depression    Date: 5/14      Manual                                          Neuromuscular Re-education       PNE SK      TMJ deprogramming SK      Diaphragmatic breathing SK      Headache diary SK                    Therapeutic Exercise                                   Therapeutic Activities                     Gait Training                     Modalities

## 2024-05-15 ENCOUNTER — OFFICE VISIT (OUTPATIENT)
Dept: PHYSICAL THERAPY | Facility: MEDICAL CENTER | Age: 59
End: 2024-05-15
Payer: COMMERCIAL

## 2024-05-15 DIAGNOSIS — G51.8 CRANIOFACIAL PAIN SYNDROME: ICD-10-CM

## 2024-05-15 DIAGNOSIS — G44.86 CERVICOGENIC HEADACHE: ICD-10-CM

## 2024-05-15 DIAGNOSIS — M54.12 CERVICAL RADICULOPATHY, CHRONIC: ICD-10-CM

## 2024-05-15 DIAGNOSIS — G89.29 CHRONIC NECK PAIN: Primary | ICD-10-CM

## 2024-05-15 DIAGNOSIS — M54.2 CHRONIC NECK PAIN: Primary | ICD-10-CM

## 2024-05-15 PROCEDURE — 97140 MANUAL THERAPY 1/> REGIONS: CPT | Performed by: PHYSICAL THERAPIST

## 2024-05-15 PROCEDURE — 97110 THERAPEUTIC EXERCISES: CPT | Performed by: PHYSICAL THERAPIST

## 2024-05-15 NOTE — PROGRESS NOTES
Daily Note     Today's date: 5/15/2024  Patient name: Chanel Grande  : 1965  MRN: [id number]  Referring provider: Izaiah Stinson,*  Dx:   Encounter Diagnosis     ICD-10-CM    1. Chronic neck pain  M54.2     G89.29       2. Cervicogenic headache  G44.86       3. 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 Cervical radiculopathy, chronic  M54.12                      Subjective: patient states that she is having a bit more discomfort in her right arm again however she has been doing a lot of heavy lifting as she is reconstructing her home.     Objective: See treatment diary below      Assessment: Tolerated treatment well. Patient exhibited good technique with therapeutic exercises and would benefit from continued PT. Focus today on manual therapy and gentle movement. Mobility diminished with comparable sign at C1-3 left and C1-2 right.      Plan: Continue per plan of care.      Precautions: fibromyalgia   Daily Treatment Diary     Manual              UPA of C1-3 left  Gr. Iv- 10sec x5            UPA of C1-2 right Gr. Iv- 10sec x5            Thoracic mobility                                           Exercise Diary              UBE             pec stretch             No monnies             Shoulder extension             Scapular retractions             Horizontal abduction             Chin tucks             Thoracic extension over foam roller 10sec x5            Foam roller on wall             Cervical SNAGs Rot 10x, 3 sec                                                                                                                                                  Modalities

## 2024-05-20 ENCOUNTER — APPOINTMENT (OUTPATIENT)
Dept: RADIOLOGY | Facility: MEDICAL CENTER | Age: 59
End: 2024-05-20
Payer: COMMERCIAL

## 2024-05-20 ENCOUNTER — OFFICE VISIT (OUTPATIENT)
Dept: OBGYN CLINIC | Facility: MEDICAL CENTER | Age: 59
End: 2024-05-20
Payer: COMMERCIAL

## 2024-05-20 VITALS
HEART RATE: 68 BPM | WEIGHT: 140 LBS | HEIGHT: 61 IN | DIASTOLIC BLOOD PRESSURE: 90 MMHG | SYSTOLIC BLOOD PRESSURE: 144 MMHG | BODY MASS INDEX: 26.43 KG/M2

## 2024-05-20 DIAGNOSIS — Z96.652 S/P TOTAL KNEE ARTHROPLASTY, LEFT: Primary | ICD-10-CM

## 2024-05-20 DIAGNOSIS — Z96.652 S/P TOTAL KNEE ARTHROPLASTY, LEFT: ICD-10-CM

## 2024-05-20 PROCEDURE — 73562 X-RAY EXAM OF KNEE 3: CPT

## 2024-05-20 PROCEDURE — 99213 OFFICE O/P EST LOW 20 MIN: CPT | Performed by: ORTHOPAEDIC SURGERY

## 2024-05-20 NOTE — PROGRESS NOTES
"Orthopaedic Surgery - Office Note  Chanel Grande (58 y.o. female)   : 1965   MRN: [id number]  Encounter Date: 2024    Chief Complaint   Patient presents with    Left Knee - Follow-up     Pt states significant improvement with respect to pain and movement.        Assessment / Plan  S/p left TKA on 2023    Continue home exercise program.  Progress activity as tolerated at this time.  Ice, heat and anti-inflammatories prn   Patient did have updated x-rays of the left knee today that showed prosthesis in good position with no signs of loosening.  These were reviewed with the patient at her visit.  Continue with dental antibiotic prophylaxis as a lifetime recommendation.  Return if symptoms worsen or fail to improve.    History of Present Illness  Chanel Grande is a 58 y.o. female following up s/p left TKA on 2023.  Patient has progressed very well since her last appointment in 2023.  She did have some issues with IT band tightness that has resolved completely with therapy.  She does have mild numbness/tingling in the anterior lateral aspect of the knee superficially that is still lingering at this time.  Currently she is denying any pain in her knee and does have full function at this time.  Occasionally she gets some soreness at nighttime but much less than she had prior to surgery.    Review of Systems  Pertinent items are noted in HPI.  All other systems were reviewed and are negative.    Physical Exam  /90   Pulse 68   Ht 5' 1\" (1.549 m)   Wt 63.5 kg (140 lb)   LMP 2019 (Exact Date)   BMI 26.45 kg/m²   Cons: Appears well.  No apparent distress.  Psych: Alert. Oriented x3.  Mood and affect normal.  Eyes: PERRLA, EOMI  Resp: Normal effort.  No audible wheezing or stridor.  CV: Palpable pulse.  No discernable arrhythmia.  No LE edema.  Lymph:  No palpable cervical, axillary, or inguinal lymphadenopathy.  Skin: Warm.  No palpable masses.  No visible " lesions.  Neuro: Normal muscle tone.  Normal and symmetric DTR's.     Left Knee Exam  Alignment:  Normal knee alignment.  Inspection:  No swelling. No ecchymosis. Incision healed.  Palpation:  No tenderness. No effusion.  ROM:  Knee Extension 0. Knee Flexion 120.  Strength:  Able to SLR without lag.  Stability:  (-) Varus instability. (-) Valgus instability. in both flexion and extension   Tests:  No pertinent positive or negative tests.  Patella:  Normal patellar mobility.  Neurovascular:  Sensation intact in DP/SP/Tate/Sa/T nerve distributions.  2+ DP & PT pulses.  Gait:  Normal.    Studies Reviewed  I have personally reviewed pertinent films in PACS.  XR of right knee - From 5/20/2024 showed prosthesis in good position with no signs of loosening, no fracture or dislocation.     Procedures  No procedures today.    Medical, Surgical, Family, and Social History  The patient's medical history, family history, and social history, were reviewed and updated as appropriate.    Past Medical History:   Diagnosis Date    Allergic 02/01/2020    Anemia     Anesthesia complication     woke up during D/C    Anxiety     Arthritis     BMI 30.0-30.9,adult     Carpal tunnel syndrome on right     Cervical spondylosis without myelopathy     COVID 12/01/2021    COVID-19     Depression     Fibromyalgia     Fibromyalgia, primary     GERD (gastroesophageal reflux disease)     Headache(784.0) 1982    History of fetal loss     Recurrent    Hyperlipidemia     Hypertension     Jaw pain     and both ears with migraines    Migraine     Neck pain     Osteoarthritis     lumbar spine    Sicca syndrome (HCC)     Sleep apnea     unable to use CPAP    Syncopal episodes     with severe migraines    Undifferentiated connective tissue disease (HCC)     Wears contact lenses     or glasses       Past Surgical History:   Procedure Laterality Date    CHOLECYSTECTOMY      COLONOSCOPY      DILATION AND CURETTAGE OF UTERUS      ORTHOPEDIC SURGERY  5/16/2023     Left knee replacement    IN ARTHRP KNE CONDYLE&PLATU MEDIAL&LAT COMPARTMENTS Left 05/16/2023    Procedure: ARTHROPLASTY KNEE TOTAL;  Surgeon: Alex Cesar MD;  Location: [facility];  Service: Orthopedics    TUBAL LIGATION         Family History   Problem Relation Age of Onset    MORGAN disease Mother     Arthritis Mother     Depression Mother     Hypertension Mother     Coronary artery disease Mother     Migraines Mother     Osteoporosis Mother     Heart attack Father 70    Diabetes Father     Prostate cancer Father 67    Hypertension Father     Arthritis Father     Stroke Father     Cancer Father         Prostate    ADD / ADHD Father     Coronary artery disease Father     Lupus Sister     Raynaud syndrome Sister     Sjogren's syndrome Sister     Depression Sister     ADD / ADHD Sister     Anxiety disorder Sister     No Known Problems Daughter     Breast cancer Maternal Grandmother 70    Arthritis Maternal Grandmother     Cancer Maternal Grandmother         Breast    Glaucoma Maternal Grandmother     Prostate cancer Maternal Grandfather 75    Arthritis Maternal Grandfather     Cancer Maternal Grandfather         Prostate    Colon cancer Paternal Grandmother 80    Cancer Paternal Grandmother         Colon    Alcohol abuse Paternal Grandfather     ADD / ADHD Brother     OCD Brother     No Known Problems Son     No Known Problems Son     No Known Problems Son     No Known Problems Son     No Known Problems Son     No Known Problems Maternal Uncle     No Known Problems Paternal Aunt     No Known Problems Paternal Uncle        Social History     Occupational History    Occupation: Administration   Tobacco Use    Smoking status: Never    Smokeless tobacco: Never   Vaping Use    Vaping status: Never Used   Substance and Sexual Activity    Alcohol use: Not Currently     Comment: rarely    Drug use: Never    Sexual activity: Yes     Partners: Male     Birth control/protection: Female Sterilization       Allergies    Allergen Reactions    Propranolol Other (See Comments)     Halluncinations    Raspberry - Food Allergy Allergic Rhinitis, Itching and Nasal Congestion    Latex Rash         Current Outpatient Medications:     acetaminophen (TYLENOL) 500 mg tablet, Take 500-1,000 mg by mouth every 6 (six) hours as needed for mild pain, Disp: , Rfl:     Diclofenac Sodium (VOLTAREN) 1 %, Apply 2 g topically 4 (four) times a day, Disp: 350 g, Rfl: 2    methocarbamol (ROBAXIN) 500 mg tablet, Take 1 tablet (500 mg total) by mouth 3 (three) times a day as needed for muscle spasms, Disp: 30 tablet, Rfl: 1    omeprazole (PriLOSEC) 20 mg delayed release capsule, TAKE 1 CAPSULE(20 MG) BY MOUTH DAILY, Disp: 90 capsule, Rfl: 1    SUMAtriptan (IMITREX) 100 mg tablet, TAKE 1 TABLET(100 MG) BY MOUTH 1 TIME AS NEEDED FOR MIGRAINE. MAY REPEAT 1 TIME IN 2 HOURS. MAX 2/24 HOURS, 9 PER MONTH, Disp: 9 tablet, Rfl: 12    ascorbic acid (VITAMIN C) 500 MG tablet, Take 1 tablet (500 mg total) by mouth 2 (two) times a day, Disp: 60 tablet, Rfl: 1    Aspirin-Acetaminophen-Caffeine (EXCEDRIN MIGRAINE PO), Take 1 tablet by mouth as needed (migraines) (Patient not taking: Reported on 3/5/2024), Disp: , Rfl:     Cyanocobalamin (VITAMIN B 12 PO), Take by mouth daily (Patient not taking: Reported on 2/19/2024), Disp: , Rfl:     ferrous sulfate 324 (65 Fe) mg, Take 1 tablet (324 mg total) by mouth 2 (two) times a day before meals, Disp: 60 tablet, Rfl: 0    SUMAtriptan (IMITREX) 20 MG/ACT nasal spray, 1 spray (20 mg total) into each nostril once as needed for migraine for up to 1 dose ; if headache returns/persists, may repeat dose once after 2 hours; MAX 40 mg/24 hours (Patient not taking: Reported on 3/5/2024), Disp: 10 each, Rfl: 5          Scribe Attestation      I,:   am acting as a scribe while in the presence of the attending physician.:       I,:   personally performed the services described in this documentation    as scribed in my presence.:

## 2024-05-22 ENCOUNTER — OFFICE VISIT (OUTPATIENT)
Dept: OBGYN CLINIC | Facility: MEDICAL CENTER | Age: 59
End: 2024-05-22
Payer: COMMERCIAL

## 2024-05-22 VITALS
HEART RATE: 66 BPM | BODY MASS INDEX: 26.43 KG/M2 | WEIGHT: 140 LBS | DIASTOLIC BLOOD PRESSURE: 76 MMHG | HEIGHT: 61 IN | SYSTOLIC BLOOD PRESSURE: 127 MMHG

## 2024-05-22 DIAGNOSIS — R20.0 BILATERAL HAND NUMBNESS: Primary | ICD-10-CM

## 2024-05-22 DIAGNOSIS — M18.0 ARTHRITIS OF CARPOMETACARPAL (CMC) JOINT OF BOTH THUMBS: ICD-10-CM

## 2024-05-22 DIAGNOSIS — M18.12 PRIMARY OSTEOARTHRITIS OF FIRST CARPOMETACARPAL JOINT OF LEFT HAND: ICD-10-CM

## 2024-05-22 DIAGNOSIS — M77.12 LATERAL EPICONDYLITIS OF LEFT ELBOW: ICD-10-CM

## 2024-05-22 PROCEDURE — 99213 OFFICE O/P EST LOW 20 MIN: CPT | Performed by: ORTHOPAEDIC SURGERY

## 2024-05-22 NOTE — PROGRESS NOTES
"    HAND & UPPER EXTREMITY OFFICE VISIT   Referred By:  Referral Self  No address on file      Chief Complaint:     Left hand pain     Previous History:   Patient was last seen 3/5/2024 and she elected to continue with therapy as well as obtain an EMG of her Left UE    Interval History:  Since last visit patient states her thumbs are doing ok. She states she does have braces but does not wear them much as they are \"clunky\" to use and she is   She did not get the EMG as it was rescheduled. It is scheduled next week on Tuesday now    She continues to have complaints of her Right hand numbness/tingling with activity. She states she has radiating pain from her head and neck down the arm. She has continued with cervical therapy and was advised to get an MRI s this 'would be more beneficial than an EMG' according to her therapist as he feels this is coming from her neck.    Past Medical History:  Past Medical History:   Diagnosis Date    Allergic 02/01/2020    Anemia     Anesthesia complication     woke up during D/C    Anxiety     Arthritis     BMI 30.0-30.9,adult     Carpal tunnel syndrome on right     Cervical spondylosis without myelopathy     COVID 12/01/2021    COVID-19     Depression     Fibromyalgia     Fibromyalgia, primary     GERD (gastroesophageal reflux disease)     Headache(784.0) 1982    History of fetal loss     Recurrent    Hyperlipidemia     Hypertension     Jaw pain     and both ears with migraines    Migraine     Neck pain     Osteoarthritis     lumbar spine    Sicca syndrome (HCC)     Sleep apnea     unable to use CPAP    Syncopal episodes     with severe migraines    Undifferentiated connective tissue disease (HCC)     Wears contact lenses     or glasses     Past Surgical History:   Procedure Laterality Date    CHOLECYSTECTOMY      COLONOSCOPY      DILATION AND CURETTAGE OF UTERUS      ORTHOPEDIC SURGERY  5/16/2023    Left knee replacement    MT ARTHRP KNE CONDYLE&PLATU MEDIAL&LAT COMPARTMENTS Left " 05/16/2023    Procedure: ARTHROPLASTY KNEE TOTAL;  Surgeon: Alex Cesar MD;  Location: [facility];  Service: Orthopedics    TUBAL LIGATION       Family History   Problem Relation Age of Onset    MORGAN disease Mother     Arthritis Mother     Depression Mother     Hypertension Mother     Coronary artery disease Mother     Migraines Mother     Osteoporosis Mother     Heart attack Father 70    Diabetes Father     Prostate cancer Father 67    Hypertension Father     Arthritis Father     Stroke Father     Cancer Father         Prostate    ADD / ADHD Father     Coronary artery disease Father     Lupus Sister     Raynaud syndrome Sister     Sjogren's syndrome Sister     Depression Sister     ADD / ADHD Sister     Anxiety disorder Sister     No Known Problems Daughter     Breast cancer Maternal Grandmother 70    Arthritis Maternal Grandmother     Cancer Maternal Grandmother         Breast    Glaucoma Maternal Grandmother     Prostate cancer Maternal Grandfather 75    Arthritis Maternal Grandfather     Cancer Maternal Grandfather         Prostate    Colon cancer Paternal Grandmother 80    Cancer Paternal Grandmother         Colon    Alcohol abuse Paternal Grandfather     ADD / ADHD Brother     OCD Brother     No Known Problems Son     No Known Problems Son     No Known Problems Son     No Known Problems Son     No Known Problems Son     No Known Problems Maternal Uncle     No Known Problems Paternal Aunt     No Known Problems Paternal Uncle      Social History     Socioeconomic History    Marital status: /Civil Union     Spouse name: Not on file    Number of children: 6    Years of education: Not on file    Highest education level: Not on file   Occupational History    Occupation: Administration   Tobacco Use    Smoking status: Never    Smokeless tobacco: Never   Vaping Use    Vaping status: Never Used   Substance and Sexual Activity    Alcohol use: Not Currently     Comment: rarely    Drug use: Never    Sexual  "activity: Yes     Partners: Male     Birth control/protection: Female Sterilization   Other Topics Concern    Not on file   Social History Narrative    6 kids 24, 22, 19, 17, 14, 10     Moved from [place] for husbands job - Jan     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    PRN Meds:.  Allergies   Allergen Reactions    Propranolol Other (See Comments)     Halluncinations    Raspberry - Food Allergy Allergic Rhinitis, Itching and Nasal Congestion    Latex Rash       Physical Examination:    /76   Pulse 66   Ht 5' 1\" (1.549 m)   Wt 63.5 kg (140 lb)   LMP 08/31/2019 (Exact Date)   BMI 26.45 kg/m²     Gen: A&Ox3, NAD  Cardiac: regular rate  Chest: non labored breathing  Abdomen: Non-distended    Right Upper Extremity:  Skin CDI  No obvious deformity of the shoulder, arm, elbow, forearm, wrist, hand  Diminished sensation in the ulnar nerve distribution  Sensation intact to light touch in the axillary median,and radial nerve distributions  5/5 motor interosseous, thumb opposition, thumb abduction, thumb extension  2+RP  Composite fist  Durkans- Positive  Tinel wrist- Positive  Cubital compression test-  Positive        Left Upper Extremity:  Skin CDI  No obvious deformity of the shoulder, arm, elbow, forearm, wrist, hand  Diminished sensation in the ulnar nerve distribution  Sensation intact to light touch in the axillary median, and radial nerve distributions  5/5 motor   2+RP  Composite fist  Durkans- Positive  Tinels wrist- Positive  Positive grind test, tender at the thumb CMC " joint  Cubital Compression test - Positive      Assessment and Plan:  1. Bilateral hand numbness  Ambulatory referral to Spine & Pain Management      2. Primary osteoarthritis of first carpometacarpal joint of left hand        3. Arthritis of carpometacarpal (CMC) joint of both thumbs        4. Lateral epicondylitis of left elbow            58 y.o. female presents in follow up for the above diagnosis.     I recommend she get the EMG as this can show a possible double crush issue. This can also help rule in any cervical component to her symptoms. We discussed that an MRI may be valuable information for further treatment options. I recommend she can return to Dr Smith to discuss an MRI if it is warrented.    For her thumb pain we will continue to monitor he symptoms as he has subjective improvements with bracing. If her symnptoms worsen, we can reconsider other treatment options such as injections or surgery    I recommend they follow up Return if symptoms worsen or fail to improve.I will contact her if I find anything abnormal on the EMG or if there is a cervical component to her symptoms    she expressed understanding of the plan and agreed. We encouraged them to contact our office with any questions or concerns.       Izaiah Stinson MD  Hand and Upper Extremity Surgery      *This note was dictated using Dragon voice recognition software. Please excuse any word substitutions or errors.*

## 2024-05-28 ENCOUNTER — HOSPITAL ENCOUNTER (OUTPATIENT)
Dept: NEUROLOGY | Facility: CLINIC | Age: 59
Discharge: HOME | End: 2024-05-28
Payer: COMMERCIAL

## 2024-05-28 ENCOUNTER — OFFICE VISIT (OUTPATIENT)
Dept: PHYSICAL THERAPY | Facility: MEDICAL CENTER | Age: 59
End: 2024-05-28
Payer: COMMERCIAL

## 2024-05-28 DIAGNOSIS — M54.2 CHRONIC NECK PAIN: Primary | ICD-10-CM

## 2024-05-28 DIAGNOSIS — M79.642 BILATERAL HAND PAIN: ICD-10-CM

## 2024-05-28 DIAGNOSIS — G51.8 CRANIOFACIAL PAIN SYNDROME: ICD-10-CM

## 2024-05-28 DIAGNOSIS — R20.0 BILATERAL HAND NUMBNESS: ICD-10-CM

## 2024-05-28 DIAGNOSIS — M79.641 BILATERAL HAND PAIN: ICD-10-CM

## 2024-05-28 DIAGNOSIS — G44.86 CERVICOGENIC HEADACHE: ICD-10-CM

## 2024-05-28 DIAGNOSIS — M54.12 CERVICAL RADICULOPATHY, CHRONIC: ICD-10-CM

## 2024-05-28 DIAGNOSIS — G89.29 CHRONIC NECK PAIN: Primary | ICD-10-CM

## 2024-05-28 PROCEDURE — 95912 NRV CNDJ TEST 11-12 STUDIES: CPT | Performed by: PSYCHIATRY & NEUROLOGY

## 2024-05-28 PROCEDURE — 97140 MANUAL THERAPY 1/> REGIONS: CPT | Performed by: PHYSICAL THERAPIST

## 2024-05-28 PROCEDURE — 97110 THERAPEUTIC EXERCISES: CPT | Performed by: PHYSICAL THERAPIST

## 2024-05-28 PROCEDURE — 95886 MUSC TEST DONE W/N TEST COMP: CPT | Performed by: PSYCHIATRY & NEUROLOGY

## 2024-05-28 NOTE — PROGRESS NOTES
Daily Note     Today's date: 2024  Patient name: Chanel Grande  : 1965  MRN: [id number]  Referring provider: Izaiah Stinson,*  Dx:   Encounter Diagnosis     ICD-10-CM    1. Chronic neck pain  M54.2     G89.29       2. Cervicogenic headache  G44.86       3. 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 Cervical radiculopathy, chronic  M54.12                      Subjective: patient states that she is having a bit more numbing in her arms again after a very stressful and busy weekend.     Objective: See treatment diary below      Assessment: Tolerated treatment well. Patient exhibited good technique with therapeutic exercises and would benefit from continued PT. Focus today on manual therapy and gentle movement. Mobility diminished with comparable sign at C1-3 left and C1-2 right.      Plan: Continue per plan of care.      Precautions: fibromyalgia   Daily Treatment Diary     Manual              UPA of C1-3 left  Gr. Iv- 10sec x5            UPA of C1-2 right Gr. Iv- 10sec x5            Thoracic mobility                                           Exercise Diary              UBE             pec stretch             No monnies             Shoulder extension             Scapular retractions             Horizontal abduction             Chin tucks             Thoracic extension over foam roller 10sec x5            Foam roller on wall             Cervical SNAGs Rot 10x, 3 sec                                                                                                                                                  Modalities

## 2024-05-30 ENCOUNTER — OFFICE VISIT (OUTPATIENT)
Dept: PHYSICAL THERAPY | Facility: MEDICAL CENTER | Age: 59
End: 2024-05-30
Payer: COMMERCIAL

## 2024-05-30 DIAGNOSIS — G44.86 CERVICOGENIC HEADACHE: ICD-10-CM

## 2024-05-30 DIAGNOSIS — G89.29 CHRONIC NECK PAIN: Primary | ICD-10-CM

## 2024-05-30 DIAGNOSIS — M54.2 CHRONIC NECK PAIN: Primary | ICD-10-CM

## 2024-05-30 DIAGNOSIS — M54.12 CERVICAL RADICULOPATHY, CHRONIC: ICD-10-CM

## 2024-05-30 PROCEDURE — 97140 MANUAL THERAPY 1/> REGIONS: CPT | Performed by: PHYSICAL THERAPIST

## 2024-05-30 NOTE — PROGRESS NOTES
Daily Note     Today's date: 2024  Patient name: Chanel Grande  : 1965  MRN: [id number]  Referring provider: Izaiah Stinson,*  Dx:   Encounter Diagnosis     ICD-10-CM    1. Chronic neck pain  M54.2     G89.29       2. Cervicogenic headache  G44.86       3. Cervical radiculopathy, chronic  M54.12                      Subjective: patient states that she is having little to no pain today and is feeling very good.      Objective: See treatment diary below      Assessment: Tolerated treatment well. Patient exhibited good technique with therapeutic exercises and would benefit from continued PT. Cervical and thoracic movement seems to be much better today and patient is feeling good.  Focus on manual therapy today.     Plan: Continue per plan of care.      Precautions: fibromyalgia   Daily Treatment Diary     Manual              UPA of C1-3 left  Gr. Iv- 10sec x5            UPA of C1-2 right Gr. Iv- 10sec x5            Thoracic mobility                                           Exercise Diary              UBE             pec stretch             No monnies             Shoulder extension             Scapular retractions             Horizontal abduction             Chin tucks             Thoracic extension over foam roller             Foam roller on wall             Cervical SNAGs Rot                                                                                                                                                   Modalities

## 2024-06-03 ENCOUNTER — OFFICE VISIT (OUTPATIENT)
Dept: PHYSICAL THERAPY | Facility: MEDICAL CENTER | Age: 59
End: 2024-06-03
Payer: COMMERCIAL

## 2024-06-03 DIAGNOSIS — G89.29 CHRONIC NECK PAIN: Primary | ICD-10-CM

## 2024-06-03 DIAGNOSIS — M54.2 CHRONIC NECK PAIN: Primary | ICD-10-CM

## 2024-06-03 DIAGNOSIS — G44.86 CERVICOGENIC HEADACHE: ICD-10-CM

## 2024-06-03 DIAGNOSIS — M54.12 CERVICAL RADICULOPATHY, CHRONIC: ICD-10-CM

## 2024-06-03 PROCEDURE — 97110 THERAPEUTIC EXERCISES: CPT | Performed by: PHYSICAL THERAPIST

## 2024-06-03 PROCEDURE — 97140 MANUAL THERAPY 1/> REGIONS: CPT | Performed by: PHYSICAL THERAPIST

## 2024-06-03 NOTE — PROGRESS NOTES
Daily Note     Today's date: 6/3/2024  Patient name: Chanel Grande  : 1965  MRN: [id number]  Referring provider: Izaiah Stinson,*  Dx:   Encounter Diagnosis     ICD-10-CM    1. Chronic neck pain  M54.2     G89.29       2. Cervical radiculopathy, chronic  M54.12       3. Cervicogenic headache  G44.86                      Subjective: patient states that she is having a bit more discomfort in her right arm again however she feels that overall she has been seeing some improvement.     Objective: See treatment diary below      Assessment: Tolerated treatment well. Patient exhibited good technique with therapeutic exercises and would benefit from continued PT. Focus today on manual therapy and gentle movement. Mobility diminished with comparable sign at C1-3 left and C1-2 right.      Plan: Continue per plan of care.      Precautions: fibromyalgia   Daily Treatment Diary     Manual              UPA of C1-3 left  Gr. Iv- 10sec x5            UPA of C1-2 right Gr. Iv- 10sec x5            Thoracic mobility                                           Exercise Diary              UBE             pec stretch             No monnies             Shoulder extension             Scapular retractions             Horizontal abduction             Chin tucks             Thoracic extension over foam roller 10sec x5            Foam roller on wall 5 min            Cervical SNAGs Rot 10x, 3 sec                                                                                                                                                  Modalities

## 2024-06-06 ENCOUNTER — OFFICE VISIT (OUTPATIENT)
Dept: PHYSICAL THERAPY | Facility: MEDICAL CENTER | Age: 59
End: 2024-06-06
Payer: COMMERCIAL

## 2024-06-06 DIAGNOSIS — R51.9 CRANIOFACIAL PAIN: ICD-10-CM

## 2024-06-06 DIAGNOSIS — G51.8 CRANIOFACIAL PAIN SYNDROME: ICD-10-CM

## 2024-06-06 DIAGNOSIS — M54.2 CHRONIC NECK PAIN: Primary | ICD-10-CM

## 2024-06-06 DIAGNOSIS — M54.12 CERVICAL RADICULOPATHY, CHRONIC: ICD-10-CM

## 2024-06-06 DIAGNOSIS — G89.29 CHRONIC NECK PAIN: Primary | ICD-10-CM

## 2024-06-06 DIAGNOSIS — G44.86 CERVICOGENIC HEADACHE: ICD-10-CM

## 2024-06-06 PROCEDURE — 97140 MANUAL THERAPY 1/> REGIONS: CPT | Performed by: PHYSICAL THERAPIST

## 2024-06-06 PROCEDURE — 97112 NEUROMUSCULAR REEDUCATION: CPT | Performed by: PHYSICAL THERAPIST

## 2024-06-06 NOTE — PROGRESS NOTES
Daily Note     Today's date: 2024  Patient name: Chanel Grande  : 1965  MRN: [id number]  Referring provider: Izaiah Stinson,*  Dx:   Encounter Diagnosis     ICD-10-CM    1. Chronic neck pain  M54.2     G89.29       2. Cervical radiculopathy, chronic  M54.12       3. Cervicogenic headache  G44.86       4. 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 Craniofacial pain  R51.9                      Assessment:   Problem List:  1) B masticatory hypertonicity - addressing with neuromotor retraining   2) cervical hypertonicity - addressing with neuromotor retraining   3) chronic apical breathing - addressing with neuromotor retraining   4) cervical hypomobility - addressing with mobs and mobility exercises     Comparable signs:  1) daily headaches     Goals  Patient will be independent with home exercise program.   Patient will be able to manage symptoms independently.   Patient will have 3 or fewer headaches per week.  Patient will be able to self-manage her headaches without medication.      Plan: Reassess in 3 weeks.      Subjective: Patient reports with her headache diary.  Initially she was having headaches every two days, but then had 3 days without followed by 3 days with and then 4 days without followed by 4 days with.        Objective: See treatment diary below      Precautions: fibromyalgia   Daily Treatment Diary     Date:          Manual            SOR  SK          manual n. glides  SK                                             Neuromuscular Re-education           PNE SK         TMJ deprogramming SK         Diaphragmatic breathing SK         Headache diary SK          sleep hygeine SK                     Therapeutic Exercise                                                           Therapeutic Activities                                   Gait Training                                   Modalities

## 2024-06-10 ENCOUNTER — OFFICE VISIT (OUTPATIENT)
Dept: PHYSICAL THERAPY | Facility: MEDICAL CENTER | Age: 59
End: 2024-06-10
Payer: COMMERCIAL

## 2024-06-10 DIAGNOSIS — G89.29 CHRONIC NECK PAIN: Primary | ICD-10-CM

## 2024-06-10 DIAGNOSIS — M54.2 CHRONIC NECK PAIN: Primary | ICD-10-CM

## 2024-06-10 DIAGNOSIS — G44.86 CERVICOGENIC HEADACHE: ICD-10-CM

## 2024-06-10 DIAGNOSIS — M54.12 CERVICAL RADICULOPATHY, CHRONIC: ICD-10-CM

## 2024-06-10 PROCEDURE — 97140 MANUAL THERAPY 1/> REGIONS: CPT | Performed by: PHYSICAL THERAPIST

## 2024-06-10 PROCEDURE — 97110 THERAPEUTIC EXERCISES: CPT | Performed by: PHYSICAL THERAPIST

## 2024-06-10 NOTE — PROGRESS NOTES
Daily Note     Today's date: 6/10/2024  Patient name: Chanel Grande  : 1965  MRN: [id number]  Referring provider: Izaiah Stinson,*  Dx:   Encounter Diagnosis     ICD-10-CM    1. Chronic neck pain  M54.2     G89.29       2. Cervical radiculopathy, chronic  M54.12       3. Cervicogenic headache  G44.86                      Subjective: patient states that she is feeling good today with less discomfort overall.  Patient had a good couple of days since last visit.      Objective: See treatment diary below      Assessment: Tolerated treatment well. Patient exhibited good technique with therapeutic exercises and would benefit from continued PT. Focus today on manual therapy and gentle movement.       Plan: Continue per plan of care.      Precautions: fibromyalgia   Daily Treatment Diary     Manual              UPA of C1-3 left  Gr. Iv- 10sec x5            UPA of C1-2 right Gr. Iv- 10sec x5            Thoracic mobility                                           Exercise Diary              UBE             pec stretch             No monnies             Shoulder extension             Scapular retractions             Horizontal abduction             Chin tucks             Thoracic extension over foam roller 10sec x5            Foam roller on wall 5 min            Cervical SNAGs Rot 10x, 3 sec                                                                                                                                                  Modalities

## 2024-06-12 ENCOUNTER — OFFICE VISIT (OUTPATIENT)
Dept: PHYSICAL THERAPY | Facility: MEDICAL CENTER | Age: 59
End: 2024-06-12
Payer: COMMERCIAL

## 2024-06-12 DIAGNOSIS — M54.12 CERVICAL RADICULOPATHY, CHRONIC: ICD-10-CM

## 2024-06-12 DIAGNOSIS — G44.86 CERVICOGENIC HEADACHE: ICD-10-CM

## 2024-06-12 DIAGNOSIS — G89.29 CHRONIC NECK PAIN: Primary | ICD-10-CM

## 2024-06-12 DIAGNOSIS — M54.2 CHRONIC NECK PAIN: Primary | ICD-10-CM

## 2024-06-12 PROCEDURE — 97140 MANUAL THERAPY 1/> REGIONS: CPT | Performed by: PHYSICAL THERAPIST

## 2024-06-12 PROCEDURE — 97110 THERAPEUTIC EXERCISES: CPT | Performed by: PHYSICAL THERAPIST

## 2024-06-12 NOTE — PROGRESS NOTES
Daily Note     Today's date: 2024  Patient name: Chanel Grande  : 1965  MRN: [id number]  Referring provider: Izaiah Stinson,*  Dx:   Encounter Diagnosis     ICD-10-CM    1. Chronic neck pain  M54.2     G89.29       2. Cervical radiculopathy, chronic  M54.12       3. Cervicogenic headache  G44.86                      Subjective: patient states that she is feeling very sore today and had to take some medication following yesterdays long day of mowing and working.     Objective: See treatment diary below      Assessment: Tolerated treatment well. Patient exhibited good technique with therapeutic exercises and would benefit from continued PT. Focus today on manual therapy and gentle movement.       Plan: Continue per plan of care.      Precautions: fibromyalgia   Daily Treatment Diary     Manual              UPA of C1-3 left  Gr. Iv- 10sec x5            UPA of C1-2 right Gr. Iv- 10sec x5            Thoracic mobility                                           Exercise Diary              UBE             pec stretch             No monnies             Shoulder extension             Scapular retractions             Horizontal abduction             Chin tucks             Thoracic extension over foam roller 10sec x5            Foam roller on wall 5 min            Cervical SNAGs Rot 10x, 3 sec                                                                                                                                                  Modalities

## 2024-06-17 ENCOUNTER — OFFICE VISIT (OUTPATIENT)
Dept: PHYSICAL THERAPY | Facility: MEDICAL CENTER | Age: 59
End: 2024-06-17
Payer: COMMERCIAL

## 2024-06-17 DIAGNOSIS — G44.86 CERVICOGENIC HEADACHE: ICD-10-CM

## 2024-06-17 DIAGNOSIS — G89.29 CHRONIC NECK PAIN: Primary | ICD-10-CM

## 2024-06-17 DIAGNOSIS — M54.2 CHRONIC NECK PAIN: Primary | ICD-10-CM

## 2024-06-17 DIAGNOSIS — M54.12 CERVICAL RADICULOPATHY, CHRONIC: ICD-10-CM

## 2024-06-17 PROCEDURE — 97110 THERAPEUTIC EXERCISES: CPT | Performed by: PHYSICAL THERAPIST

## 2024-06-17 PROCEDURE — 97140 MANUAL THERAPY 1/> REGIONS: CPT | Performed by: PHYSICAL THERAPIST

## 2024-06-17 NOTE — PROGRESS NOTES
Daily Note     Today's date: 2024  Patient name: Chanel Grande  : 1965  MRN: [id number]  Referring provider: Izaiah Stinson,*  Dx:   Encounter Diagnosis     ICD-10-CM    1. Chronic neck pain  M54.2     G89.29       2. Cervical radiculopathy, chronic  M54.12       3. Cervicogenic headache  G44.86                      Subjective: patient states that she woke at 3am with a migraine and is not having a great day.      Objective: See treatment diary below      Assessment: Tolerated treatment well. Patient exhibited good technique with therapeutic exercises and would benefit from continued PT. Focus today on manual therapy and gentle movement.  Headache improved with treatment.  Did not add exercises back into the program as patient's headache is sensitive and didn't want to set symptoms off.       Plan: Continue per plan of care.      Precautions: fibromyalgia   Daily Treatment Diary     Manual              UPA of C1-3 left  Gr. Iv- 10sec x5            UPA of C1-2 right Gr. Iv- 10sec x5            Thoracic mobility 5 min                                          Exercise Diary              UBE             pec stretch             No monnies             Shoulder extension             Scapular retractions             Horizontal abduction             Chin tucks             Thoracic extension over foam roller 10sec x5            Foam roller on wall 5 min            Cervical SNAGs Rot 10x, 3 sec                                                                                                                                                  Modalities

## 2024-06-21 ENCOUNTER — CONSULT (OUTPATIENT)
Dept: PAIN MEDICINE | Facility: MEDICAL CENTER | Age: 59
End: 2024-06-21
Payer: COMMERCIAL

## 2024-06-21 ENCOUNTER — OFFICE VISIT (OUTPATIENT)
Dept: PHYSICAL THERAPY | Facility: MEDICAL CENTER | Age: 59
End: 2024-06-21
Payer: COMMERCIAL

## 2024-06-21 VITALS
BODY MASS INDEX: 26.62 KG/M2 | WEIGHT: 141 LBS | HEIGHT: 61 IN | DIASTOLIC BLOOD PRESSURE: 84 MMHG | HEART RATE: 76 BPM | OXYGEN SATURATION: 98 % | SYSTOLIC BLOOD PRESSURE: 164 MMHG

## 2024-06-21 DIAGNOSIS — M54.12 CERVICAL RADICULOPATHY, CHRONIC: ICD-10-CM

## 2024-06-21 DIAGNOSIS — M54.2 CHRONIC NECK PAIN: Primary | ICD-10-CM

## 2024-06-21 DIAGNOSIS — M54.12 CERVICAL RADICULITIS: Primary | ICD-10-CM

## 2024-06-21 DIAGNOSIS — G51.8 CRANIOFACIAL PAIN SYNDROME: ICD-10-CM

## 2024-06-21 DIAGNOSIS — G44.86 CERVICOGENIC HEADACHE: ICD-10-CM

## 2024-06-21 DIAGNOSIS — R20.0 BILATERAL HAND NUMBNESS: ICD-10-CM

## 2024-06-21 DIAGNOSIS — G89.29 CHRONIC NECK PAIN: Primary | ICD-10-CM

## 2024-06-21 PROCEDURE — 99214 OFFICE O/P EST MOD 30 MIN: CPT | Performed by: PHYSICAL MEDICINE & REHABILITATION

## 2024-06-21 PROCEDURE — 97140 MANUAL THERAPY 1/> REGIONS: CPT | Performed by: PHYSICAL THERAPIST

## 2024-06-21 PROCEDURE — 97110 THERAPEUTIC EXERCISES: CPT | Performed by: PHYSICAL THERAPIST

## 2024-06-21 PROCEDURE — 97112 NEUROMUSCULAR REEDUCATION: CPT | Performed by: PHYSICAL THERAPIST

## 2024-06-21 NOTE — PROGRESS NOTES
Assessment  1. Cervical radiculitis    2. Bilateral hand numbness        Plan  At this time an MRI of the cervical spine is warranted given the patient's chronicity of symptoms and lack of response to conservative care including physical therapy.  Once the imaging is available for review we will determine if further treatments are indicated such as epidural steroid injection.    My impressions and treatment recommendations were discussed in detail with the patient who verbalized understanding and had no further questions.  Discharge instructions were provided. I personally saw and examined the patient and I agree with the above discussed plan of care.    Orders Placed This Encounter   Procedures    MRI cervical spine wo contrast     Standing Status:   Future     Standing Expiration Date:   6/21/2028     Scheduling Instructions:      There is no preparation for this test. Please leave your jewelry and valuables at home, wedding rings are the exception. All patients will be required to change into a hospital gown and pants.  Street clothes are not permitted in the MRI.  Magnetic nail polish must be removed prior to arrival for your test. Please bring your insurance cards, a form of photo ID and a list of your medications with you. Arrive 15 minutes prior to your appointment time in order to register. Please bring any prior CT or MRI studies of this area that were not performed at a [facility].            To schedule this appointment, please contact Central Scheduling at [phone].            Prior to your appointment, please make sure you complete the MRI Screening Form when you e-Check in for your appointment. This will be available starting 7 days before your appointment in [facility]. You may receive an e-mail with an activation code if you do not have a [facility] account. If you do not have access to a device, we will complete your screening at your appointment.     Order Specific Question:   What is the  patient's sedation requirement?     Answer:   No Sedation     Order Specific Question:   Is the patient pregnant?     Answer:   No     Order Specific Question:   Does the patient have metallic implants?     Answer:   No     Order Specific Question:   Does this procedure require the 3T MRI at [place] or [place]?     Answer:   No     Order Specific Question:   Release to patient through [facility]     Answer:   Immediate     Order Specific Question:   Is order priority selected as STAT?     Answer:   No     Order Specific Question:   Reason for Exam     Answer:   neck pain with bilateral upper extremity paresthesias     No orders of the defined types were placed in this encounter.      History of Present Illness    Chanel Grande is a 58 y.o. female seen in consultation at the request of Dr. Stinson regarding neck pain and bilateral upper extremity paresthesias.  The patient's been experiencing the symptoms for a few years without any inciting event or trauma and describing moderate to severe intensity symptoms.  Pain is currently rated as a 4/10 and is constant without any typical pattern characterized as numbness pins-and-needles pressure-like and throbbing.  She does describe some upper extremity weakness.    Aggravating factors include lying down standing sitting and exercise.  Alleviating factors are unknown.    No recent diagnostic imaging is available for review.  She did have EMG study of the bilateral upper extremities which did not reveal any concerning findings for cervical radiculopathy.  It did reveal mild right-sided carpal tunnel syndrome.    Patient has been participating appropriately with physical therapy exercise heat or ice application and TENS unit all with moderate but short-lived relief.    Social history negative for tobacco marijuana and alcohol use.    I have personally reviewed and/or updated the patient's past medical history, past surgical history, family history, social history, current  medications, allergies, and vital signs today.     Review of Systems   Respiratory:  Negative for shortness of breath.    Cardiovascular:  Negative for chest pain.   Gastrointestinal:  Negative for constipation, diarrhea, nausea and vomiting.   Musculoskeletal:  Positive for myalgias, neck pain and neck stiffness. Negative for arthralgias, gait problem and joint swelling.   Skin:  Negative for rash.   Neurological:  Positive for numbness and headaches. Negative for dizziness, seizures and weakness.   All other systems reviewed and are negative.      Patient Active Problem List   Diagnosis    Fibromyalgia    Hypercholesteremia    Migraine without aura and without status migrainosus, not intractable    Persistent insomnia    Vitamin D deficiency    Low ferritin    Benign essential hypertension    Carpal tunnel syndrome, bilateral    Syncopal episodes    Sleep disturbance    Overweight with body mass index (BMI) of 26 to 26.9 in adult    Primary generalized (osteo)arthritis    Cervical spondylosis without myelopathy    Gastroesophageal reflux disease without esophagitis    History of fetal loss    Undifferentiated connective tissue disease (HCC)    Obstructive sleep apnea-hypopnea syndrome    Excessive daytime sleepiness    Primary osteoarthritis of left knee    Tendinitis of right rotator cuff    Seronegative arthropathy of multiple sites (HCC)    History of colon polyps    MDD (major depressive disorder), recurrent episode, mild (HCC)    Anxiety and depression    Left knee pain    Osteopenia of multiple sites    S/P total knee arthroplasty, left    Lateral epicondylitis of left elbow    Arthritis of carpometacarpal (CMC) joint of both thumbs    Primary osteoarthritis of first carpometacarpal joint of left hand    Bilateral hand numbness       Past Medical History:   Diagnosis Date    Allergic 02/01/2020    Anemia     Anesthesia complication     woke up during D/C    Anxiety     Arthritis     BMI 30.0-30.9,adult      Carpal tunnel syndrome on right     Cervical spondylosis without myelopathy     COVID 12/01/2021    COVID-19     Depression     Fibromyalgia     Fibromyalgia, primary     GERD (gastroesophageal reflux disease)     Headache(784.0) 1982    History of fetal loss     Recurrent    Hyperlipidemia     Hypertension     Jaw pain     and both ears with migraines    Migraine     Neck pain     Osteoarthritis     lumbar spine    Sicca syndrome (HCC)     Sleep apnea     unable to use CPAP    Syncopal episodes     with severe migraines    Undifferentiated connective tissue disease (HCC)     Wears contact lenses     or glasses       Past Surgical History:   Procedure Laterality Date    CHOLECYSTECTOMY      COLONOSCOPY      DILATION AND CURETTAGE OF UTERUS      ORTHOPEDIC SURGERY  5/16/2023    Left knee replacement    [place] ARTHRP KNE CONDYLE&PLATU MEDIAL&LAT COMPARTMENTS Left 05/16/2023    Procedure: ARTHROPLASTY KNEE TOTAL;  Surgeon: Alex Cesar MD;  Location: [facility];  Service: Orthopedics    TUBAL LIGATION         Family History   Problem Relation Age of Onset    MORGAN disease Mother     Arthritis Mother     Depression Mother     Hypertension Mother     Coronary artery disease Mother     Migraines Mother     Osteoporosis Mother     Heart attack Father 70    Diabetes Father     Prostate cancer Father 67    Hypertension Father     Arthritis Father     Stroke Father     Cancer Father         Prostate    ADD / ADHD Father     Coronary artery disease Father     Lupus Sister     Raynaud syndrome Sister     Sjogren's syndrome Sister     Depression Sister     ADD / ADHD Sister     Anxiety disorder Sister     No Known Problems Daughter     Breast cancer Maternal Grandmother 70    Arthritis Maternal Grandmother     Cancer Maternal Grandmother         Breast    Glaucoma Maternal Grandmother     Prostate cancer Maternal Grandfather 75    Arthritis Maternal Grandfather     Cancer Maternal Grandfather         Prostate    Colon cancer  Paternal Grandmother 80    Cancer Paternal Grandmother         Colon    Alcohol abuse Paternal Grandfather     ADD / ADHD Brother     OCD Brother     No Known Problems Son     No Known Problems Son     No Known Problems Son     No Known Problems Son     No Known Problems Son     No Known Problems Maternal Uncle     No Known Problems Paternal Aunt     No Known Problems Paternal Uncle        Social History     Occupational History    Occupation: Administration   Tobacco Use    Smoking status: Never    Smokeless tobacco: Never   Vaping Use    Vaping status: Never Used   Substance and Sexual Activity    Alcohol use: Not Currently     Comment: rarely    Drug use: Never    Sexual activity: Yes     Partners: Male     Birth control/protection: Female Sterilization       Current Outpatient Medications on File Prior to Visit   Medication Sig    acetaminophen (TYLENOL) 500 mg tablet Take 500-1,000 mg by mouth every 6 (six) hours as needed for mild pain    Aspirin-Acetaminophen-Caffeine (EXCEDRIN MIGRAINE PO) Take 1 tablet by mouth as needed (migraines)    omeprazole (PriLOSEC) 20 mg delayed release capsule TAKE 1 CAPSULE(20 MG) BY MOUTH DAILY    SUMAtriptan (IMITREX) 100 mg tablet TAKE 1 TABLET(100 MG) BY MOUTH 1 TIME AS NEEDED FOR MIGRAINE. MAY REPEAT 1 TIME IN 2 HOURS. MAX 2/24 HOURS, 9 PER MONTH    ascorbic acid (VITAMIN C) 500 MG tablet Take 1 tablet (500 mg total) by mouth 2 (two) times a day    Cyanocobalamin (VITAMIN B 12 PO) Take by mouth daily (Patient not taking: Reported on 2/19/2024)    Diclofenac Sodium (VOLTAREN) 1 % Apply 2 g topically 4 (four) times a day    ferrous sulfate 324 (65 Fe) mg Take 1 tablet (324 mg total) by mouth 2 (two) times a day before meals    methocarbamol (ROBAXIN) 500 mg tablet Take 1 tablet (500 mg total) by mouth 3 (three) times a day as needed for muscle spasms (Patient not taking: Reported on 5/22/2024)    SUMAtriptan (IMITREX) 20 MG/ACT nasal spray 1 spray (20 mg total) into each  "nostril once as needed for migraine for up to 1 dose ; if headache returns/persists, may repeat dose once after 2 hours; MAX 40 mg/24 hours (Patient not taking: Reported on 3/5/2024)     No current facility-administered medications on file prior to visit.       Allergies   Allergen Reactions    Propranolol Other (See Comments)     Halluncinations    Raspberry - Food Allergy Allergic Rhinitis, Itching and Nasal Congestion    Latex Rash       Physical Exam    /84   Pulse 76   Ht 5' 1\" (1.549 m)   Wt 64 kg (141 lb)   LMP 08/31/2019 (Exact Date)   SpO2 98%   BMI 26.64 kg/m²     Constitutional: normal, well developed, well nourished, alert, in no distress and non-toxic and no overt pain behavior.  Eyes: anicteric  HEENT: grossly intact  Neck: supple, symmetric, trachea midline and no masses   Pulmonary:even and unlabored  Cardiovascular:No edema or pitting edema present  Skin:Normal without rashes or lesions and well hydrated  Psychiatric:Mood and affect appropriate  Neurologic:Cranial Nerves II-XII grossly intact, bilateral upper extremity strength is normal, bilateral upper extremity muscle stretch reflexes are physiologic and symmetric, sensation to light touch is intact throughout.  Musculoskeletal:normal    Imaging  "

## 2024-06-21 NOTE — PROGRESS NOTES
Daily Note     Today's date: 2024  Patient name: Chanel Grande  : 1965  MRN: [id number]  Referring provider: Izaiah Stinson,*  Dx:   Encounter Diagnosis     ICD-10-CM    1. Chronic neck pain  M54.2     G89.29       2. Cervical radiculopathy, chronic  M54.12       3. Cervicogenic headache  G44.86       4. 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     Objective: See treatment diary below      Assessment: Tolerated treatment well. Patient exhibited good technique with therapeutic exercises and would benefit from continued PT. Focus today on manual therapy and gentle movement.  Headache improved with treatment.  Did not add exercises back into the program as patient's headache is sensitive and didn't want to set symptoms off.       Plan: Continue per plan of care.      Precautions: fibromyalgia   Daily Treatment Diary     Manual              UPA of C1-3 left  Gr. Iv- 10sec x5            UPA of C1-2 right Gr. Iv- 10sec x5            Thoracic mobility 5 min                                          Exercise Diary              UBE             pec stretch             No monnies             Shoulder extension             Scapular retractions             Horizontal abduction             Chin tucks             Thoracic extension over foam roller 10sec x5            Foam roller on wall 5 min            Cervical SNAGs Rot 10x, 3 sec                                                                                                                                                  Modalities

## 2024-06-24 ENCOUNTER — OFFICE VISIT (OUTPATIENT)
Dept: PHYSICAL THERAPY | Facility: MEDICAL CENTER | Age: 59
End: 2024-06-24
Payer: COMMERCIAL

## 2024-06-24 DIAGNOSIS — G44.86 CERVICOGENIC HEADACHE: ICD-10-CM

## 2024-06-24 DIAGNOSIS — R51.9 CRANIOFACIAL PAIN: ICD-10-CM

## 2024-06-24 DIAGNOSIS — G51.8 CRANIOFACIAL PAIN SYNDROME: ICD-10-CM

## 2024-06-24 DIAGNOSIS — M54.12 CERVICAL RADICULOPATHY, CHRONIC: ICD-10-CM

## 2024-06-24 DIAGNOSIS — M54.2 CHRONIC NECK PAIN: Primary | ICD-10-CM

## 2024-06-24 DIAGNOSIS — G89.29 CHRONIC NECK PAIN: Primary | ICD-10-CM

## 2024-06-24 PROCEDURE — 97112 NEUROMUSCULAR REEDUCATION: CPT | Performed by: PHYSICAL THERAPIST

## 2024-06-24 PROCEDURE — 97140 MANUAL THERAPY 1/> REGIONS: CPT | Performed by: PHYSICAL THERAPIST

## 2024-06-24 NOTE — PROGRESS NOTES
Daily Note     Today's date: 2024  Patient name: Chanel Grande  : 1965  MRN: [id number]  Referring provider: Izaiah Stinson,*  Dx:   Encounter Diagnosis     ICD-10-CM    1. Chronic neck pain  M54.2     G89.29       2. Cervical radiculopathy, chronic  M54.12       3. Cervicogenic headache  G44.86       4. Craniofacial pain  R51.9       5. 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 - in progress  Patient will be able to manage symptoms independently. - in progress  Patient will have 3 or fewer headaches per week.- in progress  Patient will be able to self-manage her headaches without medication.- in progress      Plan: Reassess in 3 weeks.      Subjective: Patient reports with her headache diary.  She has now progressed to having 3 good days followed by 1 to 2 bad days with some variability.  She has had 6 significant migraines over the past 3 weeks.      Objective: See treatment diary below      Precautions: fibromyalgia   Daily Treatment Diary     Date:          Manual            SOR  SK          manual n. glides  SK          cervical UPA  SK                                 Neuromuscular Re-education           PNE SK         TMJ deprogramming SK         Diaphragmatic breathing SK         Headache diary SK          sleep hygeine SK         Scap W 3      Shoulder ext 10      rows 10                         Therapeutic Exercise                                                           Therapeutic Activities                                    Gait Training                                   Modalities

## 2024-07-15 ENCOUNTER — OFFICE VISIT (OUTPATIENT)
Dept: PHYSICAL THERAPY | Facility: MEDICAL CENTER | Age: 59
End: 2024-07-15
Payer: COMMERCIAL

## 2024-07-15 DIAGNOSIS — G89.29 CHRONIC NECK PAIN: Primary | ICD-10-CM

## 2024-07-15 DIAGNOSIS — G44.86 CERVICOGENIC HEADACHE: ICD-10-CM

## 2024-07-15 DIAGNOSIS — M54.2 CHRONIC NECK PAIN: Primary | ICD-10-CM

## 2024-07-15 DIAGNOSIS — M54.12 CERVICAL RADICULOPATHY, CHRONIC: ICD-10-CM

## 2024-07-15 PROCEDURE — 97110 THERAPEUTIC EXERCISES: CPT | Performed by: PHYSICAL THERAPIST

## 2024-07-15 PROCEDURE — 97140 MANUAL THERAPY 1/> REGIONS: CPT | Performed by: PHYSICAL THERAPIST

## 2024-07-15 NOTE — PROGRESS NOTES
Daily Note     Today's date: 7/15/2024  Patient name: Chanel Grande  : 1965  MRN: [id number]  Referring provider: Izaiah Stinson,*  Dx:   Encounter Diagnosis     ICD-10-CM    1. Chronic neck pain  M54.2     G89.29       2. Cervical radiculopathy, chronic  M54.12       3. Cervicogenic headache  G44.86                      Subjective: patient states that she has been doing well over the past 4 weeks of vacation and travel.  Some discomfort today but not too bad.     Objective: See treatment diary below      Assessment: Tolerated treatment well. Patient exhibited good technique with therapeutic exercises and would benefit from continued PT. Focus today on manual therapy and gentle movement.  Headache improved with treatment.  Did not add exercises back into the program as patient's headache is sensitive and didn't want to set symptoms off.       Plan: Continue per plan of care.      Precautions: fibromyalgia   Daily Treatment Diary     Manual              UPA of C1-3 left  Gr. Iv- 10sec x5            UPA of C1-2 right Gr. Iv- 10sec x5            Thoracic mobility 5 min                                          Exercise Diary              UBE             pec stretch             No monnies             Shoulder extension             Scapular retractions             Horizontal abduction             Chin tucks             Thoracic extension over foam roller 10sec x5            Foam roller on wall 5 min            Cervical SNAGs Rot 10x, 3 sec                                                                                                                                                  Modalities

## 2024-07-22 ENCOUNTER — OFFICE VISIT (OUTPATIENT)
Dept: PHYSICAL THERAPY | Facility: MEDICAL CENTER | Age: 59
End: 2024-07-22
Payer: COMMERCIAL

## 2024-07-22 DIAGNOSIS — G89.29 CHRONIC NECK PAIN: Primary | ICD-10-CM

## 2024-07-22 DIAGNOSIS — G44.86 CERVICOGENIC HEADACHE: ICD-10-CM

## 2024-07-22 DIAGNOSIS — M54.12 CERVICAL RADICULOPATHY, CHRONIC: ICD-10-CM

## 2024-07-22 DIAGNOSIS — G51.8 CRANIOFACIAL PAIN SYNDROME: ICD-10-CM

## 2024-07-22 DIAGNOSIS — M54.2 CHRONIC NECK PAIN: Primary | ICD-10-CM

## 2024-07-22 PROCEDURE — 97112 NEUROMUSCULAR REEDUCATION: CPT | Performed by: PHYSICAL THERAPIST

## 2024-07-22 PROCEDURE — 97140 MANUAL THERAPY 1/> REGIONS: CPT | Performed by: PHYSICAL THERAPIST

## 2024-07-22 NOTE — PROGRESS NOTES
Daily Note     Today's date: 2024  Patient name: Chanel Grande  : 1965  MRN: [id number]  Referring provider: Izaiah Stinson,*  Dx:   Encounter Diagnosis     ICD-10-CM    1. Chronic neck pain  M54.2     G89.29       2. Cervical radiculopathy, chronic  M54.12       3. Cervicogenic headache  G44.86       4. 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 - in progress  Patient will be able to manage symptoms independently. - in progress  Patient will have 3 or fewer headaches per week.- in progress  Patient will be able to self-manage her headaches without medication.- in progress      Plan: Reassess in 4 weeks.      Subjective: Patient reports she was doing great and had pills left over at the end of the month when her autorenewal came up.  However, she then had 3 days of migraine over the past weekend.       Objective: See treatment diary below      Precautions: fibromyalgia   Daily Treatment Diary     Date:          Manual            SOR  SK          manual n. glides  SK          cervical UPA  SK                                 Neuromuscular Re-education           PNE SK         TMJ deprogramming SK         Diaphragmatic breathing SK         Headache diary SK          sleep hygeine SK         Scap W 8      Shoulder ext 10 red      rows 10 red                         Therapeutic Exercise                                                           Therapeutic Activities                                   Gait Training                                    Modalities

## 2024-07-23 ENCOUNTER — OFFICE VISIT (OUTPATIENT)
Dept: PHYSICAL THERAPY | Facility: MEDICAL CENTER | Age: 59
End: 2024-07-23
Payer: COMMERCIAL

## 2024-07-23 DIAGNOSIS — G44.86 CERVICOGENIC HEADACHE: ICD-10-CM

## 2024-07-23 DIAGNOSIS — M54.12 CERVICAL RADICULOPATHY, CHRONIC: Primary | ICD-10-CM

## 2024-07-23 PROCEDURE — 97140 MANUAL THERAPY 1/> REGIONS: CPT | Performed by: PHYSICAL THERAPIST

## 2024-07-23 PROCEDURE — 97112 NEUROMUSCULAR REEDUCATION: CPT | Performed by: PHYSICAL THERAPIST

## 2024-07-23 NOTE — PROGRESS NOTES
Daily Note     Today's date: 2024  Patient name: Chanel Grande  : 1965  MRN: [id number]  Referring provider: Izaiah Stinson,*  Dx:   Encounter Diagnosis     ICD-10-CM    1. Cervical radiculopathy, chronic  M54.12       2. Cervicogenic headache  G44.86                      Subjective: patient states that she had a terrible headache this weekend for 3 days but is a bit better now.     Objective: See treatment diary below      Assessment: today's session was spent on manual therapy to address hypomobility of thoracic and cervical spine as well as soft tissue mobilization for hypertonicity.  Patient was also educated on PNE and options were discussed for further treatment and pain management options.        Plan: Continue per plan of care.      Precautions: fibromyalgia   Daily Treatment Diary     Manual              UPA of C1-3 left  Gr. Iv- 10sec x5            UPA of C1-2 right Gr. Iv- 10sec x5            Thoracic mobility 5 min                                          Exercise Diary              UBE             pec stretch             No monnies             Shoulder extension             Scapular retractions             Horizontal abduction             Chin tucks             Thoracic extension over foam roller 10sec x5            Foam roller on wall 5 min            Cervical SNAGs Rot 10x, 3 sec                                                                                                                                                  Modalities

## 2024-07-26 ENCOUNTER — OFFICE VISIT (OUTPATIENT)
Dept: PHYSICAL THERAPY | Facility: MEDICAL CENTER | Age: 59
End: 2024-07-26
Payer: COMMERCIAL

## 2024-07-26 ENCOUNTER — OFFICE VISIT (OUTPATIENT)
Dept: URGENT CARE | Facility: MEDICAL CENTER | Age: 59
End: 2024-07-26
Payer: COMMERCIAL

## 2024-07-26 VITALS
RESPIRATION RATE: 18 BRPM | DIASTOLIC BLOOD PRESSURE: 70 MMHG | SYSTOLIC BLOOD PRESSURE: 157 MMHG | BODY MASS INDEX: 27.75 KG/M2 | OXYGEN SATURATION: 98 % | TEMPERATURE: 97.7 F | HEART RATE: 82 BPM | WEIGHT: 147 LBS | HEIGHT: 61 IN

## 2024-07-26 DIAGNOSIS — L03.011 PARONYCHIA OF RIGHT THUMB: Primary | ICD-10-CM

## 2024-07-26 DIAGNOSIS — M54.2 CHRONIC NECK PAIN: Primary | ICD-10-CM

## 2024-07-26 DIAGNOSIS — G89.29 CHRONIC NECK PAIN: Primary | ICD-10-CM

## 2024-07-26 DIAGNOSIS — M54.12 CERVICAL RADICULOPATHY, CHRONIC: ICD-10-CM

## 2024-07-26 DIAGNOSIS — L03.011 CELLULITIS OF FINGER OF RIGHT HAND: ICD-10-CM

## 2024-07-26 PROCEDURE — 97140 MANUAL THERAPY 1/> REGIONS: CPT | Performed by: PHYSICAL THERAPIST

## 2024-07-26 PROCEDURE — S9083 URGENT CARE CENTER GLOBAL: HCPCS

## 2024-07-26 PROCEDURE — G0382 LEV 3 HOSP TYPE B ED VISIT: HCPCS

## 2024-07-26 PROCEDURE — 97110 THERAPEUTIC EXERCISES: CPT | Performed by: PHYSICAL THERAPIST

## 2024-07-26 RX ORDER — CEPHALEXIN 500 MG/1
500 CAPSULE ORAL EVERY 8 HOURS SCHEDULED
Qty: 21 CAPSULE | Refills: 0 | Status: SHIPPED | OUTPATIENT
Start: 2024-07-26 | End: 2024-08-02

## 2024-07-26 NOTE — PATIENT INSTRUCTIONS
"Prescribed course of Keflex, take as directed.  Soak thumb in warm water four times daily for fifteen minute periods.  May continue with bacitracin, band aid to area.  Over the counter Tylenol or ibuprofen as directed on packaging as needed for pain.    Follow up with PCP in 3-5 days.  Proceed to  ER if symptoms worsen.    If tests are performed, our office will contact you with results only if changes need to made to the care plan discussed with you at the visit. You can review your full results on [facility].    Patient Education     Paronychia   The Basics   Written by the doctors and editors at [facility]   What is paronychia? -- Paronychia is a skin infection that happens around the fingernails or toenails (picture 1).  You are more likely to get to get this infection if you:   Push down or trim the skin at the base of the nail (called the \"cuticle\")   Bite your nails   Suck your thumb or finger  People who have jobs that make them keep their hands in water a lot are also more likely to get paronychia.  What are the symptoms of paronychia? -- Symptoms include:   A painful, swollen area around the nail   Pus-filled blisters near the nail  Is there a test for paronychia? -- No. But your doctor or nurse should be able to tell if you have it by learning about your symptoms and doing an exam.  Is there anything I can do on my own to feel better? -- Yes. Some people feel better if they:   Soak the affected finger or toe in warm water for 20 minutes, 3 times a day.   Put triple antibiotic ointment (sample brand names: Neosporin, Triple Antibiotic) on the infected area after soaking it.  How is paronychia treated? -- If the treatments you tried on your own don't help, your doctor might give you antibiotics to treat the infection.  If you have a pus-filled blister, they might give you a shot to numb your finger or toe and then use a needle or sharp tool to open and drain the blister. After, you will need to soak " your finger or toe and take antibiotics.  Your doctor might also prescribe other medicines, such as steroids or anti-fungal medicines.  Can paronychia be prevented? -- You can reduce your chances of getting paronychia if you:   Push your cuticles down gently. Do not trim or cut them.   Wear rubber gloves if you need to put your hands in water.   Avoid biting your nails.   Keep your fingers out of your mouth.  When should I call the doctor? -- Call for advice if:   You have a fever of 100.4°F (38°C) or higher, or chills.   You have more drainage, redness, swelling, warmth, or pain around your nail.  All topics are updated as new evidence becomes available and our peer review process is complete.  This topic retrieved from [employer] on: Feb 26, 2024.  Topic 30109 Version 7.0  Release: 32.2.4 - C32.56  © 2024 UpToDate, Inc. and/or its affiliates. All rights reserved.  picture 1: Paronychia     Paronychia is a skin infection that causes a painful, swollen area around a fingernail or toenail. Some people also get pus-filled blisters, as shown in this photo.  Graphic 98418 Version 6.0  Consumer Information Use and Disclaimer   Disclaimer: This generalized information is a limited summary of diagnosis, treatment, and/or medication information. It is not meant to be comprehensive and should be used as a tool to help the user understand and/or assess potential diagnostic and treatment options. It does NOT include all information about conditions, treatments, medications, side effects, or risks that may apply to a specific patient. It is not intended to be medical advice or a substitute for the medical advice, diagnosis, or treatment of a health care provider based on the health care provider's examination and assessment of a patient's specific and unique circumstances. Patients must speak with a health care provider for complete information about their health, medical questions, and treatment options, including any risks or  benefits regarding use of medications. This information does not endorse any treatments or medications as safe, effective, or approved for treating a specific patient. UpToDate, Inc. and its affiliates disclaim any warranty or liability relating to this information or the use thereof.The use of this information is governed by the Terms of Use, available at https://www.[employer].com/en/know/clinical-effectiveness-terms. 2024© UpToDate, Inc. and its affiliates and/or licensors. All rights reserved.  Copyright   © 2024 UpToDate, Inc. and/or its affiliates. All rights reserved.

## 2024-07-26 NOTE — PROGRESS NOTES
Daily Note     Today's date: 2024  Patient name: Chanel Grande  : 1965  MRN: [id number]  Referring provider: Izaiah Stinson,*  Dx:   Encounter Diagnosis     ICD-10-CM    1. Chronic neck pain  M54.2     G89.29       2. Cervical radiculopathy, chronic  M54.12                      Subjective: patient states that she had a headache on Wednesday but today is much better.  Pain is limited and she is feeling more mobile.     Objective: See treatment diary below      Assessment: today's session was spent on manual therapy to address hypomobility of thoracic and cervical spine as well as soft tissue mobilization for hypertonicity.  Patient was also educated on PNE and options were discussed for further treatment and pain management options. A bit more focused on upper thoracic mobility.        Plan: Continue per plan of care.      Precautions: fibromyalgia   Daily Treatment Diary     Manual              UPA of C1-3 left  Gr. Iv- 10sec x5            UPA of C1-2 right Gr. Iv- 10sec x5            Thoracic mobility 5 min                                          Exercise Diary              UBE             pec stretch             No monnies             Shoulder extension             Scapular retractions             Horizontal abduction             Chin tucks             Thoracic extension over foam roller 10sec x5            Foam roller on wall 5 min            Cervical SNAGs Rot 10x, 3 sec            Foam roller nerve glides  10x2                                                                                                                                     Modalities

## 2024-07-26 NOTE — PROGRESS NOTES
"  [facility]        NAME: Chanel Grande is a 58 y.o. female  : 1965    MRN: [id number]  DATE: 2024  TIME: 10:47 AM    Assessment and Plan   Paronychia of right thumb [L03.011]  1. Paronychia of right thumb  cephalexin (KEFLEX) 500 mg capsule      2. Cellulitis of finger of right hand  cephalexin (KEFLEX) 500 mg capsule        Presentation consistent with paronychia, cellulitis extending down finger. Area open, not fluctuant. Prescribed course of Keflex, advised symptomatic treatment.    Patient Instructions     Prescribed course of Keflex, take as directed.  Soak thumb in warm water four times daily for fifteen minute periods.  May continue with bacitracin, band aid to area.  Over the counter Tylenol or ibuprofen as directed on packaging as needed for pain.    Follow up with PCP in 3-5 days.  Proceed to  ER if symptoms worsen.    If tests are performed, our office will contact you with results only if changes need to made to the care plan discussed with you at the visit. [facility]    Chief Complaint     Chief Complaint   Patient presents with    Thumb Pain     Patient complains of R thumb pain x 1 week. She states she does not recall what may have initiated the pain, but believes it may be infected. States she has tried antibiotic ointment and soaking in hydrogen peroxide, but finds no relief of symptoms.         History of Present Illness       Patient presents with pain, redness and drainage to her right thumb around her nail for the past week. She notes the area is tender to touch. She feels the redness is spreading down her thumb. Admits to slight tingling in the tip of her thumb, but notes this may be due to her fibromyalgia. Denies fever, chills, myalgias. Patient states at first she tried using a knife to puncture the area without any significant amount of drainage, she states \"I did not get far enough because it hurt.\" She has been " soaking in peroxide a few times a day and applying Bacitracin and a band aid without any significant relief. She does have acrylic nails, notes they were last done about 3 weeks ago.        Review of Systems   Review of Systems   Constitutional:  Negative for chills and fever.   Musculoskeletal:  Positive for joint swelling. Negative for myalgias.   Skin:  Positive for color change.         Current Medications       Current Outpatient Medications:     cephalexin (KEFLEX) 500 mg capsule, Take 1 capsule (500 mg total) by mouth every 8 (eight) hours for 7 days, Disp: 21 capsule, Rfl: 0    acetaminophen (TYLENOL) 500 mg tablet, Take 500-1,000 mg by mouth every 6 (six) hours as needed for mild pain, Disp: , Rfl:     ascorbic acid (VITAMIN C) 500 MG tablet, Take 1 tablet (500 mg total) by mouth 2 (two) times a day, Disp: 60 tablet, Rfl: 1    Aspirin-Acetaminophen-Caffeine (EXCEDRIN MIGRAINE PO), Take 1 tablet by mouth as needed (migraines), Disp: , Rfl:     Cyanocobalamin (VITAMIN B 12 PO), Take by mouth daily (Patient not taking: Reported on 2/19/2024), Disp: , Rfl:     Diclofenac Sodium (VOLTAREN) 1 %, Apply 2 g topically 4 (four) times a day, Disp: 350 g, Rfl: 2    ferrous sulfate 324 (65 Fe) mg, Take 1 tablet (324 mg total) by mouth 2 (two) times a day before meals, Disp: 60 tablet, Rfl: 0    methocarbamol (ROBAXIN) 500 mg tablet, Take 1 tablet (500 mg total) by mouth 3 (three) times a day as needed for muscle spasms (Patient not taking: Reported on 5/22/2024), Disp: 30 tablet, Rfl: 1    omeprazole (PriLOSEC) 20 mg delayed release capsule, TAKE 1 CAPSULE(20 MG) BY MOUTH DAILY, Disp: 90 capsule, Rfl: 1    SUMAtriptan (IMITREX) 100 mg tablet, TAKE 1 TABLET(100 MG) BY MOUTH 1 TIME AS NEEDED FOR MIGRAINE. MAY REPEAT 1 TIME IN 2 HOURS. MAX 2/24 HOURS, 9 PER MONTH, Disp: 9 tablet, Rfl: 12    SUMAtriptan (IMITREX) 20 MG/ACT nasal spray, 1 spray (20 mg total) into each nostril once as needed for migraine for up to 1 dose ; if  headache returns/persists, may repeat dose once after 2 hours; MAX 40 mg/24 hours (Patient not taking: Reported on 3/5/2024), Disp: 10 each, Rfl: 5    Current Allergies     Allergies as of 07/26/2024 - Reviewed 07/26/2024   Allergen Reaction Noted    Propranolol Other (See Comments) 08/22/2019    Raspberry - food allergy Allergic Rhinitis, Itching, and Nasal Congestion 11/05/2020    Latex Rash 11/03/2022            The following portions of the patient's history were reviewed and updated as appropriate: allergies, current medications, past family history, past medical history, past social history, past surgical history and problem list.     Past Medical History:   Diagnosis Date    Allergic 02/01/2020    Anemia     Anesthesia complication     woke up during D/C    Anxiety     Arthritis     BMI 30.0-30.9,adult     Carpal tunnel syndrome on right     Cervical spondylosis without myelopathy     COVID 12/01/2021    COVID-19     Depression     Fibromyalgia     Fibromyalgia, primary     GERD (gastroesophageal reflux disease)     Headache(784.0) 1982    History of fetal loss     Recurrent    Hyperlipidemia     Hypertension     Jaw pain     and both ears with migraines    Migraine     Neck pain     Osteoarthritis     lumbar spine    Sicca syndrome (HCC)     Sleep apnea     unable to use CPAP    Syncopal episodes     with severe migraines    Undifferentiated connective tissue disease (HCC)     Wears contact lenses     or glasses       Past Surgical History:   Procedure Laterality Date    CHOLECYSTECTOMY      COLONOSCOPY      DILATION AND CURETTAGE OF UTERUS      ORTHOPEDIC SURGERY  5/16/2023    Left knee replacement    NE ARTHRP KNE CONDYLE&PLATU MEDIAL&LAT COMPARTMENTS Left 05/16/2023    Procedure: ARTHROPLASTY KNEE TOTAL;  Surgeon: Alex Cesar MD;  Location: [facility];  Service: Orthopedics    TUBAL LIGATION         Family History   Problem Relation Age of Onset    MORGAN disease Mother     Arthritis Mother      "Depression Mother     Hypertension Mother     Coronary artery disease Mother     Migraines Mother     Osteoporosis Mother     Heart attack Father 70    Diabetes Father     Prostate cancer Father 67    Hypertension Father     Arthritis Father     Stroke Father     Cancer Father         Prostate    ADD / ADHD Father     Coronary artery disease Father     Lupus Sister     Raynaud syndrome Sister     Sjogren's syndrome Sister     Depression Sister     ADD / ADHD Sister     Anxiety disorder Sister     No Known Problems Daughter     Breast cancer Maternal Grandmother 70    Arthritis Maternal Grandmother     Cancer Maternal Grandmother         Breast    Glaucoma Maternal Grandmother     Prostate cancer Maternal Grandfather 75    Arthritis Maternal Grandfather     Cancer Maternal Grandfather         Prostate    Colon cancer Paternal Grandmother 80    Cancer Paternal Grandmother         Colon    Alcohol abuse Paternal Grandfather     ADD / ADHD Brother     OCD Brother     No Known Problems Son     No Known Problems Son     No Known Problems Son     No Known Problems Son     No Known Problems Son     No Known Problems Maternal Uncle     No Known Problems Paternal Aunt     No Known Problems Paternal Uncle          Medications have been verified.        Objective   /70   Pulse 82   Temp 97.7 °F (36.5 °C)   Resp 18   Ht 5' 1\" (1.549 m)   Wt 66.7 kg (147 lb)   LMP 08/31/2019 (Exact Date)   SpO2 98%   BMI 27.78 kg/m²        Physical Exam     Physical Exam  Vitals and nursing note reviewed.   Constitutional:       General: She is not in acute distress.     Appearance: Normal appearance. She is not ill-appearing.   Cardiovascular:      Rate and Rhythm: Normal rate and regular rhythm.      Pulses: Normal pulses.      Heart sounds: Normal heart sounds.   Pulmonary:      Effort: Pulmonary effort is normal.      Breath sounds: Normal breath sounds.   Skin:     Comments: Paronychia right thumb. Erythema, tenderness, " swelling in area extending from nail to area proximal to IP joint. NVI distally. ROM intact.   Neurological:      Mental Status: She is alert.

## 2024-07-30 ENCOUNTER — OFFICE VISIT (OUTPATIENT)
Dept: PHYSICAL THERAPY | Facility: MEDICAL CENTER | Age: 59
End: 2024-07-30
Payer: COMMERCIAL

## 2024-07-30 DIAGNOSIS — G89.29 CHRONIC NECK PAIN: Primary | ICD-10-CM

## 2024-07-30 DIAGNOSIS — G44.86 CERVICOGENIC HEADACHE: ICD-10-CM

## 2024-07-30 DIAGNOSIS — M54.2 CHRONIC NECK PAIN: Primary | ICD-10-CM

## 2024-07-30 DIAGNOSIS — M54.12 CERVICAL RADICULOPATHY, CHRONIC: ICD-10-CM

## 2024-07-30 PROCEDURE — 97110 THERAPEUTIC EXERCISES: CPT | Performed by: PHYSICAL THERAPIST

## 2024-07-30 PROCEDURE — 97140 MANUAL THERAPY 1/> REGIONS: CPT | Performed by: PHYSICAL THERAPIST

## 2024-07-30 NOTE — PROGRESS NOTES
Daily Note     Today's date: 2024  Patient name: Chanel Grande  : 1965  MRN: [id number]  Referring provider: Izaiah Stinson,*  Dx:   Encounter Diagnosis     ICD-10-CM    1. Chronic neck pain  M54.2     G89.29       2. Cervical radiculopathy, chronic  M54.12       3. Cervicogenic headache  G44.86                      Subjective: patient states that she had a good weekend without much discomfort.     Objective: See treatment diary below      Assessment: today's session was spent on manual therapy to address hypomobility of thoracic and cervical spine as well as soft tissue mobilization for hypertonicity.  Patient was also educated on PNE and options were discussed for further treatment and pain management options. A bit more focused on upper thoracic mobility.        Plan: Continue per plan of care.      Precautions: fibromyalgia   Daily Treatment Diary     Manual              UPA of C1-3 left  Gr. Iv- 10sec x5            UPA of C1-2 right Gr. Iv- 10sec x5            Thoracic mobility 5 min                                          Exercise Diary              UBE             pec stretch             No monnies             Shoulder extension             Scapular retractions             Horizontal abduction             Chin tucks             Thoracic extension over foam roller 10sec x5            Foam roller on wall 5 min            Cervical SNAGs Rot 10x, 3 sec            Foam roller nerve glides  10x2                                                                                                                                     Modalities

## 2024-08-01 ENCOUNTER — OFFICE VISIT (OUTPATIENT)
Dept: PHYSICAL THERAPY | Facility: MEDICAL CENTER | Age: 59
End: 2024-08-01
Payer: COMMERCIAL

## 2024-08-01 DIAGNOSIS — G89.29 CHRONIC NECK PAIN: Primary | ICD-10-CM

## 2024-08-01 DIAGNOSIS — M54.12 CERVICAL RADICULOPATHY, CHRONIC: ICD-10-CM

## 2024-08-01 DIAGNOSIS — G44.86 CERVICOGENIC HEADACHE: ICD-10-CM

## 2024-08-01 DIAGNOSIS — M54.2 CHRONIC NECK PAIN: Primary | ICD-10-CM

## 2024-08-01 PROCEDURE — 97110 THERAPEUTIC EXERCISES: CPT | Performed by: PHYSICAL THERAPIST

## 2024-08-01 PROCEDURE — 97140 MANUAL THERAPY 1/> REGIONS: CPT | Performed by: PHYSICAL THERAPIST

## 2024-08-01 NOTE — PROGRESS NOTES
Daily Note     Today's date: 2024  Patient name: Chanel Grande  : 1965  MRN: [id number]  Referring provider: Izaiah Stinson,*  Dx:   Encounter Diagnosis     ICD-10-CM    1. Chronic neck pain  M54.2     G89.29       2. Cervical radiculopathy, chronic  M54.12       3. Cervicogenic headache  G44.86                      Subjective: patient states that she is feeling pretty good today and just having some tightness in the left upper trap and left rotation of cervical spine.     Objective: See treatment diary below      Assessment: today's session was spent on manual therapy to address hypomobility of thoracic and cervical spine as well as soft tissue mobilization for hypertonicity.  Patient was also educated on PNE and options were discussed for further treatment and pain management options. A bit more focused on upper thoracic mobility and education.       Plan: Continue per plan of care.      Precautions: fibromyalgia   Daily Treatment Diary     Manual              UPA of C1-3 left  Gr. Iv- 10sec x5            UPA of C1-2 right Gr. Iv- 10sec x5            Thoracic mobility 5 min                                          Exercise Diary              UBE             pec stretch             No monnies             Shoulder extension             Scapular retractions             Horizontal abduction             Chin tucks             Thoracic extension over foam roller             Foam roller on wall 5 min            Cervical SNAGs Rot 10x, 3 sec            Foam roller nerve glides  10x2                                                                                                                                     Modalities

## 2024-08-06 ENCOUNTER — OFFICE VISIT (OUTPATIENT)
Dept: PHYSICAL THERAPY | Facility: MEDICAL CENTER | Age: 59
End: 2024-08-06
Payer: COMMERCIAL

## 2024-08-06 DIAGNOSIS — G44.86 CERVICOGENIC HEADACHE: ICD-10-CM

## 2024-08-06 DIAGNOSIS — M54.2 CHRONIC NECK PAIN: Primary | ICD-10-CM

## 2024-08-06 DIAGNOSIS — G89.29 CHRONIC NECK PAIN: Primary | ICD-10-CM

## 2024-08-06 DIAGNOSIS — M54.12 CERVICAL RADICULOPATHY, CHRONIC: ICD-10-CM

## 2024-08-06 PROCEDURE — 97110 THERAPEUTIC EXERCISES: CPT | Performed by: PHYSICAL THERAPIST

## 2024-08-06 PROCEDURE — 97140 MANUAL THERAPY 1/> REGIONS: CPT | Performed by: PHYSICAL THERAPIST

## 2024-08-06 NOTE — PROGRESS NOTES
Daily Note     Today's date: 2024  Patient name: Chanel Grande  : 1965  MRN: [id number]  Referring provider: Izaiah Stinson,*  Dx:   Encounter Diagnosis     ICD-10-CM    1. Chronic neck pain  M54.2     G89.29       2. Cervical radiculopathy, chronic  M54.12       3. Cervicogenic headache  G44.86                      Subjective: patient states that she is feeling pretty good today and just having some tightness in the left upper trap.  Neck is doing better.     Objective: See treatment diary below      Assessment: today's session was spent on manual therapy to address hypomobility of thoracic and cervical spine as well as soft tissue mobilization for hypertonicity. A bit more focused on upper thoracic mobility and education.       Plan: Continue per plan of care.      Precautions: fibromyalgia   Daily Treatment Diary     Manual              UPA of C1-3 left  Gr. Iv- 10sec x5            UPA of C1-2 right Gr. Iv- 10sec x5            Thoracic mobility 5 min                                          Exercise Diary              UBE             pec stretch             No monnies             Shoulder extension             Scapular retractions             Horizontal abduction             Chin tucks             Thoracic extension over foam roller             Foam roller on wall 5 min            Cervical SNAGs Rot 10x, 3 sec            Foam roller nerve glides  10x2                                                                                                                                     Modalities

## 2024-08-13 ENCOUNTER — OFFICE VISIT (OUTPATIENT)
Dept: FAMILY MEDICINE CLINIC | Facility: CLINIC | Age: 59
End: 2024-08-13
Payer: COMMERCIAL

## 2024-08-13 ENCOUNTER — APPOINTMENT (OUTPATIENT)
Dept: PHYSICAL THERAPY | Facility: MEDICAL CENTER | Age: 59
End: 2024-08-13
Payer: COMMERCIAL

## 2024-08-13 VITALS
WEIGHT: 144 LBS | OXYGEN SATURATION: 99 % | RESPIRATION RATE: 16 BRPM | HEIGHT: 61 IN | SYSTOLIC BLOOD PRESSURE: 128 MMHG | DIASTOLIC BLOOD PRESSURE: 64 MMHG | TEMPERATURE: 97.7 F | HEART RATE: 58 BPM | BODY MASS INDEX: 27.19 KG/M2

## 2024-08-13 DIAGNOSIS — I10 BENIGN ESSENTIAL HYPERTENSION: ICD-10-CM

## 2024-08-13 DIAGNOSIS — E66.3 OVERWEIGHT: ICD-10-CM

## 2024-08-13 DIAGNOSIS — L24.7 IRRITANT CONTACT DERMATITIS DUE TO PLANTS, EXCEPT FOOD: Primary | ICD-10-CM

## 2024-08-13 PROCEDURE — 99214 OFFICE O/P EST MOD 30 MIN: CPT | Performed by: FAMILY MEDICINE

## 2024-08-13 RX ORDER — PREDNISONE 5 MG/1
TABLET ORAL
Qty: 24 TABLET | Refills: 0 | Status: SHIPPED | OUTPATIENT
Start: 2024-08-13

## 2024-08-13 RX ORDER — MOMETASONE FUROATE 1 MG/G
CREAM TOPICAL 2 TIMES DAILY PRN
Qty: 45 G | Refills: 0 | Status: CANCELLED | OUTPATIENT
Start: 2024-08-13 | End: 2024-08-23

## 2024-08-13 RX ORDER — MULTIVIT WITH IRON,MINERALS
1 TABLET,CHEWABLE ORAL DAILY
COMMUNITY

## 2024-08-13 RX ORDER — MULTIVIT-MIN/IRON/FOLIC ACID/K 18-600-40
1 CAPSULE ORAL DAILY
COMMUNITY

## 2024-08-13 NOTE — PATIENT INSTRUCTIONS
Patient Education     Poison Rachel, Poison [place], Poison Sumac Discharge Instructions   About this topic   Poison ivy, oak, and sumac are plants that may cause rashes on the skin. The plants can be found anywhere, including the woods or your yard. The rash is caused by the oily sap of the plants. It does not smell and it is clear so you probably can't tell that it is there. Sometimes, you get a rash by touching the plants. Other times, it is from touching something else, like clothes, pets, another person, or gardening tools that have touched a plant. Smoke from burning these plants can also cause a rash.  Not everyone who comes in contact with the sap will get a reaction, but most people will. A reaction may happen a few hours after you touch the sap or it may happen up to 5 days later. If the sap gets on your skin, it may become red, swollen, and very itchy. Blisters may also form. You can't catch a rash from someone else, but you can get it from someone if they transfer the sap to you. The rash may last 1 to 3 weeks.  What care is needed at home?   Ask your doctor what you need to do when you go home. Make sure you ask questions if you do not understand what the doctor says. This way you will know what you need to do.  If you have a rash:  Do not scratch or rub your skin. This can lead to infection or spread the rash.  Try putting a cool, wet cloth on your rash.  Creams and lotions, like hydrocortisone cream and Calamine lotion, may help itching and blistering.  Take a cool shower to help ease the itching.  Take a bath using lukewarm water. Hot water can make itching worse. Adding oatmeal bath products or baking soda may also help.  After bathing or showering, blot your body dry rather than rubbing to avoid spread of the rash.  What follow-up care is needed?   Your doctor may ask you to make visits to the office to check on your progress. Be sure to keep these visits.  What drugs may be needed?   If you have a very  bad rash, your doctor may give you drugs to help with swelling and itching.  What can be done to prevent this health problem?   Learn what the plants look like and stay away from them. Poison ivy and poison oak have three leaves on one stem. Poison sumac has 7 to 13 leaves that grow in pairs.  Wear long pants, long sleeves, and gloves if you must be around these plants.  Wash your hands with soap and water within 15 minutes if you have contact with the plants.  Scrub your fingernails carefully to prevent spreading to other parts of your body.  Take a shower if the plants have touched your arms, legs, or other body parts. Wash well with soap and water.  Wash clothing and shoes with soap and hot water. Wash anything else, like gardening tools that may have touched a plant.  Wash your pets to remove oil from the fur. Pets do not get this rash but you may get it from touching oil on their fur.  Do not burn these plants. This will spread the oil into the air. It can cause very bad problems with other people if the wind is blowing.  If you are prone to getting a reaction from these plants, you may want to consider using over-the-counter products that can help block the oil from getting into the skin.  When do I need to call the doctor?   Signs of infection. These include a fever of 100.4°F (38°C) or higher, chills, wound that will not heal.  Trouble breathing or swallowing  Swelling of the face and lips or swelling that makes your eyes puffy or partially shut  A rash that covers a large part of your body, or that is spreading quickly  Pain, swelling, warmth, pus around the rash  A rash in your eyes, mouth, or genital area  Very bad itching that doesn't get better or keeps you awake at night  You are not feeling better in 2 to 3 days or you are feeling worse  Teach Back: Helping You Understand   The Teach Back Method helps you understand the information we are giving you. After you talk with the staff, tell them in your  own words what you learned. This helps to make sure the staff has described each thing clearly. It also helps to explain things that may have been confusing. Before going home, make sure you can do these:  I can tell you about my condition.  I can tell you how to care for my rash.  I can tell you how I will take extra care to prevent this from happening in the future.  I can tell you what I will do if I have trouble breathing or swallowing, or if the rash covers a large part of my face or my body.  Last Reviewed Date   2021-11-15  Consumer Information Use and Disclaimer   This generalized information is a limited summary of diagnosis, treatment, and/or medication information. It is not meant to be comprehensive and should be used as a tool to help the user understand and/or assess potential diagnostic and treatment options. It does NOT include all information about conditions, treatments, medications, side effects, or risks that may apply to a specific patient. It is not intended to be medical advice or a substitute for the medical advice, diagnosis, or treatment of a health care provider based on the health care provider's examination and assessment of a patient’s specific and unique circumstances. Patients must speak with a health care provider for complete information about their health, medical questions, and treatment options, including any risks or benefits regarding use of medications. This information does not endorse any treatments or medications as safe, effective, or approved for treating a specific patient. [employer] and its affiliates disclaim any warranty or liability relating to this information or the use thereof. The use of this information is governed by the Terms of Use, available at https://www.woltersModern Feeduwer.com/en/know/clinical-effectiveness-terms   Copyright   Copyright © 2024 [employer] and its affiliates and/or licensors. All rights reserved.      Patient Education     Poison Ivy    What is this product used for?   The use of poison ivy for any health condition is not supported.  What are the precautions when taking this product?   Always check with your doctor before you use a natural product. Some products may not mix well with drugs or other natural products.  Do not swallow this product. Serious side effects, including death, may happen if you are very allergic to poison ivy, cashews, or mangoes.  Do not use this product if you are pregnant or breastfeeding.  What should I watch for?   Rash or blisters  Mouth or throat irritation  When do I need to call the doctor?   Signs of a very bad reaction. These include wheezing; chest tightness; fever; itching; bad cough; blue skin color; seizures; or swelling of face, lips, tongue, or throat. Go to the ER right away.  Very bad throwing up  Very bad loose stools  Last Reviewed Date   2022-04-13  Consumer Information Use and Disclaimer   This generalized information is a limited summary of diagnosis, treatment, and/or medication information. It is not meant to be comprehensive and should be used as a tool to help the user understand and/or assess potential diagnostic and treatment options. It does NOT include all information about conditions, treatments, medications, side effects, or risks that may apply to a specific patient. It is not intended to be medical advice or a substitute for the medical advice, diagnosis, or treatment of a health care provider based on the health care provider's examination and assessment of a patient’s specific and unique circumstances. Patients must speak with a health care provider for complete information about their health, medical questions, and treatment options, including any risks or benefits regarding use of medications. This information does not endorse any treatments or medications as safe, effective, or approved for treating a specific patient. [employer] and its affiliates disclaim any warranty or  liability relating to this information or the use thereof. The use of this information is governed by the Terms of Use, available at https://www.wolterskluwer.com/en/know/clinical-effectiveness-terms   Copyright   Copyright © 2024 [employer] and its affiliates and/or licensors. All rights reserved.

## 2024-08-13 NOTE — PROGRESS NOTES
Assessment/Plan:  Problem List Items Addressed This Visit          Cardiovascular and Mediastinum    Benign essential hypertension     Stable.  Check blood pressure outside of office.  Recommend lifestyle modifications.              Other    Overweight     Improved.  Recommend lifestyle modifications.            Other Visit Diagnoses       Irritant contact dermatitis due to plants, except food    -  Primary    Relevant Medications    predniSONE 5 mg tablet    Start Prednisone taper.  Contact precautions advised.  Handout given.                 Return if symptoms worsen or fail to improve.      Future Appointments   Date Time Provider Department Center   8/20/2024  9:30 AM Earl Rodriguez, PT [facility]   8/22/2024  5:00 PM Shashi Corona, PT [facility]   8/24/2024  7:15 AM [facility]   8/24/2024  8:00 AM [facility] 1 [facility]   8/30/2024  9:00 AM Jan Yoder MD [facility]   9/13/2024  9:00 AM Amanda Clarke DO [facility]        Subjective:     Chanel is a 58 y.o. female who presents today for a follow-up on her acute medical conditions.        HPI:  Chief Complaint   Patient presents with   • Poison Ivy     Pt reports poison rash for the last week that is still spreading. Rash on BUE, BLE, R hip. Using otc poison ivy tx, somewhat effective.      -- Above per clinical staff and reviewed. --    Poison Ivy  Pertinent negatives include no diarrhea, fatigue, fever, shortness of breath or vomiting.         Today:      Rash - Symptoms x 1 week, spreading.  B/L UE, B/L LE, and Right Hip.  She thinks sh has some on her left lower lip.  She was weeding prior to symptoms - has poison sumac, oak, and ivy on property.  Using Calamine c relief.      The following portions of the patient's history were reviewed and updated as appropriate: allergies, current medications, past family history, past medical history, past social history, past  "surgical history and problem list.      Review of Systems   Constitutional:  Negative for appetite change, chills, diaphoresis, fatigue and fever.   Respiratory:  Negative for chest tightness and shortness of breath.    Cardiovascular:  Negative for chest pain.   Gastrointestinal:  Negative for abdominal pain, blood in stool, diarrhea, nausea and vomiting.   Genitourinary:  Negative for dysuria.   Skin:  Positive for rash.        Current Outpatient Medications   Medication Sig Dispense Refill   • acetaminophen (TYLENOL) 500 mg tablet Take 500-1,000 mg by mouth every 6 (six) hours as needed for mild pain     • Aspirin-Acetaminophen-Caffeine (EXCEDRIN MIGRAINE PO) Take 1 tablet by mouth as needed (migraines)     • Cholecalciferol (Vitamin D) 50 MCG (2000 UT) CAPS Take 1 capsule by mouth in the morning     • omeprazole (PriLOSEC) 20 mg delayed release capsule TAKE 1 CAPSULE(20 MG) BY MOUTH DAILY 90 capsule 1   • Pediatric Multivitamins-Iron (multiple vitamins w/iron) 15 MG chew tablet Chew 1 tablet daily     • predniSONE 5 mg tablet # of pills by mouth daily - 5/5/4/4/3/2/1 24 tablet 0   • SUMAtriptan (IMITREX) 100 mg tablet TAKE 1 TABLET(100 MG) BY MOUTH 1 TIME AS NEEDED FOR MIGRAINE. MAY REPEAT 1 TIME IN 2 HOURS. MAX 2/24 HOURS, 9 PER MONTH 9 tablet 12   • ferrous sulfate 324 (65 Fe) mg Take 1 tablet (324 mg total) by mouth 2 (two) times a day before meals (Patient not taking: Reported on 8/13/2024) 60 tablet 0     No current facility-administered medications for this visit.       Objective:  /64   Pulse 58   Temp 97.7 °F (36.5 °C)   Resp 16   Ht 5' 1\" (1.549 m)   Wt 65.3 kg (144 lb)   LMP 08/31/2019 (Exact Date)   SpO2 99%   BMI 27.21 kg/m²    Wt Readings from Last 3 Encounters:   08/13/24 65.3 kg (144 lb)   08/05/24 66.7 kg (147 lb)   07/26/24 66.7 kg (147 lb)      BP Readings from Last 3 Encounters:   08/13/24 128/64   07/26/24 157/70   06/21/24 164/84          Physical Exam  Vitals and nursing note " "reviewed.   Constitutional:       Appearance: Normal appearance. She is well-developed.   HENT:      Head: Normocephalic and atraumatic.   Eyes:      Conjunctiva/sclera: Conjunctivae normal.   Neck:      Thyroid: No thyromegaly.   Cardiovascular:      Rate and Rhythm: Normal rate and regular rhythm.      Pulses: Normal pulses.      Heart sounds: Normal heart sounds.   Pulmonary:      Effort: Pulmonary effort is normal.      Breath sounds: Normal breath sounds.   Abdominal:      Palpations: Abdomen is soft.   Musculoskeletal:         General: No swelling or tenderness.      Cervical back: Neck supple.      Right lower leg: No edema.      Left lower leg: No edema.   Lymphadenopathy:      Cervical: No cervical adenopathy.   Skin:     Findings: Rash (Papular rash, some linear, on B/L medial LE, B/L medial UE, proximal right lateral thigh, lateral right hip.  No lip lesion appreciated.) present.   Neurological:      General: No focal deficit present.      Mental Status: She is alert and oriented to person, place, and time.   Psychiatric:         Mood and Affect: Mood normal.         Lab Results:      Lab Results   Component Value Date    WBC 6.01 01/31/2024    HGB 14.6 01/31/2024    HCT 44.6 01/31/2024     01/31/2024    TRIG 80 01/31/2024    HDL 54 01/31/2024    ALT 10 01/31/2024    AST 14 01/31/2024    K 4.4 01/31/2024     01/31/2024    CREATININE 0.78 01/31/2024    BUN 14 01/31/2024    CO2 30 01/31/2024    INR 0.93 04/29/2023    GLUF 94 01/31/2024    HGBA1C 4.8 04/29/2023     No results found for: \"URICACID\"  Invalid input(s): \"BASENAME\" Vitamin D    No results found.     POCT Labs                       "

## 2024-08-20 ENCOUNTER — OFFICE VISIT (OUTPATIENT)
Dept: PHYSICAL THERAPY | Facility: MEDICAL CENTER | Age: 59
End: 2024-08-20
Payer: COMMERCIAL

## 2024-08-20 DIAGNOSIS — M54.12 CERVICAL RADICULOPATHY, CHRONIC: ICD-10-CM

## 2024-08-20 DIAGNOSIS — G89.29 CHRONIC NECK PAIN: Primary | ICD-10-CM

## 2024-08-20 DIAGNOSIS — M54.2 CHRONIC NECK PAIN: Primary | ICD-10-CM

## 2024-08-20 DIAGNOSIS — G44.86 CERVICOGENIC HEADACHE: ICD-10-CM

## 2024-08-20 PROCEDURE — 97140 MANUAL THERAPY 1/> REGIONS: CPT | Performed by: PHYSICAL THERAPIST

## 2024-08-20 PROCEDURE — 97110 THERAPEUTIC EXERCISES: CPT | Performed by: PHYSICAL THERAPIST

## 2024-08-20 NOTE — PROGRESS NOTES
Daily Note     Today's date: 2024  Patient name: Chanel Grande  : 1965  MRN: [id number]  Referring provider: Izaiah Stinson,*  Dx:   Encounter Diagnosis     ICD-10-CM    1. Chronic neck pain  M54.2     G89.29       2. Cervical radiculopathy, chronic  M54.12       3. Cervicogenic headache  G44.86                      Subjective: patient states that she is feeling pretty good today and just having some tightness in the left upper trap.  Neck is doing better.     Objective: See treatment diary below      Assessment: today's session was spent on manual therapy to address hypomobility of thoracic and cervical spine as well as soft tissue mobilization for hypertonicity. A bit more focused on upper thoracic mobility and education.       Plan: Continue per plan of care.      Precautions: fibromyalgia   Daily Treatment Diary     Manual              UPA of C1-3 left  Gr. Iv- 10sec x5            UPA of C1-2 right Gr. Iv- 10sec x5            Thoracic mobility 5 min                                          Exercise Diary              UBE             pec stretch             No monnies             Shoulder extension             Scapular retractions             Horizontal abduction             Chin tucks             Thoracic extension over foam roller             Foam roller on wall 5 min            Cervical SNAGs Rot 10x, 3 sec            Foam roller nerve glides  10x2                                                                                                                                     Modalities

## 2024-08-22 ENCOUNTER — APPOINTMENT (OUTPATIENT)
Dept: PHYSICAL THERAPY | Facility: MEDICAL CENTER | Age: 59
End: 2024-08-22
Payer: COMMERCIAL

## 2024-08-24 ENCOUNTER — HOSPITAL ENCOUNTER (OUTPATIENT)
Facility: MEDICAL CENTER | Age: 59
Discharge: HOME | End: 2024-08-24
Payer: COMMERCIAL

## 2024-08-24 DIAGNOSIS — K11.9 LESION OF PAROTID GLAND: ICD-10-CM

## 2024-08-24 DIAGNOSIS — M54.12 CERVICAL RADICULITIS: ICD-10-CM

## 2024-08-24 PROCEDURE — A9585 GADOBUTROL INJECTION: HCPCS | Performed by: FAMILY MEDICINE

## 2024-08-24 PROCEDURE — 70543 MRI ORBT/FAC/NCK W/O &W/DYE: CPT

## 2024-08-24 RX ORDER — GADOBUTROL 604.72 MG/ML
6.5 INJECTION INTRAVENOUS
Status: COMPLETED | OUTPATIENT
Start: 2024-08-24 | End: 2024-08-24

## 2024-08-24 RX ADMIN — GADOBUTROL 6.5 ML: 604.72 INJECTION INTRAVENOUS at 09:19

## 2024-08-26 ENCOUNTER — OFFICE VISIT (OUTPATIENT)
Dept: PHYSICAL THERAPY | Facility: MEDICAL CENTER | Age: 59
End: 2024-08-26
Payer: COMMERCIAL

## 2024-08-26 DIAGNOSIS — M54.2 CHRONIC NECK PAIN: Primary | ICD-10-CM

## 2024-08-26 DIAGNOSIS — G89.29 CHRONIC NECK PAIN: Primary | ICD-10-CM

## 2024-08-26 DIAGNOSIS — M54.12 CERVICAL RADICULOPATHY, CHRONIC: ICD-10-CM

## 2024-08-26 DIAGNOSIS — G44.86 CERVICOGENIC HEADACHE: ICD-10-CM

## 2024-08-26 PROCEDURE — 97110 THERAPEUTIC EXERCISES: CPT | Performed by: PHYSICAL THERAPIST

## 2024-08-26 PROCEDURE — 97140 MANUAL THERAPY 1/> REGIONS: CPT | Performed by: PHYSICAL THERAPIST

## 2024-08-26 NOTE — PROGRESS NOTES
Daily Note     Today's date: 2024  Patient name: Chanel Grande  : 1965  MRN: [id number]  Referring provider: Izaiah Stinson,*  Dx:   Encounter Diagnosis     ICD-10-CM    1. Chronic neck pain  M54.2     G89.29       2. Cervical radiculopathy, chronic  M54.12       3. Cervicogenic headache  G44.86                      Subjective: patient states that she is feeling pretty good today and just having some tightness in the left upper trap.  Neck is doing better.     Objective: See treatment diary below      Assessment: today's session was spent on manual therapy to address hypomobility of thoracic and cervical spine as well as soft tissue mobilization for hypertonicity. A bit more focused on upper thoracic mobility and education.       Plan: Continue per plan of care.      Precautions: fibromyalgia   Daily Treatment Diary     Manual              UPA of C1-3 left  Gr. Iv- 10sec x5            UPA of C1-2 right Gr. Iv- 10sec x5            Thoracic mobility 5 min                                          Exercise Diary              UBE             pec stretch             No monnies             Shoulder extension             Scapular retractions             Horizontal abduction             Chin tucks             Thoracic extension over foam roller             Foam roller on wall 5 min            Cervical SNAGs Rot 10x, 3 sec            Foam roller nerve glides  10x2                                                                                                                                     Modalities

## 2024-08-30 NOTE — TELEPHONE ENCOUNTER
Upon review of the In Basket request we were able to locate, review, and update the patient chart as requested for Pap Smear (HPV) aka Cervical Cancer Screening  Any additional questions or concerns should be emailed to the Practice Liaisons via the appropriate education email address, please do not reply via In Basket      Thank you  Nelly Chávez Speaking [employer]herently

## 2024-09-04 ENCOUNTER — OFFICE VISIT (OUTPATIENT)
Dept: PHYSICAL THERAPY | Facility: MEDICAL CENTER | Age: 59
End: 2024-09-04
Payer: COMMERCIAL

## 2024-09-04 DIAGNOSIS — M54.2 CHRONIC NECK PAIN: Primary | ICD-10-CM

## 2024-09-04 DIAGNOSIS — G89.29 CHRONIC NECK PAIN: Primary | ICD-10-CM

## 2024-09-04 DIAGNOSIS — G44.86 CERVICOGENIC HEADACHE: ICD-10-CM

## 2024-09-04 DIAGNOSIS — M54.12 CERVICAL RADICULOPATHY, CHRONIC: ICD-10-CM

## 2024-09-04 PROCEDURE — 97110 THERAPEUTIC EXERCISES: CPT | Performed by: PHYSICAL THERAPIST

## 2024-09-04 PROCEDURE — 97140 MANUAL THERAPY 1/> REGIONS: CPT | Performed by: PHYSICAL THERAPIST

## 2024-09-04 NOTE — PROGRESS NOTES
Daily Note     Today's date: 2024  Patient name: Chanel Grande  : 1965  MRN: [id number]  Referring provider: Izaiah Stinson,*  Dx:   Encounter Diagnosis     ICD-10-CM    1. Chronic neck pain  M54.2     G89.29       2. Cervical radiculopathy, chronic  M54.12       3. Cervicogenic headache  G44.86                      Subjective: patient states that she is feeling pretty good today and just having some tightness in the left upper trap.  Neck is doing better.     Objective: See treatment diary below      Assessment: today's session was spent on manual therapy to address hypomobility of thoracic and cervical spine as well as soft tissue mobilization for hypertonicity. A bit more focused on upper thoracic mobility and education.       Plan: Continue per plan of care.      Precautions: fibromyalgia   Daily Treatment Diary     Manual              UPA of C1-3 left  Gr. Iv- 10sec x5            UPA of C1-2 right Gr. Iv- 10sec x5            Thoracic mobility 5 min                                          Exercise Diary              UBE             pec stretch             No monnies             Shoulder extension             Scapular retractions             Horizontal abduction             Chin tucks             Thoracic extension over foam roller             Foam roller on wall 5 min            Cervical SNAGs Rot 10x, 3 sec            Foam roller nerve glides  10x2                                                                                                                                     Modalities

## 2024-09-05 NOTE — PROGRESS NOTES
Assessment/Plan:     1  Epigastric pain  Refill   - omeprazole (PriLOSEC) 20 mg delayed release capsule; Take 1 capsule (20 mg total) by mouth daily  Dispense: 90 capsule; Refill: 1    2  Multiple benign nevi  Refer to derm for skin exam   - Ambulatory Referral to Dermatology; Future    3  Hemangioma of skin  Refer for excision   - Ambulatory Referral to Dermatology; Future    4  Neck pain  Shoulder pain likely coming from neck - she did not realize she was to f/u with pain mgmt  New referral placed  - Ambulatory Referral to Pain Management; Future    5  Menopause  Refer to GYN to review options  - Ambulatory referral to Gynecology; Future    6  Chronic right shoulder pain  See above   - Ambulatory Referral to Orthopedic Surgery; Future    7  Other urinary incontinence  Refer to uro gyn for workup and treatment options   - Ambulatory Referral to Urogynecology; Future    No follow-ups on file  Subjective:   Alec Garrett is a 49 y o  female here today for a follow-up on her current medical conditions:  Patient Active Problem List   Diagnosis    Fibromyalgia    Hypercholesteremia    Migraine without aura and without status migrainosus, not intractable    Persistent insomnia    Vitamin D deficiency    Low ferritin    Benign essential hypertension    Carpal tunnel syndrome, bilateral    Syncopal episodes    Sleep disturbance    HTN (hypertension), benign    COVID-19    Obesity (BMI 30-39  9)        Current Medications:  Current Outpatient Medications   Medication Sig Dispense Refill    Aspirin-Acetaminophen-Caffeine (EXCEDRIN MIGRAINE PO) Take 1 tablet by mouth as needed (migraines)       cholecalciferol (VITAMIN D3) 1,000 units tablet Take 2,000 Units by mouth daily       ferrous sulfate 325 (65 Fe) mg tablet Take 325 mg by mouth daily with breakfast       Galcanezumab-gnlm 120 MG/ML SOAJ Inject 120 mg under the skin every 30 (thirty) days 1 mL 11    lisinopril (ZESTRIL) 20 mg tablet Take 1 tablet (20 mg total) by mouth daily 90 tablet 1    MAGNESIUM GLUCONATE PO Take 3 tablets by mouth daily       Multiple Vitamins-Minerals (MULTIVITAMIN ADULT PO) Take 1 tablet by mouth daily      Naproxen Sodium (Aleve) 220 MG CAPS Take 2 capsules by mouth as needed      omeprazole (PriLOSEC) 20 mg delayed release capsule Take 1 capsule (20 mg total) by mouth daily 90 capsule 1    Riboflavin (Vitamin B-2) 100 MG TABS Take 400 mg by mouth daily        SUMAtriptan (IMITREX) 100 mg tablet One tab daily prn migraine, may repeat in 2 hours if needed  Max 2 tabs per 24 hr 10 tablet 2    tiZANidine (ZANAFLEX) 2 mg tablet Take 2 mg by mouth daily at bedtime TAKE 1/2 TABLET NIGHTLY        No current facility-administered medications for this visit  HPI:  Chief Complaint   Patient presents with    Numbness     RIGHT ARM/HAND - UNRESOLVED - FOLLOWS WITH RHUM/NEURO    Pain     PAIN MANAGEMENT C-SPINE      -- Above per clinical staff and reviewed  --    PHQ-2/9 Depression Screening              order OCt 2021 labs - CMP, lipids, Hep C   ferritin ?vit D (noraml June 2020)   parotid tumor o CT scan   home BP not accurate     labs for rheum NOv 2021 normal    colonoscopy - family history   Today:  Scheduled for numbness today - brought a one page list of concerns with her   Right handed and numbness inner arm from axilla to thumb   Now getting wrose, more pain, weaker, hard to lift things now   Had seen neuro - tested for CTS  Saw pain mgmt in Nov 2019   C3-C6  Hot flashes often and feels like body on fire all the time   Emotional all the time - feels like she will cry easily like with a [employer] makes her cry   Feels menopausal symptoms   Changing rheum from Dr Germania Fletcher to Dr Arely Mckeon like was hit by a bus - whole body - like a large bruise  Weather shifts make it the worst  Headache an hour after meds mornign meds - hydrochlorothiazide - maybe an hour later   Am meds  hydrochlorothiazide, vit D, omeprazole, multivitamin, B12   cymbalta in the past was not helpful   Leaking urine without awareness of it   Pink lesion on stomach     The following portions of the patient's history were reviewed and updated as appropriate: allergies, current medications, past family history, past medical history, past social history, past surgical history and problem list     Objective:  Vitals:  BP 90/70   Pulse 76   Temp 97 6 °F (36 4 °C)   Resp 12   Ht 5' 1" (1 549 m)   Wt 73 8 kg (162 lb 12 8 oz)   SpO2 93%   BMI 30 76 kg/m²    Wt Readings from Last 3 Encounters:   03/04/22 75 4 kg (166 lb 3 2 oz)   02/24/22 73 8 kg (162 lb 12 8 oz)   02/01/22 73 7 kg (162 lb 6 4 oz)      BP Readings from Last 3 Encounters:   03/04/22 112/75   02/24/22 90/70   12/08/21 124/88        Review of Systems   She has no other concerns  No unexpected weight changes  No chest pain, SOB, or palpitations  No GERD  No changes in bowels or bladder  Sleeping well  + mood changes  Physical Exam   Constitutional:  she appears well-developed and well-nourished  HENT: Head: Normocephalic  Neck: Neck supple  Cardiovascular: Normal rate, regular rhythm and normal heart sounds  Pulmonary/Chest: Effort normal and breath sounds normal  No wheezes, rales, or rhonchi  Abdominal: Soft  Bowel sounds are normal  There is no tenderness  No hepatosplenomegaly  Musculoskeletal: she exhibits no edema  Lymphadenopathy: she has no cervical adenopathy  Neurological: she is alert and oriented to person, place, and time  Skin: Skin is warm and dry  Large elevated hemangioma stomach  Psychiatric: she has a normal mood and affect  her behavior is normal  Thought content normal      BMI Counseling: Body mass index is 30 76 kg/m²  The BMI is above normal  Nutrition recommendations include decreasing portion sizes  Exercise recommendations include exercising 3-5 times per week  Rationale for BMI follow-up plan is due to patient being overweight or obese  [de-identified] : 56 year old  male patient with history of stable Hypertension, Elevated Hemoglobin A1c, Obesity, Anemia, Arthropathy of knees, Lumbar Radiculopathy, history as stated, presented for follow up examination with complaint of cough, associated with fatigue and intermittent dizziness, unable to perform at work. Patient is compliant with all medications. ROS as stated.

## 2024-09-12 ENCOUNTER — OFFICE VISIT (OUTPATIENT)
Dept: PHYSICAL THERAPY | Facility: MEDICAL CENTER | Age: 59
End: 2024-09-12
Payer: COMMERCIAL

## 2024-09-12 DIAGNOSIS — G89.29 CHRONIC NECK PAIN: Primary | ICD-10-CM

## 2024-09-12 DIAGNOSIS — M54.2 CHRONIC NECK PAIN: Primary | ICD-10-CM

## 2024-09-12 DIAGNOSIS — M54.12 CERVICAL RADICULOPATHY, CHRONIC: ICD-10-CM

## 2024-09-12 DIAGNOSIS — G44.86 CERVICOGENIC HEADACHE: ICD-10-CM

## 2024-09-12 PROCEDURE — 97140 MANUAL THERAPY 1/> REGIONS: CPT | Performed by: PHYSICAL THERAPIST

## 2024-09-12 PROCEDURE — 97110 THERAPEUTIC EXERCISES: CPT | Performed by: PHYSICAL THERAPIST

## 2024-09-12 NOTE — PROGRESS NOTES
Daily Note     Today's date: 2024  Patient name: Chanel Grande  : 1965  MRN: [id number]  Referring provider: Izaiah Stinson,*  Dx:   Encounter Diagnosis     ICD-10-CM    1. Chronic neck pain  M54.2     G89.29       2. Cervical radiculopathy, chronic  M54.12       3. Cervicogenic headache  G44.86                      Subjective: patient states that she is feeling pretty good today and just having some tightness in the left upper trap.  Neck is doing better. Doing work around her home with less discomfort.     Objective: See treatment diary below      Assessment: today's session was spent on manual therapy to address hypomobility of thoracic and cervical spine as well as soft tissue mobilization for hypertonicity. A bit more focused on upper thoracic mobility and education. Some increased tension of the suboccipital musculature as well as C1-2.        Plan: Continue per plan of care.      Precautions: fibromyalgia   Daily Treatment Diary     Manual              UPA of C1-3 left  Gr. Iv- 10sec x5            UPA of C1-2 right Gr. Iv- 10sec x5            Thoracic mobility 5 min                                          Exercise Diary              UBE             pec stretch             No monnies             Shoulder extension             Scapular retractions             Horizontal abduction             Chin tucks             Shoulder flexion over 1/2 foam roller 15x2            Foam roller on wall 5 min            Cervical SNAGs Rot 10x, 3 sec            Foam roller nerve glides  10x2                                                                                                                                     Modalities

## 2024-09-13 ENCOUNTER — OFFICE VISIT (OUTPATIENT)
Dept: FAMILY MEDICINE CLINIC | Facility: CLINIC | Age: 59
End: 2024-09-13
Payer: COMMERCIAL

## 2024-09-13 VITALS
HEART RATE: 67 BPM | SYSTOLIC BLOOD PRESSURE: 132 MMHG | DIASTOLIC BLOOD PRESSURE: 86 MMHG | WEIGHT: 140.4 LBS | OXYGEN SATURATION: 98 % | HEIGHT: 61 IN | TEMPERATURE: 97.8 F | RESPIRATION RATE: 16 BRPM | BODY MASS INDEX: 26.51 KG/M2

## 2024-09-13 DIAGNOSIS — M15.0 PRIMARY GENERALIZED (OSTEO)ARTHRITIS: ICD-10-CM

## 2024-09-13 DIAGNOSIS — M79.7 FIBROMYALGIA: Primary | ICD-10-CM

## 2024-09-13 DIAGNOSIS — I10 BENIGN ESSENTIAL HYPERTENSION: ICD-10-CM

## 2024-09-13 DIAGNOSIS — K11.9 LESION OF PAROTID GLAND: ICD-10-CM

## 2024-09-13 DIAGNOSIS — F41.9 ANXIETY: ICD-10-CM

## 2024-09-13 DIAGNOSIS — E66.3 OVERWEIGHT: ICD-10-CM

## 2024-09-13 DIAGNOSIS — G43.009 MIGRAINE WITHOUT AURA AND WITHOUT STATUS MIGRAINOSUS, NOT INTRACTABLE: ICD-10-CM

## 2024-09-13 DIAGNOSIS — M06.09 SERONEGATIVE ARTHROPATHY OF MULTIPLE SITES (HCC): ICD-10-CM

## 2024-09-13 PROCEDURE — 99214 OFFICE O/P EST MOD 30 MIN: CPT | Performed by: FAMILY MEDICINE

## 2024-09-13 NOTE — PROGRESS NOTES
Assessment & Plan  Fibromyalgia  Patient has ongoing pain.  She is following with orthopedics, rheumatology, physical therapy, and pain management.  Today she asks about potentially seeing a functional medicine provider.  I encouraged her to do this.  I also recommended a physiatrist.  She is considering medical marijuana.  I advised her this is not something I certify, but gave her a website and several provider she could try.    Orders:    Ambulatory Referral to Physiatry; Future    Primary generalized (osteo)arthritis  Patient has ongoing pain.  She is following with orthopedics, rheumatology, physical therapy, and pain management.  Today she asks about potentially seeing a functional medicine provider.  I encouraged her to do this.  I also recommended a physiatrist.  She is considering medical marijuana.  I advised her this is not something I certify, but gave her a website and several provider she could try.  Orders:    Ambulatory Referral to Physiatry; Future    Benign essential hypertension  Patient reports a recent visit with cardiology and blood pressure was well-controlled.  Will continue to follow with Dr. Severino       Migraine without aura and without status migrainosus, not intractable  Following with neurology.  She reports Imitrex is no longer working.       Anxiety  No changes.       Seronegative arthropathy of multiple sites (HCC)  Patient has ongoing pain.  She is following with orthopedics, rheumatology, physical therapy, and pain management.  Today she asks about potentially seeing a functional medicine provider.  I encouraged her to do this.  I also recommended a physiatrist.  She is considering medical marijuana.  I advised her this is not something I certify, but gave her a website and several provider she could try.       Lesion of parotid gland  Patient following with otolaryngology, Dr. Yoder.  Parotid mass has been stable for about 3 years.  Plan is to watch this for now.  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  She chose to have a steroid injection, but has not found any change in her pain.  She is now considering medical marijuana for this.       Overweight  Patient doing a great job with diet changes and weight loss.  States her goal weight is 125.  I encourage small changes to get to the goal where she feels good, but not to focus on a number.       Patient came in today because she wanted to update us on the changes in her health.  She is following with multiple other providers.    Patient Care Team:  Amanda Clarke DO as PCP - General (Family Medicine)  Morro Severino DO (Cardiology)  Alka Freeman MD (Rheumatology)  Beverly Romano MD (Neurology)  Shashi Barrow MD (Dermatology)  Jan Yoder MD (Otolaryngology)  Dylan Dumont DO (Pain Medicine)  Morro Severino DO (Cardiology)    No follow-ups on file.  Physical due 2/19/2025    Subjective:   Chanel is a 58 y.o. female here today for a follow-up on her current medical conditions:    Patient Active Problem List   Diagnosis    Fibromyalgia    Hypercholesteremia    Migraine without aura and without status migrainosus, not intractable    Persistent insomnia    Vitamin D deficiency    Low ferritin    Benign essential hypertension    Carpal tunnel syndrome, bilateral    Syncopal episodes    Sleep disturbance    Overweight    Primary generalized (osteo)arthritis    Cervical spondylosis without myelopathy    Gastroesophageal reflux disease without esophagitis    History of fetal loss    Undifferentiated connective tissue disease (HCC)    Obstructive sleep apnea-hypopnea syndrome    Excessive daytime sleepiness    Primary osteoarthritis of left knee    Tendinitis of right rotator cuff    Seronegative arthropathy of multiple sites (HCC)    History of colon polyps    MDD (major depressive disorder), recurrent episode, mild (HCC)    Anxiety and depression    Left knee  pain    Osteopenia of multiple sites    S/P total knee arthroplasty, left    Lateral epicondylitis of left elbow    Arthritis of carpometacarpal (CMC) joint of both thumbs    Primary osteoarthritis of first carpometacarpal joint of left hand    Bilateral hand numbness         Patient Care Team:  Amanda Clarke DO as PCP - General (Family Medicine)  Morro Severino DO (Cardiology)  Alka Freeman MD (Rheumatology)  Beverly Romano MD (Neurology)  Shashi Barrow MD (Dermatology)    Current Medications:  Current Outpatient Medications   Medication Sig Dispense Refill    acetaminophen (TYLENOL) 500 mg tablet Take 500-1,000 mg by mouth every 6 (six) hours as needed for mild pain      Aspirin-Acetaminophen-Caffeine (EXCEDRIN MIGRAINE PO) Take 1 tablet by mouth as needed (migraines)      Cholecalciferol (Vitamin D) 50 MCG (2000 UT) CAPS Take 1 capsule by mouth in the morning      ferrous sulfate 324 (65 Fe) mg Take 1 tablet (324 mg total) by mouth 2 (two) times a day before meals 60 tablet 0    omeprazole (PriLOSEC) 20 mg delayed release capsule TAKE 1 CAPSULE(20 MG) BY MOUTH DAILY 90 capsule 1    SUMAtriptan (IMITREX) 100 mg tablet TAKE 1 TABLET(100 MG) BY MOUTH 1 TIME AS NEEDED FOR MIGRAINE. MAY REPEAT 1 TIME IN 2 HOURS. MAX 2/24 HOURS, 9 PER MONTH 9 tablet 12    Pediatric Multivitamins-Iron (multiple vitamins w/iron) 15 MG chew tablet Chew 1 tablet daily      predniSONE 5 mg tablet # of pills by mouth daily - 5/5/4/4/3/2/1 24 tablet 0     No current facility-administered medications for this visit.       HPI:  Chief Complaint   Patient presents with    Follow-up     Follow up BP check and after seeing specialists     -- Above per clinical staff and reviewed. --    PHQ-2/9 Depression Screening                Change to Physical due 12/9/23 -   Wants to wean off wellbutrin - she stopped wellbutrin   Mood check-PHQ-9/MIGDALIA-7.   flu shot gilmgaom63/10/2023 CB       Jan 2024 labs chol 246 to 222, TG 80, HDL 54, LDL  "164 to 152, CMP normal. CBC normal   Jan.   6 kids - Harmeet, Nick, Giovanni, Jan, Alex, Linda.   last visit on wellbutrin, may be helping. She was concerned about ADD. consider Gene SIght, mood stablizer   watch VIKAS, considering topamax   Feb 2023 labs per rheum chol 246, tg 121, HDL 58, LDL 14  8 to 164 (ASCVD 3.1%)  glu 1010, A1C 5.2dec appt started wellbutrin to help with psychcological symptoms from pain, trauma. ?SSRI   parotid tumor  CT scan 10/2021 stable compared to 6 months prior   home BP not accurate.   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 Paresh Pickettes occurring - severity improved.  Thinks partly rebound   Imitrex no longer helps   Feels like a lot from tension in neck and shoulders   Seeing Dr. Smith pain management.  Has not seen PMR or functional medicine doctor   Restartred iron   Cannot get off omeprazole   Cuts her corners short and hitting things does not happen all the time.  Does not note a pattern.  No abnormal gait.  No dizziness or double vision.  Bruises from this.  Shaking sometimes       The following portions of the patient's history were reviewed and updated as appropriate: allergies, current medications, past family history, past medical history, past social history, past surgical history and problem list.    Objective:  Vitals:  /86 (BP Location: Left arm, Patient Position: Sitting, Cuff Size: Standard)   Pulse 67   Temp 97.8 °F (36.6 °C) (Temporal)   Resp 16   Ht 5' 1\" (1.549 m)   Wt 63.7 kg (140 lb 6.4 oz)   LMP 08/31/2019 (Exact Date)   SpO2 98%   " BMI 26.53 kg/m²    Wt Readings from Last 3 Encounters:   09/13/24 63.7 kg (140 lb 6.4 oz)   08/30/24 65.3 kg (144 lb)   08/13/24 65.3 kg (144 lb)      BP Readings from Last 3 Encounters:   09/13/24 132/86   08/13/24 128/64   07/26/24 157/70        Review of Systems   She has no other concerns. No unexpected weight changes. No chest pain, SOB, or palpitations. No GERD. No changes in bowels or bladder. Sleeping well. No mood changes.      Physical Exam   Constitutional:  she appears well-developed and well-nourished.  HENT: Head: Normocephalic.   Neck: Neck supple.   Cardiovascular: Normal rate, regular rhythm and normal heart sounds.   Pulmonary/Chest: Effort normal and breath sounds normal. No wheezes, rales, or rhonchi.   Abdominal: Soft. Bowel sounds are normal. There is no tenderness. No hepatosplenomegaly.   Musculoskeletal: she exhibits no edema.   Lymphadenopathy: she has no cervical adenopathy.   Neurological: she is alert and oriented to person, place, and time.   Skin: Skin is warm and dry.   Psychiatric: she has a normal mood and affect. her behavior is normal. Thought content normal.

## 2024-09-13 NOTE — ASSESSMENT & PLAN NOTE
Patient reports a recent visit with cardiology and blood pressure was well-controlled.  Will continue to follow with Dr. Severino

## 2024-09-13 NOTE — ASSESSMENT & PLAN NOTE
Patient has ongoing pain.  She is following with orthopedics, rheumatology, physical therapy, and pain management.  Today she asks about potentially seeing a functional medicine provider.  I encouraged her to do this.  I also recommended a physiatrist.  She is considering medical marijuana.  I advised her this is not something I certify, but gave her a website and several provider she could try.    Orders:    Ambulatory Referral to Physiatry; Future

## 2024-09-13 NOTE — ASSESSMENT & PLAN NOTE
Patient doing a great job with diet changes and weight loss.  States her goal weight is 125.  I encourage small changes to get to the goal where she feels good, but not to focus on a number.

## 2024-09-13 NOTE — ASSESSMENT & PLAN NOTE
Patient has ongoing pain.  She is following with orthopedics, rheumatology, physical therapy, and pain management.  Today she asks about potentially seeing a functional medicine provider.  I encouraged her to do this.  I also recommended a physiatrist.  She is considering medical marijuana.  I advised her this is not something I certify, but gave her a website and several provider she could try.

## 2024-09-13 NOTE — PATIENT INSTRUCTIONS
Here is a web site to go to for information on providers who certify medical marijuana:  https://www.[place].gov/guides/[place]-medical-marijuana-program/#Physicians    FREDDY Johns MD - [facility]   [address] [place] [address], [address]  [place], [place] [id number] [phone]    Virgen Jacobsen MD - [facility]   [address], [address]  [place], [place]   [phone]

## 2024-09-17 ENCOUNTER — OFFICE VISIT (OUTPATIENT)
Dept: PHYSICAL THERAPY | Facility: MEDICAL CENTER | Age: 59
End: 2024-09-17
Payer: COMMERCIAL

## 2024-09-17 DIAGNOSIS — M54.12 CERVICAL RADICULOPATHY, CHRONIC: ICD-10-CM

## 2024-09-17 DIAGNOSIS — G44.86 CERVICOGENIC HEADACHE: ICD-10-CM

## 2024-09-17 DIAGNOSIS — M54.2 CHRONIC NECK PAIN: Primary | ICD-10-CM

## 2024-09-17 DIAGNOSIS — G89.29 CHRONIC NECK PAIN: Primary | ICD-10-CM

## 2024-09-17 PROCEDURE — 97110 THERAPEUTIC EXERCISES: CPT | Performed by: PHYSICAL THERAPIST

## 2024-09-17 PROCEDURE — 97140 MANUAL THERAPY 1/> REGIONS: CPT | Performed by: PHYSICAL THERAPIST

## 2024-09-17 NOTE — PROGRESS NOTES
Daily Note     Today's date: 2024  Patient name: Chanel Grande  : 1965  MRN: [id number]  Referring provider: Izaiah Stinson,*  Dx:   Encounter Diagnosis     ICD-10-CM    1. Chronic neck pain  M54.2     G89.29       2. Cervical radiculopathy, chronic  M54.12       3. Cervicogenic headache  G44.86                      Subjective: patient states that she is had a really bad headache yesterday and noted migraine symptoms.  Patient had some tightness in the upper neck back reportedly.     Objective: See treatment diary below      Assessment: today's session was spent on manual therapy to address hypomobility of thoracic and cervical spine as well as soft tissue mobilization for hypertonicity. Mostly muscle tension noted in upper back and cervical spine.      Plan: Continue per plan of care.      Precautions: fibromyalgia   Daily Treatment Diary     Manual              UPA of C1-3 left  Gr. Iv- 10sec x5            UPA of C1-2 right Gr. Iv- 10sec x5            Thoracic mobility 5 min                                          Exercise Diary              UBE             pec stretch             No monnies             Shoulder extension             Scapular retractions             Horizontal abduction             Chin tucks             Shoulder flexion over 1/2 foam roller 15x2            Foam roller on wall 5 min            Cervical SNAGs Rot 10x, 3 sec            Foam roller nerve glides  10x2                                                                                                                                     Modalities

## 2024-09-23 DIAGNOSIS — R10.13 EPIGASTRIC PAIN: ICD-10-CM

## 2024-09-25 ENCOUNTER — OFFICE VISIT (OUTPATIENT)
Dept: PHYSICAL THERAPY | Facility: MEDICAL CENTER | Age: 59
End: 2024-09-25
Payer: COMMERCIAL

## 2024-09-25 DIAGNOSIS — G44.86 CERVICOGENIC HEADACHE: ICD-10-CM

## 2024-09-25 DIAGNOSIS — M54.2 CHRONIC NECK PAIN: Primary | ICD-10-CM

## 2024-09-25 DIAGNOSIS — G89.29 CHRONIC NECK PAIN: Primary | ICD-10-CM

## 2024-09-25 DIAGNOSIS — M54.12 CERVICAL RADICULOPATHY, CHRONIC: ICD-10-CM

## 2024-09-25 PROCEDURE — 97140 MANUAL THERAPY 1/> REGIONS: CPT | Performed by: PHYSICAL THERAPIST

## 2024-09-25 PROCEDURE — 97110 THERAPEUTIC EXERCISES: CPT | Performed by: PHYSICAL THERAPIST

## 2024-09-25 NOTE — PROGRESS NOTES
Daily Note     Today's date: 2024  Patient name: Chanel Grande  : 1965  MRN: [id number]  Referring provider: Izaiah Stinson,*  Dx:   Encounter Diagnosis     ICD-10-CM    1. Chronic neck pain  M54.2     G89.29       2. Cervical radiculopathy, chronic  M54.12       3. Cervicogenic headache  G44.86                      Subjective: patient states that she is had a really bad headache yesterday and noted migraine symptoms.  Patient had some tightness in the upper neck back reportedly.     Objective: See treatment diary below      Assessment: today's session was spent on manual therapy to address hypomobility of thoracic and cervical spine as well as soft tissue mobilization for hypertonicity. Mostly muscle tension noted in upper back and cervical spine.      Plan: Continue per plan of care.      Precautions: fibromyalgia   Daily Treatment Diary     Manual              UPA of C1-3 left  Gr. Iv- 10sec x5            UPA of C1-2 right Gr. Iv- 10sec x5            Thoracic mobility 5 min                                          Exercise Diary              UBE             pec stretch             No monnies             Shoulder extension             Scapular retractions             Horizontal abduction             Chin tucks             Shoulder flexion over 1/2 foam roller 15x2            Foam roller on wall 5 min            Cervical SNAGs Rot 10x, 3 sec            Foam roller nerve glides  10x2                                                                                                                                     Modalities

## 2024-10-10 ENCOUNTER — OFFICE VISIT (OUTPATIENT)
Dept: PHYSICAL THERAPY | Facility: MEDICAL CENTER | Age: 59
End: 2024-10-10
Payer: COMMERCIAL

## 2024-10-10 DIAGNOSIS — M54.12 CERVICAL RADICULOPATHY, CHRONIC: ICD-10-CM

## 2024-10-10 DIAGNOSIS — G89.29 CHRONIC NECK PAIN: Primary | ICD-10-CM

## 2024-10-10 DIAGNOSIS — M54.2 CHRONIC NECK PAIN: Primary | ICD-10-CM

## 2024-10-10 DIAGNOSIS — G44.86 CERVICOGENIC HEADACHE: ICD-10-CM

## 2024-10-10 PROCEDURE — 97110 THERAPEUTIC EXERCISES: CPT | Performed by: PHYSICAL THERAPIST

## 2024-10-10 PROCEDURE — 97140 MANUAL THERAPY 1/> REGIONS: CPT | Performed by: PHYSICAL THERAPIST

## 2024-10-10 NOTE — PROGRESS NOTES
Daily Note     Today's date: 10/10/2024  Patient name: Chanel Grande  : 1965  MRN: [id number]  Referring provider: Izaiah Stinson,*  Dx:   Encounter Diagnosis     ICD-10-CM    1. Chronic neck pain  M54.2     G89.29       2. Cervical radiculopathy, chronic  M54.12       3. Cervicogenic headache  G44.86                      Subjective: patient states that she is had a really bad headache and noted migraine symptoms.  Patient had some tightness in the upper neck back reportedly from stress during her vacation.     Objective: See treatment diary below      Assessment: today's session was spent on manual therapy to address hypomobility of thoracic and cervical spine as well as soft tissue mobilization for hypertonicity. Mostly muscle tension noted in upper back and cervical spine.      Plan: Continue per plan of care.      Precautions: fibromyalgia   Daily Treatment Diary     Manual              UPA of C1-3 left  Gr. Iv- 10sec x5            UPA of C1-2 right Gr. Iv- 10sec x5            Thoracic mobility 5 min                                          Exercise Diary              UBE             pec stretch             No monnies             Shoulder extension             Scapular retractions             Horizontal abduction             Chin tucks             Shoulder flexion over 1/2 foam roller 15x2            Foam roller on wall 5 min            Cervical SNAGs Rot 10x, 3 sec            Foam roller nerve glides  10x2                                                                                                                                     Modalities

## 2024-10-16 ENCOUNTER — OFFICE VISIT (OUTPATIENT)
Dept: PHYSICAL THERAPY | Facility: MEDICAL CENTER | Age: 59
End: 2024-10-16
Payer: COMMERCIAL

## 2024-10-16 DIAGNOSIS — M54.12 CERVICAL RADICULOPATHY, CHRONIC: ICD-10-CM

## 2024-10-16 DIAGNOSIS — M54.2 CHRONIC NECK PAIN: Primary | ICD-10-CM

## 2024-10-16 DIAGNOSIS — G89.29 CHRONIC NECK PAIN: Primary | ICD-10-CM

## 2024-10-16 DIAGNOSIS — G44.86 CERVICOGENIC HEADACHE: ICD-10-CM

## 2024-10-16 PROCEDURE — 97110 THERAPEUTIC EXERCISES: CPT | Performed by: PHYSICAL THERAPIST

## 2024-10-16 PROCEDURE — 97140 MANUAL THERAPY 1/> REGIONS: CPT | Performed by: PHYSICAL THERAPIST

## 2024-10-16 NOTE — PROGRESS NOTES
Daily Note     Today's date: 10/16/2024  Patient name: Chanel Grande  : 1965  MRN: [id number]  Referring provider: Izaiah Stinson,*  Dx:   Encounter Diagnosis     ICD-10-CM    1. Chronic neck pain  M54.2     G89.29       2. Cervical radiculopathy, chronic  M54.12       3. Cervicogenic headache  G44.86                      Subjective: patient states that she is feeling pretty good today and just having some tightness in the left upper trap.  Had several days of migraine last week but better today.  Patient notes that she was spending time on the floor with her grandchild and is sore from that.     Objective: See treatment diary below      Assessment: today's session was spent on manual therapy to address hypomobility of thoracic and cervical spine as well as soft tissue mobilization for hypertonicity. A bit more focused on upper thoracic mobility and education.       Plan: Continue per plan of care.      Precautions: fibromyalgia   Daily Treatment Diary     Manual              UPA of C1-3 left  Gr. Iv- 10sec x5            UPA of C1-2 right Gr. Iv- 10sec x5            Thoracic mobility 5 min                                          Exercise Diary              UBE             pec stretch             No monnies             Shoulder extension             Scapular retractions             Horizontal abduction             Chin tucks             Thoracic extension over foam roller             Foam roller on wall 5 min            Cervical SNAGs Rot 10x, 3 sec            Foam roller nerve glides  10x2                                                                                                                                     Modalities

## 2024-11-08 ENCOUNTER — APPOINTMENT (OUTPATIENT)
Dept: LAB | Facility: MEDICAL CENTER | Age: 59
End: 2024-11-08
Payer: COMMERCIAL

## 2024-11-08 DIAGNOSIS — I10 BENIGN ESSENTIAL HYPERTENSION: ICD-10-CM

## 2024-11-08 DIAGNOSIS — E78.00 HYPERCHOLESTEREMIA: ICD-10-CM

## 2024-11-08 DIAGNOSIS — G43.009 MIGRAINE WITHOUT AURA AND WITHOUT STATUS MIGRAINOSUS, NOT INTRACTABLE: ICD-10-CM

## 2024-11-08 DIAGNOSIS — M79.7 FIBROMYALGIA: ICD-10-CM

## 2024-11-08 LAB
25(OH)D3 SERPL-MCNC: 41.4 NG/ML (ref 30–100)
ALBUMIN SERPL BCG-MCNC: 4.7 G/DL (ref 3.5–5)
ALP SERPL-CCNC: 78 U/L (ref 34–104)
ALT SERPL W P-5'-P-CCNC: 19 U/L (ref 7–52)
ANION GAP SERPL CALCULATED.3IONS-SCNC: 7 MMOL/L (ref 4–13)
AST SERPL W P-5'-P-CCNC: 17 U/L (ref 13–39)
BASOPHILS # BLD AUTO: 0.06 THOUSANDS/ÂΜL (ref 0–0.1)
BASOPHILS NFR BLD AUTO: 1 % (ref 0–1)
BILIRUB SERPL-MCNC: 0.59 MG/DL (ref 0.2–1)
BUN SERPL-MCNC: 11 MG/DL (ref 5–25)
CALCIUM SERPL-MCNC: 10.2 MG/DL (ref 8.4–10.2)
CHLORIDE SERPL-SCNC: 102 MMOL/L (ref 96–108)
CO2 SERPL-SCNC: 32 MMOL/L (ref 21–32)
CREAT SERPL-MCNC: 0.71 MG/DL (ref 0.6–1.3)
EOSINOPHIL # BLD AUTO: 0.09 THOUSAND/ÂΜL (ref 0–0.61)
EOSINOPHIL NFR BLD AUTO: 1 % (ref 0–6)
ERYTHROCYTE [DISTWIDTH] IN BLOOD BY AUTOMATED COUNT: 12.2 % (ref 11.6–15.1)
EST. AVERAGE GLUCOSE BLD GHB EST-MCNC: 103 MG/DL
FERRITIN SERPL-MCNC: 73 NG/ML (ref 11–307)
GFR SERPL CREATININE-BSD FRML MDRD: 94 ML/MIN/1.73SQ M
GLUCOSE P FAST SERPL-MCNC: 95 MG/DL (ref 65–99)
HBA1C MFR BLD: 5.2 %
HCT VFR BLD AUTO: 48.6 % (ref 34.8–46.1)
HGB BLD-MCNC: 16.3 G/DL (ref 11.5–15.4)
IMM GRANULOCYTES # BLD AUTO: 0.02 THOUSAND/UL (ref 0–0.2)
IMM GRANULOCYTES NFR BLD AUTO: 0 % (ref 0–2)
IRON SATN MFR SERPL: 26 % (ref 15–50)
IRON SERPL-MCNC: 87 UG/DL (ref 50–212)
LYMPHOCYTES # BLD AUTO: 2.04 THOUSANDS/ÂΜL (ref 0.6–4.47)
LYMPHOCYTES NFR BLD AUTO: 26 % (ref 14–44)
MAGNESIUM SERPL-MCNC: 2.1 MG/DL (ref 1.9–2.7)
MCH RBC QN AUTO: 29.2 PG (ref 26.8–34.3)
MCHC RBC AUTO-ENTMCNC: 33.5 G/DL (ref 31.4–37.4)
MCV RBC AUTO: 87 FL (ref 82–98)
MONOCYTES # BLD AUTO: 0.45 THOUSAND/ÂΜL (ref 0.17–1.22)
MONOCYTES NFR BLD AUTO: 6 % (ref 4–12)
NEUTROPHILS # BLD AUTO: 5.13 THOUSANDS/ÂΜL (ref 1.85–7.62)
NEUTS SEG NFR BLD AUTO: 66 % (ref 43–75)
NRBC BLD AUTO-RTO: 0 /100 WBCS
PLATELET # BLD AUTO: 256 THOUSANDS/UL (ref 149–390)
PMV BLD AUTO: 10.5 FL (ref 8.9–12.7)
POTASSIUM SERPL-SCNC: 4.1 MMOL/L (ref 3.5–5.3)
PROT SERPL-MCNC: 7.3 G/DL (ref 6.4–8.4)
RBC # BLD AUTO: 5.59 MILLION/UL (ref 3.81–5.12)
SODIUM SERPL-SCNC: 141 MMOL/L (ref 135–147)
TIBC SERPL-MCNC: 334 UG/DL (ref 250–450)
UIBC SERPL-MCNC: 247 UG/DL (ref 155–355)
VIT B12 SERPL-MCNC: 828 PG/ML (ref 180–914)
WBC # BLD AUTO: 7.79 THOUSAND/UL (ref 4.31–10.16)

## 2024-11-08 PROCEDURE — 83735 ASSAY OF MAGNESIUM: CPT

## 2024-11-08 PROCEDURE — 83540 ASSAY OF IRON: CPT

## 2024-11-08 PROCEDURE — 82306 VITAMIN D 25 HYDROXY: CPT

## 2024-11-08 PROCEDURE — 83550 IRON BINDING TEST: CPT

## 2024-11-08 PROCEDURE — 80053 COMPREHEN METABOLIC PANEL: CPT

## 2024-11-08 PROCEDURE — 85025 COMPLETE CBC W/AUTO DIFF WBC: CPT

## 2024-11-08 PROCEDURE — 82728 ASSAY OF FERRITIN: CPT

## 2024-11-08 PROCEDURE — 83036 HEMOGLOBIN GLYCOSYLATED A1C: CPT

## 2024-11-08 PROCEDURE — 36415 COLL VENOUS BLD VENIPUNCTURE: CPT

## 2024-11-08 PROCEDURE — 82607 VITAMIN B-12: CPT

## 2024-11-26 ENCOUNTER — TELEPHONE (OUTPATIENT)
Age: 59
End: 2024-11-26

## 2025-01-09 DIAGNOSIS — G43.709 CHRONIC MIGRAINE WITHOUT AURA WITHOUT STATUS MIGRAINOSUS, NOT INTRACTABLE: ICD-10-CM

## 2025-01-09 RX ORDER — SUMATRIPTAN SUCCINATE 100 MG/1
TABLET ORAL
Qty: 9 TABLET | Refills: 12 | Status: SHIPPED | OUTPATIENT
Start: 2025-01-09

## 2025-01-09 NOTE — TELEPHONE ENCOUNTER
Patient called to request a refill for their sumatriptan advised a refill was requested on 1/9/25 and is pending approval. Patient verbalized understanding and is in agreement.     Patient is going out of town and wanted to  before she left since she doesn't have any left

## 2025-01-09 NOTE — TELEPHONE ENCOUNTER
Patient called to check the status of her refill sumatriptan. She stated that she did schedule an appointment in June and who she spoke to before told her once she makes the appointment there should be no problem refilling the medication. She stated that it was denied, but I do not see that it was, it is still pending. Pt needs med refilled as soon as possible. Thank you

## 2025-01-15 DIAGNOSIS — G43.709 CHRONIC MIGRAINE WITHOUT AURA WITHOUT STATUS MIGRAINOSUS, NOT INTRACTABLE: ICD-10-CM

## 2025-01-16 RX ORDER — SUMATRIPTAN SUCCINATE 100 MG/1
TABLET ORAL
Qty: 9 TABLET | Refills: 12 | OUTPATIENT
Start: 2025-01-16

## 2025-01-16 NOTE — TELEPHONE ENCOUNTER
Called pharm.  States that they did not receive script from 1/9.  Verbal rx given    [employer] message sent to pt

## 2025-02-20 ENCOUNTER — OFFICE VISIT (OUTPATIENT)
Dept: FAMILY MEDICINE CLINIC | Facility: CLINIC | Age: 60
End: 2025-02-20
Payer: COMMERCIAL

## 2025-02-20 VITALS
HEIGHT: 61 IN | SYSTOLIC BLOOD PRESSURE: 136 MMHG | OXYGEN SATURATION: 96 % | DIASTOLIC BLOOD PRESSURE: 92 MMHG | HEART RATE: 83 BPM | WEIGHT: 147.8 LBS | BODY MASS INDEX: 27.9 KG/M2 | TEMPERATURE: 97.4 F

## 2025-02-20 DIAGNOSIS — Z12.31 ENCOUNTER FOR SCREENING MAMMOGRAM FOR BREAST CANCER: ICD-10-CM

## 2025-02-20 DIAGNOSIS — M06.09 SERONEGATIVE ARTHROPATHY OF MULTIPLE SITES (HCC): ICD-10-CM

## 2025-02-20 DIAGNOSIS — Z00.00 WELL ADULT EXAM: Primary | ICD-10-CM

## 2025-02-20 PROCEDURE — 99396 PREV VISIT EST AGE 40-64: CPT | Performed by: FAMILY MEDICINE

## 2025-02-20 NOTE — PROGRESS NOTES
Assessment & Plan  Well adult exam    ·         Continue healthy diet   ·         Encourage exercise 4 times a week or more for minimum 30 minutes.   ·         Continue to see dentist, wear seatbelt.  ·         Health maintenance reviewed -update mammogram.  Declines flu or COVID vaccines.  Health Maintenance   Topic Date Due   • Breast Cancer Screening: Mammogram  09/22/2024   • Annual Physical  02/19/2025   • COVID-19 Vaccine (1 - 2024-25 season) 05/20/2025 (Originally 9/1/2024)   • Influenza Vaccine (1) 06/30/2025 (Originally 9/1/2024)   • DXA SCAN  10/30/2025   • Depression Screening  02/20/2026   • Cervical Cancer Screening  07/14/2027   • DTaP,Tdap,and Td Vaccines (2 - Td or Tdap) 09/29/2027   • Colorectal Cancer Screening  12/17/2029   • HIV Screening  Completed   • Hepatitis C Screening  Completed   • Zoster Vaccine  Completed   • Meningococcal B Vaccine  Aged Out   • RSV Vaccine age 0-20 Months  Aged Out   • Pneumococcal Vaccine: Pediatrics (0 to 5 Years) and At-Risk Patients (6 to 64 Years)  Aged Out   • HIB Vaccine  Aged Out   • IPV Vaccine  Aged Out   • Hepatitis A Vaccine  Aged Out   • Meningococcal ACWY Vaccine  Aged Out   • HPV Vaccine  Aged Out     Return in about 1 year (around 2/20/2026) for Annual physical.  There are no Patient Instructions on file for this visit.         Encounter for screening mammogram for breast cancer  Update mammogram  Orders:  •  Mammo screening bilateral w 3d and cad; Future    Seronegative arthropathy of multiple sites (HCC)  Following with rheumatology       Patient will continue to follow-up with her specialists.  She also intends on following up with a functional medicine provider.    Return in about 1 year (around 2/20/2026) for Annual physical.    Subjective:   Chanel is a 59 y.o. female here today for a follow-up on her current medical conditions:    Patient Active Problem List   Diagnosis   • Fibromyalgia   • Hypercholesteremia   • Migraine without aura and  "without status migrainosus, not intractable   • Persistent insomnia   • Vitamin D deficiency   • Low ferritin   • Benign essential hypertension   • Carpal tunnel syndrome, bilateral   • Syncopal episodes   • Sleep disturbance   • Overweight   • Primary generalized (osteo)arthritis   • Cervical spondylosis without myelopathy   • Gastroesophageal reflux disease without esophagitis   • History of fetal loss   • Undifferentiated connective tissue disease (HCC)   • Obstructive sleep apnea-hypopnea syndrome   • Excessive daytime sleepiness   • Primary osteoarthritis of left knee   • Tendinitis of right rotator cuff   • Seronegative arthropathy of multiple sites (HCC)   • History of colon polyps   • MDD (major depressive disorder), recurrent episode, mild (HCC)   • Anxiety and depression   • Left knee pain   • Osteopenia of multiple sites   • S/P total knee arthroplasty, left   • Lateral epicondylitis of left elbow   • Arthritis of carpometacarpal (CMC) joint of both thumbs   • Primary osteoarthritis of first carpometacarpal joint of left hand   • Bilateral hand numbness         Patient Care Team:  Amanda Clarke DO as PCP - General (Family Medicine)  Morro Severino DO (Cardiology)  Alka Freeman MD (Rheumatology)  Beverly Romano MD (Neurology)  Shashi Barrow MD (Dermatology)  Jan Yoder MD (Otolaryngology)  Dylan Dumont DO (Pain Medicine)  Morro Severino DO (Cardiology)    Current Medications:  Current Outpatient Medications   Medication Sig Dispense Refill   • acetaminophen (TYLENOL) 500 mg tablet Take 500-1,000 mg by mouth every 6 (six) hours as needed for mild pain     • Aspirin-Acetaminophen-Caffeine (EXCEDRIN MIGRAINE PO) Take 1 tablet by mouth as needed (migraines)     • Cholecalciferol (Vitamin D) 50 MCG (2000 UT) CAPS Take 1 capsule by mouth in the morning \"VEV Vit D3 with K2 82441 IU\"     • ferrous sulfate 324 (65 Fe) mg Take 1 tablet (324 mg total) by mouth 2 (two) times a day before " meals 60 tablet 0   • MAGNESIUM GLYCINATE PO Take 350 mg by mouth 4 times a day     • omeprazole (PriLOSEC) 20 mg delayed release capsule TAKE 1 CAPSULE(20 MG) BY MOUTH DAILY 90 capsule 1   • SUMAtriptan (IMITREX) 100 mg tablet TAKE 1 TABLET(100 MG) BY MOUTH 1 TIME AS NEEDED FOR MIGRAINE. MAY REPEAT 1 TIME IN 2 HOURS. MAX 2/24 HOURS, 9 PER MONTH 9 tablet 12     No current facility-administered medications for this visit.       HPI:  Chief Complaint   Patient presents with   • Physical Exam     No questions or concerns.     -- Above per clinical staff and reviewed. --    PHQ-2/9 Depression Screening    Little interest or pleasure in doing things: 0 - not at all  Feeling down, depressed, or hopeless: 0 - not at all  Trouble falling or staying asleep, or sleeping too much: 0 - not at all  Feeling tired or having little energy: 0 - not at all  Poor appetite or overeatin - not at all  Feeling bad about yourself - or that you are a failure or have let yourself or your family down: 0 - not at all  Trouble concentrating on things, such as reading the newspaper or watching television: 0 - not at all  Moving or speaking so slowly that other people could have noticed. Or the opposite - being so fidgety or restless that you have been moving around a lot more than usual: 0 - not at all  Thoughts that you would be better off dead, or of hurting yourself in some way: 0 - not at all  PHQ-9 Score: 0  PHQ-9 Interpretation: No or Minimal depression             Physical due 2025  Her  was diagnosed with prostate cancer recently - awaiting appointment with urology to review.   - declines flu, COVID.   - last visit requested to see functional medicine provider and was considering medical marijana.     - follows with ENT, cardio,  orthopedics, rheumatology, physical therapy, and pain management.neurologist.       2024 labs chol 246 to 222, TG 80, HDL 54,  to 152, CMP normal. CBC normal   Jan. 6 kids -  "Harmeet, Nick, Giovanni, Jan, Misha currie, Linda.     - cannot tolerate CPAP   - has not tolerated mood meds  - parotid tumor  CT seing followed by ENT. Possible [place] syndrome. Last MRI 8/24/24  - home BP not accurate.   Today:  Some stress eating, healthy in between, meal preps    Not exercising much   Sleep 6 - 8 hours   Dentist twice a years    Found a functional medicine doctor   Needs to make an appointment for a sleep study   Plans to see ENT soon   Holds her breath a lot   Puppy last year x 2     The following portions of the patient's history were reviewed and updated as appropriate: allergies, current medications, past family history, past medical history, past social history, past surgical history and problem list.    Objective:  Vitals:  /92 (Patient Position: Sitting, Cuff Size: Standard)   Pulse 83   Temp (!) 97.4 °F (36.3 °C) (Temporal)   Ht 5' 1\" (1.549 m)   Wt 67 kg (147 lb 12.8 oz)   LMP 08/31/2019 (Exact Date)   SpO2 96%   BMI 27.93 kg/m²    Wt Readings from Last 3 Encounters:   02/20/25 67 kg (147 lb 12.8 oz)   09/13/24 63.7 kg (140 lb 6.4 oz)   08/30/24 65.3 kg (144 lb)      BP Readings from Last 3 Encounters:   02/20/25 136/92   09/13/24 132/86   08/13/24 128/64        Review of Systems   She has no other concerns. No unexpected weight changes. No chest pain, SOB, or palpitations. No GERD. No changes in bowels or bladder. Not sleeping well. No mood changes. + FM pain + snoring     Physical Exam   Constitutional:  she appears well-developed and well-nourished.  HENT: Head: Normocephalic.   Neck: Neck supple.   Cardiovascular: Normal rate, regular rhythm and normal heart sounds.   Pulmonary/Chest: Effort normal and breath sounds normal. No wheezes, rales, or rhonchi.   Abdominal: Soft. Bowel sounds are normal. There is no tenderness. No hepatosplenomegaly.   Musculoskeletal: she exhibits no edema.   Lymphadenopathy: she has no cervical adenopathy.   Neurological: she is alert and " oriented to person, place, and time.   Skin: Skin is warm and dry.   Psychiatric: she has a normal mood and affect. her behavior is normal. Thought content normal.

## 2025-04-09 ENCOUNTER — HOSPITAL ENCOUNTER (OUTPATIENT)
Dept: MAMMOGRAPHY | Facility: MEDICAL CENTER | Age: 60
Discharge: HOME | End: 2025-04-09
Payer: COMMERCIAL

## 2025-04-09 VITALS — WEIGHT: 147 LBS | BODY MASS INDEX: 27.75 KG/M2 | HEIGHT: 61 IN

## 2025-04-09 DIAGNOSIS — Z12.31 ENCOUNTER FOR SCREENING MAMMOGRAM FOR BREAST CANCER: ICD-10-CM

## 2025-04-09 PROCEDURE — 77067 SCR MAMMO BI INCL CAD: CPT

## 2025-04-09 PROCEDURE — 77063 BREAST TOMOSYNTHESIS BI: CPT

## 2025-04-14 ENCOUNTER — RESULTS FOLLOW-UP (OUTPATIENT)
Dept: FAMILY MEDICINE CLINIC | Facility: CLINIC | Age: 60
End: 2025-04-14

## 2025-04-15 DIAGNOSIS — R10.13 EPIGASTRIC PAIN: ICD-10-CM

## 2025-04-15 RX ORDER — OMEPRAZOLE 20 MG/1
20 CAPSULE, DELAYED RELEASE ORAL DAILY
Qty: 90 CAPSULE | Refills: 1 | Status: SHIPPED | OUTPATIENT
Start: 2025-04-15

## 2025-06-02 ENCOUNTER — OFFICE VISIT (OUTPATIENT)
Dept: OBGYN CLINIC | Facility: MEDICAL CENTER | Age: 60
End: 2025-06-02
Payer: COMMERCIAL

## 2025-06-02 ENCOUNTER — APPOINTMENT (OUTPATIENT)
Dept: RADIOLOGY | Facility: MEDICAL CENTER | Age: 60
End: 2025-06-02
Attending: ORTHOPAEDIC SURGERY
Payer: COMMERCIAL

## 2025-06-02 ENCOUNTER — OFFICE VISIT (OUTPATIENT)
Dept: NEUROLOGY | Facility: CLINIC | Age: 60
End: 2025-06-02
Payer: COMMERCIAL

## 2025-06-02 VITALS
SYSTOLIC BLOOD PRESSURE: 138 MMHG | DIASTOLIC BLOOD PRESSURE: 82 MMHG | HEART RATE: 76 BPM | HEIGHT: 61 IN | WEIGHT: 143 LBS | BODY MASS INDEX: 27 KG/M2 | OXYGEN SATURATION: 95 %

## 2025-06-02 VITALS — WEIGHT: 143 LBS | HEIGHT: 61 IN | BODY MASS INDEX: 27 KG/M2

## 2025-06-02 DIAGNOSIS — G47.33 OSA (OBSTRUCTIVE SLEEP APNEA): ICD-10-CM

## 2025-06-02 DIAGNOSIS — G43.E09 CHRONIC MIGRAINE WITH AURA WITHOUT STATUS MIGRAINOSUS, NOT INTRACTABLE: Primary | ICD-10-CM

## 2025-06-02 DIAGNOSIS — Z96.652 S/P TOTAL KNEE ARTHROPLASTY, LEFT: Primary | ICD-10-CM

## 2025-06-02 DIAGNOSIS — Z96.652 S/P TOTAL KNEE ARTHROPLASTY, LEFT: ICD-10-CM

## 2025-06-02 PROCEDURE — 99215 OFFICE O/P EST HI 40 MIN: CPT | Performed by: PSYCHIATRY & NEUROLOGY

## 2025-06-02 PROCEDURE — 99213 OFFICE O/P EST LOW 20 MIN: CPT | Performed by: ORTHOPAEDIC SURGERY

## 2025-06-02 PROCEDURE — 73562 X-RAY EXAM OF KNEE 3: CPT

## 2025-06-02 RX ORDER — SUMATRIPTAN SUCCINATE 100 MG/1
TABLET ORAL
Qty: 9 TABLET | Refills: 11 | Status: SHIPPED | OUTPATIENT
Start: 2025-06-02

## 2025-06-02 NOTE — PATIENT INSTRUCTIONS
"Try every way you can to treat the sleep apnea   Generally we do not recommend estrogen in the setting of possible migraine aura, due to stroke risk         -If you feel like you could sleep on your back that night only, certainly could try to sleep on your back for the sleep study, but not if you feel like you cannot sleep this way.  Just getting sleep is the most important thing, as the machine thinks you are sleeping the entire time it is plugged in. Otherwise, we discussed home sleep study can underestimate the problem.  If it comes back that you almost have sleep apnea or other sleep disorder, but not meeting criteria, we could consider in lab study and reach out to me if you would like this ordered before next visit.    I am placing a referral for a sleep study and if it is abnormal, I will be happy to place a referral to the sleep medicine specialist team to further evaluate your sleep issues. That can be done now or anytime you would like, but you do not have to see them until after the results.    Please call [phone] OR can schedule on [employer] via \"scheduling ticket\" to schedule the sleep study and we discussed due to the new order being labeled \" ambulatory referral to sleep medicine\" you have to wait 2-6 days for the sleep team to turn this referral into the actual sleep study order that you can schedule, otherwise if you call now they may say there is no order for sleep study, just an order for a referral, because they may not see it yet on their end as a study order.  The referral to sleep medicine piece is only if there is abnormalities and you need to see them afterwards.    After the sleep study is completed if there is abnormal results that need treatment, I recommend you see one of the following providers in our office:  Sreedhar Glynn " PA-C      Headache/migraine treatment:   Abortive medications (for immediate treatment of a headache):   It is ok to take ibuprofen, acetaminophen or naproxen (Advil, Tylenol,  Aleve, Excedrin) if they help your headaches you should limit these to No more than 3 times a week to avoid medication overuse/rebound headaches.     For your more moderate to severe migraines take this medication early   Sumatriptan/imitrex 100mg tabs - take one at the onset of headache. May repeat one time after 1-2 hours if pain has not resolved.   (Max 2 a day and 9 a month)       Over the counter preventive supplements for headaches/migraines (if you try, try for 3 months straight)  (to take every day to help prevent headaches - not to take at the time of headache):  There are combo pills online of these - none of which regulated by FDA and double check dosing - take appropriate dose only once a day- preventa migraine, migravent, mind ease, migrelief   [] Magnesium 400mg daily (If any diarrhea or upset stomach, decrease dose  as tolerated)  [] Riboflavin (Vitamin B2) 400mg daily - try online   (FYI B2 may make your urine bright/neon yellow)  AND/OR  [] Herbal medication: Petasites/Butterbur 150 mg daily - try online  (When choosing your Butterbur online or in the store, beware that there are some poor preps containing pyrrolizidine alkaloids (PAs) that can be harmful to the liver. Therefore, do not use butterbur products that are not labeled as PA-free.)      Prescription preventive medications for headaches/migraines   (to take every day to help prevent headaches - not to take at the time of headache):  [x] We have options if needed      *Typically these types of medications take time untill you see the benefit, although some may see improvement in days, often it may take weeks, especially if the medication is being titrated up to a beneficial level. Please contact us if there are any concerns or questions regarding the medication.      Lifestyle Recommendations:  [x] SLEEP - Maintain a regular sleep schedule: Adults need at least 7-8 hours of uninterrupted a night. Maintain good sleep hygiene:  Going to bed and waking up at consistent times, avoiding excessive daytime naps, avoiding caffeinated beverages in the evening, avoid excessive stimulation in the evening and generally using bed primarily for sleeping.  One hour before bedtime would recommend turning lights down lower, decreasing your activity (may read quietly, listen to music at a low volume). When you get into bed, should eliminate all technology (no texting, emailing, playing with your phone, iPad or tablet in bed).  [x] HYDRATION - Maintain good hydration.  Drink  2L of fluid a day (4 typical small water bottles)  [x] DIET - Maintain good nutrition. In particular don't skip meals and try and eat healthy balanced meals regularly.  [x] TRIGGERS - Look for other triggers and avoid them: Limit caffeine to 1-2 cups a day or less. Avoid dietary triggers that you have noticed bring on your headaches (this could include aged cheese, peanuts, MSG, aspartame and nitrates).  [x] EXERCISE - physical exercise as we all know is good for you in many ways, and not only is good for your heart, but also is beneficial for your mental health, cognitive health and  chronic pain/headaches. I would encourage at the least 5 days of physical exercise weekly for at least 30 minutes.     Education and Follow-up  [x] Please call with any questions or concerns. Of course if any new concerning symptoms go to the emergency department.  [x] Follow up 3-4 months to review sleep study together, sooner if needed   But if sleep study is not done by then can push back to after sleep study

## 2025-06-02 NOTE — ASSESSMENT & PLAN NOTE
Orders:    XR knee 3 vw left non injury; Future    Ambulatory Referral to Physical Therapy; Future

## 2025-06-02 NOTE — PROGRESS NOTES
Neurology Ambulatory Visit  Name: Chanel Grande       : 1965       MRN: [id number]   Encounter Provider: Beverly Romano MD   Encounter Date: 2025  Encounter department: [facility]      :  Assessment & Plan  Chronic migraine with aura without status migrainosus, not intractable    Assessment/Plan:   Chanel Grande is a delightful 59 y.o. 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  My initial evaluation 2021    Chronic migraine with and without aura without status migrainosus, not intractable  Moderate VIKAS not on CPAP, hypoxia (Home study, 2022, 106 events, 17 obstructive, 89 hypopneas, BUNNY 16.2, oxygen down to 81%, hypoxia noted with 11% of recording time with oxygen below 90%) -following up with sleep medicine  She reports a long history of headaches and migraines dating back to age 17. She reports the pain is typically bilateral and various locations as discussed in HPI.  She reports sometimes she has a possible aura and otherwise has typical associated migrainous features without significant autonomic features.  - as of 2021: chronic daily headaches for years, severe migraines 1-2 times a month that don't even respond to sumatriptan, migraines 10 days a month. Trial of emgality for prevention. Continue sumatriptan for abortive.   - as of 2021: She reports headaches have improved to 3-4 milder migraines per week on emgality, Also severity of migraines has significantly improved as well, no longer preventing her from working. Trial of nurtec for abortive to see if this works better than  sumatriptan.   - as of 4/1/2022: She self discontinued emgality about Jan or Feb, but still feels like headaches and migraines are better, daily milder headaches she feels is related her fibro, worse migraines once every 3 weeks. Sleep study pending. Would consider botox next if she would like a alternative prescription preventative. Sumatriptan for abortive.   - as of 8/15/2022: She was found to have moderate VIKAS and is struggling to tolerate CPAP. Headaches have improved from daily to a few times a week and worse migraines about 5-6 a month that respond to sumatriptan. Severity decreased as well, once every couple months is worse only now.  - as of 2/17/2023: She reports headaches have been daily for the past month and prior to that 2-3 times a week in the setting of untreated moderate VIKAS and she would like to add trial of ajovy.  We discussed if this does not help enough we could add trial of topiramate as this can sometimes help more in the setting of untreated VIKAS.  Continue sumatriptan for migraine rescue.  - as of 6/2/2025: She reports overall she has been doing well off of prescription migraine preventatives, she does not recall if she ever tried Ajovy, no she tried 1 shot but that was likely Emgality as I do not see we were ever sent the prior authorization for Ajovy when I last saw her over 2 years ago, she did not like shots anyway.  She reports being lost to follow-up in part wanting to focus on 1 thing at a time, otherwise  was diagnosed with prostate cancer and now is doing much better.  She reports 5-9 migraines a month that resolved with sumatriptan/Imitrex which often works on its own and if it does not she will take 2 Excedrin with it.  She also has had some sinus issues lately, following with ENT.  She is not currently interested in prescription preventative.  We discussed the best natural preventative she could do would be treating her moderate sleep apnea with hypoxia at night and we  discussed again the morbidity mortality related to this and all the things that could be impacting.  She tried CPAP x 2 and could not tolerate it.  She would be interested in inspire trial and already follows with Dr. Dave's office seeing Dr. Yoder for the left pharyngeal mass.  She has lost weight and we discussed if she has less than 15 apneas per hour she would not currently qualify for the inspire device until it has broader coverage in the future.  She also discontinued since last visit lisinopril, Wellbutrin and magnesium glycinate as she is looking for natural brand without additives.  She is still taking ferrous sulfate, plan seen and natural provider.  She is open to repeat sleep study and recommended following up with sleep medicine if abnormal.    Workup:  - MRI brain with without contrast 10/07/2021:  1.  No acute infarction, intracranial hemorrhage or enhancing mass lesion.  2.  Small, 1.1 cm arachnoid cyst in the right cerebellopontine angle cistern.   - ordered by others - just noting - EMG 5/28/2024 of bilateral upper extremities: EMG nerve conduction study of the arms revealed mild right sided carpal tunnel syndrome.  There is no electrical evidence of cervical radiculopathy or ulnar neuropathy.  Left side was electrically normal.  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 is within differential consideration -> following with ENT    Preventative:  - we discussed headache hygiene and lifestyle factors that may improve headaches  - We discussed headache preventative supplements she could try, as a follow-up 6025 she reports that she stopped magnesium glycinate - stopped due to concern for rec brand,  not taking riboflavin -she could resume it anytime if interested  - on through other providers: discontinued since last visit as of 6-25     Iron/ferrous sulfate 324 (65 Fe) and D2 10,000 +K3 200 mcg  - Past/ failed/contraindicated:   Gabapentin, amitriptyline, propranolol, lisinopril, emgality seemed to help initially and then she felt less so and anxiety provoking painful shot and self discontinued 1/2022, Hydrochlorothiazide, duloxetine , wellbutrin, ajovy - she doesn't recall if she took -either way the shots were not tolerated causing anxiety  - future options: alternative CGRP such as Qulipta, botox-does not like needles    Abortive:  - discussed not taking over-the-counter or prescription pain medications more than 3 days per week to prevent medication overuse/rebound headache  -     SUMAtriptan (IMITREX) 100 mg tablet; 1 tab by mouth as needed for migraine may repeat in 2 hours, max 2 per 24 hours, 3/week, 9/month. Discussed proper use, off label use with certain meds, possible side effects and risks.  - Past/ failed/contraindicated: fioricet, Fiorinal, ubrelvy, nurtec, decadron for 5 days did not seem to help, tizanidine, naproxen, treximet, rizatriptan  - future options:  prochlorperazine, Toradol IM or p.o., could consider trial for 5 days of Depakote 4 prolonged migraine,  reyvow    VIKAS (obstructive sleep apnea)  -     Ambulatory Referral to Sleep Medicine; Future. This is not a referral for sleep medicine only, it is referral for a sleep study and follow up with sleep medicine specialist if abnormal.      Patient instructions      Try every way you can to treat the sleep apnea   Generally we do not recommend estrogen in the setting of possible migraine aura, due to stroke risk       -If you feel like you could sleep on your back that night only, certainly could try to sleep on your back for the sleep study, but not if you feel like you cannot sleep this way.  Just getting sleep is the most important  "thing, as the machine thinks you are sleeping the entire time it is plugged in. Otherwise, we discussed home sleep study can underestimate the problem.  If it comes back that you almost have sleep apnea or other sleep disorder, but not meeting criteria, we could consider in lab study and reach out to me if you would like this ordered before next visit.    I am placing a referral for a sleep study and if it is abnormal, I will be happy to place a referral to the sleep medicine specialist team to further evaluate your sleep issues. That can be done now or anytime you would like, but you do not have to see them until after the results.    Please call [phone] OR can schedule on [id number] via \"scheduling ticket\" to schedule the sleep study and we discussed due to the new order being labeled \" ambulatory referral to sleep medicine\" you have to wait 2-6 days for the sleep team to turn this referral into the actual sleep study order that you can schedule, otherwise if you call now they may say there is no order for sleep study, just an order for a referral, because they may not see it yet on their end as a study order.  The referral to sleep medicine piece is only if there is abnormalities and you need to see them afterwards.    After the sleep study is completed if there is abnormal results that need treatment, I recommend you see one of the following providers in our office:  Sreedhar Glynn PA-C      Headache/migraine treatment:   Abortive medications (for immediate treatment of a headache):   It is ok to take ibuprofen, acetaminophen or naproxen (Advil, Tylenol,  Aleve, Excedrin) if they help your headaches you should limit these to No more than 3 times a week to avoid medication overuse/rebound headaches.     For your more moderate to severe migraines take this " medication early   Sumatriptan/imitrex 100mg tabs - take one at the onset of headache. May repeat one time after 1-2 hours if pain has not resolved.   (Max 2 a day and 9 a month)       Over the counter preventive supplements for headaches/migraines (if you try, try for 3 months straight)  (to take every day to help prevent headaches - not to take at the time of headache):  There are combo pills online of these - none of which regulated by FDA and double check dosing - take appropriate dose only once a day- preventa migraine, migravent, mind ease, migrelief   [] Magnesium 400mg daily (If any diarrhea or upset stomach, decrease dose  as tolerated)  [] Riboflavin (Vitamin B2) 400mg daily - try online   (FYI B2 may make your urine bright/neon yellow)  AND/OR  [] Herbal medication: Petasites/Butterbur 150 mg daily - try online  (When choosing your Butterbur online or in the store, beware that there are some poor preps containing pyrrolizidine alkaloids (PAs) that can be harmful to the liver. Therefore, do not use butterbur products that are not labeled as PA-free.)      Prescription preventive medications for headaches/migraines   (to take every day to help prevent headaches - not to take at the time of headache):  [x] We have options if needed      *Typically these types of medications take time untill you see the benefit, although some may see improvement in days, often it may take weeks, especially if the medication is being titrated up to a beneficial level. Please contact us if there are any concerns or questions regarding the medication.     Lifestyle Recommendations:  [x] SLEEP - Maintain a regular sleep schedule: Adults need at least 7-8 hours of uninterrupted a night. Maintain good sleep hygiene:  Going to bed and waking up at consistent times, avoiding excessive daytime naps, avoiding caffeinated beverages in the evening, avoid excessive stimulation in the evening and generally using bed primarily for sleeping.   One hour before bedtime would recommend turning lights down lower, decreasing your activity (may read quietly, listen to music at a low volume). When you get into bed, should eliminate all technology (no texting, emailing, playing with your phone, iPad or tablet in bed).  [x] HYDRATION - Maintain good hydration.  Drink  2L of fluid a day (4 typical small water bottles)  [x] DIET - Maintain good nutrition. In particular don't skip meals and try and eat healthy balanced meals regularly.  [x] TRIGGERS - Look for other triggers and avoid them: Limit caffeine to 1-2 cups a day or less. Avoid dietary triggers that you have noticed bring on your headaches (this could include aged cheese, peanuts, MSG, aspartame and nitrates).  [x] EXERCISE - physical exercise as we all know is good for you in many ways, and not only is good for your heart, but also is beneficial for your mental health, cognitive health and  chronic pain/headaches. I would encourage at the least 5 days of physical exercise weekly for at least 30 minutes.     Education and Follow-up  [x] Please call with any questions or concerns. Of course if any new concerning symptoms go to the emergency department.  [x] Follow up 3-4 months to review sleep study together, sooner if needed   But if sleep study is not done by then can push back to after sleep study   Orders:    SUMAtriptan (IMITREX) 100 mg tablet; TAKE 1 TABLET(100 MG) BY MOUTH 1 TIME AS NEEDED FOR MIGRAINE. MAY REPEAT 1 TIME IN 2 HOURS. MAX 2/24 HOURS, 3 per week, 9 PER MONTH    VIKAS (obstructive sleep apnea)    Orders:    Ambulatory Referral to Sleep Medicine; Future        CC:   We had the pleasure of evaluating Chanel Grande in neurological consultation today. Chanel Grande presents today for evaluation of headaches.   History obtained from patient as well as available medical record review.  History of Present Illness:   Interval history as of 6/2/2025  - Of note/managed by others:   - she was  asked to return in 3-4 months and is now following up over 2 years later  Please see EMR for details since last visit which include following up with orthopedics for left knee, PCP for multiple reasons, admitted for left knee to orthopedics 5/16/2023 with anesthesia arthroplasty total knee, following with PT in the time afterwards, -> was focusing on this for a long time -> the pain has never quite gone away (was bone on bone, pain a little better, not limping)  - following with pain management for nerve block/ablation discussion late 2023, ER for migraine 1/1/2024, EMG diagnosing right carpal tunnel syndrome 5/28/2024, PT for chronic neck pain/cervical radiculopathy contributing to headaches, MR head and spine for lesion of parotid gland, cervical radiculitis  -Pre-COVID had MRI that 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, potential sinus infection, updated MRI showed minimal growth, headaches are mostly on the left side 90% of the time, ENT did not think it was likely related, thought maybe though she could have Yocha Dehe syndrome and recommended removing hyoid bone/send please see my detailed response 1/17/2025 and wrote that Yocha Dehe syndrome certainly could contribute to headaches and migraines, so could the untreated sleep apnea with 106 apneas oxygen dropping to 81%  - fibromyalgia is worse  -  diagnosed with prostate cancer due to rising PSA at age 62, PCP is great, Cancer center is great   - no significant new or concerning neurologic symptoms since last visit reported  - diagnostics of note (please see EMR for others/details):   - ordered by others - just noting - EMG 5/28/2024 of bilateral upper extremities: EMG nerve conduction study of the arms revealed mild right sided carpal tunnel syndrome.  There is no electrical evidence of cervical radiculopathy or ulnar neuropathy.  Left side was electrically normal.    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 is within differential consideration -> following with ENT    - sleep: = Moderate VIKAS not on CPAP (Home study, 5/29/2022, 106 events, 17 obstructive, 89 hypopneas, BUNNY 16.2, oxygen down to 81%, hypoxia noted with 11% of recording time with oxygen below 90%) -following up with sleep medicine for CPAP - too much anxiety and couldn't tolerate x2,   She reports pain attention more notices she has breath holding during the day   History of sleep apnea in the past with significant weight loss  She has witnessed apneas at night  snoring   Tiredness not explained by other reasons during the day, can crash in the afternoon  Can wake up with headaches and migraines    Headaches and migraines   5-9 a month that require sumatriptan  Sinuses have been bad the past few months - sudafed sinus     She said her migraines have been up and down with the weather.   Sumatriptan sometimes works on its own and other times may take 2 Excedrin with it    Preventative:    - Trial of Ajovy started last visit in February 2023 - doesn't recall if she takes it   - on through other providers: discontinued since last visit   stopped magnesium glycinate - stopped due to concern for rec brand,  Iron/ferrous sulfate 324 (65 Fe) and D2 10,000 +K3 200 mcg    Abortive:   Sumatriptan sometimes works on its own and other times may take 2 Excedrin with it  No reported bothersome side effects      Please see previous notes/EMR for details from previous visits.     Objective     Physical Exam:                                                                 Vitals:            Constitutional:    /82 (BP Location: Left arm, Patient Position: Sitting, Cuff Size: Adult)   Pulse 76   Ht 5' 1\" " (1.549 m)   Wt 64.9 kg (143 lb)   LMP 08/31/2019 (Exact Date)   SpO2 95%   BMI 27.02 kg/m²   BP Readings from Last 3 Encounters:   06/02/25 138/82   02/20/25 136/92   09/13/24 132/86     Pulse Readings from Last 3 Encounters:   06/02/25 76   02/20/25 83   09/13/24 67         Well developed, well nourished, well groomed.        Psychiatric:  Normal behavior and appropriate affect        Able to answer questions appropriately, provide history of recent events   Normal language and spontaneous speech.  facial expression symmetric  symmetric bulk throughout. no atrophy, fasciculations or significant abnormal movements noted during our visit from observation.   steady casual gait       Administrative Statements   I have spent a total time of 54 minutes in caring for this patient on the day of the visit/encounter including Diagnostic results, Prognosis, Risks and benefits of tx options, Instructions for management, Patient education, Importance of tx compliance, Risk factor reductions, Impressions, Counseling / Coordination of care, Documenting in the medical record, Reviewing/placing orders in the medical record (including tests, medications, and/or procedures), Obtaining or reviewing history  , and Communicating with other healthcare professionals .

## 2025-06-02 NOTE — PROGRESS NOTES
"Orthopaedic Surgery - Office Note  Chanel Grande (59 y.o. female)   : 1965   MRN: [id number]  Encounter Date: 2025    Assessment & Plan  S/P total knee arthroplasty, left    Orders:    XR knee 3 vw left non injury; Future    Ambulatory Referral to Physical Therapy; Future        Assessment / Plan  2 years s/p Left TKA on 2023    Activity as tolerated  Begin outpatient PT for quadriceps strengthening and IT band tightness. Incorporate core and lumbar protocol.  Anti-inflammatories or Tylenol prn pain  X-rays obtained and reviewed with patient  Follow-up:  Return if symptoms worsen or fail to improve.      Chief Complaint / Date of Onset  Left knee pain   Injury Mechanism / Date  None  Surgery / Date  Left TKA on 2023    History of Present Illness   Chanel Grande is a 59 y.o. female who presents for follow up s/p left TKA on 2023. Since her previous visit, she notes an onset of left knee pain while straightening her legs out at night over the past 6 weeks. The pain is located in the posterior and lateral knee and a cramping sensation in the calf rated a 5/10. She also notes feelings of weakness in which the knee gives out while descending stairs and walking. She denies further trauma or injury to the left knee. There is occasional numbness in the lower left extremity.     Treatment Summary  Medications / Modalities  Tylenol and Aleve prn  Bracing / Immobilization  None  Physical Therapy  Previously attended   Injections  Prior history of L knee CSI - most recent on 2022  Prior Surgeries  As noted in HPI  Other Treatments  None      Sport / Organization / Current Status  Active      Review of Systems  Pertinent items are noted in HPI.  All other systems were reviewed and are negative.      Physical Exam  Ht 5' 1\" (1.549 m)   Wt 64.9 kg (143 lb)   LMP 2019 (Exact Date)   BMI 27.02 kg/m²   Cons: Appears well.  No apparent distress.  Psych: Alert. Oriented x3.  Mood and " affect normal.  Eyes: PERRLA, EOMI  Resp: Normal effort.  No audible wheezing or stridor.  CV: Palpable pulse.  No discernable arrhythmia.  No LE edema.  Lymph:  No palpable cervical, axillary, or inguinal lymphadenopathy.  Skin: Warm.  No palpable masses.  No visible lesions.  Neuro: Normal muscle tone.  Normal and symmetric DTR's.     Left Knee Exam  Alignment:  Normal knee alignment.  Inspection:  No swelling. No erythema. No ecchymosis. Incision healed. Quadriceps asymmetry when compared to contralateral side.   Palpation:  Distal IT Band and medial calf tenderness.  ROM:  Knee Extension 0. Knee Flexion 125.  Strength:  5/5 quadriceps and hamstrings. Able to SLR without lag.  Stability:  Stable to varus/valgus stress at 0,30, and 90  Tests:  No pertinent positive or negative tests.  Patella:  Normal patellar mobility.  Neurovascular:  Sensation intact in DP/SP/Tate/Sa/T nerve distributions.  2+ DP & PT pulses.  Gait:  Normal.       Studies Reviewed  XR of left knee - Images from 6/2/2205 demonstrate no acute fracture or dislocation. Stable alignment of prosthesis with no signs of loosening.      Procedures  No procedures today.    Medical, Surgical, Family, and Social History  The patient's medical history, family history, and social history, were reviewed and updated as appropriate.    Past Medical History[1]    Past Surgical History[2]    Family History[3]    Social History     Occupational History    Occupation: Administration   Tobacco Use    Smoking status: Never     Passive exposure: Never    Smokeless tobacco: Never   Vaping Use    Vaping status: Never Used   Substance and Sexual Activity    Alcohol use: Not Currently     Comment: rarely    Drug use: Never    Sexual activity: Yes     Partners: Male     Birth control/protection: Post-menopausal, Female Sterilization       Allergies[4]    Current Medications[5]      Addy Lundy    Scribe Attestation      I,:  Addy Lundy am acting as a scribe while in  the presence of the attending physician.:       I,:  Alex Cesar MD personally performed the services described in this documentation    as scribed in my presence.:                   [1]   Past Medical History:  Diagnosis Date    Allergic 02/01/2020    Anemia     Anesthesia complication     woke up during D/C    Anxiety     Arthritis     BMI 30.0-30.9,adult     Carpal tunnel syndrome on right     Cervical spondylosis without myelopathy     COVID 12/01/2021    COVID-19 12/3/2021    Depression     Fibromyalgia     Fibromyalgia, primary     GERD (gastroesophageal reflux disease)     Headache(784.0) 1982    History of fetal loss     Recurrent    Hyperlipidemia     Hypertension     Jaw pain     and both ears with migraines    Migraine     Neck pain     Osteoarthritis     lumbar spine    Sicca syndrome (HCC)     Sleep apnea     unable to use CPAP    Syncopal episodes     with severe migraines    Undifferentiated connective tissue disease (HCC)     Wears contact lenses     or glasses   [2]   Past Surgical History:  Procedure Laterality Date    CHOLECYSTECTOMY      COLONOSCOPY      DILATION AND CURETTAGE OF UTERUS      ORTHOPEDIC SURGERY  5/16/2023    Left knee replacement    AL ARTHRP KNE CONDYLE&PLATU MEDIAL&LAT COMPARTMENTS Left 05/16/2023    Procedure: ARTHROPLASTY KNEE TOTAL;  Surgeon: Alex Cesar MD;  Location: [facility];  Service: Orthopedics    TUBAL LIGATION     [3]   Family History  Problem Relation Name Age of Onset    MORGAN disease Mother Pat     Arthritis Mother Pat     Depression Mother Pat     Hypertension Mother Pat     Coronary artery disease Mother Pat     Migraines Mother Pat     Osteoporosis Mother Pat     Heart attack Father Diabetes 70    Diabetes Father Diabetes     Prostate cancer Father Diabetes 67    Hypertension Father Diabetes     Arthritis Father Diabetes     Stroke Father Diabetes     Cancer Father Diabetes         Prostate    ADD / ADHD Father Diabetes     Coronary artery  disease Father Diabetes     Lupus Sister Shelia     Raynaud syndrome Sister Shelia     Sjogren's syndrome Sister Shelia     Depression Sister Kizzy     ADD / ADHD Sister Kizzy     Anxiety disorder Sister Kizzy     No Known Problems Daughter      Breast cancer Maternal Grandmother Kristi 70    Arthritis Maternal Grandmother Kristi     Cancer Maternal Grandmother Kristi         Breast Cancer Survivor    Glaucoma Maternal Grandmother Kristi     Prostate cancer Maternal Grandfather Shiv 75    Arthritis Maternal Grandfather Shiv     Cancer Maternal Grandfather Shiv         Prostate Cancer    Colon cancer Paternal Grandmother Emilia 80    Cancer Paternal Grandmother Emilia         Colon Cancer    Alcohol abuse Paternal Grandfather Nael     ADD / ADHD Brother Andrea     OCD Brother Andrea     No Known Problems Son      No Known Problems Son      No Known Problems Son      No Known Problems Son      No Known Problems Son      No Known Problems Maternal Uncle      No Known Problems Paternal Aunt      No Known Problems Paternal Uncle     [4]   Allergies  Allergen Reactions    Propranolol Other (See Comments)     Halluncinations    Raspberry - Food Allergy Allergic Rhinitis, Itching and Nasal Congestion    Latex Rash   [5]   Current Outpatient Medications:     acetaminophen (TYLENOL) 500 mg tablet, Take 500-1,000 mg by mouth every 6 (six) hours as needed for mild pain, Disp: , Rfl:     Aspirin-Acetaminophen-Caffeine (EXCEDRIN MIGRAINE PO), Take 1 tablet by mouth as needed (migraines), Disp: , Rfl:     ferrous sulfate 324 (65 Fe) mg, Take 1 tablet (324 mg total) by mouth 2 (two) times a day before meals, Disp: 60 tablet, Rfl: 0    omeprazole (PriLOSEC) 20 mg delayed release capsule, TAKE 1 CAPSULE(20 MG) BY MOUTH DAILY, Disp: 90 capsule, Rfl: 1    SUMAtriptan (IMITREX) 100 mg tablet, TAKE 1 TABLET(100 MG) BY MOUTH 1 TIME AS NEEDED FOR MIGRAINE. MAY REPEAT 1 TIME IN 2 HOURS. MAX 2/24 HOURS, 9 PER MONTH, Disp: 9 tablet,  "Rfl: 12    Cholecalciferol (Vitamin D) 50 MCG (2000 UT) CAPS, Take 1 capsule by mouth in the morning \"VEV Vit D3 with K2 71112 IU\", Disp: , Rfl:     MAGNESIUM GLYCINATE PO, Take 350 mg by mouth 4 times a day, Disp: , Rfl:     "

## 2025-06-03 ENCOUNTER — TRANSCRIBE ORDERS (OUTPATIENT)
Dept: SLEEP CENTER | Facility: CLINIC | Age: 60
End: 2025-06-03

## 2025-06-03 DIAGNOSIS — G47.33 OSA (OBSTRUCTIVE SLEEP APNEA): Primary | ICD-10-CM

## 2025-06-10 ENCOUNTER — EVALUATION (OUTPATIENT)
Dept: PHYSICAL THERAPY | Facility: REHABILITATION | Age: 60
End: 2025-06-10
Attending: ORTHOPAEDIC SURGERY
Payer: COMMERCIAL

## 2025-06-10 DIAGNOSIS — M25.562 LEFT KNEE PAIN, UNSPECIFIED CHRONICITY: Primary | ICD-10-CM

## 2025-06-10 DIAGNOSIS — Z96.652 HISTORY OF TOTAL LEFT KNEE REPLACEMENT: ICD-10-CM

## 2025-06-10 PROCEDURE — 97161 PT EVAL LOW COMPLEX 20 MIN: CPT | Performed by: PHYSICAL THERAPIST

## 2025-06-10 NOTE — PROGRESS NOTES
"PT Evaluation     Today's date: 6/10/2025  Patient name: Chanel Grande  : 1965  MRN: [id number]  Referring provider: Alex Cesar, *  Dx:   Encounter Diagnosis     ICD-10-CM    1. Left knee pain, unspecified chronicity  M25.562       2. History of total left knee replacement  Z96.652                      Assessment    Assessment details: Chanel Grande is a 59 y.o. female referred to outpt PT with dx of left knee pain.  Pt presents with decrease in core and hip strength, positive valgus posturing of left LE during active and passive activities, and positive pain lateral knee and popliteal space.  Pt funct is limited with decrease in tolerance to negotiation of stairs especially descending and walking due to left leg giving out, diminished tolerance to sleep due to pain when changing from knee flexed position to extension, and positive sx's on and off throughout her daily tasks.  Pt would benefit from skilled PT to address these limitations and max funct.       Goals  Funct 1. No sleep disturbances x4 weeks.  2. Improve tolerance to amb and negotiation of stairs with no reports of left LE buckling x4 weeks.  Impairment 1. Decrease pain by 25 % x4 weeks  2. Increase strength by 1/2 grade x4 weeks.        Plan    Frequency: 1-2x week  Duration in weeks: 4        Subjective Evaluation    History of Present Illness  Mechanism of injury: Pt reports that she was having difficulty with straightening her knee in the middle of the night.  Pt reports that a few weeks ago she was dancing at her son's wedding when her left knee \"gave out\" and ended up falling onto her right knee.  Pt had x-ray performed with negative findings for fx, but does have positive fluid surrounding knee.  Pt also notes having positive cramping occurring bilat feet, left greater than right and sx's into left gastroc.  Pt has positive pain occurring lateral left knee and along lateral ITB.  Pt does also report having some back pain " "for multiple months and has been \"ignoring it.\" Pt notes lateral knee numbness occurring since surgery but denies changes or further progression of sx's.  Pt does note more frequent \"giving out\" but denies falling other than during her son's wedding.  Pt denies use of medication for pain control.    Patient Goals  Patient goals for therapy: decreased pain and independence with ADLs/IADLs    Pain  Current pain ratin        Objective     Active Range of Motion     Lumbar   Flexion:  WFL  Extension:  WFL  Left lateral flexion:  WFL  Right lateral flexion:  WFL  Left rotation:  WFL  Right rotation:  WFL    Additional Active Range of Motion Details  Pt reports central LB discomfort during trunk ROM in all directions but no reproducible left LE sx's.    Pt demo's normal knee ROM.  Pt does have positive tendreness surrounding joint.  Pt also demo's valgus posturing on left knee and IR of left hip.  Pt demo's similar posturing during sitting, and SL sleeping.  Pt has positive crepitus increase when releasing from valgus position which reproduces similar popliteal discomfort.      Strength/Myotome Testing     Left Hip   Planes of Motion   Flexion: 4  Extension: 4+  Abduction: 4  External rotation: 4+  Internal rotation: 4-    Right Hip   Planes of Motion   Flexion: 5    Left Knee   Flexion: 5  Extension: 4+    Left Ankle/Foot   Dorsiflexion: 5  Plantar flexion: 5    Additional Strength Details  Pt is able to perform SLS for balance bilat, does demo knee locking on left compared with right.    Pt also demo's similar valgus posture left LE when descending stairs.       Flowsheet Rows      Flowsheet Row Most Recent Value   PT/OT G-Codes    Current Score 70   Projected Score 70              Precautions: left TKA, migraine        Manual  6/10/25                                                                                   Neuro Re-Ed             Bridges tband abd          Clamshells tband           SL hip abd         "   Side stepping           SLR                                          Therex            Rec bike Maintain neutral                                                                                                                          Gait Training              lateral step up             Step down controlled single leg squat              Modalities

## 2025-06-17 ENCOUNTER — OFFICE VISIT (OUTPATIENT)
Dept: PHYSICAL THERAPY | Facility: REHABILITATION | Age: 60
End: 2025-06-17
Attending: ORTHOPAEDIC SURGERY
Payer: COMMERCIAL

## 2025-06-17 DIAGNOSIS — M25.562 LEFT KNEE PAIN, UNSPECIFIED CHRONICITY: Primary | ICD-10-CM

## 2025-06-17 DIAGNOSIS — Z96.652 HISTORY OF TOTAL LEFT KNEE REPLACEMENT: ICD-10-CM

## 2025-06-17 PROCEDURE — 97112 NEUROMUSCULAR REEDUCATION: CPT | Performed by: PHYSICAL THERAPIST

## 2025-06-17 PROCEDURE — 97110 THERAPEUTIC EXERCISES: CPT | Performed by: PHYSICAL THERAPIST

## 2025-06-17 NOTE — PROGRESS NOTES
"Daily Note     Today's date: 2025  Patient name: Chanel Grande  : 1965  MRN: [id number]  Referring provider: Alex Cesar, *  Dx:   Encounter Diagnosis     ICD-10-CM    1. Left knee pain, unspecified chronicity  M25.562       2. History of total left knee replacement  Z96.652                      Subjective: Pt reports that she cont to have some limitations of positioning during bed and wishes to review.        Objective: See treatment diary below    Precautions: left TKA, migraine        Manual  6/10/25  6/17/25                                                                                     Neuro Re-Ed             Bridges tband abd    gtb 5\"x20          Clamshells tband   Gtb 3\"2x10          SL hip abd    hep         Counter top (bar) planks  20\"x3          SLR    3\"2x10                                                    Therex             Rec bike Maintain neutral  5 mins           leg press single leg   NV                                                                                                            Gait Training              lateral step up   L2 x10 SLOW         Step down controlled single leg squat                  Modalities                                                              Assessment: Pt encouraged to focus on neutral positioning of left knee through session, including during rec bike which pt initiates valgus posture with.  Focused on glute and hip strength throughout session with inclusion of TrA contraction to assist with maintaining neutral spine.  Pt did well with higher surface plank and encouraged ot use mirror for biofeedback or camera on phone.        Plan: Continue per plan of care.           "

## 2025-06-20 ENCOUNTER — OFFICE VISIT (OUTPATIENT)
Dept: PHYSICAL THERAPY | Facility: REHABILITATION | Age: 60
End: 2025-06-20
Attending: ORTHOPAEDIC SURGERY
Payer: COMMERCIAL

## 2025-06-20 DIAGNOSIS — Z96.652 HISTORY OF TOTAL LEFT KNEE REPLACEMENT: ICD-10-CM

## 2025-06-20 DIAGNOSIS — M25.562 LEFT KNEE PAIN, UNSPECIFIED CHRONICITY: Primary | ICD-10-CM

## 2025-06-20 PROCEDURE — 97112 NEUROMUSCULAR REEDUCATION: CPT

## 2025-06-20 PROCEDURE — 97110 THERAPEUTIC EXERCISES: CPT

## 2025-06-20 NOTE — PROGRESS NOTES
"Daily Note     Today's date: 2025  Patient name: Chanel Grande  : 1965  MRN: [id number]  Referring provider: Alex Cesar, *  Dx:   Encounter Diagnosis     ICD-10-CM    1. Left knee pain, unspecified chronicity  M25.562       2. History of total left knee replacement  Z96.652           Start Time: 0800  Stop Time: 0843  Total time in clinic (min): 43 minutes    Subjective: Pt reports general improvement with exercises, just notes soreness/discomfort continues to persist. No medical updates reported otherwise.       Objective: See treatment diary below      Assessment: Chanel tolerated treatment well with consistent cuing throughout. TE's were performed with the same resistance and reps. Added single leg leg press this session, with good tolerance (began at 35#, progress as tolerated next session). Following treatment, the patient demonstrated fatigue post treatment, exhibited good technique with therapeutic exercises, and would benefit from continued PT.         Plan: Continue per plan of care.      {  Neuro Re-Ed           Bridges tband abd    gtb 5\"x20   gtb 5''x20       Clamshells tband   Gtb 3\"2x10   Gtb 3\"2x10        SL hip abd    hep  2x10       Counter top (bar) planks   20\"x3   20\"x3        SLR    3\"2x10   3'' 2x10                                                 Therex             Rec bike Maintain neutral  5 mins   5 min        leg press single leg   NV  35# 2x10 ea                                                                                                          Gait Training              lateral step up   L2 x10 SLOW  L2 2x10 SLOW       Step down controlled single leg squat                   Modalities                                                             "

## 2025-06-24 ENCOUNTER — OFFICE VISIT (OUTPATIENT)
Dept: PHYSICAL THERAPY | Facility: REHABILITATION | Age: 60
End: 2025-06-24
Attending: ORTHOPAEDIC SURGERY
Payer: COMMERCIAL

## 2025-06-24 DIAGNOSIS — Z96.652 HISTORY OF TOTAL LEFT KNEE REPLACEMENT: ICD-10-CM

## 2025-06-24 DIAGNOSIS — M25.562 LEFT KNEE PAIN, UNSPECIFIED CHRONICITY: Primary | ICD-10-CM

## 2025-06-24 PROCEDURE — 97110 THERAPEUTIC EXERCISES: CPT | Performed by: PHYSICAL THERAPIST

## 2025-06-24 PROCEDURE — 97112 NEUROMUSCULAR REEDUCATION: CPT | Performed by: PHYSICAL THERAPIST

## 2025-06-24 NOTE — PROGRESS NOTES
"Daily Note     Today's date: 2025  Patient name: Chanel Grande  : 1965  MRN: [id number]  Referring provider: Alex Cesar, *  Dx:   Encounter Diagnosis     ICD-10-CM    1. Left knee pain, unspecified chronicity  M25.562       2. History of total left knee replacement  Z96.652                      Subjective: Pt notes having a migraine today with some restrictions but denies knee pain.  Pt did have discomfort over the weekend into left LE.        Objective: See treatment diary below    Precautions: left TKA, migraine        Manual  6/10/25  6/17/25  6/20/25  6/24/25                                                                                 Neuro Re-Ed             Bridges tband abd    gtb 5\"x20   gtb 5\"x20   gtb 5\"2x15      Clamshells tband   Gtb 3\"2x10   gtb 3\"2x10   gtb 3\"2x15      SL hip abd    hep  2x10   np     Counter top (bar) planks   20\"x3   20\"x3  np     SLR    3\"2x10   3\"2x10   3\"2x10 ea                                               Therex             Rec bike Maintain neutral  5 mins   6 mins   6 mins       leg press single leg   NV  35# 2x10   np                                                                                                        Gait Training              lateral step up   L2 x10 SLOW  L2 x10 SLOW       Step down controlled single leg squat                   Modalities                                                                 Assessment: Pt does well with exercises despite having migraine.  Pt does cont to require cues to maintain knee neutral position as pt cont to adduct left LE to place valgus stress into left knee.  Will resume complete program NV as able.         Plan: Continue per plan of care.         "

## 2025-07-03 ENCOUNTER — OFFICE VISIT (OUTPATIENT)
Dept: PHYSICAL THERAPY | Facility: REHABILITATION | Age: 60
End: 2025-07-03
Attending: ORTHOPAEDIC SURGERY
Payer: COMMERCIAL

## 2025-07-03 DIAGNOSIS — M25.562 LEFT KNEE PAIN, UNSPECIFIED CHRONICITY: Primary | ICD-10-CM

## 2025-07-03 DIAGNOSIS — Z96.652 HISTORY OF TOTAL LEFT KNEE REPLACEMENT: ICD-10-CM

## 2025-07-03 PROCEDURE — 97110 THERAPEUTIC EXERCISES: CPT | Performed by: PHYSICAL THERAPIST

## 2025-07-03 PROCEDURE — 97112 NEUROMUSCULAR REEDUCATION: CPT | Performed by: PHYSICAL THERAPIST

## 2025-07-03 NOTE — PROGRESS NOTES
"Daily Note     Today's date: 7/3/2025  Patient name: Chanel Grande  : 1965  MRN: [id number]  Referring provider: Alex Cesar, *  Dx:   Encounter Diagnosis     ICD-10-CM    1. Left knee pain, unspecified chronicity  M25.562       2. History of total left knee replacement  Z96.652                      Subjective: Pt notes knee \"about the same.\"       Objective: See treatment diary below    Precautions: left TKA, migraine        Manual  6/10/25  6/17/25  6/20/25  6/24/25  7/3/25                                                                               Neuro Re-Ed             Bridges tband abd    gtb 5\"x20   gtb 5\"x20   gtb 5\"2x15   gtb 5\"2x15    Clamshells tband   Gtb 3\"2x10   gtb 3\"2x10   gtb 3\"2x15   gtb 3\"2x15    SL hip abd    hep  2x10   np  3\"2x10    Counter top (bar) planks   20\"x3   20\"x3  np     SLR    3\"2x10   3\"2x10   3\"2x10 ea  3\"2x10     knee proprioception prior to bridge         X5 placing left knee in neutral posture versus IR and valgus                               Therex             Rec bike Maintain neutral  5 mins   6 mins   6 mins   7 mins     leg press single leg   NV  35# 2x10   np  35# 2x10                                                                                                       Gait Training              lateral step up   L2 x10 SLOW  L2 x10 SLOW       Step down controlled single leg squat         Floor 2x10          Modalities                                                                 Assessment: Pt cont to require cues to diminsh left knee valgus-does well with integration of proprioception/biofeedback to left knee when in support position. Pt encouraged to use feedback to assist with integration of changes and technique for maximal benefit of exercises/strengthening. Pt is progressing well towards goals.         Plan: Continue per plan of care.         "

## 2025-07-10 ENCOUNTER — HOSPITAL ENCOUNTER (OUTPATIENT)
Dept: SLEEP CENTER | Facility: CLINIC | Age: 60
Discharge: HOME | End: 2025-07-10
Payer: COMMERCIAL

## 2025-07-10 DIAGNOSIS — G47.33 OSA (OBSTRUCTIVE SLEEP APNEA): ICD-10-CM

## 2025-07-10 PROCEDURE — G0399 HOME SLEEP TEST/TYPE 3 PORTA: HCPCS

## 2025-07-10 NOTE — PROGRESS NOTES
Home Sleep Study Documentation    HOME STUDY DEVICE: Noxturnal no                                           Karley G3 yes device # 15      Pre-Sleep Home Study:    Set-up and instructions performed by: Linda    Technician performed demonstration for Patient: yes    Return demonstration performed by Patient: yes    Written instructions provided to Patient: yes    Patient signed consent form: yes          Post-Sleep Home Study:    Post Test Questionnaire Data: Pending    Additional comments by Patient: pending    Home Sleep Study Failed:pending    Failure reason: pending    Reported or Detected: pending    Scored by: pending

## 2025-07-11 ENCOUNTER — TELEPHONE (OUTPATIENT)
Age: 60
End: 2025-07-11

## 2025-07-11 NOTE — TELEPHONE ENCOUNTER
Patient called to report she will be late dropping the sleep kit off but it will be before 9am today.      Any questions please patient.    Thank you

## 2025-07-14 ENCOUNTER — OFFICE VISIT (OUTPATIENT)
Dept: PHYSICAL THERAPY | Facility: REHABILITATION | Age: 60
End: 2025-07-14
Attending: ORTHOPAEDIC SURGERY
Payer: COMMERCIAL

## 2025-07-14 DIAGNOSIS — M25.562 LEFT KNEE PAIN, UNSPECIFIED CHRONICITY: Primary | ICD-10-CM

## 2025-07-14 DIAGNOSIS — Z96.652 HISTORY OF TOTAL LEFT KNEE REPLACEMENT: ICD-10-CM

## 2025-07-14 PROCEDURE — 97110 THERAPEUTIC EXERCISES: CPT | Performed by: PHYSICAL THERAPIST

## 2025-07-14 PROCEDURE — 97112 NEUROMUSCULAR REEDUCATION: CPT | Performed by: PHYSICAL THERAPIST

## 2025-07-14 NOTE — PROGRESS NOTES
"Daily Note     Today's date: 2025  Patient name: Chanel Grande  : 1965  MRN: [id number]  Referring provider: Alex Cesar, *  Dx:   Encounter Diagnosis     ICD-10-CM    1. Left knee pain, unspecified chronicity  M25.562       2. History of total left knee replacement  Z96.652                      Subjective: Pt reports that she has been having some left leg discomfort and pain with negotiation of stairs while carrying her grandson.        Objective: See treatment diary below    Precautions: left TKA, migraine        Manual  7/14/25  6/17/25  6/20/25  6/24/25  7/3/25                                                                               Neuro Re-Ed             Bridges tband abd    gtb 5\"x20   gtb 5\"x20   gtb 5\"2x15   gtb 5\"2x15    Clamshells tband  gtb 3\"2x10  Gtb 3\"2x10   gtb 3\"2x10   gtb 3\"2x15   gtb 3\"2x15    SL hip abd  gtb 3\"2x10  hep  2x10   np  3\"2x10    Counter top (bar) planks   20\"x3   20\"x3  np     SLR  hep  3\"2x10   3\"2x10   3\"2x10 ea  3\"2x10     knee proprioception prior to bridge         X5 placing left knee in neutral posture versus IR and valgus                               Therex             Rec bike 8 mins  5 mins   6 mins   6 mins   7 mins     leg press single leg  45# 2x15 NV  35# 2x10   np  35# 2x10                                                                                                       Gait Training              lateral step up   L2 x10 SLOW  L2 x10 SLOW       Step down controlled single leg squat Stair descending x3 flights SLOW       Floor 2x10          Modalities                                                                 Assessment: Focused on slow controlled descending stairs and single leg leg press to focus on fatigue.  Pt notes when performing ex she has been stopping prior to fatigue and educated pt to perform reps within fatigue as the appropriate way to strengthen within control.         Plan: Continue per plan of care.         "

## 2025-07-22 ENCOUNTER — EVALUATION (OUTPATIENT)
Dept: PHYSICAL THERAPY | Facility: REHABILITATION | Age: 60
End: 2025-07-22
Attending: ORTHOPAEDIC SURGERY
Payer: COMMERCIAL

## 2025-07-22 DIAGNOSIS — M25.562 LEFT KNEE PAIN, UNSPECIFIED CHRONICITY: Primary | ICD-10-CM

## 2025-07-22 DIAGNOSIS — Z96.652 HISTORY OF TOTAL LEFT KNEE REPLACEMENT: ICD-10-CM

## 2025-07-22 PROCEDURE — 97112 NEUROMUSCULAR REEDUCATION: CPT | Performed by: PHYSICAL THERAPIST

## 2025-07-22 PROCEDURE — 97110 THERAPEUTIC EXERCISES: CPT | Performed by: PHYSICAL THERAPIST

## 2025-07-22 NOTE — PROGRESS NOTES
"PT Discharge    Today's date: 2025  Patient name: Chanel Grande  : 1965  MRN: [id number]  Referring provider: Alex Cesar, *  Dx:   Encounter Diagnosis     ICD-10-CM    1. Left knee pain, unspecified chronicity  M25.562       2. History of total left knee replacement  Z96.652                      Assessment    Assessment details: Pt has progressed well with PT and has met most funct goals with improved strength and less pain.  Pt is Dc'd from PT to cont with HEP to cont progress strengthening.        Goals  Funct 1. No sleep disturbances x4 weeks.-met  2. Improve tolerance to amb and negotiation of stairs with no reports of left LE buckling x4 weeks.-met  Impairment 1. Decrease pain by 25 % x4 weeks-partially met  2. Increase strength by 1/2 grade x4 weeks.-partially met            Subjective Evaluation    History of Present Illness  Mechanism of injury: Pt notes improvement in sx's with having less pain during extension of left knee in bed and during funct tasks.  Pt notes that she is having less sx's as well occurring during walking and stair activities including no \"buckling\" occurring.  Pt cont to have pain along lateral LE and into lateral knee at times throughout her day, but does feel improvement with focus on \"slowing\" her controlled movements while on the stairs.    Patient Goals  Patient goals for therapy: decreased pain and independence with ADLs/IADLs  Patient goal: -partially met  Pain  Current pain ratin          Objective     Active Range of Motion     Lumbar   Flexion:  WFL  Extension:  WFL  Left lateral flexion:  WFL  Right lateral flexion:  WFL  Left rotation:  WFL  Right rotation:  WFL    Additional Active Range of Motion Details  Pt reports central LB discomfort during trunk ROM in all directions but no reproducible left LE sx's.      Strength/Myotome Testing     Left Hip   Planes of Motion   Flexion: 4+  Extension: 5  Abduction: 4+  External rotation: 5  Internal " rotation: 4    Right Hip   Planes of Motion   Flexion: 5    Left Knee   Flexion: 5  Extension: 5    Left Ankle/Foot   Dorsiflexion: 5  Plantar flexion: 5    Additional Strength Details  Pt able to  SLS position without locking left knee with appropriate sway but no pain or LOB/buckling.     Pt is able to negotiate stairs descending with good control and maintaining knee neutral position throughout.           RE: ROM/strength testing 15 mins; FA 15 mins; HEP review 10 mins.

## 2025-08-05 ENCOUNTER — PATIENT MESSAGE (OUTPATIENT)
Dept: FAMILY MEDICINE CLINIC | Facility: CLINIC | Age: 60
End: 2025-08-05

## (undated) DEVICE — 3M™ IOBAN™ 2 ANTIMICROBIAL INCISE DRAPE 6648EZ: Brand: IOBAN™ 2

## (undated) DEVICE — SAW BLADE RECIPROCATING 179

## (undated) DEVICE — NEEDLE 18 G X 1 1/2

## (undated) DEVICE — HANDPIECE SET WITH RETRACTABLE COAXIAL FAN SPRAY TIP AND SUCTION TUBE: Brand: INTERPULSE

## (undated) DEVICE — ACE WRAP 6 IN UNSTERILE

## (undated) DEVICE — WEBRIL 6 IN UNSTERILE

## (undated) DEVICE — SUT STRATAFIX SPIRAL PDS PLUS 1 CTX 18 IN SXPP1A400

## (undated) DEVICE — SPONGE LAP 18 X 18 IN STRL RFD

## (undated) DEVICE — SUT STRATAFIX SPIRAL MONOCRYL PLUS 2-0 CT-1 30CM SXMP1B412

## (undated) DEVICE — CEMENT MIXING SYSTEM WITH FEMORAL BREAKWAY NOZZLE: Brand: REVOLUTION

## (undated) DEVICE — COOL TEMP PAD

## (undated) DEVICE — SYRINGE 50ML LL

## (undated) DEVICE — 3M™ STERI-DRAPE™ U-DRAPE 1015: Brand: STERI-DRAPE™

## (undated) DEVICE — DRESSING MEPILEX AG BORDER POST-OP 4 X 12 IN

## (undated) DEVICE — PLUMEPEN PRO 10FT

## (undated) DEVICE — ADHESIVE SKIN HIGH VISCOSITY EXOFIN 1ML

## (undated) DEVICE — IMPERVIOUS STOCKINETTE: Brand: DEROYAL

## (undated) DEVICE — HOOD: Brand: FLYTE, SURGICOOL

## (undated) DEVICE — Device

## (undated) DEVICE — I DISPOSABLE MIXING BOWL AND SPATULA: Brand: HOWMEDICA, MIX-KIT

## (undated) DEVICE — 3000CC GUARDIAN II: Brand: GUARDIAN

## (undated) DEVICE — COBAN 6 IN STERILE

## (undated) DEVICE — 4-PORT MANIFOLD: Brand: NEPTUNE 2

## (undated) DEVICE — GLOVE PI ULTRA TOUCH SZ.6.5

## (undated) DEVICE — ABDOMINAL PAD: Brand: DERMACEA

## (undated) DEVICE — GLOVE PI ULTRA TOUCH SZ.8.0

## (undated) DEVICE — GAUZE SPONGES,USP TYPE VII GAUZE, 12 PLY: Brand: CURITY

## (undated) DEVICE — 10FR FRAZIER SUCTION HANDLE: Brand: CARDINAL HEALTH

## (undated) DEVICE — GLOVE PI ULTRA TOUCH SZ.7.5

## (undated) DEVICE — BETHLEHEM UNIV TOTAL KNEE, KIT: Brand: CARDINAL HEALTH

## (undated) DEVICE — SUT VICRYL 1 CTX 36 IN J977H

## (undated) DEVICE — PADDING CAST 6IN COTTON STRL

## (undated) DEVICE — SURGICAL GOWN, XL SMARTSLEEVE: Brand: CONVERTORS

## (undated) DEVICE — GLOVE INDICATOR PI UNDERGLOVE SZ 7 BLUE

## (undated) DEVICE — GLOVE INDICATOR PI UNDERGLOVE SZ 8.5 BLUE

## (undated) DEVICE — SAW BLADE OSCILLATING BRAZOL 167

## (undated) DEVICE — SCD SEQUENTIAL COMPRESSION COMFORT SLEEVE MEDIUM KNEE LENGTH: Brand: KENDALL SCD

## (undated) DEVICE — SUT VICRYL 0 CT-1 36 IN J946H